# Patient Record
Sex: FEMALE | Race: WHITE | Employment: OTHER | URBAN - METROPOLITAN AREA
[De-identification: names, ages, dates, MRNs, and addresses within clinical notes are randomized per-mention and may not be internally consistent; named-entity substitution may affect disease eponyms.]

---

## 2017-02-10 ENCOUNTER — HOSPITAL ENCOUNTER (OUTPATIENT)
Dept: ONCOLOGY | Age: 67
Discharge: HOME | End: 2017-02-10

## 2017-02-10 ENCOUNTER — OFFICE VISIT (OUTPATIENT)
Dept: ONCOLOGY | Age: 67
End: 2017-02-10

## 2017-02-10 ENCOUNTER — HOSPITAL ENCOUNTER (OUTPATIENT)
Dept: LAB | Age: 67
Discharge: HOME | End: 2017-02-10
Payer: MEDICARE

## 2017-02-10 VITALS
WEIGHT: 108 LBS | DIASTOLIC BLOOD PRESSURE: 83 MMHG | BODY MASS INDEX: 22.67 KG/M2 | TEMPERATURE: 98.3 F | SYSTOLIC BLOOD PRESSURE: 155 MMHG | HEIGHT: 58 IN | HEART RATE: 78 BPM

## 2017-02-10 DIAGNOSIS — T45.1X5A NEUROPATHY DUE TO CHEMOTHERAPEUTIC DRUG (HCC): ICD-10-CM

## 2017-02-10 DIAGNOSIS — G62.0 NEUROPATHY DUE TO CHEMOTHERAPEUTIC DRUG (HCC): ICD-10-CM

## 2017-02-10 DIAGNOSIS — C83.18 MANTLE CELL LYMPHOMA, LYMPH NODES OF MULTIPLE SITES (HCC): Primary | ICD-10-CM

## 2017-02-10 DIAGNOSIS — D72.9 NEUTROPHILIA: ICD-10-CM

## 2017-02-10 DIAGNOSIS — F41.9 ANXIETY: ICD-10-CM

## 2017-02-10 DIAGNOSIS — C83.18 MANTLE CELL LYMPHOMA, LYMPH NODES OF MULTIPLE SITES (HCC): ICD-10-CM

## 2017-02-10 LAB
ALBUMIN SERPL BCP-MCNC: 4.5 G/DL (ref 3.4–5)
ALBUMIN/GLOB SERPL: 1.5 {RATIO} (ref 0.8–1.7)
ALP SERPL-CCNC: 73 U/L (ref 45–117)
ALT SERPL-CCNC: 47 U/L (ref 13–56)
ANION GAP BLD CALC-SCNC: 7 MMOL/L (ref 3–18)
AST SERPL W P-5'-P-CCNC: 24 U/L (ref 15–37)
BASO+EOS+MONOS # BLD AUTO: 0.8 K/UL (ref 0–2.3)
BASO+EOS+MONOS # BLD AUTO: 8 % (ref 0.1–17)
BASOPHILS # BLD AUTO: 0.1 K/UL (ref 0–0.06)
BASOPHILS # BLD: 1 % (ref 0–2)
BILIRUB SERPL-MCNC: 0.5 MG/DL (ref 0.2–1)
BUN SERPL-MCNC: 17 MG/DL (ref 7–18)
BUN/CREAT SERPL: 24 (ref 12–20)
CALCIUM SERPL-MCNC: 9.4 MG/DL (ref 8.5–10.1)
CHLORIDE SERPL-SCNC: 106 MMOL/L (ref 100–108)
CO2 SERPL-SCNC: 27 MMOL/L (ref 21–32)
CREAT SERPL-MCNC: 0.72 MG/DL (ref 0.6–1.3)
DIFFERENTIAL METHOD BLD: ABNORMAL
DIFFERENTIAL METHOD BLD: ABNORMAL
EOSINOPHIL # BLD: 0.2 K/UL (ref 0–0.4)
EOSINOPHIL NFR BLD: 2 % (ref 0–5)
ERYTHROCYTE [DISTWIDTH] IN BLOOD BY AUTOMATED COUNT: 13.2 % (ref 11.5–14.5)
ERYTHROCYTE [DISTWIDTH] IN BLOOD BY AUTOMATED COUNT: 13.8 % (ref 11.6–14.5)
GLOBULIN SER CALC-MCNC: 3.1 G/DL (ref 2–4)
GLUCOSE SERPL-MCNC: 89 MG/DL (ref 74–99)
HCT VFR BLD AUTO: 42 % (ref 36–48)
HCT VFR BLD AUTO: 43.1 % (ref 35–45)
HGB BLD-MCNC: 14.8 G/DL (ref 12–16)
HGB BLD-MCNC: 15.1 G/DL (ref 12–16)
LYMPHOCYTES # BLD AUTO: 10 % (ref 21–52)
LYMPHOCYTES # BLD AUTO: 9 % (ref 14–44)
LYMPHOCYTES # BLD: 0.9 K/UL (ref 1.1–5.9)
LYMPHOCYTES # BLD: 1 K/UL (ref 0.9–3.6)
MCH RBC QN AUTO: 33.2 PG (ref 25–35)
MCH RBC QN AUTO: 34.2 PG (ref 24–34)
MCHC RBC AUTO-ENTMCNC: 35 G/DL (ref 31–37)
MCHC RBC AUTO-ENTMCNC: 35.2 G/DL (ref 31–37)
MCV RBC AUTO: 94.2 FL (ref 78–102)
MCV RBC AUTO: 97.7 FL (ref 74–97)
MONOCYTES # BLD: 0.7 K/UL (ref 0.05–1.2)
MONOCYTES NFR BLD AUTO: 7 % (ref 3–10)
NEUTS SEG # BLD: 8.1 K/UL (ref 1.8–9.5)
NEUTS SEG # BLD: 8.2 K/UL (ref 1.8–8)
NEUTS SEG NFR BLD AUTO: 80 % (ref 40–73)
NEUTS SEG NFR BLD AUTO: 83 % (ref 40–70)
PLATELET # BLD AUTO: 244 K/UL (ref 135–420)
PLATELET # BLD AUTO: 253 K/UL (ref 140–440)
PMV BLD AUTO: 9.3 FL (ref 9.2–11.8)
POTASSIUM SERPL-SCNC: 4.1 MMOL/L (ref 3.5–5.5)
PROT SERPL-MCNC: 7.6 G/DL (ref 6.4–8.2)
RBC # BLD AUTO: 4.41 M/UL (ref 4.2–5.3)
RBC # BLD AUTO: 4.46 M/UL (ref 4.1–5.1)
SODIUM SERPL-SCNC: 140 MMOL/L (ref 136–145)
WBC # BLD AUTO: 10.2 K/UL (ref 4.6–13.2)
WBC # BLD AUTO: 9.8 K/UL (ref 4.5–13)

## 2017-02-10 PROCEDURE — 85025 COMPLETE CBC W/AUTO DIFF WBC: CPT | Performed by: INTERNAL MEDICINE

## 2017-02-10 PROCEDURE — 80053 COMPREHEN METABOLIC PANEL: CPT | Performed by: INTERNAL MEDICINE

## 2017-02-10 PROCEDURE — 36415 COLL VENOUS BLD VENIPUNCTURE: CPT | Performed by: INTERNAL MEDICINE

## 2017-02-10 PROCEDURE — 84155 ASSAY OF PROTEIN SERUM: CPT | Performed by: INTERNAL MEDICINE

## 2017-02-10 NOTE — PROGRESS NOTES
Hematology/Oncology  Progress Note    Name: Akhil Richards  Date: 2/10/2017  : 1950    PCP: Reynaldo Yates MD     Ms. Lyle is a 73year old female who was seen for management of her non-Hodgkin lymphoma, neuropathy,and arthritis    Current therapy: Supportive care and active surveillance. Subjective:     Ms. Lyle is a 71-year-old woman who has a history of mantle cell lymphoma. She has completed a full course of systemic chemotherapy. Today she has no concerns or complaints. She denies any night sweats, fevers or unexplained infections at this time. She denies weight loss. Since her last clinic visit she has not had any further episodes of recurrent diverticulitis. The patient reports that she has continued to maintain a high-fiber diet which was recommended with her last episode of diverticulitis. She is doing well and denies any abdominal pain or discomfort. She also report that her neuropathy associated with her prior chemotherapy is better and has not have to use the Neurontin. Past medical history, family history, and social history: these were reviewed and remains unchanged.     Past Medical History   Diagnosis Date    Anxiety 6/15     IKE-7 was     Calculus of kidney     Clostridium difficile colitis 3/14     Dr. Kevin Montilla adenoma      10/11 Dr. Jenny Trejo Cystic breast     Depression 6/15     PHQ-9 was 15/27    Diverticulitis      recurrent x3 Dr Wilfrido Mckeon liver      10/10 on US, CT ; Fib-4 was 0.74 from 1/15    GI bleed      ischemic colitis on CT, seen by Dr. Erik Wade H/O mammogram 6/3/2016    Hyperlipidemia      calculated 10 year risk score was 3.2% (1/15)    Hypertension      resolved w wt loss    Ischemic colitis ([address] 3/14     Dr Hedrick Matters    Labial abscess      + MRSA    Lymphoma ([address]      Dr. Valentino Maxcy    Migraine     [facility] to [facility]     Prediabetes     Zoster      left T11     Past Surgical History   Procedure Laterality Date    Hx appendectomy  1968    Hx orthopaedic  teenager     knee surgery left    Hx mele and bso  04/02     Dr. Yaneli Jansen Pr cardiac surg procedure unlist  2/14     echo shows mild conc lvh, ef 60-65%. no wma    Hx colonoscopy       colon polyps 2003 Dr. Dianne Hernandez; adenoma 2011; isch colitis 3/14 Dr Johnna Nathan Hx other surgical       bx for lymphoma    Hx heent       tonsillectomy     Social History     Social History    Marital status:      Spouse name: N/A    Number of children: 2    Years of education: N/A     Occupational History    director [employer]      Social History Main Topics    Smoking status: Current Every Day Smoker     Packs/day: 0.50    Smokeless tobacco: Never Used      Comment: smoking cessation advised    Alcohol use 2.5 oz/week     5 Glasses of wine per week      Comment: socially    Drug use: No    Sexual activity: Not Currently     Other Topics Concern    Not on file     Social History Narrative     Family History   Problem Relation Age of Onset    Adopted: Yes     Current Outpatient Prescriptions   Medication Sig Dispense Refill    hyoscyamine (LEVSIN) 0.125 mg tablet Take 1 Tab by mouth every four (4) hours as needed for Cramping. 40 Tab 0       Review of Systems  Constitutional: The patient has no complaints or acute distress  HEENT: The patient denies recent head trauma, eye pain, blurred vision,  hearing deficit, oropharyngeal mucosal pain or lesions, and the patient denies throat pain or discomfort. Lymphatics: The patient denies palpable peripheral lymphadenopathy. Hematologic: The patient denies having bruising, bleeding, or progressive fatigue. Respiratory: Patient denies cough,SOB, or fevers. Cardiovascular: The patient denies having leg pain, leg swelling, heart palpitations, chest permit, chest pain, or lightheadedness. The patient denies having dyspnea on exertion.   Neurologic: The patient is complaining of paresthesias in her hands and feet and legs due to prior chemotherapy use. Gastrointestinal: The patient denies having nausea, emesis, or diarrhea. The patient denies having any hematemesis or blood in the stool. Genitourinary: Patient denies having urinary urgency, frequency, or dysuria. The patient denies having blood in the urine. Psychological: The patient denies having symptoms of nervousness, anxiety, depression, or thoughts of harming himself some of this. Skin: Patient denies having skin rashes, skin, ulcerations, or unexplained itching or pruritus. Musculoskeletal: The patient denies pains in muscles or joints    Objective:     Visit Vitals    /83    Pulse 78    Temp 98.3 °F (36.8 °C)    Ht 4' 10\" (1.473 m)    Wt 49 kg (108 lb)    BMI 22.57 kg/m2     ECOG PS=0, pain score=0/10   Physical Exam:   Gen. Appearance: The patient is in no acute distress. Skin: There is no bruise or rash. HEENT: The exam is unremarkable. Neck: Supple without lymphadenopathy or thyromegaly. Lungs: Clear to auscultation and percussion; there are no wheezes or rhonchi. Heart: Regular rate and rhythm; there are no murmurs, gallops, or rubs. Abdomen: Bowel sounds are present and normal.  There is no guarding, tenderness, or hepatosplenomegaly. Extremities: There is no clubbing, cyanosis, or edema. Neurologic: There are no focal neurologic deficits, except for her complaints of paresthesias in her hands and feet. .  Lymphatics: There is no palpable peripheral lymphadenopathy. Musculoskeletal: The patient has full range of motion at all joints. There is no evidence of joint deformity or effusions. There is no focal joint tenderness. She does complain of pain on standing and with flexion and extension of the leg and hip and knee joints. Psychological/psychiatric: There is no clinical evidence of anxiety, depression, or melancholy.     Lab data:      Results for orders placed or performed during the hospital encounter of 02/10/17   CBC WITH 3 PART DIFF     Status: Abnormal   Result Value Ref Range Status    WBC 9.8 4.5 - 13.0 K/uL Final    RBC 4.46 4.10 - 5.10 M/uL Final    HGB 14.8 12.0 - 16.0 g/dL Final    HCT 42.0 36 - 48 % Final    MCV 94.2 78 - 102 FL Final    MCH 33.2 25.0 - 35.0 PG Final    MCHC 35.2 31 - 37 g/dL Final    RDW 13.2 11.5 - 14.5 % Final    PLATELET 095 226 - 426 K/uL Final    NEUTROPHILS 83 (H) 40 - 70 % Final    MIXED CELLS 8 0.1 - 17 % Final    LYMPHOCYTES 9 (L) 14 - 44 % Final    ABS. NEUTROPHILS 8.1 1.8 - 9.5 K/UL Final    ABS. MIXED CELLS 0.8 0.0 - 2.3 K/uL Final    ABS. LYMPHOCYTES 0.9 (L) 1.1 - 5.9 K/UL Final     Comment: Test performed at [address] Results Reviewed by Medical Director. DF AUTOMATED   Final           Assessment:     1. Mantle cell lymphoma, lymph nodes of multiple sites ([facility]    2. Neutrophilia    3. Neuropathy due to chemotherapeutic drug ([facility]    4. Anxiety      Plan:   Antecedent lymphoma, multiple sites: The patient has completed a full course of systemic chemotherapy. Clinically there is no evidence of disease recurrence. I will recheck her comprehensive metabolic panel, sedimentation rate and the liver function tests. Neutrophilia: I explained to the patient that she is at risk for the development of myelodysplastic syndrome from prior chemotherapy. Her neutrophil count today remain at 76%. A prior flow cytometry did not show any evidence of immunophenotypic abnormalities. There was no evidence of any ongoing leukemia or recurrent lymphoma. .    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 We will continue to monitor this every 4 months at the time of her followup visits.     The patient will return in 4months for a complete reassessment     Orders Placed This Encounter    COMPLETE CBC & AUTO DIFF WBC    InHouse CBC ([employer]))     Standing Status:   Future     Number of Occurrences:   1     Standing Expiration Date:   3/10/0810    METABOLIC PANEL, COMPREHENSIVE     Standing Status:   Future     Standing Expiration Date:   2018    SPEP     Standing Status:   Future     Standing Expiration Date:   2018    CBC WITH AUTOMATED DIFF     Standing Status:   Future     Standing Expiration Date:   2018       Fred Schmitt MD  2/10/2017                                                                                            Hematology/Oncology  Progress Note    Name: Claritza Marie  Date: 2/10/2017  : 1950    PCP: Maude Marcos MD     Ms. Katheryn Barnes is a 60year old female who was seen for management of her non-Hodgkin lymphoma, neuropathy,and arthritis  Current therapy: Supportive care and active surveillance. Subjective:     Ms. Katheryn Barnes is a 41-year-old woman who has a history of mantle cell lymphoma. She has completed a full course of systemic chemotherapy. Today she has no concerns or complaints. She states last week she had three nights of sweats, but this has since resolved. She denies fever or weight loss. She still has complaints of the neuropathy associated with her prior chemotherapy usage, however this is much better since starting Neurontin. Past medical history, family history, and social history: these were reviewed and remains unchanged.     Past Medical History   Diagnosis Date    Anxiety 6/15     IKE-7 was     Calculus of kidney     Clostridium difficile colitis 3/14     Dr. Claudia Christensenble adenoma      10/11 Dr. Stevie Severs Cystic breast     Depression 6/15     PHQ-9 was 15/27    Diverticulitis      recurrent x3 Dr Oc Peck liver      10/10 on US, CT ; Fib-4 was 0.74 from 1/15    GI bleed      ischemic colitis on CT, seen by Dr. Beto Mills H/O mammogram 6/3/2016    Hyperlipidemia      calculated 10 year risk score was 3.2% (1/15)    Hypertension resolved w wt loss    Ischemic colitis ([facility] 3/14     Dr Gary Renee    Labial abscess      + MRSA    Lymphoma ([facility] 12/13     Dr. Preston Keen    Migraine     Neuropathy due to chemotherapeutic drug ([facility] 9/14    Prediabetes     Zoster 8/14     left T11     Past Surgical History   Procedure Laterality Date    Hx appendectomy  1968    Hx orthopaedic  teenager     knee surgery left    Hx mele and bso  04/02     Dr. Steph Gaviria Pr cardiac surg procedure unlist  2/14     echo shows mild conc lvh, ef 60-65%. no wma    Hx colonoscopy       colon polyps 2003 Dr. 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[employer] [place]      Social History Main Topics    Smoking status: Current Every Day Smoker     Packs/day: 0.50    Smokeless tobacco: Never Used      Comment: smoking cessation advised    Alcohol use 2.5 oz/week     5 Glasses of wine per week      Comment: socially    Drug use: No    Sexual activity: Not Currently     Other Topics Concern    Not on file     Social History Narrative     Family History   Problem Relation Age of Onset    Adopted: Yes     Current Outpatient Prescriptions   Medication Sig Dispense Refill    hyoscyamine (LEVSIN) 0.125 mg tablet Take 1 Tab by mouth every four (4) hours as needed for Cramping. 40 Tab 0       Review of Systems  Constitutional: The patient has no complaints or acute distress  HEENT: The patient denies recent head trauma, eye pain, blurred vision,  hearing deficit, oropharyngeal mucosal pain or lesions, and the patient denies throat pain or discomfort. Lymphatics: The patient denies palpable peripheral lymphadenopathy. Hematologic: The patient denies having bruising, bleeding, or progressive fatigue.   Respiratory: Patient denies cough,SOB, or fevers. Cardiovascular: The patient denies having leg pain, leg swelling, heart palpitations, chest permit, chest pain, or lightheadedness. The patient denies having dyspnea on exertion. Neurologic: The patient is complaining of paresthesias in her hands and feet and legs due to prior chemotherapy use. Gastrointestinal: The patient denies having nausea, emesis, or diarrhea. The patient denies having any hematemesis or blood in the stool. Genitourinary: Patient denies having urinary urgency, frequency, or dysuria. The patient denies having blood in the urine. Psychological: The patient denies having symptoms of nervousness, anxiety, depression, or thoughts of harming himself some of this. Skin: Patient denies having skin rashes, skin, ulcerations, or unexplained itching or pruritus. Musculoskeletal: The patient denies pains in muscles or joints    Objective:     Visit Vitals    /83    Pulse 78    Temp 98.3 °F (36.8 °C)    Ht 4' 10\" (1.473 m)    Wt 49 kg (108 lb)    BMI 22.57 kg/m2     ECOG PS=0, pain score=0/10   Physical Exam:   Gen. Appearance: The patient is in no acute distress. Skin: There is no bruise or rash. HEENT: The exam is unremarkable. Neck: Supple without lymphadenopathy or thyromegaly. Lungs: Clear to auscultation and percussion; there are no wheezes or rhonchi. Heart: Regular rate and rhythm; there are no murmurs, gallops, or rubs. Abdomen: Bowel sounds are present and normal.  There is no guarding, tenderness, or hepatosplenomegaly. Extremities: There is no clubbing, cyanosis, or edema. Neurologic: There are no focal neurologic deficits, except for her complaints of paresthesias in her hands and feet. .  Lymphatics: There is no palpable peripheral lymphadenopathy. Musculoskeletal: The patient has full range of motion at all joints. There is no evidence of joint deformity or effusions. There is no focal joint tenderness.  She does complain of pain on standing and with flexion and extension of the leg and hip and knee joints. Psychological/psychiatric: There is no clinical evidence of anxiety, depression, or melancholy. Lab data:      Results for orders placed or performed during the hospital encounter of 02/10/17   CBC WITH 3 PART DIFF     Status: Abnormal   Result Value Ref Range Status    WBC 9.8 4.5 - 13.0 K/uL Final    RBC 4.46 4.10 - 5.10 M/uL Final    HGB 14.8 12.0 - 16.0 g/dL Final    HCT 42.0 36 - 48 % Final    MCV 94.2 78 - 102 FL Final    MCH 33.2 25.0 - 35.0 PG Final    MCHC 35.2 31 - 37 g/dL Final    RDW 13.2 11.5 - 14.5 % Final    PLATELET 421 996 - 196 K/uL Final    NEUTROPHILS 83 (H) 40 - 70 % Final    MIXED CELLS 8 0.1 - 17 % Final    LYMPHOCYTES 9 (L) 14 - 44 % Final    ABS. NEUTROPHILS 8.1 1.8 - 9.5 K/UL Final    ABS. MIXED CELLS 0.8 0.0 - 2.3 K/uL Final    ABS. LYMPHOCYTES 0.9 (L) 1.1 - 5.9 K/UL Final     Comment: Test performed at [address]. Results Reviewed by Medical Director. DF AUTOMATED   Final           Assessment:     1. Mantle cell lymphoma, lymph nodes of multiple sites ([facility]    2. Neutrophilia    3. Neuropathy due to chemotherapeutic drug ([facility]    4. Anxiety      Plan:   Antecedent lymphoma, multiple sites: The patient has completed a full course of systemic chemotherapy. Clinically there is no evidence of disease recurrence. I will recheck her comprehensive metabolic panel, sedimentation rate and the liver function tests. Neutrophilia: I explained to the patient that she is at risk for the development of myelodysplastic syndrome from prior chemotherapy. Her neutrophil count today is 83%. A prior flow cytometry did not show any evidence of immunophenotypic abnormalities. There was no evidence of any ongoing leukemia or recurrent lymphoma. .    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  We will continue to monitor this every 4 months at the time of her followup visits. Anxiety: The patient reports that her anxiety symptoms have been under control over the last 4 months. She has been doing reasonably well. She does not require any Ativan at this time.     The patient will return in 4months for a complete reassessment     Orders Placed This Encounter    COMPLETE CBC & AUTO DIFF WBC    InHouse CBC ([employer])     Standing Status:   Future     Number of Occurrences:   1     Standing Expiration Date:   7/78/6236    METABOLIC PANEL, COMPREHENSIVE     Standing Status:   Future     Standing Expiration Date:   2/11/2018    SPEP     Standing Status:   Future     Standing Expiration Date:   2/11/2018    CBC WITH AUTOMATED DIFF     Standing Status:   Future     Standing Expiration Date:   2/11/2018       Roger Urbina MD  2/10/2017

## 2017-02-10 NOTE — MR AVS SNAPSHOT
Visit Information Date & Time Provider Department Dept. Phone Encounter #  
 2/10/2017 10:15 AM Bandar Gomez [facility] Office [phone] Follow-up Instructions Return in about 4 months (around 6/10/2017). Your Appointments 6/9/2017 11:00 AM  
Office Visit with Bandar Gomez MD  
[facility] [address] [facility]) Appt Note: 4 mo fu  
 [facility] [address] [place] [id number] [phone]  
  
   
 [facility] [address] [facility] Upcoming Health Maintenance Date Due DTaP/Tdap/Td series (1 - Tdap) 8/3/2004 GLAUCOMA SCREENING Q2Y 11/15/2016 OSTEOPOROSIS SCREENING (DEXA) 11/15/2016 MEDICARE YEARLY EXAM 11/15/2016 Pneumococcal 65+ High/Highest Risk (2 of 2 - PPSV23) 10/20/2017 BREAST CANCER SCRN MAMMOGRAM 6/3/2018 COLONOSCOPY 3/20/2024 Allergies as of 2/10/2017  Review Complete On: 10/17/2016 By: Scott Cotter MD  
  
 Severity Noted Reaction Type Reactions Keflex [Cephalexin] Medium 08/12/2013   Side Effect Nausea Only Augmentin [Amoxicillin-pot Clavulanate]    Diarrhea Demerol [Meperidine]    Nausea Only Current Immunizations  Reviewed on 5/31/2016 Name Date Influenza Vaccine (Quad) PF 9/29/2016, 10/22/2015  3:30 PM  
 Influenza Vaccine PF 1/23/2014 PPD 8/15/2000 Pneumococcal Conjugate (PCV-13) 10/22/2015  3:31 PM  
 Pneumococcal Vaccine (Unspecified Type) 10/20/2012 TD Vaccine 8/2/2004 Zoster Vaccine, Live 1/1/2012 Not reviewed this visit You Were Diagnosed With   
  
 Codes Comments Mantle cell lymphoma, lymph nodes of multiple sites [facility])    -  Primary ICD-10-CM: C83.18 
ICD-9-CM: 200.48 Neutrophilia     ICD-10-CM: D72.9 ICD-9-CM: 288.8 Neuropathy due to chemotherapeutic drug ([facility]     ICD-10-CM: G62.0, T45.1X5A 
ICD-9-CM: 357.6, E933.1 Anxiety     ICD-10-CM: F41.9 ICD-9-CM: 300.00 Vitals BP Pulse Temp Height(growth percentile) Weight(growth percentile) BMI  
 155/83 78 98.3 °F (36.8 °C) 4' 10\" (1.473 m) 108 lb (49 kg) 22.57 kg/m2 OB Status Smoking Status Hysterectomy Current Every Day Smoker BMI and BSA Data Body Mass Index Body Surface Area  
 22.57 kg/m 2 1.42 m 2 Preferred Pharmacy Pharmacy Name Phone [facility] [address], [address] [phone] Your Updated Medication List  
  
   
This list is accurate as of: 2/10/17 11:43 AM.  Always use your most recent med list.  
  
  
  
  
 hyoscyamine 0.125 mg tablet Commonly known as:  Traiana Memory Take 1 Tab by mouth every four (4) hours as needed for Cramping. We Performed the Following COMPLETE CBC & AUTO DIFF WBC [20541 CPT(R)] Follow-up Instructions Return in about 4 months (around 6/10/2017). To-Do List   
 02/10/2017 Lab:  CBC WITH 3 PART DIFF   
  
 02/10/2017 Lab:  METABOLIC PANEL, COMPREHENSIVE   
  
 02/10/2017 Lab:  PROTEIN ELECTROPHORESIS   
  
 02/11/2017 Lab:  CBC WITH AUTOMATED DIFF Introducing [facility]! [address] [facility] introduces [employer] patient portal. Now you can access parts of your medical record, email your doctor's office, and request medication refills online. 1. In your internet browser, go to [email or web address] 2. Click on the First Time User? Click Here link in the Sign In box. You will see the New Member Sign Up page. 3. Enter your [employer] Access Code exactly as it appears below. You will not need to use this code after youve completed the sign-up process. If you do not sign up before the expiration date, you must request a new code. · [employer] Access Code: [email or web address] Expires: 5/11/2017 11:43 AM 
 
4. Enter the last four digits of your Social Security Number (xxxx) and Date of Birth (mm/dd/yyyy) as indicated and click Submit.  You will be taken to the next sign-up page. 5. Create a [email or web address][employer]sh ID. This will be your [employer]CH[employer] login ID and cannot be changed, so think of one that is secure and easy to remember. 6. Create a [employer]ORCH[employer]sh password. You can change your password at any time. 7. Enter your Password Reset Question and Answer. This can be used at a later time if you forget your password. 8. Enter your e-mail address. You will receive e-mail notification when new information is available in [address] 9. Click Sign Up. You can now view and download portions of your medical record. 10. Click the Download Summary menu link to download a portable copy of your medical information. If you have questions, please visit the Frequently Asked Questions section of the [employer]CH[employer] website. Remember, [email or web address] is NOT to be used for urgent needs. For medical emergencies, dial 911. Now available from your iPhone and Android! Please provide this summary of care documentation to your next provider. Your primary care clinician is listed as Emilie Ennis. If you have any questions after today's visit, please call [phone].

## 2017-02-13 LAB
ALBUMIN SERPL ELPH-MCNC: 4.4 G/DL (ref 2.9–4.4)
ALBUMIN/GLOB SERPL: 1.5 {RATIO} (ref 0.7–1.7)
ALPHA1 GLOB SERPL ELPH-MCNC: 0.3 G/DL (ref 0–0.4)
ALPHA2 GLOB SERPL ELPH-MCNC: 0.8 G/DL (ref 0.4–1)
B-GLOBULIN SERPL ELPH-MCNC: 1 G/DL (ref 0.7–1.3)
GAMMA GLOB SERPL ELPH-MCNC: 0.9 G/DL (ref 0.4–1.8)
GLOBULIN SER CALC-MCNC: 2.9 G/DL (ref 2.2–3.9)
M PROTEIN SERPL ELPH-MCNC: 0.7 G/DL
PROT SERPL-MCNC: 7.3 G/DL (ref 6–8.5)

## 2017-04-27 ENCOUNTER — HOSPITAL ENCOUNTER (OUTPATIENT)
Dept: VASCULAR SURGERY | Age: 67
Discharge: HOME | End: 2017-04-27
Attending: PODIATRIST
Payer: MEDICARE

## 2017-04-27 DIAGNOSIS — I70.223 ATHEROSCLEROSIS OF NATIVE ARTERY OF BOTH LOWER EXTREMITIES WITH REST PAIN (HCC): ICD-10-CM

## 2017-04-27 PROCEDURE — 93923 UPR/LXTR ART STDY 3+ LVLS: CPT

## 2017-04-27 NOTE — PROCEDURES
[facility]  *** FINAL REPORT ***    Name: Samira Carrion  MRN: [id number]    Outpatient  : 15 Nov 1950  HIS Order #: [id number]  [address] [facility] Visit #: [id number]  Date: 2017    TYPE OF TEST: Peripheral Arterial Testing    REASON FOR TEST  Tobacco use, Peripheral vascular dz NOS    Right Leg  Segmentals: Normal                     mmHg  Brachial         142  High thigh  Low thigh  Calf             158  Posterior tibial 141  Dorsalis pedis   146  Peroneal  Metatarsal  Toe pressure     108  Doppler:    Normal  Ankle/Brachial: 1.01    Left Leg  Segmentals: Normal                     mmHg  Brachial         144  High thigh         0  Low thigh  Calf             164  Posterior tibial 142  Dorsalis pedis   158  Peroneal  Metatarsal  Toe pressure      98  Doppler:    Normal  Ankle/Brachial: 1.10    INTERPRETATION/FINDINGS  Physiologic testing was performed using continuous wave Doppler and  segmental pressures. 1. No evidence of significant peripheral arterial disease at rest in  the right leg. 2. No evidence of significant peripheral arterial disease at rest in  the left leg. 3. The right ankle/brachial index is 1.01 and the left ankle/brachial  index is 1.10.  4. The right digit/brachial index is 0.75 and the left digit/brachial  index is 0.68    ADDITIONAL COMMENTS  No previous study available for comparison. I have personally reviewed the data relevant to the interpretation of  this  study. TECHNOLOGIST: ARMANI Franco, JOE  Signed: 2017 02:43 PM    PHYSICIAN: Flores Guthrie MD  Signed: 2017 09:13 AM

## 2017-06-23 ENCOUNTER — HOSPITAL ENCOUNTER (OUTPATIENT)
Dept: LAB | Age: 67
Discharge: HOME | End: 2017-06-23
Payer: MEDICARE

## 2017-06-23 ENCOUNTER — HOSPITAL ENCOUNTER (OUTPATIENT)
Dept: ONCOLOGY | Age: 67
Discharge: HOME | End: 2017-06-23

## 2017-06-23 ENCOUNTER — OFFICE VISIT (OUTPATIENT)
Dept: ONCOLOGY | Age: 67
End: 2017-06-23

## 2017-06-23 VITALS
BODY MASS INDEX: 22.36 KG/M2 | WEIGHT: 107 LBS | TEMPERATURE: 98.2 F | SYSTOLIC BLOOD PRESSURE: 191 MMHG | HEART RATE: 61 BPM | DIASTOLIC BLOOD PRESSURE: 85 MMHG

## 2017-06-23 DIAGNOSIS — G62.0 NEUROPATHY DUE TO CHEMOTHERAPEUTIC DRUG (HCC): ICD-10-CM

## 2017-06-23 DIAGNOSIS — D72.9 NEUTROPHILIA: ICD-10-CM

## 2017-06-23 DIAGNOSIS — C83.18 MANTLE CELL LYMPHOMA, LYMPH NODES OF MULTIPLE SITES (HCC): Primary | ICD-10-CM

## 2017-06-23 DIAGNOSIS — T45.1X5A NEUROPATHY DUE TO CHEMOTHERAPEUTIC DRUG (HCC): ICD-10-CM

## 2017-06-23 DIAGNOSIS — F32.9 REACTIVE DEPRESSION: ICD-10-CM

## 2017-06-23 DIAGNOSIS — F41.9 ANXIETY: ICD-10-CM

## 2017-06-23 DIAGNOSIS — C83.18 MANTLE CELL LYMPHOMA, LYMPH NODES OF MULTIPLE SITES (HCC): ICD-10-CM

## 2017-06-23 LAB
ALBUMIN SERPL BCP-MCNC: 4.2 G/DL (ref 3.4–5)
ALBUMIN/GLOB SERPL: 1.3 {RATIO} (ref 0.8–1.7)
ALP SERPL-CCNC: 63 U/L (ref 45–117)
ALT SERPL-CCNC: 52 U/L (ref 13–56)
ANION GAP BLD CALC-SCNC: 8 MMOL/L (ref 3–18)
AST SERPL W P-5'-P-CCNC: 35 U/L (ref 15–37)
BASO+EOS+MONOS # BLD AUTO: 0.5 K/UL (ref 0–2.3)
BASO+EOS+MONOS # BLD AUTO: 6 % (ref 0.1–17)
BILIRUB SERPL-MCNC: 0.3 MG/DL (ref 0.2–1)
BUN SERPL-MCNC: 16 MG/DL (ref 7–18)
BUN/CREAT SERPL: 23 (ref 12–20)
CALCIUM SERPL-MCNC: 9.4 MG/DL (ref 8.5–10.1)
CHLORIDE SERPL-SCNC: 107 MMOL/L (ref 100–108)
CO2 SERPL-SCNC: 25 MMOL/L (ref 21–32)
CREAT SERPL-MCNC: 0.71 MG/DL (ref 0.6–1.3)
DIFFERENTIAL METHOD BLD: ABNORMAL
ERYTHROCYTE [DISTWIDTH] IN BLOOD BY AUTOMATED COUNT: 12.6 % (ref 11.5–14.5)
ERYTHROCYTE [SEDIMENTATION RATE] IN BLOOD: 9 MM/HR (ref 0–30)
GLOBULIN SER CALC-MCNC: 3.3 G/DL (ref 2–4)
GLUCOSE SERPL-MCNC: 87 MG/DL (ref 74–99)
HCT VFR BLD AUTO: 40.1 % (ref 36–48)
HGB BLD-MCNC: 14.1 G/DL (ref 12–16)
LYMPHOCYTES # BLD AUTO: 12 % (ref 14–44)
LYMPHOCYTES # BLD: 0.9 K/UL (ref 1.1–5.9)
MCH RBC QN AUTO: 33.3 PG (ref 25–35)
MCHC RBC AUTO-ENTMCNC: 35.2 G/DL (ref 31–37)
MCV RBC AUTO: 94.6 FL (ref 78–102)
NEUTS SEG # BLD: 6.1 K/UL (ref 1.8–9.5)
NEUTS SEG NFR BLD AUTO: 82 % (ref 40–70)
PLATELET # BLD AUTO: 244 K/UL (ref 140–440)
POTASSIUM SERPL-SCNC: 4.9 MMOL/L (ref 3.5–5.5)
PROT SERPL-MCNC: 7.5 G/DL (ref 6.4–8.2)
RBC # BLD AUTO: 4.24 M/UL (ref 4.1–5.1)
SODIUM SERPL-SCNC: 140 MMOL/L (ref 136–145)
WBC # BLD AUTO: 7.5 K/UL (ref 4.5–13)

## 2017-06-23 PROCEDURE — 85652 RBC SED RATE AUTOMATED: CPT | Performed by: INTERNAL MEDICINE

## 2017-06-23 PROCEDURE — 80053 COMPREHEN METABOLIC PANEL: CPT | Performed by: INTERNAL MEDICINE

## 2017-06-23 PROCEDURE — 36415 COLL VENOUS BLD VENIPUNCTURE: CPT | Performed by: INTERNAL MEDICINE

## 2017-06-23 NOTE — PATIENT INSTRUCTIONS
Non-Hodgkin's Lymphoma: Care Instructions  Your Care Instructions  Non-Hodgkin's lymphoma is a type of cancer that affects part of the immune system (lymph system). Cells normally found in the lymph nodes, spleen, and other parts of the lymph system increase in number and collect in areas where they cause problems. Non-Hodgkin's lymphoma is not contagious and is not caused by an injury. Non-Hodgkin's lymphoma may occur in a single lymph node, a group of lymph nodes, or an organ. It can spread to almost any part of the body, including the liver, bone marrow, and spleen. Treatment for non-Hodgkin's lymphoma depends on the stage of the lymphoma. It is usually treated with medicines called chemotherapy. Your doctor may also give you medicines that work on the body's immune system (immunotherapy). You may also need radiation treatments or a procedure called a bone marrow transplant. Your doctor will talk to you about what kind of treatment may be best for you. When you find out that you have cancer, you may feel many emotions and may need some help coping. Seek out family, friends, and counselors for support. You also can do things at home to make yourself feel better while you go through treatment. Call the [employer] (8-331.399.9641) or visit its website at [employer] for more information. Follow-up care is a key part of your treatment and safety. Be sure to make and go to all appointments, and call your doctor if you are having problems. It's also a good idea to know your test results and keep a list of the medicines you take. How can you care for yourself at home? · Take your medicines exactly as prescribed. Call your doctor if you think you are having a problem with your medicine. You may get medicine for nausea and vomiting if you have these side effects. · Eat healthy food.  If you do not feel like eating, try to eat food that has protein and extra calories to keep up your strength and prevent weight loss. Drink liquid meal replacements for extra calories and protein. Try to eat your main meal early. · Get some physical activity every day, but do not get too tired. Keep doing the hobbies you enjoy as your energy allows. · Take steps to control your stress and workload. Learn relaxation techniques. ¨ Share your feelings. Stress and tension affect our emotions. By expressing your feelings to others, you may be able to understand and cope with them. ¨ Consider joining a support group. Talking about a problem with your spouse, a good friend, or other people with similar problems is a good way to reduce tension and stress. ¨ Express yourself through art. Try writing, crafts, dance, or art to relieve stress. Some dance, writing, or art groups may be available just for people who have cancer. ¨ Be kind to your body and mind. Getting enough sleep, eating a healthy diet, and taking time to do things you enjoy can contribute to an overall feeling of balance in your life and can help reduce stress. ¨ Get help if you need it. Discuss your concerns with your doctor or counselor. · If you are vomiting or have diarrhea:  ¨ Drink plenty of fluids (enough so that your urine is light yellow or clear like water) to prevent dehydration. Choose water and other caffeine-free clear liquids. If you have kidney, heart, or liver disease and have to limit fluids, talk with your doctor before you increase the amount of fluids you drink. ¨ When you are able to eat, try clear soups, mild foods, and liquids until all symptoms are gone for 12 to 48 hours. Other good choices include dry toast, crackers, cooked cereal, and gelatin dessert, such as Jell-O.  · If you have not already done so, prepare a list of advance directives. Advance directives are instructions to your doctor and family members about what kind of care you want if you become unable to speak or express yourself. When should you call for help?   Call [phone] anytime you think you may need emergency care. For example, call if:  · You passed out (lost consciousness). · You have trouble breathing. · You cough up blood. · You vomit blood or what looks like coffee grounds. · You pass maroon or very bloody stools. · You have symptoms of a stroke. These may include:  ¨ Sudden numbness, tingling, weakness, or loss of movement in your face, arm, or leg, especially on only one side of your body. ¨ Sudden vision changes. ¨ Sudden trouble speaking. ¨ Sudden confusion or trouble understanding simple statements. ¨ Sudden problems with walking or balance. ¨ A sudden, severe headache that is different from past headaches. Call your doctor now or seek immediate medical care if:  · You have severe pain. · You are dizzy or lightheaded, or you feel like you may faint. · You are sick to your stomach or cannot keep fluids down. · You have any unusual bleeding, such as:  ¨ Blood spots under the skin. ¨ A nosebleed that you cannot stop. ¨ Bleeding gums when you brush your teeth. ¨ Blood in your urine. ¨ Vaginal bleeding when you are not having your period, or heavy period bleeding. · Your stools are black and tarlike or have streaks of blood. · You have signs of an infection, such as:  ¨ Increased pain, swelling, warmth, or redness. ¨ Red streaks leading from a bruise. ¨ Pus draining from a wound. ¨ A fever or chills. ¨ Burning when you urinate. Watch closely for changes in your health, and be sure to contact your doctor if:  · You cough up yellow or green mucus. · You have trouble controlling your pain. Where can you learn more? Go to http://kevin-justen.info/. Enter O194 in the search box to learn more about \"Non-Hodgkin's Lymphoma: Care Instructions. \"  Current as of: July 26, 2016  Content Version: 11.3  © 9507-2684 [employer].  Care instructions adapted under license by [employer] (which disclaims liability or warranty for this information). If you have questions about a medical condition or this instruction, always ask your healthcare professional. [place] [address] any warranty or liability for your use of this information.

## 2017-06-23 NOTE — MR AVS SNAPSHOT
Visit Information Date & Time Provider Department Dept. Phone Encounter #  
 6/23/2017  1:00 PM Mati Juarez[facility] Office [phone] [id number] Follow-up Instructions Return in about 4 months (around 10/23/2017). Your Appointments 10/20/2017  2:00 PM  
Office Visit with Mati Juarez MD  
[address] ([facility]) Appt Note: 4 mo fu  
 [facility] [address] [place] [address] [address] [facility] [address]  
[phone]  
  
   
 [address] Upcoming Health Maintenance Date Due DTaP/Tdap/Td series (1 - Tdap) 8/3/2004 GLAUCOMA SCREENING Q2Y 11/15/2016 OSTEOPOROSIS SCREENING (DEXA) 11/15/2016 MEDICARE YEARLY EXAM 11/15/2016 INFLUENZA AGE 9 TO ADULT 8/1/2017 Pneumococcal 65+ High/Highest Risk (2 of 2 - PPSV23) 10/20/2017 BREAST CANCER SCRN MAMMOGRAM 6/3/2018 COLONOSCOPY 3/20/2024 Allergies as of 6/23/2017  Review Complete On: 6/23/2017 By: Mati Juarez MD  
  
 Severity Noted Reaction Type Reactions Keflex [Cephalexin] Medium 08/12/2013   Side Effect Nausea Only Augmentin [Amoxicillin-pot Clavulanate]    Diarrhea Demerol [Meperidine]    Nausea Only Current Immunizations  Reviewed on 5/31/2016 Name Date Influenza Vaccine (Quad) PF 9/29/2016, 10/22/2015  3:30 PM  
 Influenza Vaccine PF 1/23/2014 PPD 8/15/2000 Pneumococcal Conjugate (PCV-13) 10/22/2015  3:31 PM  
 Pneumococcal Vaccine (Unspecified Type) 10/20/2012 TD Vaccine 8/2/2004 Zoster Vaccine, Live 1/1/2012 Not reviewed this visit You Were Diagnosed With   
  
 Codes Comments Mantle cell lymphoma, lymph nodes of multiple sites [facility])    -  Primary ICD-10-CM: C83.18 
ICD-9-CM: 200.48 Neutrophilia     ICD-10-CM: D72.9 ICD-9-CM: 288.8 Neuropathy due to chemotherapeutic drug ([address]     ICD-10-CM: G62.0, T45.1X5A 
ICD-9-CM: 357.6, E933.1 Reactive depression     ICD-10-CM: F32.9 ICD-9-CM: 300.4 Anxiety     ICD-10-CM: F41.9 ICD-9-CM: 300.00 Vitals BP Pulse Temp Weight(growth percentile) BMI OB Status 191/85 61 98.2 °F (36.8 °C) 107 lb (48.5 kg) 22.36 kg/m2 Hysterectomy Smoking Status Current Every Day Smoker BMI and BSA Data Body Mass Index Body Surface Area  
 22.36 kg/m 2 1.41 m 2 Preferred Pharmacy Pharmacy Name Phone [facility] [address], [address] [phone] Your Updated Medication List  
  
   
This list is accurate as of: 6/23/17  3:01 PM.  Always use your most recent med list.  
  
  
  
  
 hyoscyamine 0.125 mg tablet Commonly known as:  Loreda So Take 1 Tab by mouth every four (4) hours as needed for Cramping. We Performed the Following COMPLETE CBC & AUTO DIFF WBC [44192 CPT(R)] METABOLIC PANEL, COMPREHENSIVE [66345 CPT(R)] SED RATE (ESR) [id number] CPT(R)] Follow-up Instructions Return in about 4 months (around 10/23/2017). To-Do List   
 06/23/2017 Lab:  CBC WITH 3 PART DIFF Patient Instructions Non-Hodgkin's Lymphoma: Care Instructions Your Care Instructions Non-Hodgkin's lymphoma is a type of cancer that affects part of the immune system (lymph system). Cells normally found in the lymph nodes, spleen, and other parts of the lymph system increase in number and collect in areas where they cause problems. Non-Hodgkin's lymphoma is not contagious and is not caused by an injury. Non-Hodgkin's lymphoma may occur in a single lymph node, a group of lymph nodes, or an organ. It can spread to almost any part of the body, including the liver, bone marrow, and spleen. Treatment for non-Hodgkin's lymphoma depends on the stage of the lymphoma. It is usually treated with medicines called chemotherapy.  Your doctor may also give you medicines that work on the body's immune system (immunotherapy). You may also need radiation treatments or a procedure called a bone marrow transplant. Your doctor will talk to you about what kind of treatment may be best for you. When you find out that you have cancer, you may feel many emotions and may need some help coping. Seek out family, friends, and counselors for support. You also can do things at home to make yourself feel better while you go through treatment. Call the [employer] (5-367.282.5423) or visit its website at [employer] [employer] for more information. Follow-up care is a key part of your treatment and safety. Be sure to make and go to all appointments, and call your doctor if you are having problems. It's also a good idea to know your test results and keep a list of the medicines you take. How can you care for yourself at home? · Take your medicines exactly as prescribed. Call your doctor if you think you are having a problem with your medicine. You may get medicine for nausea and vomiting if you have these side effects. · Eat healthy food. If you do not feel like eating, try to eat food that has protein and extra calories to keep up your strength and prevent weight loss. Drink liquid meal replacements for extra calories and protein. Try to eat your main meal early. · Get some physical activity every day, but do not get too tired. Keep doing the hobbies you enjoy as your energy allows. · Take steps to control your stress and workload. Learn relaxation techniques. ¨ Share your feelings. Stress and tension affect our emotions. By expressing your feelings to others, you may be able to understand and cope with them. ¨ Consider joining a support group. Talking about a problem with your spouse, a good friend, or other people with similar problems is a good way to reduce tension and stress. ¨ Express yourself through art. Try writing, crafts, dance, or art to relieve stress.  Some dance, writing, or art groups may be available just for people who have cancer. ¨ Be kind to your body and mind. Getting enough sleep, eating a healthy diet, and taking time to do things you enjoy can contribute to an overall feeling of balance in your life and can help reduce stress. ¨ Get help if you need it. Discuss your concerns with your doctor or counselor. · If you are vomiting or have diarrhea: ¨ Drink plenty of fluids (enough so that your urine is light yellow or clear like water) to prevent dehydration. Choose water and other caffeine-free clear liquids. If you have kidney, heart, or liver disease and have to limit fluids, talk with your doctor before you increase the amount of fluids you drink. ¨ When you are able to eat, try clear soups, mild foods, and liquids until all symptoms are gone for 12 to 48 hours. Other good choices include dry toast, crackers, cooked cereal, and gelatin dessert, such as Jell-O. · If you have not already done so, prepare a list of advance directives. Advance directives are instructions to your doctor and family members about what kind of care you want if you become unable to speak or express yourself. When should you call for help? Call 911 anytime you think you may need emergency care. For example, call if: 
· You passed out (lost consciousness). · You have trouble breathing. · You cough up blood. · You vomit blood or what looks like coffee grounds. · You pass maroon or very bloody stools. · You have symptoms of a stroke. These may include: 
¨ Sudden numbness, tingling, weakness, or loss of movement in your face, arm, or leg, especially on only one side of your body. ¨ Sudden vision changes. ¨ Sudden trouble speaking. ¨ Sudden confusion or trouble understanding simple statements. ¨ Sudden problems with walking or balance. ¨ A sudden, severe headache that is different from past headaches. Call your doctor now or seek immediate medical care if: 
· You have severe pain. · You are dizzy or lightheaded, or you feel like you may faint. · You are sick to your stomach or cannot keep fluids down. · You have any unusual bleeding, such as: ¨ Blood spots under the skin. ¨ A nosebleed that you cannot stop. ¨ Bleeding gums when you brush your teeth. ¨ Blood in your urine. ¨ Vaginal bleeding when you are not having your period, or heavy period bleeding. · Your stools are black and tarlike or have streaks of blood. · You have signs of an infection, such as: 
¨ Increased pain, swelling, warmth, or redness. ¨ Red streaks leading from a bruise. ¨ Pus draining from a wound. ¨ A fever or chills. ¨ Burning when you urinate. Watch closely for changes in your health, and be sure to contact your doctor if: 
· You cough up yellow or green mucus. · You have trouble controlling your pain. Where can you learn more? Go to http://kevin-justen.info/. Enter T313 in the search box to learn more about \"Non-Hodgkin's Lymphoma: Care Instructions. \" Current as of: July 26, 2016 Content Version: 11.3 © 4289-6252 [employer]. Care instructions adapted under license by [employer] (which disclaims liability or warranty for this information). If you have questions about a medical condition or this instruction, always ask your healthcare professional. [address] any warranty or liability for your use of this information. Introducing \[facility]\"" & HEALTH SERVICES! [facility] introduces Tri[employer] patient portal. Now you can access parts of your medical record, email your doctor's office, and request medication refills online. 1. In your internet browser, go to [email or web address] 2. Click on the First Time User? Click Here link in the Sign In box. You will see the New Member Sign Up page. 3. Enter your Tripsidea Access Code exactly as it appears below.  You will not need to use this code after youve completed the sign-up process. If you do not sign up before the expiration date, you must request a new code. · [employer] Access Code: [email or web address] Expires: 9/21/2017  3:01 PM 
 
4. Enter the last four digits of your Social Security Number (xxxx) and Date of Birth (mm/dd/yyyy) as indicated and click Submit. You will be taken to the next sign-up page. 5. Create a [employer] ID. This will be your [employer] login ID and cannot be changed, so think of one that is secure and easy to remember. 6. Create a [employer] password. You can change your password at any time. 7. Enter your Password Reset Question and Answer. This can be used at a later time if you forget your password. 8. Enter your e-mail address. You will receive e-mail notification when new information is available in [address]. 9. Click Sign Up. You can now view and download portions of your medical record. 10. Click the Download Summary menu link to download a portable copy of your medical information. If you have questions, please visit the Frequently Asked Questions section of the [employer] website. Remember, [employer] is NOT to be used for urgent needs. For medical emergencies, dial 911. Now available from your iPhone and Android! Please provide this summary of care documentation to your next provider. Your primary care clinician is listed as Laura Kahn. If you have any questions after today's visit, please call [phone].

## 2017-06-23 NOTE — PROGRESS NOTES
Hematology/Oncology  Progress Note    Name: Davis Ramon  Date: 2017  : 1950    PCP: Radha Donovan MD     Ms. Yvan Sauceda is a 73year old female who was seen for management of her non-Hodgkin lymphoma, neuropathy,and arthritis    Current therapy: Supportive care and active surveillance. Subjective:     Ms. Yvan Sauceda is a 43-year-old woman who has a history of mantle cell lymphoma. She has completed a full course of systemic chemotherapy. Today she has no concerns or complaints. She denies any night sweats, fevers or unexplained infections at this time. She denies weight loss. Since her last clinic visit she has not had any further episodes of recurrent diverticulitis. The patient reports that she has continued to maintain a high-fiber diet which was recommended with her last episode of diverticulitis. She is doing well and denies any abdominal pain or discomfort. She also report that her neuropathy associated with her prior chemotherapy is better and has not have to use the Neurontin. Overall, the patient reports that she has no new complaints to report. Past medical history, family history, and social history: these were reviewed and remains unchanged.     Past Medical History:   Diagnosis Date    Anxiety 6/15    IKE-7 was     Calculus of kidney     Clostridium difficile colitis 3/14    Dr. Eris Orr adenoma     10/11 Dr. Marcio Cunningham Cystic breast     Depression 6/15    PHQ-9 was 15/27    Diverticulitis     recurrent x3 Dr Loki Bains liver     10/10 on US, CT ; Fib-4 was 0.74 from 1/15    GI bleed     ischemic colitis on CT, seen by Dr. Renny Nichols H/O mammogram 6/3/2016    Hyperlipidemia     calculated 10 year risk score was 3.2% (1/15)    Hypertension     resolved w wt loss    Ischemic colitis ([facility] [address] 3/14    Dr Orellana Major    Labial abscess     + MRSA    Lymphoma ([facility] [address]     Dr. Ángel Doshi    Migraine     Neuropathy due to chemotherapeutic drug ([facility] [address]   Prediabetes     Zoster 8/14    left T11     Past Surgical History:   Procedure Laterality Date    CARDIAC SURG PROCEDURE UNLIST  2/14    echo shows mild conc lvh, ef 60-65%. no wma    HX APPENDECTOMY  1968    HX COLONOSCOPY      colon polyps 2003 Dr. Patricia Slade; adenoma 2011; isch colitis 3/14 Dr Fidel Garcia    HX HEENT      tonsillectomy    HX ORTHOPAEDIC  teenager    knee surgery left    HX OTHER SURGICAL      bx for lymphoma    HX SOPHIA AND BSO  04/02    Dr. Karina Aguirre History    Marital status:      Spouse name: N/A    Number of children: 2    Years of education: N/A     Occupational History    director [employer]      Social History Main Topics    Smoking status: Current Every Day Smoker     Packs/day: 0.50    Smokeless tobacco: Never Used      Comment: smoking cessation advised    Alcohol use 2.5 oz/week     5 Glasses of wine per week      Comment: socially    Drug use: No    Sexual activity: Not Currently     Other Topics Concern    Not on file     Social History Narrative     Family History   Problem Relation Age of Onset    Adopted: Yes     Current Outpatient Prescriptions   Medication Sig Dispense Refill    hyoscyamine (LEVSIN) 0.125 mg tablet Take 1 Tab by mouth every four (4) hours as needed for Cramping. 40 Tab 0       Review of Systems  Constitutional: The patient has no complaints or acute distress  HEENT: The patient denies recent head trauma, eye pain, blurred vision,  hearing deficit, oropharyngeal mucosal pain or lesions, and the patient denies throat pain or discomfort. Lymphatics: The patient denies palpable peripheral lymphadenopathy. Hematologic: The patient denies having bruising, bleeding, or progressive fatigue. Respiratory: Patient denies cough,SOB, or fevers. Cardiovascular: The patient denies having leg pain, leg swelling, heart palpitations, chest permit, chest pain, or lightheadedness.   The patient denies having dyspnea on exertion. Neurologic: The patient is complaining of paresthesias in her hands and feet and legs due to prior chemotherapy use. Gastrointestinal: The patient denies having nausea, emesis, or diarrhea. The patient denies having any hematemesis or blood in the stool. Genitourinary: Patient denies having urinary urgency, frequency, or dysuria. The patient denies having blood in the urine. Psychological: The patient denies having symptoms of nervousness, anxiety, depression, or thoughts of harming himself some of this. Skin: Patient denies having skin rashes, skin, ulcerations, or unexplained itching or pruritus. Musculoskeletal: The patient denies pains in muscles or joints    Objective:     Visit Vitals    /85    Pulse 61    Temp 98.2 °F (36.8 °C)    Wt 48.5 kg (107 lb)    BMI 22.36 kg/m2     ECOG PS=0, pain score=0/10   Physical Exam:   Gen. Appearance: The patient is in no acute distress. Skin: There is no bruise or rash. HEENT: The exam is unremarkable. Neck: Supple without lymphadenopathy or thyromegaly. Lungs: Clear to auscultation and percussion; there are no wheezes or rhonchi. Heart: Regular rate and rhythm; there are no murmurs, gallops, or rubs. Abdomen: Bowel sounds are present and normal.  There is no guarding, tenderness, or hepatosplenomegaly. Extremities: There is no clubbing, cyanosis, or edema. Neurologic: There are no focal neurologic deficits, except for her complaints of paresthesias in her hands and feet. .  Lymphatics: There is no palpable peripheral lymphadenopathy. Musculoskeletal: The patient has full range of motion at all joints. There is no evidence of joint deformity or effusions. There is no focal joint tenderness. She does complain of pain on standing and with flexion and extension of the leg and hip and knee joints. Psychological/psychiatric: There is no clinical evidence of anxiety, depression, or melancholy.     Lab data:      Results for orders placed or performed during the hospital encounter of 06/23/17   CBC WITH 3 PART DIFF     Status: Abnormal   Result Value Ref Range Status    WBC 7.5 4.5 - 13.0 K/uL Final    RBC 4.24 4.10 - 5.10 M/uL Final    HGB 14.1 12.0 - 16.0 g/dL Final    HCT 40.1 36 - 48 % Final    MCV 94.6 78 - 102 FL Final    MCH 33.3 25.0 - 35.0 PG Final    MCHC 35.2 31 - 37 g/dL Final    RDW 12.6 11.5 - 14.5 % Final    PLATELET 735 454 - 752 K/uL Final    NEUTROPHILS 82 (H) 40 - 70 % Final    MIXED CELLS 6 0.1 - 17 % Final    LYMPHOCYTES 12 (L) 14 - 44 % Final    ABS. NEUTROPHILS 6.1 1.8 - 9.5 K/UL Final    ABS. MIXED CELLS 0.5 0.0 - 2.3 K/uL Final    ABS. LYMPHOCYTES 0.9 (L) 1.1 - 5.9 K/UL Final     Comment: Test performed at [address]. Results Reviewed by Medical Director. DF AUTOMATED   Final           Assessment:     1. Mantle cell lymphoma, lymph nodes of multiple sites ([facility] [address]    2. Neutrophilia    3. Neuropathy due to chemotherapeutic drug (HCC)    4. Reactive depression    5. Anxiety      Plan:   Antecedent lymphoma, multiple sites: The patient has completed a full course of systemic chemotherapy. Clinically there is no evidence of disease recurrence. I will recheck her comprehensive metabolic panel, sedimentation rate and the liver function tests. Neutrophilia: I explained to the patient that she is at risk for the development of myelodysplastic syndrome from prior chemotherapy. Her neutrophil count today is normal at 51%. A prior flow cytometry did not show any evidence of immunophenotypic abnormalities. There was no evidence of any ongoing leukemia or recurrent lymphoma. .    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 We will continue to monitor this every 4 months at the time of her followup visits.     The patient will return in 4 months for a complete reassessment     Orders Placed This Encounter    COMPLETE CBC & AUTO DIFF WBC    METABOLIC PANEL, COMPREHENSIVE     Standing Status:   Future     Number of Occurrences:   1     Standing Expiration Date:   2018    SED RATE (ESR)     Standing Status:   Future     Number of Occurrences:   1     Standing Expiration Date:   2018    InHouse CBC ([facility])     Standing Status:   Future     Number of Occurrences:   1     Standing Expiration Date:   2017       Evy Stanford MD  2017                                                                                            Hematology/Oncology  Progress Note    Name: Piper Marie  Date: 2017  : 1950    PCP: Ramona Nageotte, MD     Ms. Christ Saab is a 60year old female who was seen for management of her non-Hodgkin lymphoma, neuropathy,and arthritis  Current therapy: Supportive care and active surveillance. Subjective:     Ms. Christ Saab is a 80-year-old woman who has a history of mantle cell lymphoma. She has completed a full course of systemic chemotherapy. Today she has no concerns or complaints. She states last week she had three nights of sweats, but this has since resolved. She denies fever or weight loss. She still has complaints of the neuropathy associated with her prior chemotherapy usage, however this is much better since starting Neurontin. Past medical history, family history, and social history: these were reviewed and remains unchanged.     Past Medical History:   Diagnosis Date    Anxiety 6/15    IKE-7 was     Calculus of kidney     Clostridium difficile colitis 3/14    Dr. Wells Hazard adenoma     10/11 Dr. Goldy Dan Cystic breast     Depression 6/15    PHQ-9 was 15/27    Diverticulitis     recurrent x3 Dr Chichi Nguyễn liver     10/10 on US, CT ; Fib-4 was 0.74 from 1/15    GI bleed     ischemic colitis on CT, seen by Dr. Arlyn Mondragon H/O mammogram 6/3/2016    Hyperlipidemia     calculated 10 year risk score was 3.2% (1/15)    Hypertension     resolved w wt loss    Ischemic colitis ([facility] [address] 3/14    Dr Khoi Wall    Labial abscess     + MRSA    Lymphoma ([facility] [address] 12/13    Dr. Coleman Barrett    Migraine     Neuropathy due to chemotherapeutic drug ([facility] [address] 9/14    Prediabetes     Zoster 8/14    left T11     Past Surgical History:   Procedure Laterality Date    CARDIAC SURG PROCEDURE UNLIST  2/14    echo shows mild conc lvh, ef 60-65%. no wma    HX APPENDECTOMY  1968    HX COLONOSCOPY      colon polyps 2003 Dr. Shelton Ok; adenoma 2011; isch colitis 3/14 Dr Zuly Guillaume    HX HEENT      tonsillectomy    HX ORTHOPAEDIC  teenager    knee surgery left    HX OTHER SURGICAL      bx for lymphoma    HX SOPHIA AND BSO  04/02    Dr. 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[employer]      Social History Main Topics    Smoking status: Current Every Day Smoker     Packs/day: 0.50    Smokeless tobacco: Never Used      Comment: smoking cessation advised    Alcohol use 2.5 oz/week     5 Glasses of wine per week      Comment: socially    Drug use: No    Sexual activity: Not Currently     Other Topics Concern    Not on file     Social History Narrative     Family History   Problem Relation Age of Onset    Adopted: Yes     Current Outpatient Prescriptions   Medication Sig Dispense Refill    hyoscyamine (LEVSIN) 0.125 mg tablet Take 1 Tab by mouth every four (4) hours as needed for Cramping. 40 Tab 0       Review of Systems  Constitutional: The patient has no complaints or acute distress  HEENT: The patient denies recent head trauma, eye pain, blurred vision,  hearing deficit, oropharyngeal mucosal pain or lesions, and the patient denies throat pain or discomfort. Lymphatics: The patient denies palpable peripheral lymphadenopathy.   Hematologic: The patient denies having bruising, bleeding, or progressive fatigue. Respiratory: Patient denies cough,SOB, or fevers. Cardiovascular: The patient denies having leg pain, leg swelling, heart palpitations, chest permit, chest pain, or lightheadedness. The patient denies having dyspnea on exertion. Neurologic: The patient is complaining of paresthesias in her hands and feet and legs due to prior chemotherapy use. Gastrointestinal: The patient denies having nausea, emesis, or diarrhea. The patient denies having any hematemesis or blood in the stool. Genitourinary: Patient denies having urinary urgency, frequency, or dysuria. The patient denies having blood in the urine. Psychological: The patient denies having symptoms of nervousness, anxiety, depression, or thoughts of harming himself some of this. Skin: Patient denies having skin rashes, skin, ulcerations, or unexplained itching or pruritus. Musculoskeletal: The patient denies pains in muscles or joints    Objective:     Visit Vitals    /85    Pulse 61    Temp 98.2 °F (36.8 °C)    Wt 48.5 kg (107 lb)    BMI 22.36 kg/m2     ECOG PS=0, pain score=0/10   Physical Exam:   Gen. Appearance: The patient is in no acute distress. Skin: There is no bruise or rash. HEENT: The exam is unremarkable. Neck: Supple without lymphadenopathy or thyromegaly. Lungs: Clear to auscultation and percussion; there are no wheezes or rhonchi. Heart: Regular rate and rhythm; there are no murmurs, gallops, or rubs. Abdomen: Bowel sounds are present and normal.  There is no guarding, tenderness, or hepatosplenomegaly. Extremities: There is no clubbing, cyanosis, or edema. Neurologic: There are no focal neurologic deficits, except for her complaints of paresthesias in her hands and feet. .  Lymphatics: There is no palpable peripheral lymphadenopathy. Musculoskeletal: The patient has full range of motion at all joints. There is no evidence of joint deformity or effusions. There is no focal joint tenderness.  She does complain of pain on standing and with flexion and extension of the leg and hip and knee joints. Psychological/psychiatric: There is no clinical evidence of anxiety, depression, or melancholy. Lab data:      Results for orders placed or performed during the hospital encounter of 06/23/17   CBC WITH 3 PART DIFF     Status: Abnormal   Result Value Ref Range Status    WBC 7.5 4.5 - 13.0 K/uL Final    RBC 4.24 4.10 - 5.10 M/uL Final    HGB 14.1 12.0 - 16.0 g/dL Final    HCT 40.1 36 - 48 % Final    MCV 94.6 78 - 102 FL Final    MCH 33.3 25.0 - 35.0 PG Final    MCHC 35.2 31 - 37 g/dL Final    RDW 12.6 11.5 - 14.5 % Final    PLATELET 506 762 - 020 K/uL Final    NEUTROPHILS 82 (H) 40 - 70 % Final    MIXED CELLS 6 0.1 - 17 % Final    LYMPHOCYTES 12 (L) 14 - 44 % Final    ABS. NEUTROPHILS 6.1 1.8 - 9.5 K/UL Final    ABS. MIXED CELLS 0.5 0.0 - 2.3 K/uL Final    ABS. LYMPHOCYTES 0.9 (L) 1.1 - 5.9 K/UL Final     Comment: Test performed at [place] [address] Location. Results Reviewed by Medical Director. DF AUTOMATED   Final           Assessment:     1. Mantle cell lymphoma, lymph nodes of multiple sites ([facility] [address]    2. Neutrophilia    3. Neuropathy due to chemotherapeutic drug (HCC)    4. Reactive depression    5. Anxiety      Plan:   Antecedent lymphoma, multiple sites: The patient has completed a full course of systemic chemotherapy. Clinically there is no evidence of disease recurrence. I will recheck her comprehensive metabolic panel, sedimentation rate and the liver function tests. Neutrophilia: I explained to the patient that she is at risk for the development of myelodysplastic syndrome from prior chemotherapy. Her neutrophil count today is 83%. A prior flow cytometry did not show any evidence of immunophenotypic abnormalities. There was no evidence of any ongoing leukemia or recurrent lymphoma.   .    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 We will continue to monitor this every 4 months at the time of her followup visits. Anxiety: The patient reports that her anxiety symptoms have been under control over the last 4 months. She has been doing reasonably well. She does not require any Ativan at this time.     The patient will return in 4months for a complete reassessment     Orders Placed This Encounter    COMPLETE CBC & AUTO DIFF WBC    METABOLIC PANEL, COMPREHENSIVE     Standing Status:   Future     Number of Occurrences:   1     Standing Expiration Date:   6/24/2018    SED RATE (ESR)     Standing Status:   Future     Number of Occurrences:   1     Standing Expiration Date:   6/24/2018    InHouse CBC ([facility])     Standing Status:   Future     Number of Occurrences:   1     Standing Expiration Date:   6/30/2017       Kiersten Traore MD  6/23/2017

## 2017-08-11 ENCOUNTER — OFFICE VISIT (OUTPATIENT)
Dept: INTERNAL MEDICINE CLINIC | Age: 67
End: 2017-08-11

## 2017-08-11 VITALS
HEIGHT: 58 IN | SYSTOLIC BLOOD PRESSURE: 132 MMHG | RESPIRATION RATE: 14 BRPM | WEIGHT: 107 LBS | BODY MASS INDEX: 22.46 KG/M2 | HEART RATE: 76 BPM | DIASTOLIC BLOOD PRESSURE: 76 MMHG | OXYGEN SATURATION: 99 % | TEMPERATURE: 98.3 F

## 2017-08-11 DIAGNOSIS — C83.18 MANTLE CELL LYMPHOMA, LYMPH NODES OF MULTIPLE SITES (HCC): ICD-10-CM

## 2017-08-11 DIAGNOSIS — M54.31 BACK PAIN WITH RIGHT-SIDED SCIATICA: Primary | ICD-10-CM

## 2017-08-11 DIAGNOSIS — R73.01 IFG (IMPAIRED FASTING GLUCOSE): ICD-10-CM

## 2017-08-11 DIAGNOSIS — E78.5 HYPERLIPIDEMIA, UNSPECIFIED HYPERLIPIDEMIA TYPE: ICD-10-CM

## 2017-08-11 RX ORDER — METHYLPREDNISOLONE 4 MG/1
TABLET ORAL
Qty: 1 DOSE PACK | Refills: 0 | Status: SHIPPED | OUTPATIENT
Start: 2017-08-11 | End: 2018-02-05 | Stop reason: ALTCHOICE

## 2017-08-11 RX ORDER — METAXALONE 800 MG/1
800 TABLET ORAL 4 TIMES DAILY
COMMUNITY
End: 2017-08-11 | Stop reason: ALTCHOICE

## 2017-08-11 RX ORDER — OXYCODONE AND ACETAMINOPHEN 5; 325 MG/1; MG/1
1 TABLET ORAL
Qty: 30 TAB | Refills: 0 | Status: SHIPPED | OUTPATIENT
Start: 2017-08-11 | End: 2018-02-05 | Stop reason: ALTCHOICE

## 2017-08-11 RX ORDER — CYCLOBENZAPRINE HCL 10 MG
10 TABLET ORAL
Qty: 30 TAB | Refills: 1 | Status: SHIPPED | OUTPATIENT
Start: 2017-08-11 | End: 2019-02-08

## 2017-08-11 NOTE — PATIENT INSTRUCTIONS
Advance Directives: Care Instructions  Your Care Instructions  An advance directive is a legal way to state your wishes at the end of your life. It tells your family and your doctor what to do if you can no longer say what you want. There are two main types of advance directives. You can change them any time that your wishes change. · A living will tells your family and your doctor your wishes about life support and other treatment. · A durable power of  for health care lets you name a person to make treatment decisions for you when you can't speak for yourself. This person is called a health care agent. If you do not have an advance directive, decisions about your medical care may be made by a doctor or a  who doesn't know you. It may help to think of an advance directive as a gift to the people who care for you. If you have one, they won't have to make tough decisions by themselves. Follow-up care is a key part of your treatment and safety. Be sure to make and go to all appointments, and call your doctor if you are having problems. It's also a good idea to know your test results and keep a list of the medicines you take. How can you care for yourself at home? · Discuss your wishes with your loved ones and your doctor. This way, there are no surprises. · Many states have a unique form. Or you might use a universal form that has been approved by many states. This kind of form can sometimes be completed and stored online. Your electronic copy will then be available wherever you have a connection to the Internet. In most cases, doctors will respect your wishes even if you have a form from a different state. · You don't need a  to do an advance directive. But you may want to get legal advice. · Think about these questions when you prepare an advance directive:  ¨ Who do you want to make decisions about your medical care if you are not able to?  Many people choose a family member or close friend. ¨ Do you know enough about life support methods that might be used? If not, talk to your doctor so you understand. ¨ What are you most afraid of that might happen? You might be afraid of having pain, losing your independence, or being kept alive by machines. ¨ Where would you prefer to die? Choices include your home, a hospital, or a nursing home. ¨ Would you like to have information about hospice care to support you and your family? ¨ Do you want to donate organs when you die? ¨ Do you want certain Restorationism practices performed before you die? If so, put your wishes in the advance directive. · Read your advance directive every year, and make changes as needed. When should you call for help? Be sure to contact your doctor if you have any questions. Where can you learn more? Go to http://kevin-justen.info/. Enter R264 in the search box to learn more about \"Advance Directives: Care Instructions. \"  Current as of: November 17, 2016  Content Version: 11.3  © 0715-4872 [employer]. Care instructions adapted under license by [employer] (which disclaims liability or warranty for this information). If you have questions about a medical condition or this instruction, always ask your healthcare professional. [address] any warranty or liability for your use of this information.

## 2017-08-11 NOTE — PROGRESS NOTES
1. Have you been to the ER, urgent care clinic or hospitalized since your last visit? YES. [facility] Aug 7, 2017    2. Have you seen or consulted any other health care providers outside of the [address] [place] [address] since your last visit (Include any pap smears or colon screening)? NO      Do you have an Advanced Directive? NO    Would you like information on Advanced Directives?  YES

## 2017-08-11 NOTE — PROGRESS NOTES
73 y.o. WHITE OR  female who presents for evaluation. She was helping prepare for her daughter's pre-wedding party and she thinks she pulled her back in all the commotion. The evening of 8/5/17 it started getting bad and by the next day she had pain radiating to the right buttock and leg. She went to urgent care and films showed some arthritis, she was sent home on pred taper and muscle relaxant. She really has not had much improvement. No incontinence, she is able to ambulate, she is known to have prior l spine disease on CTs    Past Medical History:   Diagnosis Date    Anxiety 6/15    IKE-7 was 12/21    Calculus of kidney     Clostridium difficile colitis 3/14    Dr. Dayna Gordon adenoma     10/11 Dr. Jack Esteban Cystic breast     Depression 6/15    PHQ-9 was 15/27    Diverticulitis     recurrent x3 Dr Omkar Douglas liver     10/10 on US, CT 6/12; Fib-4 was 0.74 from 1/15    GI bleed 2/14    ischemic colitis on CT, seen by Dr. Shivam Meza H/O mammogram 6/3/2016    Hyperlipidemia     calculated 10 year risk score was 3.2% (1/15)    Hypertension     resolved w wt loss    Ischemic colitis ([facility] [address] 3/14    Dr Harvey Garcia    Labial abscess     + MRSA    Lymphoma ([facility] [address] 12/13    Dr. Nava Meza    Migraine     Neuropathy due to chemotherapeutic drug ([facility] [address] 9/14    Prediabetes     Zoster 8/14    left T11     Current Outpatient Prescriptions   Medication Sig    methylPREDNISolone (MEDROL DOSEPACK) 4 mg tablet Per dose pack instructions    oxyCODONE-acetaminophen (PERCOCET) 5-325 mg per tablet Take 1 Tab by mouth every six (6) hours as needed for Pain. Max Daily Amount: 4 Tabs.  cyclobenzaprine (FLEXERIL) 10 mg tablet Take 1 Tab by mouth three (3) times daily as needed for Muscle Spasm(s).  hyoscyamine (LEVSIN) 0.125 mg tablet Take 1 Tab by mouth every four (4) hours as needed for Cramping. No current facility-administered medications for this visit.       Allergies   Allergen Reactions    Keflex [Cephalexin] Nausea Only    Augmentin [Amoxicillin-Pot Clavulanate] Diarrhea    Demerol [Meperidine] Nausea Only     Visit Vitals    /76 (BP 1 Location: Right arm, BP Patient Position: Sitting)    Pulse 76    Temp 98.3 °F (36.8 °C) (Oral)    Resp 14    Ht 4' 10\" (1.473 m)    Wt 107 lb (48.5 kg)    SpO2 99%    BMI 22.36 kg/m2   back showed no cvat, marked tenderness and spasm in the right lower lumbar musculature, midline tenderness also in the lower lumbar spine. Positive straight leg on right. No tenderness in hips, pulses and soft touch  And strength intact bilat le    Assessment and plan:  1. Back pain, spasm, right sciatica. Will sched mri and give medrol, perc, flexeril. Further recs pending outcomes      Above conditions discussed at length and patient vocalized understanding.   All questions answered to patient satisfaction

## 2017-08-11 NOTE — MR AVS SNAPSHOT
Visit Information Date & Time Provider Department Dept. Phone Encounter #  
 8/11/2017  2:30 PM Jennifer Thakur MD Internist of [address] [place] [address] Road [phone] [id number] Your Appointments 10/20/2017  2:00 PM  
Office Visit with MD Sofia Vargas 78 ([facility]) Appt Note: 4 mo fu  
 [facility] [address] [facility] [address] [facility] [address]  
[phone]  
  
   
 [facility] [address] [facility] [address] [place] Upcoming Health Maintenance Date Due DTaP/Tdap/Td series (1 - Tdap) 8/3/2004 GLAUCOMA SCREENING Q2Y 11/15/2016 OSTEOPOROSIS SCREENING (DEXA) 11/15/2016 MEDICARE YEARLY EXAM 11/15/2016 INFLUENZA AGE 9 TO ADULT 8/1/2017 Pneumococcal 65+ High/Highest Risk (2 of 2 - PPSV23) 10/20/2017 BREAST CANCER SCRN MAMMOGRAM 6/3/2018 COLONOSCOPY 3/20/2024 Allergies as of 8/11/2017  Review Complete On: 8/11/2017 By: Brennan Colon Severity Noted Reaction Type Reactions Keflex [Cephalexin] Medium 08/12/2013   Side Effect Nausea Only Augmentin [Amoxicillin-pot Clavulanate]    Diarrhea Demerol [Meperidine]    Nausea Only Current Immunizations  Reviewed on 5/31/2016 Name Date Influenza Vaccine (Quad) PF 9/29/2016, 10/22/2015  3:30 PM  
 Influenza Vaccine PF 1/23/2014 PPD 8/15/2000 Pneumococcal Conjugate (PCV-13) 10/22/2015  3:31 PM  
 Pneumococcal Vaccine (Unspecified Type) 10/20/2012 TD Vaccine 8/2/2004 Zoster Vaccine, Live 1/1/2012 Not reviewed this visit You Were Diagnosed With   
  
 Codes Comments Back pain with right-sided sciatica    -  Primary ICD-10-CM: M54.31 
ICD-9-CM: 724.3 Vitals BP Pulse Temp Resp Height(growth percentile) Weight(growth percentile) 132/76 (BP 1 Location: Right arm, BP Patient Position: Sitting) 76 98.3 °F (36.8 °C) (Oral) 14 4' 10\" (1.473 m) 107 lb (48.5 kg) SpO2 BMI OB Status Smoking Status 99% 22.36 kg/m2 Hysterectomy Current Every Day Smoker Vitals History BMI and BSA Data Body Mass Index Body Surface Area  
 22.36 kg/m 2 1.41 m 2 Preferred Pharmacy Pharmacy Name [facility] [phone] Your Updated Medication List  
  
   
This list is accurate as of: 8/11/17  3:21 PM.  Always use your most recent med list.  
  
  
  
  
 cyclobenzaprine 10 mg tablet Commonly known as:  FLEXERIL Take 1 Tab by mouth three (3) times daily as needed for Muscle Spasm(s). hyoscyamine 0.125 mg tablet Commonly known as:  Carolin Milder Take 1 Tab by mouth every four (4) hours as needed for Cramping. methylPREDNISolone 4 mg tablet Commonly known as:  Harry Midget Per dose pack instructions  
  
 oxyCODONE-acetaminophen 5-325 mg per tablet Commonly known as:  PERCOCET Take 1 Tab by mouth every six (6) hours as needed for Pain. Max Daily Amount: 4 Tabs. Prescriptions Printed Refills  
 methylPREDNISolone (MEDROL DOSEPACK) 4 mg tablet 0 Sig: Per dose pack instructions Class: Print  
 oxyCODONE-acetaminophen (PERCOCET) 5-325 mg per tablet 0 Sig: Take 1 Tab by mouth every six (6) hours as needed for Pain. Max Daily Amount: 4 Tabs. Class: Print Route: Oral  
 cyclobenzaprine (FLEXERIL) 10 mg tablet 1 Sig: Take 1 Tab by mouth three (3) times daily as needed for Muscle Spasm(s). Class: Print Route: Oral  
  
We Performed the Following REFERRAL TO ORTHOPEDICS [PLT033 Custom] Comments:  
 Please evaluate for back pain ad right sciaitca 
pls sched any provider To-Do List   
 08/11/2017 Imaging:  MRI LUMB SPINE WO CONT Referral Information Referral ID Referred By Referred To  
  
 [id number] [facility], [address] [facility]   
   [address] Phone: [phone] Fax: [phone] Visits Status Start Date End Date 1 New Request 8/11/17 8/11/18 If your referral has a status of pending review or denied, additional information will be sent to support the outcome of this decision. Patient Instructions Advance Directives: Care Instructions Your Care Instructions An advance directive is a legal way to state your wishes at the end of your life. It tells your family and your doctor what to do if you can no longer say what you want. There are two main types of advance directives. You can change them any time that your wishes change. · A living will tells your family and your doctor your wishes about life support and other treatment. · A durable power of  for health care lets you name a person to make treatment decisions for you when you can't speak for yourself. This person is called a health care agent. If you do not have an advance directive, decisions about your medical care may be made by a doctor or a  who doesn't know you. It may help to think of an advance directive as a gift to the people who care for you. If you have one, they won't have to make tough decisions by themselves. Follow-up care is a key part of your treatment and safety. Be sure to make and go to all appointments, and call your doctor if you are having problems. It's also a good idea to know your test results and keep a list of the medicines you take. How can you care for yourself at home? · Discuss your wishes with your loved ones and your doctor. This way, there are no surprises. · Many states have a unique form. Or you might use a universal form that has been approved by many states. This kind of form can sometimes be completed and stored online. Your electronic copy will then be available wherever you have a connection to the Internet.  In most cases, doctors will respect your wishes even if you have a form from a different state. · You don't need a  to do an advance directive. But you may want to get legal advice. · Think about these questions when you prepare an advance directive: ¨ Who do you want to make decisions about your medical care if you are not able to? Many people choose a family member or close friend. ¨ Do you know enough about life support methods that might be used? If not, talk to your doctor so you understand. ¨ What are you most afraid of that might happen? You might be afraid of having pain, losing your independence, or being kept alive by machines. ¨ Where would you prefer to die? Choices include your home, a hospital, or a nursing home. ¨ Would you like to have information about hospice care to support you and your family? ¨ Do you want to donate organs when you die? ¨ Do you want certain Mandaeism practices performed before you die? If so, put your wishes in the advance directive. · Read your advance directive every year, and make changes as needed. When should you call for help? Be sure to contact your doctor if you have any questions. Where can you learn more? Go to http://kevin-justen.info/. Enter R264 in the search box to learn more about \"Advance Directives: Care Instructions. \" Current as of: November 17, 2016 Content Version: 11.3 © 3887-9838 [employer]. Care instructions adapted under license by [employer] (which disclaims liability or warranty for this information). If you have questions about a medical condition or this instruction, always ask your healthcare professional. [place] [address] any warranty or liability for your use of this information. Introducing [facility]! Christiane Nj introduces [employer]fire patient portal. Now you can access parts of your medical record, email your doctor's office, and request medication refills online. 1. In your internet browser, go to [email or web address] 2. Click on the First Time User? Click Here link in the Sign In box. You will see the New Member Sign Up page. 3. Enter your Exavio Access Code exactly as it appears below. You will not need to use this code after youve completed the sign-up process. If you do not sign up before the expiration date, you must request a new code. · Exavio Access Code: [email or web address] Expires: 9/21/2017  3:01 PM 
 
4. Enter the last four digits of your Social Security Number (xxxx) and Date of Birth (mm/dd/yyyy) as indicated and click Submit. You will be taken to the next sign-up page. 5. Create a House Partyt ID. This will be your Exavio login ID and cannot be changed, so think of one that is secure and easy to remember. 6. Create a Exavio password. You can change your password at any time. 7. Enter your Password Reset Question and Answer. This can be used at a later time if you forget your password. 8. Enter your e-mail address. You will receive e-mail notification when new information is available in [address] 9. Click Sign Up. You can now view and download portions of your medical record. 10. Click the Download Summary menu link to download a portable copy of your medical information. If you have questions, please visit the Frequently Asked Questions section of the [employer] website. Remember, Ex[employer]io is NOT to be used for urgent needs. For medical emergencies, dial 911. Now available from your iPhone and Android! Please provide this summary of care documentation to your next provider. Your primary care clinician is listed as Madelin Carrera. If you have any questions after today's visit, please call [phone].

## 2017-10-20 ENCOUNTER — HOSPITAL ENCOUNTER (OUTPATIENT)
Dept: ONCOLOGY | Age: 67
Discharge: HOME | End: 2017-10-20

## 2017-10-20 ENCOUNTER — HOSPITAL ENCOUNTER (OUTPATIENT)
Dept: LAB | Age: 67
Discharge: HOME | End: 2017-10-20
Payer: MEDICARE

## 2017-10-20 ENCOUNTER — OFFICE VISIT (OUTPATIENT)
Dept: ONCOLOGY | Age: 67
End: 2017-10-20

## 2017-10-20 VITALS
BODY MASS INDEX: 21.32 KG/M2 | WEIGHT: 102 LBS | TEMPERATURE: 98.1 F | DIASTOLIC BLOOD PRESSURE: 78 MMHG | SYSTOLIC BLOOD PRESSURE: 126 MMHG | HEART RATE: 70 BPM

## 2017-10-20 DIAGNOSIS — F32.9 REACTIVE DEPRESSION: ICD-10-CM

## 2017-10-20 DIAGNOSIS — F41.9 ANXIETY: ICD-10-CM

## 2017-10-20 DIAGNOSIS — M15.9 PRIMARY OSTEOARTHRITIS INVOLVING MULTIPLE JOINTS: ICD-10-CM

## 2017-10-20 DIAGNOSIS — D72.9 NEUTROPHILIA: ICD-10-CM

## 2017-10-20 DIAGNOSIS — C83.18 MANTLE CELL LYMPHOMA OF LYMPH NODES OF MULTIPLE SITES (HCC): ICD-10-CM

## 2017-10-20 DIAGNOSIS — G62.0 NEUROPATHY DUE TO CHEMOTHERAPEUTIC DRUG (HCC): ICD-10-CM

## 2017-10-20 DIAGNOSIS — T45.1X5A NEUROPATHY DUE TO CHEMOTHERAPEUTIC DRUG (HCC): ICD-10-CM

## 2017-10-20 DIAGNOSIS — C83.18 MANTLE CELL LYMPHOMA OF LYMPH NODES OF MULTIPLE SITES (HCC): Primary | ICD-10-CM

## 2017-10-20 LAB
ALBUMIN SERPL-MCNC: 3.9 G/DL (ref 3.4–5)
ALBUMIN/GLOB SERPL: 1.3 {RATIO} (ref 0.8–1.7)
ALP SERPL-CCNC: 58 U/L (ref 45–117)
ALT SERPL-CCNC: 27 U/L (ref 13–56)
ANION GAP SERPL CALC-SCNC: 6 MMOL/L (ref 3–18)
AST SERPL-CCNC: 15 U/L (ref 15–37)
BASO+EOS+MONOS # BLD AUTO: 0.3 K/UL (ref 0–2.3)
BASO+EOS+MONOS # BLD AUTO: 4 % (ref 0.1–17)
BILIRUB SERPL-MCNC: 0.3 MG/DL (ref 0.2–1)
BUN SERPL-MCNC: 11 MG/DL (ref 7–18)
BUN/CREAT SERPL: 16 (ref 12–20)
CALCIUM SERPL-MCNC: 9.3 MG/DL (ref 8.5–10.1)
CHLORIDE SERPL-SCNC: 107 MMOL/L (ref 100–108)
CO2 SERPL-SCNC: 28 MMOL/L (ref 21–32)
CREAT SERPL-MCNC: 0.67 MG/DL (ref 0.6–1.3)
DIFFERENTIAL METHOD BLD: ABNORMAL
ERYTHROCYTE [DISTWIDTH] IN BLOOD BY AUTOMATED COUNT: 12.6 % (ref 11.5–14.5)
ERYTHROCYTE [SEDIMENTATION RATE] IN BLOOD: 13 MM/HR (ref 0–30)
GLOBULIN SER CALC-MCNC: 3.1 G/DL (ref 2–4)
GLUCOSE SERPL-MCNC: 100 MG/DL (ref 74–99)
HCT VFR BLD AUTO: 33.6 % (ref 36–48)
HGB BLD-MCNC: 11.7 G/DL (ref 12–16)
LYMPHOCYTES # BLD: 1.3 K/UL (ref 1.1–5.9)
LYMPHOCYTES NFR BLD: 19 % (ref 14–44)
MCH RBC QN AUTO: 32.5 PG (ref 25–35)
MCHC RBC AUTO-ENTMCNC: 34.8 G/DL (ref 31–37)
MCV RBC AUTO: 93.3 FL (ref 78–102)
NEUTS SEG # BLD: 5.2 K/UL (ref 1.8–9.5)
NEUTS SEG NFR BLD: 77 % (ref 40–70)
PLATELET # BLD AUTO: 262 K/UL (ref 140–440)
POTASSIUM SERPL-SCNC: 3.5 MMOL/L (ref 3.5–5.5)
PROT SERPL-MCNC: 7 G/DL (ref 6.4–8.2)
RBC # BLD AUTO: 3.6 M/UL (ref 4.1–5.1)
SODIUM SERPL-SCNC: 141 MMOL/L (ref 136–145)
WBC # BLD AUTO: 6.8 K/UL (ref 4.5–13)

## 2017-10-20 PROCEDURE — 85652 RBC SED RATE AUTOMATED: CPT | Performed by: INTERNAL MEDICINE

## 2017-10-20 PROCEDURE — 36415 COLL VENOUS BLD VENIPUNCTURE: CPT | Performed by: INTERNAL MEDICINE

## 2017-10-20 PROCEDURE — 80053 COMPREHEN METABOLIC PANEL: CPT | Performed by: INTERNAL MEDICINE

## 2017-10-20 NOTE — PROGRESS NOTES
Hematology/Oncology  Progress Note    Name: Flaquito Brandon  Date: 10/20/2017  : 1950    PCP: Yung Flores MD     Ms. Ken Saez is a 73year old female who was seen for management of her non-Hodgkin lymphoma, neuropathy,and arthritis    Current therapy: Supportive care and active surveillance. Subjective:     Ms. Ken Saez is a 79-year-old woman who has a history of mantle cell lymphoma. She has completed a full course of systemic chemotherapy. Today she has no concerns or complaints. She denies any night sweats, fevers or unexplained infections at this time. She denies weight loss. Since her last clinic visit she has not had any further episodes of recurrent diverticulitis. The patient reports that she has continued to maintain a high-fiber diet which was recommended with her last episode of diverticulitis. She is doing well and denies any abdominal pain or discomfort. She also report that her neuropathy associated with her prior chemotherapy is better and has not have to use the Neurontin. Overall, the patient reports that she has no new complaints to report. Past medical history, family history, and social history: these were reviewed and remains unchanged.     Past Medical History:   Diagnosis Date    Anxiety 6/15    IKE-7 was     Calculus of kidney     Clostridium difficile colitis 3/14    Dr. Wendy Brantley adenoma     10/11 Dr. Mike Noel Cystic breast     Depression 6/15    PHQ-9 was 15/27    Diverticulitis     recurrent x3 Dr Mike Christy liver     10/10 on US, CT ; Fib-4 was 0.74 from 1/15    GI bleed     ischemic colitis on CT, seen by Dr. Jay Jay Renteria H/O mammogram 6/3/2016    Hyperlipidemia     calculated 10 year risk score was 3.2% (1/15)    Hypertension     resolved w wt loss    Ischemic colitis ([address] 3/14    Dr Sincere Hicks    Labial abscess     + MRSA    Lymphoma ([address]     Dr. Nnamdi Burris    Migraine     Neuropathy due to chemotherapeutic drug ([address] 9/14    Prediabetes     Zoster 8/14    left T11     Past Surgical History:   Procedure Laterality Date    CARDIAC SURG PROCEDURE UNLIST  2/14    echo shows mild conc lvh, ef 60-65%. no wma    HX APPENDECTOMY  1968    HX COLONOSCOPY      colon polyps 2003 Dr. Airam Broussard; adenoma 2011; isch colitis 3/14 Dr Kleber Adair    HX HEENT      tonsillectomy    HX ORTHOPAEDIC  teenager    knee surgery left    HX OTHER SURGICAL      bx for lymphoma    HX SOPHIA AND BSO  04/02    Dr. Nelsy De La Vega History    Marital status:      Spouse name: N/A    Number of children: 2    Years of education: N/A     Occupational History    director [employer]      Social History Main Topics    Smoking status: Current Every Day Smoker     Packs/day: 0.50    Smokeless tobacco: Never Used      Comment: smoking cessation advised    Alcohol use 2.5 oz/week     5 Glasses of wine per week      Comment: socially    Drug use: No    Sexual activity: Not Currently     Other Topics Concern    Not on file     Social History Narrative     Family History   Problem Relation Age of Onset    Adopted: Yes     Current Outpatient Prescriptions   Medication Sig Dispense Refill    methylPREDNISolone (MEDROL DOSEPACK) 4 mg tablet Per dose pack instructions 1 Dose Pack 0    oxyCODONE-acetaminophen (PERCOCET) 5-325 mg per tablet Take 1 Tab by mouth every six (6) hours as needed for Pain. Max Daily Amount: 4 Tabs. 30 Tab 0    cyclobenzaprine (FLEXERIL) 10 mg tablet Take 1 Tab by mouth three (3) times daily as needed for Muscle Spasm(s). 30 Tab 1    hyoscyamine (LEVSIN) 0.125 mg tablet Take 1 Tab by mouth every four (4) hours as needed for Cramping. 40 Tab 0       Review of Systems  Constitutional: The patient has no complaints or acute distress  HEENT: The patient denies recent head trauma, eye pain, blurred vision,  hearing deficit, oropharyngeal mucosal pain or lesions, and the patient denies throat pain or discomfort.   Lymphatics: The patient denies palpable peripheral lymphadenopathy. Hematologic: The patient denies having bruising, bleeding, or progressive fatigue. Respiratory: Patient denies cough,SOB, or fevers. Cardiovascular: The patient denies having leg pain, leg swelling, heart palpitations, chest permit, chest pain, or lightheadedness. The patient denies having dyspnea on exertion. Neurologic: The patient is complaining of paresthesias in her hands and feet and legs due to prior chemotherapy use. Gastrointestinal: The patient denies having nausea, emesis, or diarrhea. The patient denies having any hematemesis or blood in the stool. Genitourinary: Patient denies having urinary urgency, frequency, or dysuria. The patient denies having blood in the urine. Psychological: The patient denies having symptoms of nervousness, anxiety, depression, or thoughts of harming himself some of this. Skin: Patient denies having skin rashes, skin, ulcerations, or unexplained itching or pruritus. Musculoskeletal: The patient denies pains in muscles or joints    Objective:     Visit Vitals    /78 (BP 1 Location: Left arm, BP Patient Position: Sitting)    Pulse 70    Temp 98.1 °F (36.7 °C) (Oral)    Wt 46.3 kg (102 lb)    BMI 21.32 kg/m2     ECOG PS=0, pain score=0/10   Physical Exam:   Gen. Appearance: The patient is in no acute distress. Skin: There is no bruise or rash. HEENT: The exam is unremarkable. Neck: Supple without lymphadenopathy or thyromegaly. Lungs: Clear to auscultation and percussion; there are no wheezes or rhonchi. Heart: Regular rate and rhythm; there are no murmurs, gallops, or rubs. Abdomen: Bowel sounds are present and normal.  There is no guarding, tenderness, or hepatosplenomegaly. Extremities: There is no clubbing, cyanosis, or edema. Neurologic: There are no focal neurologic deficits, except for her complaints of paresthesias in her hands and feet. .  Lymphatics:  There is no palpable peripheral lymphadenopathy. Musculoskeletal: The patient has full range of motion at all joints. There is no evidence of joint deformity or effusions. There is no focal joint tenderness. She does complain of pain on standing and with flexion and extension of the leg and hip and knee joints. Psychological/psychiatric: There is no clinical evidence of anxiety, depression, or melancholy. Lab data:      Results for orders placed or performed during the hospital encounter of 10/20/17   CBC WITH 3 PART DIFF     Status: Abnormal   Result Value Ref Range Status    WBC 6.8 4.5 - 13.0 K/uL Final    RBC 3.60 (L) 4.10 - 5.10 M/uL Final    HGB 11.7 (L) 12.0 - 16.0 g/dL Final    HCT 33.6 (L) 36 - 48 % Final    MCV 93.3 78 - 102 FL Final    MCH 32.5 25.0 - 35.0 PG Final    MCHC 34.8 31 - 37 g/dL Final    RDW 12.6 11.5 - 14.5 % Final    PLATELET 618 715 - 798 K/uL Final    NEUTROPHILS 77 (H) 40 - 70 % Final    MIXED CELLS 4 0.1 - 17 % Final    LYMPHOCYTES 19 14 - 44 % Final    ABS. NEUTROPHILS 5.2 1.8 - 9.5 K/UL Final    ABS. MIXED CELLS 0.3 0.0 - 2.3 K/uL Final    ABS. LYMPHOCYTES 1.3 1.1 - 5.9 K/UL Final     Comment: Test performed at [address] Results Reviewed by Medical Director. DF AUTOMATED   Final           Assessment:     1. Mantle cell lymphoma of lymph nodes of multiple sites ([facility]    2. Neuropathy due to chemotherapeutic drug ([facility]    3. Anxiety    4. Reactive depression    5. Primary osteoarthritis involving multiple joints    6. Neutrophilia      Plan:   Antecedent lymphoma, multiple sites: The patient has completed a full course of systemic chemotherapy. Clinically there is no evidence of disease recurrence. I will recheck her comprehensive metabolic panel, sedimentation rate and the liver function tests. Neutrophilia: I explained to the patient that she is at risk for the development of myelodysplastic syndrome from prior chemotherapy. Her neutrophil count today is normal at 51%.   A prior flow cytometry did not show any evidence of immunophenotypic abnormalities. There was no evidence of any ongoing leukemia or recurrent lymphoma. .    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 We will continue to monitor this every 4 months at the time of her followup visits. The patient will return in 4 months for a complete reassessment     Orders Placed This Encounter    COMPLETE CBC & AUTO DIFF WBC    InHouse CBC ([employer])     Standing Status:   Future     Number of Occurrences:   1     Standing Expiration Date:   10/27/2017       Yuliet Orourke MD  10/20/2017                                                                                            Hematology/Oncology  Progress Note    Name: Eder Marie  Date: 10/20/2017  : 1950    PCP: Tamara Valverde MD     Ms. Merlinda Faes is a 60year old female who was seen for management of her non-Hodgkin lymphoma, neuropathy,and arthritis  Current therapy: Supportive care and active surveillance. Subjective:     Ms. Merlinda Faes is a 45-year-old woman who has a history of mantle cell lymphoma. She has completed a full course of systemic chemotherapy. Today she has no concerns or complaints. She states last week she had three nights of sweats, but this has since resolved. She denies fever or weight loss. She still has complaints of the neuropathy associated with her prior chemotherapy usage, however this is much better since starting Neurontin. Past medical history, family history, and social history: these were reviewed and remains unchanged.     Past Medical History:   Diagnosis Date    Anxiety 6/15    IKE-7 was     Calculus of kidney     Clostridium difficile colitis 3/14    Dr. Yudi Nation adenoma     10/11 Dr. Baca [facility] breast     Depression 6/15    PHQ-9 was 15/27    Diverticulitis     recurrent x3  [facility]    Fatty liver     10/10 on US, CT 6/12; Fib-4 was 0.74 from 1/15    GI bleed 2/14    ischemic colitis on CT, seen by Dr. Richie Draper H/O mammogram 6/3/2016    Hyperlipidemia     calculated 10 year risk score was 3.2% (1/15)    Hypertension     resolved w wt loss    Ischemic colitis ([address] 3/14    Dr Janiya Holcomb    Labial abscess     + MRSA    Lymphoma ([facility] 12/13    Dr. Pito Phelan    Migraine     Neuropathy due to chemotherapeutic drug ([facility] 9/14    Prediabetes     Zoster 8/14    left T11     Past Surgical History:   Procedure Laterality Date    CARDIAC SURG PROCEDURE UNLIST  2/14    echo shows mild conc lvh, ef 60-65%. no wma    HX APPENDECTOMY  1968    HX COLONOSCOPY      colon polyps 2003 Dr. Fatoumata Espino; adenoma 2011; isch colitis 3/14 Dr Josr Cha    HX HEENT      tonsillectomy    HX ORTHOPAEDIC  teenager    knee surgery left    HX OTHER SURGICAL      bx for lymphoma    HX SOPHIA AND BSO  04/02    Dr. 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[employer]      Social History Main Topics    Smoking status: Current Every Day Smoker     Packs/day: 0.50    Smokeless tobacco: Never Used      Comment: smoking cessation advised    Alcohol use 2.5 oz/week     5 Glasses of wine per week      Comment: socially    Drug use: No    Sexual activity: Not Currently     Other Topics Concern    Not on file     Social History Narrative     Family History   Problem Relation Age of Onset    Adopted: Yes     Current Outpatient Prescriptions   Medication Sig Dispense Refill    methylPREDNISolone (MEDROL DOSEPACK) 4 mg tablet Per dose pack instructions 1 Dose Pack 0    oxyCODONE-acetaminophen (PERCOCET) 5-325 mg per tablet Take 1 Tab by mouth every six (6) hours as needed for Pain. Max Daily Amount: 4 Tabs.  30 Tab 0    cyclobenzaprine (FLEXERIL) 10 mg tablet Take 1 Tab by mouth three (3) times daily as needed for Muscle Spasm(s). 30 Tab 1    hyoscyamine (LEVSIN) 0.125 mg tablet Take 1 Tab by mouth every four (4) hours as needed for Cramping. 40 Tab 0       Review of Systems  Constitutional: The patient has no complaints or acute distress  HEENT: The patient denies recent head trauma, eye pain, blurred vision,  hearing deficit, oropharyngeal mucosal pain or lesions, and the patient denies throat pain or discomfort. Lymphatics: The patient denies palpable peripheral lymphadenopathy. Hematologic: The patient denies having bruising, bleeding, or progressive fatigue. Respiratory: Patient denies cough,SOB, or fevers. Cardiovascular: The patient denies having leg pain, leg swelling, heart palpitations, chest permit, chest pain, or lightheadedness. The patient denies having dyspnea on exertion. Neurologic: The patient is complaining of paresthesias in her hands and feet and legs due to prior chemotherapy use. Gastrointestinal: The patient denies having nausea, emesis, or diarrhea. The patient denies having any hematemesis or blood in the stool. Genitourinary: Patient denies having urinary urgency, frequency, or dysuria. The patient denies having blood in the urine. Psychological: The patient denies having symptoms of nervousness, anxiety, depression, or thoughts of harming himself some of this. Skin: Patient denies having skin rashes, skin, ulcerations, or unexplained itching or pruritus. Musculoskeletal: The patient denies pains in muscles or joints    Objective:     Visit Vitals    /78 (BP 1 Location: Left arm, BP Patient Position: Sitting)    Pulse 70    Temp 98.1 °F (36.7 °C) (Oral)    Wt 46.3 kg (102 lb)    BMI 21.32 kg/m2     ECOG PS=0, pain score=0/10   Physical Exam:   Gen. Appearance: The patient is in no acute distress. Skin: There is no bruise or rash. HEENT: The exam is unremarkable. Neck: Supple without lymphadenopathy or thyromegaly.   Lungs: Clear to auscultation and percussion; there are no wheezes or rhonchi. Heart: Regular rate and rhythm; there are no murmurs, gallops, or rubs. Abdomen: Bowel sounds are present and normal.  There is no guarding, tenderness, or hepatosplenomegaly. Extremities: There is no clubbing, cyanosis, or edema. Neurologic: There are no focal neurologic deficits, except for her complaints of paresthesias in her hands and feet. .  Lymphatics: There is no palpable peripheral lymphadenopathy. Musculoskeletal: The patient has full range of motion at all joints. There is no evidence of joint deformity or effusions. There is no focal joint tenderness. She does complain of pain on standing and with flexion and extension of the leg and hip and knee joints. Psychological/psychiatric: There is no clinical evidence of anxiety, depression, or melancholy. Lab data:      Results for orders placed or performed during the hospital encounter of 10/20/17   CBC WITH 3 PART DIFF     Status: Abnormal   Result Value Ref Range Status    WBC 6.8 4.5 - 13.0 K/uL Final    RBC 3.60 (L) 4.10 - 5.10 M/uL Final    HGB 11.7 (L) 12.0 - 16.0 g/dL Final    HCT 33.6 (L) 36 - 48 % Final    MCV 93.3 78 - 102 FL Final    MCH 32.5 25.0 - 35.0 PG Final    MCHC 34.8 31 - 37 g/dL Final    RDW 12.6 11.5 - 14.5 % Final    PLATELET 462 524 - 530 K/uL Final    NEUTROPHILS 77 (H) 40 - 70 % Final    MIXED CELLS 4 0.1 - 17 % Final    LYMPHOCYTES 19 14 - 44 % Final    ABS. NEUTROPHILS 5.2 1.8 - 9.5 K/UL Final    ABS. MIXED CELLS 0.3 0.0 - 2.3 K/uL Final    ABS. LYMPHOCYTES 1.3 1.1 - 5.9 K/UL Final     Comment: Test performed at [place] [address] Location. Results Reviewed by Medical Director. DF AUTOMATED   Final           Assessment:     1. Mantle cell lymphoma of lymph nodes of multiple sites ([facility]    2. Neuropathy due to chemotherapeutic drug ([facility]    3. Anxiety    4. Reactive depression    5. Primary osteoarthritis involving multiple joints    6.  Neutrophilia      Plan: Mantle cell lymphoma, multiple sites: The patient has completed a full course of systemic chemotherapy. Clinically there is no evidence of disease recurrence. I will recheck her comprehensive metabolic panel, sedimentation rate and the liver function tests. The most recent sedimentation rate from 6/23/2017 was normal at 9 mm. Neutrophilia: I explained to the patient that she is at risk for the development of myelodysplastic syndrome from prior chemotherapy. Her neutrophil count today is 77%. A prior flow cytometry did not show any evidence of immunophenotypic abnormalities. There was no evidence of any ongoing leukemia or recurrent lymphoma. .    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 We will continue to monitor this every 4 months at the time of her followup visits. Anxiety/depression: The patient reports that her anxiety symptoms have been under control over the last 4 months. She has been doing reasonably well. She does not require any Ativan at this time.     The patient will return in 4months for a complete reassessment     Orders Placed This Encounter    COMPLETE CBC & AUTO DIFF WBC    InHouse CBC ([employer])     Standing Status:   Future     Number of Occurrences:   1     Standing Expiration Date:   10/27/2017       Carrillo Troncoso MD  10/20/2017

## 2017-10-20 NOTE — MR AVS SNAPSHOT
Visit Information Date & Time Provider Department Dept. Phone Encounter #  
 10/20/2017  2:00 PM Carrillo Client, [facility] [address] Office [phone] [id number] Your Appointments 2/20/2018 11:15 AM  
Office Visit with Carrillo Client, MD [facility]) Appt Note: 4 mo fu  
 [facility] [address] [place] [address] [address] [address]  
[phone]  
  
   
 [facility] [address] [place] [address] Upcoming Health Maintenance Date Due DTaP/Tdap/Td series (1 - Tdap) 8/3/2004 GLAUCOMA SCREENING Q2Y 11/15/2016 OSTEOPOROSIS SCREENING (DEXA) 11/15/2016 MEDICARE YEARLY EXAM 11/15/2016 INFLUENZA AGE 9 TO ADULT 8/1/2017 Pneumococcal 65+ High/Highest Risk (2 of 2 - PPSV23) 10/20/2017 BREAST CANCER SCRN MAMMOGRAM 7/14/2019 COLONOSCOPY 3/20/2024 Allergies as of 10/20/2017  Review Complete On: 8/11/2017 By: Jaime Woods MD  
  
 Severity Noted Reaction Type Reactions Keflex [Cephalexin] Medium 08/12/2013   Side Effect Nausea Only Augmentin [Amoxicillin-pot Clavulanate]    Diarrhea Demerol [Meperidine]    Nausea Only Current Immunizations  Reviewed on 5/31/2016 Name Date Influenza Vaccine (Quad) PF 9/29/2016, 10/22/2015  3:30 PM  
 Influenza Vaccine PF 1/23/2014 PPD 8/15/2000 Pneumococcal Conjugate (PCV-13) 10/22/2015  3:31 PM  
 Pneumococcal Vaccine (Unspecified Type) 10/20/2012 TD Vaccine 8/2/2004 Zoster Vaccine, Live 1/1/2012 Not reviewed this visit You Were Diagnosed With   
  
 Codes Comments Mantle cell lymphoma of lymph nodes of multiple sites [facility])    -  Primary ICD-10-CM: C83.18 
ICD-9-CM: 200.48 Neuropathy due to chemotherapeutic drug ([facility]     ICD-10-CM: G62.0, T45.1X5A 
ICD-9-CM: 357.6, E933.1 Anxiety     ICD-10-CM: F41.9 ICD-9-CM: 300.00 Reactive depression     ICD-10-CM: F32.9 ICD-9-CM: 300.4 Primary osteoarthritis involving multiple joints     ICD-10-CM: M15.0 ICD-9-CM: 715.09 Neutrophilia     ICD-10-CM: D72.9 ICD-9-CM: 150. 8 Vitals BP Pulse Temp Weight(growth percentile) BMI OB Status 126/78 (BP 1 Location: Left arm, BP Patient Position: Sitting) 70 98.1 °F (36.7 °C) (Oral) 102 lb (46.3 kg) 21.32 kg/m2 Hysterectomy Smoking Status Current Every Day Smoker BMI and BSA Data Body Mass Index Body Surface Area  
 21.32 kg/m 2 1.38 m 2 Preferred Pharmacy Pharmacy Name Phone [facility] Tika Barrios, [address] [phone] Your Updated Medication List  
  
   
This list is accurate as of: 10/20/17  2:51 PM.  Always use your most recent med list.  
  
  
  
  
 cyclobenzaprine 10 mg tablet Commonly known as:  FLEXERIL Take 1 Tab by mouth three (3) times daily as needed for Muscle Spasm(s). hyoscyamine 0.125 mg tablet Commonly known as:  [place] Francisco Take 1 Tab by mouth every four (4) hours as needed for Cramping. methylPREDNISolone 4 mg tablet Commonly known as:  [place] Innis Per dose pack instructions  
  
 oxyCODONE-acetaminophen 5-325 mg per tablet Commonly known as:  PERCOCET Take 1 Tab by mouth every six (6) hours as needed for Pain. Max Daily Amount: 4 Tabs. We Performed the Following COMPLETE CBC & AUTO DIFF WBC [39296 CPT(R)] To-Do List   
 10/20/2017 Lab:  CBC WITH 3 PART DIFF Introducing [facility]! Desiree Blake introduces [facility] patient portal. Now you can access parts of your medical record, email your doctor's office, and request medication refills online. 1. In your internet browser, go to [email or web address] 2. Click on the First Time User? Click Here link in the Sign In box. You will see the New Member Sign Up page. 3. Enter your [facility] Access Code exactly as it appears below.  You will not need to use this code after youve completed the sign-up process. If you do not sign up before the expiration date, you must request a new code. · [employer] Access Code: [email or web address] Expires: 1/18/2018  2:51 PM 
 
4. Enter the last four digits of your Social Security Number (xxxx) and Date of Birth (mm/dd/yyyy) as indicated and click Submit. You will be taken to the next sign-up page. 5. Create a [employer] ID. This will be your [employer] login ID and cannot be changed, so think of one that is secure and easy to remember. 6. Create a [employer] password. You can change your password at any time. 7. Enter your Password Reset Question and Answer. This can be used at a later time if you forget your password. 8. Enter your e-mail address. You will receive e-mail notification when new information is available in [address] 9. Click Sign Up. You can now view and download portions of your medical record. 10. Click the Download Summary menu link to download a portable copy of your medical information. If you have questions, please visit the Frequently Asked Questions section of the [employer] website. Remember, [employer] is NOT to be used for urgent needs. For medical emergencies, dial 911. Now available from your iPhone and Android! Please provide this summary of care documentation to your next provider. Your primary care clinician is listed as Tara Murphy. If you have any questions after today's visit, please call [phone].

## 2018-02-05 ENCOUNTER — OFFICE VISIT (OUTPATIENT)
Dept: INTERNAL MEDICINE CLINIC | Age: 68
End: 2018-02-05

## 2018-02-05 VITALS
DIASTOLIC BLOOD PRESSURE: 72 MMHG | SYSTOLIC BLOOD PRESSURE: 124 MMHG | TEMPERATURE: 100.2 F | HEART RATE: 78 BPM | RESPIRATION RATE: 14 BRPM | WEIGHT: 102 LBS | BODY MASS INDEX: 21.41 KG/M2 | HEIGHT: 58 IN | OXYGEN SATURATION: 97 %

## 2018-02-05 DIAGNOSIS — J11.1 INFLUENZA: Primary | ICD-10-CM

## 2018-02-05 DIAGNOSIS — R09.89 CHEST CONGESTION: ICD-10-CM

## 2018-02-05 DIAGNOSIS — R06.2 WHEEZING: ICD-10-CM

## 2018-02-05 DIAGNOSIS — J01.40 ACUTE NON-RECURRENT PANSINUSITIS: ICD-10-CM

## 2018-02-05 RX ORDER — IBUPROFEN 200 MG
200 TABLET ORAL
COMMUNITY
End: 2020-10-14

## 2018-02-05 RX ORDER — DOXYCYCLINE 100 MG/1
100 TABLET ORAL 2 TIMES DAILY
Qty: 20 TAB | Refills: 0 | Status: SHIPPED | OUTPATIENT
Start: 2018-02-05 | End: 2018-02-15

## 2018-02-05 RX ORDER — GUAIFENESIN 600 MG/1
600 TABLET, EXTENDED RELEASE ORAL 2 TIMES DAILY
Qty: 30 TAB | Refills: 0 | Status: SHIPPED | OUTPATIENT
Start: 2018-02-05 | End: 2019-02-08 | Stop reason: ALTCHOICE

## 2018-02-05 RX ORDER — PREDNISONE 20 MG/1
TABLET ORAL
Qty: 6 TAB | Refills: 0 | Status: SHIPPED | OUTPATIENT
Start: 2018-02-05 | End: 2019-02-08 | Stop reason: ALTCHOICE

## 2018-02-05 RX ORDER — BENZONATATE 100 MG/1
100 CAPSULE ORAL
Qty: 21 CAP | Refills: 0 | Status: SHIPPED | OUTPATIENT
Start: 2018-02-05 | End: 2018-02-12

## 2018-02-05 RX ORDER — PREDNISONE 20 MG/1
TABLET ORAL
Qty: 10 TAB | Refills: 0 | Status: SHIPPED | OUTPATIENT
Start: 2018-02-05 | End: 2018-02-05 | Stop reason: DRUGHIGH

## 2018-02-05 RX ORDER — ALBUTEROL SULFATE 90 UG/1
2 AEROSOL, METERED RESPIRATORY (INHALATION)
Qty: 1 INHALER | Refills: 0 | Status: SHIPPED | OUTPATIENT
Start: 2018-02-05 | End: 2019-02-08

## 2018-02-05 NOTE — PROGRESS NOTES
1. Have you been to the ER, urgent care clinic or hospitalized since your last visit? NO.     2. Have you seen or consulted any other health care providers outside of the [address] [place] [address] since your last visit (Include any pap smears or colon screening)? NO      Do you have an Advanced Directive? NO    Would you like information on Advanced Directives?  NO

## 2018-02-05 NOTE — PATIENT INSTRUCTIONS
Symptomatic Treatment:     Make sure you are staying hydrated, 6-8 glasses of water daily, warm teas with honey to soothe a sore throat, throat lozenges, cool mist humidifier    Over the counter medications:     Nasal saline rinses followed by Flonase or Nasacort 1 puff to each side of the nose to be done daily prior to bedtime to help decreased nasal congestion and post nasal drip. Mucinex daily to help loosen chest and nasal congestion and needs to be taken daily with good hydration to provide relief. Antihistamines (Claritin, Zyrtec, Allegra) help dry up congestion and decrease watery or itchy eyes, ear fullness if related to noninfectious fluid behind the ear drum, and itchy ears. Sudafed- helps with congestion and cough. This medication does contain phenylephrine so be cautious while taking this medication if you have a diagnosis of elevated blood pressure. Tylenol, Naproxsyn, Aleve can be used to help relieve pain/tenderness/headaches/fever    Prescription medications:    Tessalon tablets take to help reduce cough. Bromfed is a liquid used to help reduce cough and congestion- this medication contains an antihistamine and phenylephrine(decongestant part), the phenylephrine should be used with caution in those with elevated blood pressure as it can increase blood pressure. Tussionex is used to provide cough relief. It does contain an antihistamine and a narcotic called Hydrocodone. Please be advised that narcotics can cause sedation so should only be used when in a safe environment, no driving while taking this medication, and please keep out of reach of children.

## 2018-02-05 NOTE — PROGRESS NOTES
Melanie Kelly is a 78 y.o.  female and presents with    Chief Complaint   Patient presents with    Cold Symptoms     Patient here for cough with clear mucus, head, nasal & chest congestion, sore throat, shortness of breathe. Patient reports temp this morning at 100. 1. x 5 days        Subjective:  HPI   Mrs. Marie presents today with complaints of productive cough with clear mucus, sinus congestion, nasal and chest congestion, sore throat, shortness of breath. She reports symptoms began on Thursday, fevers at home averaging 101, she is taking ibuprofen with relief. She did not take ibuprofen this morning. She was using a TheraFlu and Afrin with some relief. Complaints today of increased shortness of breath. History of mantle cell lymphoma in remission. Additional Concerns: none     ROS   Review of Systems   Constitutional: Positive for chills, fever and malaise/fatigue. Negative for diaphoresis and weight loss. HENT: Positive for congestion and sore throat. Eyes: Negative. Respiratory: Positive for cough, sputum production and shortness of breath. Negative for hemoptysis and wheezing. Cardiovascular: Negative. Gastrointestinal: Negative. Genitourinary: Negative. Musculoskeletal: Positive for myalgias. Mild body aches   Skin: Negative. Neurological: Positive for headaches. Negative for dizziness and weakness. Psychiatric/Behavioral: Negative. Allergies   Allergen Reactions    Keflex [Cephalexin] Nausea Only    Augmentin [Amoxicillin-Pot Clavulanate] Diarrhea    Demerol [Meperidine] Nausea Only       Current Outpatient Prescriptions   Medication Sig Dispense Refill    OXYMETAZOLINE HCL (AFRIN NASAL SPRAY NA) 2 Puffs by Nasal route daily as needed.  ibuprofen (MOTRIN) 200 mg tablet Take 200 mg by mouth every six (6) hours as needed for Pain.       cyclobenzaprine (FLEXERIL) 10 mg tablet Take 1 Tab by mouth three (3) times daily as needed for Muscle Spasm(s). 30 Tab 1    methylPREDNISolone (MEDROL DOSEPACK) 4 mg tablet Per dose pack instructions 1 Dose Pack 0       Social History     Social History    Marital status:      Spouse name: N/A    Number of children: 2    Years of education: N/A     Occupational History    director [employer]      Social History Main Topics    Smoking status: Current Every Day Smoker     Packs/day: 0.50    Smokeless tobacco: Never Used      Comment: smoking cessation advised    Alcohol use 2.5 oz/week     5 Glasses of wine per week      Comment: socially    Drug use: No    Sexual activity: Not Currently     Other Topics Concern    Not on file     Social History Narrative       Past Medical History:   Diagnosis Date    Anxiety 6/15    IKE-7 was 12/21    Calculus of kidney     Clostridium difficile colitis 3/14    Dr. Yulissa Hodges adenoma     10/11 Dr. Yudith Laguerre Cystic breast     Depression 6/15    PHQ-9 was 15/27    Diverticulitis     recurrent x3 Dr Leonides Bear liver     10/10 on US, CT 6/12; Fib-4 was 0.74 from 1/15    GI bleed 2/14    ischemic colitis on CT, seen by Dr. Leila Yang H/O mammogram 6/3/2016    Hyperlipidemia     calculated 10 year risk score was 3.2% (1/15)    Hypertension     resolved w wt loss    Ischemic colitis ([facility] [address] 3/14    Dr Tonya Crain    Labial abscess     + MRSA    Lymphoma ([facility] [address] 12/13    Dr. Gary Dee    Migraine     Neuropathy due to chemotherapeutic drug ([facility] [address] 9/14    Prediabetes     Zoster 8/14    left T11       Past Surgical History:   Procedure Laterality Date    CARDIAC SURG PROCEDURE UNLIST  2/14    echo shows mild conc lvh, ef 60-65%.  no wma    HX APPENDECTOMY  1968    HX COLONOSCOPY      colon polyps 2003 Dr. Walt Truong; adenoma 2011; isch colitis 3/14 Dr Biju Mahoney    HX HEENT      tonsillectomy    HX ORTHOPAEDIC  teenager    knee surgery left    HX OTHER SURGICAL      bx for lymphoma    HX SOPHIA AND BSO  04/02    Dr. Nayana Linton       Family History   Problem Relation Age of Onset    Adopted: Yes       Objective:  Vitals:    02/05/18 0934   BP: 124/72   Pulse: 78   Resp: 14   Temp: 100.2 °F (37.9 °C)   TempSrc: Oral   SpO2: 97%   Weight: 102 lb (46.3 kg)   Height: 4' 10\" (1.473 m)   PainSc:   0 - No pain       LABS   Results for orders placed or performed during the hospital encounter of 86/89/33   METABOLIC PANEL, COMPREHENSIVE   Result Value Ref Range    Sodium 141 136 - 145 mmol/L    Potassium 3.5 3.5 - 5.5 mmol/L    Chloride 107 100 - 108 mmol/L    CO2 28 21 - 32 mmol/L    Anion gap 6 3.0 - 18 mmol/L    Glucose 100 (H) 74 - 99 mg/dL    BUN 11 7.0 - 18 MG/DL    Creatinine 0.67 0.6 - 1.3 MG/DL    BUN/Creatinine ratio 16 12 - 20      GFR est AA >60 >60 ml/min/1.73m2    GFR est non-AA >60 >60 ml/min/1.73m2    Calcium 9.3 8.5 - 10.1 MG/DL    Bilirubin, total 0.3 0.2 - 1.0 MG/DL    ALT (SGPT) 27 13 - 56 U/L    AST (SGOT) 15 15 - 37 U/L    Alk. phosphatase 58 45 - 117 U/L    Protein, total 7.0 6.4 - 8.2 g/dL    Albumin 3.9 3.4 - 5.0 g/dL    Globulin 3.1 2.0 - 4.0 g/dL    A-G Ratio 1.3 0.8 - 1.7     SED RATE (ESR)   Result Value Ref Range    Sed rate, automated 13 0 - 30 mm/hr       TESTS  none    PE  Physical Exam   Constitutional: She is oriented to person, place, and time. She appears well-developed and well-nourished. No distress. Tired appearing, properly dressed, alert and oriented   HENT:   Head: Normocephalic and atraumatic. Nose: Nose normal.   Mouth/Throat: Oropharynx is clear and moist. No oropharyngeal exudate. Edematous nasal mucosa, erythematous oropharynx  Tenderness on palpation to frontal, maxillary sinus, bilateral eyes with clear drainage   Eyes: Conjunctivae and EOM are normal. Pupils are equal, round, and reactive to light. Neck: Normal range of motion. Neck supple. Cardiovascular: Normal rate, regular rhythm, normal heart sounds and intact distal pulses. No murmur heard. Pulmonary/Chest: Effort normal. No respiratory distress. She has wheezes.  She has no rales. She exhibits no tenderness. Able to complete full sentences    Abdominal: Soft. Bowel sounds are normal. She exhibits no distension. There is no tenderness. Musculoskeletal: Normal range of motion. Lymphadenopathy:     She has no cervical adenopathy. Neurological: She is alert and oriented to person, place, and time. Skin: Skin is warm and dry. She is not diaphoretic. No erythema. Psychiatric: She has a normal mood and affect. Her behavior is normal. Judgment and thought content normal.       Assessment/Plan:    1. Possible influenza, possible bacterial pansinusitis-patient ×5 days with symptoms, out of guideline window for treatment of influenza, patient would rather not have nasal swab since treatment will not be changed due to result. Symptomatic treatment discussed and education provided to include rest, hydration, no return to work until no fever x 24 h. Prednisone 40 mg x 3 days, albuterol PRN cough/wheeze/bronchospasm. Possible bacterial sinusitis-doxycycline ×10 days prescribed, recommended nasal saline rinses followed by Flonase prior to bedtime, Zyrtec daily. Alternate Tylenol and Ibuprofen for fever, body aches. Handout provided. Patient to call/follow up for reevaluation with worsening symptoms. Lab review: no lab studies available for review at time of visit    Today's Visit:   Diagnoses and all orders for this visit:    1. Influenza    Other orders  -     predniSONE (DELTASONE) 20 mg tablet; Take 2 tablets by mouth x 5 days  -     albuterol (PROVENTIL HFA, VENTOLIN HFA, PROAIR HFA) 90 mcg/actuation inhaler; Take 2 Puffs by inhalation every four (4) hours as needed for Wheezing. Health Maintenance: Defer to PCP. I have discussed the diagnosis with the patient and the intended plan as seen in the above orders. The patient has received an after-visit summary and questions were answered concerning future plans.   I have discussed medication side effects and warnings with the patient as well. I have reviewed the plan of care with the patient, accepted their input and they are in agreement with the treatment goals. Follow-up Disposition: Not on File   More than 1/2 of this 20 minute visit was spent in counseling and coordination of care, as described above.     MELISSA Kruger  Internist of [address], [address] [place] [address] Str.  Phone: [phone]  Fax: [phone]

## 2018-02-07 ENCOUNTER — TELEPHONE (OUTPATIENT)
Dept: INTERNAL MEDICINE CLINIC | Age: 68
End: 2018-02-07

## 2018-02-07 NOTE — TELEPHONE ENCOUNTER
Still has temp 100-101, still sweating at night, restless sleeping, chest is still tight- does she need an xray?   She needs to get back to work

## 2018-02-07 NOTE — TELEPHONE ENCOUNTER
Spoke with Mrs. Rajni Chacon regarding fevers and chest symptoms. She reports feeling better as far as congestion however still feeling \"drained\" and with 100 to 101 temperatures that are relieved with tylenol and aleve however do return, waking at night drenched in sweat. Discussed course of infuenza as I do suspect she has and asked her to monitor until Friday and report symptoms then, if continues with symptoms will order a chest xray and address plan of care, she has a follow up scheduled with Oncology Feb 16th. She is anxious to go back to work however recommended with fevers, this was not recommended and her need to hydrate and rest. She will call on Friday.

## 2018-02-16 ENCOUNTER — OFFICE VISIT (OUTPATIENT)
Dept: ONCOLOGY | Age: 68
End: 2018-02-16

## 2018-02-16 ENCOUNTER — HOSPITAL ENCOUNTER (OUTPATIENT)
Dept: LAB | Age: 68
Discharge: HOME | End: 2018-02-16
Payer: MEDICARE

## 2018-02-16 ENCOUNTER — HOSPITAL ENCOUNTER (OUTPATIENT)
Dept: ONCOLOGY | Age: 68
Discharge: HOME | End: 2018-02-16

## 2018-02-16 VITALS
TEMPERATURE: 98.5 F | SYSTOLIC BLOOD PRESSURE: 115 MMHG | HEART RATE: 57 BPM | DIASTOLIC BLOOD PRESSURE: 65 MMHG | BODY MASS INDEX: 20.61 KG/M2 | WEIGHT: 98.6 LBS

## 2018-02-16 DIAGNOSIS — C83.18 MANTLE CELL LYMPHOMA OF LYMPH NODES OF MULTIPLE SITES (HCC): ICD-10-CM

## 2018-02-16 DIAGNOSIS — D72.9 NEUTROPHILIA: ICD-10-CM

## 2018-02-16 DIAGNOSIS — G62.0 NEUROPATHY DUE TO CHEMOTHERAPEUTIC DRUG (HCC): ICD-10-CM

## 2018-02-16 DIAGNOSIS — T45.1X5A NEUROPATHY DUE TO CHEMOTHERAPEUTIC DRUG (HCC): ICD-10-CM

## 2018-02-16 DIAGNOSIS — F41.9 ANXIETY: ICD-10-CM

## 2018-02-16 DIAGNOSIS — M15.9 PRIMARY OSTEOARTHRITIS INVOLVING MULTIPLE JOINTS: ICD-10-CM

## 2018-02-16 DIAGNOSIS — C83.18 MANTLE CELL LYMPHOMA OF LYMPH NODES OF MULTIPLE SITES (HCC): Primary | ICD-10-CM

## 2018-02-16 LAB
ALBUMIN SERPL-MCNC: 4 G/DL (ref 3.4–5)
ALBUMIN/GLOB SERPL: 1.2 {RATIO} (ref 0.8–1.7)
ALP SERPL-CCNC: 58 U/L (ref 45–117)
ALT SERPL-CCNC: 22 U/L (ref 13–56)
ANION GAP SERPL CALC-SCNC: 9 MMOL/L (ref 3–18)
AST SERPL-CCNC: 11 U/L (ref 15–37)
BASO+EOS+MONOS # BLD AUTO: 0.7 K/UL (ref 0–2.3)
BASO+EOS+MONOS # BLD AUTO: 8 % (ref 0.1–17)
BILIRUB SERPL-MCNC: 0.3 MG/DL (ref 0.2–1)
BUN SERPL-MCNC: 18 MG/DL (ref 7–18)
BUN/CREAT SERPL: 28 (ref 12–20)
CALCIUM SERPL-MCNC: 9.2 MG/DL (ref 8.5–10.1)
CHLORIDE SERPL-SCNC: 109 MMOL/L (ref 100–108)
CO2 SERPL-SCNC: 23 MMOL/L (ref 21–32)
CREAT SERPL-MCNC: 0.65 MG/DL (ref 0.6–1.3)
DIFFERENTIAL METHOD BLD: ABNORMAL
ERYTHROCYTE [DISTWIDTH] IN BLOOD BY AUTOMATED COUNT: 12.9 % (ref 11.5–14.5)
ERYTHROCYTE [SEDIMENTATION RATE] IN BLOOD: 5 MM/HR (ref 0–30)
GLOBULIN SER CALC-MCNC: 3.3 G/DL (ref 2–4)
GLUCOSE SERPL-MCNC: 103 MG/DL (ref 74–99)
HCT VFR BLD AUTO: 36 % (ref 36–48)
HGB BLD-MCNC: 13.1 G/DL (ref 12–16)
LYMPHOCYTES # BLD: 1.6 K/UL (ref 1.1–5.9)
LYMPHOCYTES NFR BLD: 18 % (ref 14–44)
MCH RBC QN AUTO: 33.2 PG (ref 25–35)
MCHC RBC AUTO-ENTMCNC: 36.4 G/DL (ref 31–37)
MCV RBC AUTO: 91.4 FL (ref 78–102)
NEUTS SEG # BLD: 6.8 K/UL (ref 1.8–9.5)
NEUTS SEG NFR BLD: 74 % (ref 40–70)
PLATELET # BLD AUTO: 328 K/UL (ref 140–440)
POTASSIUM SERPL-SCNC: 4 MMOL/L (ref 3.5–5.5)
PROT SERPL-MCNC: 7.3 G/DL (ref 6.4–8.2)
RBC # BLD AUTO: 3.94 M/UL (ref 4.1–5.1)
SODIUM SERPL-SCNC: 141 MMOL/L (ref 136–145)
WBC # BLD AUTO: 9.1 K/UL (ref 4.5–13)

## 2018-02-16 PROCEDURE — 85652 RBC SED RATE AUTOMATED: CPT | Performed by: INTERNAL MEDICINE

## 2018-02-16 PROCEDURE — 36415 COLL VENOUS BLD VENIPUNCTURE: CPT | Performed by: INTERNAL MEDICINE

## 2018-02-16 PROCEDURE — 80053 COMPREHEN METABOLIC PANEL: CPT | Performed by: INTERNAL MEDICINE

## 2018-02-16 NOTE — PROGRESS NOTES
Hematology/Oncology  Progress Note    Name: Fidencio Macias  Date: 2018  : 1950    PCP: Gilma Suarez MD     Ms. Hari Alvarenga is a 78year old female who was seen for management of her non-Hodgkin lymphoma, neuropathy,and arthritis    Current therapy: Supportive care and active surveillance. Subjective:     Ms. Hari Alvarenga is a 50-year-old woman who has a history of mantle cell lymphoma. She has completed a full course of systemic chemotherapy. Today she has no concerns or complaints. She denies any night sweats, fevers or unexplained infections at this time. She denies weight loss. Since her last clinic visit she has not had any further episodes of recurrent diverticulitis. The patient reports that she has continued to maintain a high-fiber diet which was recommended with her last episode of diverticulitis. She is doing well and denies any abdominal pain or discomfort. She also report that her neuropathy associated with her prior chemotherapy is better and has not have to use the Neurontin. Overall, the patient reports that she has no new complaints to report. Past medical history, family history, and social history: these were reviewed and remains unchanged.     Past Medical History:   Diagnosis Date    Anxiety 6/15    IKE-7 was     Calculus of kidney     Clostridium difficile colitis 3/14    Dr. Colonel Oliva adenoma     10/11 Dr. Edu Soto Cystic breast     Depression 6/15    PHQ-9 was 15/27    Diverticulitis     recurrent x3 Dr Keyla Walker liver     10/10 on US, CT ; Fib-4 was 0.74 from 1/15    GI bleed     ischemic colitis on CT, seen by Dr. Amilcar Pereira H/O mammogram 6/3/2016    Hyperlipidemia     calculated 10 year risk score was 3.2% (1/15)    Hypertension     resolved w wt loss    Ischemic colitis ([address] 3/14    Dr Jessica Aj    Labial abscess     + MRSA    Lymphoma ([address]     Dr. Kody Wong    Migraine     Neurop[facility] due to [facility]otherapeutic [address] [facility]   Prediabetes     Zoster 8/14    left T11     Past Surgical History:   Procedure Laterality Date    CARDIAC SURG PROCEDURE UNLIST  2/14    echo shows mild conc lvh, ef 60-65%. no wma    HX APPENDECTOMY  1968    HX COLONOSCOPY      colon polyps 2003 Dr. Benny Toledo; adenoma 2011; isch colitis 3/14 Dr Brock Gunter    HX HEENT      tonsillectomy    HX ORTHOPAEDIC  teenager    knee surgery left    HX OTHER SURGICAL      bx for lymphoma    HX SOPHIA AND BSO  04/02    Dr. Arian Mccormack History    Marital status:      Spouse name: N/A    Number of children: 2    Years of education: N/A     Occupational History    director [employer]      Social History Main Topics    Smoking status: Current Every Day Smoker     Packs/day: 0.50    Smokeless tobacco: Never Used      Comment: smoking cessation advised    Alcohol use 2.5 oz/week     5 Glasses of wine per week      Comment: socially    Drug use: No    Sexual activity: Not Currently     Other Topics Concern    Not on file     Social History Narrative     Family History   Problem Relation Age of Onset    Adopted: Yes     Current Outpatient Prescriptions   Medication Sig Dispense Refill    OXYMETAZOLINE HCL (AFRIN NASAL SPRAY NA) 2 Puffs by Nasal route daily as needed.  ibuprofen (MOTRIN) 200 mg tablet Take 200 mg by mouth every six (6) hours as needed for Pain.  albuterol (PROVENTIL HFA, VENTOLIN HFA, PROAIR HFA) 90 mcg/actuation inhaler Take 2 Puffs by inhalation every four (4) hours as needed for Wheezing. 1 Inhaler 0    guaiFENesin ER (MUCINEX) 600 mg ER tablet Take 1 Tab by mouth two (2) times a day. Indications: Cough 30 Tab 0    inhalational spacing device 1 Each by Does Not Apply route as needed. 1 Device 0    predniSONE (DELTASONE) 20 mg tablet Take 2 tabs by mouth x 3 days 6 Tab 0    cyclobenzaprine (FLEXERIL) 10 mg tablet Take 1 Tab by mouth three (3) times daily as needed for Muscle Spasm(s).  30 Tab 1       Review of Systems  Constitutional: The patient has no complaints or acute distress  HEENT: The patient denies recent head trauma, eye pain, blurred vision,  hearing deficit, oropharyngeal mucosal pain or lesions, and the patient denies throat pain or discomfort. Lymphatics: The patient denies palpable peripheral lymphadenopathy. Hematologic: The patient denies having bruising, bleeding, or progressive fatigue. Respiratory: Patient denies cough,SOB, or fevers. Cardiovascular: The patient denies having leg pain, leg swelling, heart palpitations, chest permit, chest pain, or lightheadedness. The patient denies having dyspnea on exertion. Neurologic: The patient is complaining of paresthesias in her hands and feet and legs due to prior chemotherapy use. Gastrointestinal: The patient denies having nausea, emesis, or diarrhea. The patient denies having any hematemesis or blood in the stool. Genitourinary: Patient denies having urinary urgency, frequency, or dysuria. The patient denies having blood in the urine. Psychological: The patient denies having symptoms of nervousness, anxiety, depression, or thoughts of harming himself some of this. Skin: Patient denies having skin rashes, skin, ulcerations, or unexplained itching or pruritus. Musculoskeletal: The patient denies pains in muscles or joints    Objective:     Visit Vitals    /65    Pulse (!) 57    Temp 98.5 °F (36.9 °C) (Oral)    Wt 44.7 kg (98 lb 9.6 oz)    BMI 20.61 kg/m2     ECOG PS=0, pain score=0/10   Physical Exam:   Gen. Appearance: The patient is in no acute distress. Skin: There is no bruise or rash. HEENT: The exam is unremarkable. Neck: Supple without lymphadenopathy or thyromegaly. Lungs: Clear to auscultation and percussion; there are no wheezes or rhonchi. Heart: Regular rate and rhythm; there are no murmurs, gallops, or rubs. Abdomen: Bowel sounds are present and normal.  There is no guarding, tenderness, or hepatosplenomegaly. Extremities: There is no clubbing, cyanosis, or edema. Neurologic: There are no focal neurologic deficits, except for her complaints of paresthesias in her hands and feet. .  Lymphatics: There is no palpable peripheral lymphadenopathy. Musculoskeletal: The patient has full range of motion at all joints. There is no evidence of joint deformity or effusions. There is no focal joint tenderness. She does complain of pain on standing and with flexion and extension of the leg and hip and knee joints. Psychological/psychiatric: There is no clinical evidence of anxiety, depression, or melancholy. Lab data:      Results for orders placed or performed during the hospital encounter of 02/16/18   CBC WITH 3 PART DIFF     Status: Abnormal   Result Value Ref Range Status    WBC 9.1 4.5 - 13.0 K/uL Final    RBC 3.94 (L) 4.10 - 5.10 M/uL Final    HGB 13.1 12.0 - 16.0 g/dL Final    HCT 36.0 36 - 48 % Final    MCV 91.4 78 - 102 FL Final    MCH 33.2 25.0 - 35.0 PG Final    MCHC 36.4 31 - 37 g/dL Final    RDW 12.9 11.5 - 14.5 % Final    PLATELET 086 564 - 546 K/uL Final    NEUTROPHILS 74 (H) 40 - 70 % Final    MIXED CELLS 8 0.1 - 17 % Final    LYMPHOCYTES 18 14 - 44 % Final    ABS. NEUTROPHILS 6.8 1.8 - 9.5 K/UL Final    ABS. MIXED CELLS 0.7 0.0 - 2.3 K/uL Final    ABS. LYMPHOCYTES 1.6 1.1 - 5.9 K/UL Final     Comment: Test performed at [place] [id number] Location. Results Reviewed by Medical Director. DF AUTOMATED   Final           Assessment:     1. Mantle cell lymphoma of lymph nodes of multiple sites ([address]    2. Neutrophilia    3. Neuropathy due to chemotherapeutic drug ([address]    4. Anxiety    5. Primary osteoarthritis involving multiple joints      Plan:   Mantle cell lymphoma, multiple sites: The patient has completed a full course of systemic chemotherapy. Clinically there is no evidence of disease recurrence.   I will recheck her comprehensive metabolic panel, sedimentation rate and the liver function tests.    Neutrophilia: I explained to the patient that she is at risk for the development of myelodysplastic syndrome from prior chemotherapy. Her neutrophil count today is currently slightly elevated at 74%. A prior flow cytometry did not show any evidence of immunophenotypic abnormalities. There was no evidence of any ongoing leukemia or recurrent lymphoma. .    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 We will continue to monitor this every 4 months at the time of her followup visits. The patient will return in 4 months for a complete reassessment     Orders Placed This Encounter    COMPLETE CBC & AUTO DIFF WBC    InHouse CBC ([facility])     Standing Status:   Future     Number of Occurrences:   1     Standing Expiration Date:       METABOLIC PANEL, COMPREHENSIVE     Standing Status:   Future     Standing Expiration Date:   2019    SED RATE (ESR)     Standing Status:   Future     Standing Expiration Date:   2019       Diana Hamlin MD  2018                                                                                            Hematology/Oncology  Progress Note    Name: Latrell Eusebio Marie  Date: 2018  : 1950    PCP: Remedios Godwin MD     Ms. Karel Rubio is a 60year old female who was seen for management of her non-Hodgkin lymphoma, neuropathy,and arthritis  Current therapy: Supportive care and active surveillance. Subjective:     Ms. Karel Rubio is a 80-year-old woman who has a history of mantle cell lymphoma. She has completed a full course of systemic chemotherapy. Today she has no concerns or complaints. She states last week she had three nights of sweats, but this has since resolved. She denies fever or weight loss.  She still has complaints of the neuropathy associated with her prior chemotherapy usage, however this is much better since starting Neurontin. Past medical history, family history, and social history: these were reviewed and remains unchanged. Past Medical History:   Diagnosis Date    Anxiety 6/15    IKE-7 was 12/21    Calculus of kidney     Clostridium difficile colitis 3/14    Dr. Harrison Prader adenoma     10/11 Dr. Jeison Zamudio Cystic breast     Depression 6/15    PHQ-9 was 15/27    Diverticulitis     recurrent x3 Dr Paul Truong liver     10/10 on US, CT 6/12; Fib-4 was 0.74 from 1/15    GI bleed 2/14    ischemic colitis on CT, seen by Dr. Everette Cota H/O mammogram 6/3/2016    Hyperlipidemia     calculated 10 year risk score was 3.2% (1/15)    Hypertension     resolved w wt loss    Ischemic colitis ([address] 3/14    Dr Rosalee Felix    Labial abscess     + MRSA    Lymphoma [address] 12/13    Dr. Jyothi Chao    Migraine     Neuropathy due to chemotherapeutic drug ([facility] [facility] 9/14    Prediabetes     Zoster 8/14    left T11     Past Surgical History:   Procedure Laterality Date    CARDIAC SURG PROCEDURE UNLIST  2/14    echo shows mild conc lvh, ef 60-65%.  no wma    HX APPENDECTOMY  1968    HX COLONOSCOPY      colon polyps 2003 Dr. Boris Barrett; adenoma 2011; isch colitis 3/14 Dr Larissa Kellogg    HX HEENT      tonsillectomy    HX ORTHOPAEDIC  teenager    knee surgery left    HX OTHER SURGICAL      bx for lymphoma    HX SOPHIA AND BSO  04/02    Dr. 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[employer]      Social History Main Topics    Smoking status: Current Every Day Smoker     Packs/day: 0.50    Smokeless tobacco: Never Used      Comment: smoking cessation advised    Alcohol use 2.5 oz/week     5 Glasses of wine per week      Comment: socially    Drug use: No    Sexual activity: Not Currently     Other Topics Concern    Not on file     Social History Narrative     Family History   Problem Relation Age of Onset  Adopted: Yes     Current Outpatient Prescriptions   Medication Sig Dispense Refill    OXYMETAZOLINE HCL (AFRIN NASAL SPRAY NA) 2 Puffs by Nasal route daily as needed.  ibuprofen (MOTRIN) 200 mg tablet Take 200 mg by mouth every six (6) hours as needed for Pain.  albuterol (PROVENTIL HFA, VENTOLIN HFA, PROAIR HFA) 90 mcg/actuation inhaler Take 2 Puffs by inhalation every four (4) hours as needed for Wheezing. 1 Inhaler 0    guaiFENesin ER (MUCINEX) 600 mg ER tablet Take 1 Tab by mouth two (2) times a day. Indications: Cough 30 Tab 0    inhalational spacing device 1 Each by Does Not Apply route as needed. 1 Device 0    predniSONE (DELTASONE) 20 mg tablet Take 2 tabs by mouth x 3 days 6 Tab 0    cyclobenzaprine (FLEXERIL) 10 mg tablet Take 1 Tab by mouth three (3) times daily as needed for Muscle Spasm(s). 30 Tab 1       Review of Systems  Constitutional: The patient has no complaints or acute distress  HEENT: The patient denies recent head trauma, eye pain, blurred vision,  hearing deficit, oropharyngeal mucosal pain or lesions, and the patient denies throat pain or discomfort. Lymphatics: The patient denies palpable peripheral lymphadenopathy. Hematologic: The patient denies having bruising, bleeding, or progressive fatigue. Respiratory: Patient denies cough,SOB, or fevers. Cardiovascular: The patient denies having leg pain, leg swelling, heart palpitations, chest permit, chest pain, or lightheadedness. The patient denies having dyspnea on exertion. Neurologic: The patient is complaining of paresthesias in her hands and feet and legs due to prior chemotherapy use. Gastrointestinal: The patient denies having nausea, emesis, or diarrhea. The patient denies having any hematemesis or blood in the stool. Genitourinary: Patient denies having urinary urgency, frequency, or dysuria. The patient denies having blood in the urine.   Psychological: The patient denies having symptoms of nervousness, anxiety, depression, or thoughts of harming himself some of this. Skin: Patient denies having skin rashes, skin, ulcerations, or unexplained itching or pruritus. Musculoskeletal: The patient denies pains in muscles or joints    Objective:     Visit Vitals    /65    Pulse (!) 57    Temp 98.5 °F (36.9 °C) (Oral)    Wt 44.7 kg (98 lb 9.6 oz)    BMI 20.61 kg/m2     ECOG PS=0, pain score=0/10   Physical Exam:   Gen. Appearance: The patient is in no acute distress. Skin: There is no bruise or rash. HEENT: The exam is unremarkable. Neck: Supple without lymphadenopathy or thyromegaly. Lungs: Clear to auscultation and percussion; there are no wheezes or rhonchi. Heart: Regular rate and rhythm; there are no murmurs, gallops, or rubs. Abdomen: Bowel sounds are present and normal.  There is no guarding, tenderness, or hepatosplenomegaly. Extremities: There is no clubbing, cyanosis, or edema. Neurologic: There are no focal neurologic deficits, except for her complaints of paresthesias in her hands and feet. .  Lymphatics: There is no palpable peripheral lymphadenopathy. Musculoskeletal: The patient has full range of motion at all joints. There is no evidence of joint deformity or effusions. There is no focal joint tenderness. She does complain of pain on standing and with flexion and extension of the leg and hip and knee joints. Psychological/psychiatric: There is no clinical evidence of anxiety, depression, or melancholy.     Lab data:      Results for orders placed or performed during the hospital encounter of 02/16/18   CBC WITH 3 PART DIFF     Status: Abnormal   Result Value Ref Range Status    WBC 9.1 4.5 - 13.0 K/uL Final    RBC 3.94 (L) 4.10 - 5.10 M/uL Final    HGB 13.1 12.0 - 16.0 g/dL Final    HCT 36.0 36 - 48 % Final    MCV 91.4 78 - 102 FL Final    MCH 33.2 25.0 - 35.0 PG Final    MCHC 36.4 31 - 37 g/dL Final    RDW 12.9 11.5 - 14.5 % Final    PLATELET 132 852 - 150 K/uL Final    NEUTROPHILS 74 (H) 40 - 70 % Final    MIXED CELLS 8 0.1 - 17 % Final    LYMPHOCYTES 18 14 - 44 % Final    ABS. NEUTROPHILS 6.8 1.8 - 9.5 K/UL Final    ABS. MIXED CELLS 0.7 0.0 - 2.3 K/uL Final    ABS. LYMPHOCYTES 1.6 1.1 - 5.9 K/UL Final     Comment: Test performed at [place] [address] Location. Results Reviewed by Medical Director. DF AUTOMATED   Final           Assessment:     1. Mantle cell lymphoma of lymph nodes of multiple sites ([facility]    2. Neutrophilia    3. Neuropathy due to chemotherapeutic drug ([facility]    4. Anxiety    5. Primary osteoarthritis involving multiple joints      Plan:   Mantle cell lymphoma, multiple sites: The patient has completed a full course of systemic chemotherapy. Clinically there is no evidence of disease recurrence. I will recheck her comprehensive metabolic panel, sedimentation rate and the liver function tests. The most recent sedimentation rate from 6/23/2017 was normal at 9 mm. Neutrophilia: I explained to the patient that she is at risk for the development of myelodysplastic syndrome from prior chemotherapy. Her neutrophil count today is 77%. A prior flow cytometry did not show any evidence of immunophenotypic abnormalities. There was no evidence of any ongoing leukemia or recurrent lymphoma. .    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 We will continue to monitor this every 4 months at the time of her followup visits. Anxiety/depression: The patient reports that her anxiety symptoms have been under control over the last 4 months. She has been doing reasonably well. She does not require any Ativan at this time.     The patient will return in 4months for a complete reassessment     Orders Placed This Encounter    COMPLETE CBC & AUTO DIFF WBC    InHouse CBC ([facility])     Standing Status:   Future     Number of Occurrences:   1     Standing Expiration Date:   2/23/2018   [address] [place] [address] METABOLIC PANEL, COMPREHENSIVE     Standing Status:   Future     Standing Expiration Date:   2/17/2019    SED RATE (ESR)     Standing Status:   Future     Standing Expiration Date:   2/17/2019       Damari Steele MD  2/16/2018

## 2018-02-16 NOTE — MR AVS SNAPSHOT
[address] [facility] [address] [facility] [address] 
[phone] Patient: Inocente Marin MRN: [id number] YIZ:[id number] Visit Information Date & Time Provider Department Dept. Phone Encounter #  
 2/16/2018 11:15 AM Roseann Crane 72 Office [phone] [id number] Follow-up Instructions Return in about 4 months (around 6/16/2018). Your Appointments 6/15/2018 11:15 AM  
Office Visit with MD Sofia Crane [address] [place] [address] [facility]: 4 mo fu  
 [facility] [address] [place] [address]  
[phone]  
  
   
 [address] Upcoming Health Maintenance Date Due DTaP/Tdap/Td series (1 - Tdap) 8/3/2004 GLAUCOMA SCREENING Q2Y 11/15/2016 OSTEOPOROSIS SCREENING (DEXA) 11/15/2016 MEDICARE YEARLY EXAM 11/15/2016 Influenza Age 5 to Adult 8/1/2017 Pneumococcal 65+ High/Highest Risk (2 of 2 - PPSV23) 10/20/2017 BREAST CANCER SCRN MAMMOGRAM 7/14/2019 COLONOSCOPY 3/20/2024 Allergies as of 2/16/2018  Review Complete On: 2/16/2018 By: Liseth Cordova MD  
  
 Severity Noted Reaction Type Reactions Keflex [Cephalexin] Medium 08/12/2013   Side Effect Nausea Only Augmentin [Amoxicillin-pot Clavulanate]    Diarrhea Demerol [Meperidine]    Nausea Only Current Immunizations  Reviewed on 5/31/2016 Name Date Influenza Vaccine (Quad) PF 9/29/2016, 10/22/2015  3:30 PM  
 Influenza Vaccine PF 1/23/2014 PPD 8/15/2000 Pneumococcal Conjugate (PCV-13) 10/22/2015  3:31 PM  
 Pneumococcal Vaccine (Unspecified Type) 10/20/2012 TD Vaccine 8/2/2004 Zoster Vaccine, Live 1/1/2012 Not reviewed this visit You Were Diagnosed With   
  
 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 
ICD-9-CM: 200.48   
 Neutrophilia     ICD-10-CM: D72.9 ICD-9-CM: 288.8 Neuropathy due to chemotherapeutic drug ([facility] [address]     ICD-10-CM: G62.0, T45.1X5A 
ICD-9-CM: 357.6, E933.1 Anxiety     ICD-10-CM: F41.9 ICD-9-CM: 300.00 Primary osteoarthritis involving multiple joints     ICD-10-CM: M15.0 ICD-9-CM: 715.09 Vitals BP Pulse Temp Weight(growth percentile) BMI OB Status 115/65 (!) 57 98.5 °F (36.9 °C) (Oral) 98 lb 9.6 oz (44.7 kg) 20.61 kg/m2 Hysterectomy Smoking Status Current Every Day Smoker BMI and BSA Data Body Mass Index Body Surface Area  
 20.61 kg/m 2 1.35 m 2 Preferred Pharmacy Pharmacy Name [facility] Myranda Grimm, [address] [phone] Your Updated Medication List  
  
   
This list is accurate as of: 2/16/18 12:18 PM.  Always use your most recent med list.  
  
  
  
  
 AFRIN NASAL SPRAY NA  
2 Puffs by Nasal route daily as needed. albuterol 90 mcg/actuation inhaler Commonly known as:  PROVENTIL HFA, VENTOLIN HFA, PROAIR HFA Take 2 Puffs by inhalation every four (4) hours as needed for Wheezing. cyclobenzaprine 10 mg tablet Commonly known as:  FLEXERIL Take 1 Tab by mouth three (3) times daily as needed for Muscle Spasm(s). guaiFENesin  mg ER tablet Commonly known as:  Neo & Neo Take 1 Tab by mouth two (2) times a day. Indications: Cough  
  
 ibuprofen 200 mg tablet Commonly known as:  MOTRIN Take 200 mg by mouth every six (6) hours as needed for Pain.  
  
 inhalational spacing device 1 Each by Does Not Apply route as needed. predniSONE 20 mg tablet Commonly known as:  Prashant Lemus Take 2 tabs by mouth x 3 days We Performed the Following COMPLETE CBC & AUTO DIFF WBC [24600 CPT(R)] Follow-up Instructions Return in about 4 months (around 6/16/2018). To-Do List   
 02/16/2018   Lab:  CBC WITH 3 PART DIFF   
  
  
 Patient Instructions Non-Hodgkin's Lymphoma: Care Instructions Your Care Instructions Non-Hodgkin's lymphoma is a type of cancer that affects part of the immune system (lymph system). Cells normally found in the lymph nodes, spleen, and other parts of the lymph system increase in number and collect in areas where they cause problems. Non-Hodgkin's lymphoma is not contagious and is not caused by an injury. Non-Hodgkin's lymphoma may occur in a single lymph node, a group of lymph nodes, or an organ. It can spread to almost any part of the body, including the liver, bone marrow, and spleen. Treatment for non-Hodgkin's lymphoma depends on the stage of the lymphoma. It is usually treated with medicines called chemotherapy. Your doctor may also give you medicines that work on the body's immune system (immunotherapy). You may also need radiation treatments or a procedure called a bone marrow transplant. Your doctor will talk to you about what kind of treatment may be best for you. When you find out that you have cancer, you may feel many emotions and may need some help coping. Seek out family, friends, and counselors for support. You also can do things at home to make yourself feel better while you go through treatment. Call the [employer] (0-709.334.9372) or visit its website at [address]. Medialive for more information. Follow-up care is a key part of your treatment and safety. Be sure to make and go to all appointments, and call your doctor if you are having problems. It's also a good idea to know your test results and keep a list of the medicines you take. How can you care for yourself at home? · Take your medicines exactly as prescribed. Call your doctor if you think you are having a problem with your medicine. You may get medicine for nausea and vomiting if you have these side effects. · Eat healthy food.  If you do not feel like eating, try to eat food that has protein and extra calories to keep up your strength and prevent weight loss. Drink liquid meal replacements for extra calories and protein. Try to eat your main meal early. · Get some physical activity every day, but do not get too tired. Keep doing the hobbies you enjoy as your energy allows. · Take steps to control your stress and workload. Learn relaxation techniques. ¨ Share your feelings. Stress and tension affect our emotions. By expressing your feelings to others, you may be able to understand and cope with them. ¨ Consider joining a support group. Talking about a problem with your spouse, a good friend, or other people with similar problems is a good way to reduce tension and stress. ¨ Express yourself through art. Try writing, crafts, dance, or art to relieve stress. Some dance, writing, or art groups may be available just for people who have cancer. ¨ Be kind to your body and mind. Getting enough sleep, eating a healthy diet, and taking time to do things you enjoy can contribute to an overall feeling of balance in your life and can help reduce stress. ¨ Get help if you need it. Discuss your concerns with your doctor or counselor. · If you are vomiting or have diarrhea: ¨ Drink plenty of fluids (enough so that your urine is light yellow or clear like water) to prevent dehydration. Choose water and other caffeine-free clear liquids. If you have kidney, heart, or liver disease and have to limit fluids, talk with your doctor before you increase the amount of fluids you drink. ¨ When you are able to eat, try clear soups, mild foods, and liquids until all symptoms are gone for 12 to 48 hours. Other good choices include dry toast, crackers, cooked cereal, and gelatin dessert, such as Jell-O. · If you have not already done so, prepare a list of advance directives.  Advance directives are instructions to your doctor and family members about what kind of care you want if you become unable to speak or express yourself. When should you call for help? Call 911 anytime you think you may need emergency care. For example, call if: 
? · You passed out (lost consciousness). ?Call your doctor now or seek immediate medical care if: 
? · You have a fever. ? · You have abnormal bleeding. ? · You have new or worse pain. ? · You think you have an infection. ? · You have new symptoms, such as a cough, belly pain, vomiting, diarrhea, or a rash. ? Watch closely for changes in your health, and be sure to contact your doctor if: 
? · You are much more tired than usual.  
? · You have swollen glands in your armpits, groin, or neck. ? · You do not get better as expected. Where can you learn more? Go to http://kevin-justen.info/. Enter S335 in the search box to learn more about \"Non-Hodgkin's Lymphoma: Care Instructions. \" Current as of: May 12, 2017 Content Version: 11.4 © 8669-6001 [employer]. Care instructions adapted under license by [employer] (which disclaims liability or warranty for this information). If you have questions about a medical condition or this instruction, always ask your healthcare professional. [address] any warranty or liability for your use of this information. Introducing [facility]! [employer] introduces [employer]PNR patient portal. Now you can access parts of your medical record, email your doctor's office, and request medication refills online. 1. In your internet browser, go to [email or web address] 2. Click on the First Time User? Click Here link in the Sign In box. You will see the New Member Sign Up page. 3. Enter your [employer]PNR Access Code exactly as it appears below. You will not need to use this code after youve completed the sign-up process.  If you do not sign up before the expiration date, you must request a new code. · [employer] Access Code: [email or web address] Expires: 5/6/2018  9:31 AM 
 
4. Enter the last four digits of your Social Security Number (xxxx) and Date of Birth (mm/dd/yyyy) as indicated and click Submit. You will be taken to the next sign-up page. 5. Create a [employer] ID. This will be your [employer] login ID and cannot be changed, so think of one that is secure and easy to remember. 6. Create a [employer] password. You can change your password at any time. 7. Enter your Password Reset Question and Answer. This can be used at a later time if you forget your password. 8. Enter your e-mail address. You will receive e-mail notification when new information is available in [address] 9. Click Sign Up. You can now view and download portions of your medical record. 10. Click the Download Summary menu link to download a portable copy of your medical information. If you have questions, please visit the Frequently Asked Questions section of the [employer] website. Remember, [employer] is NOT to be used for urgent needs. For medical emergencies, dial 911. Now available from your iPhone and Android! Please provide this summary of care documentation to your next provider. Your primary care clinician is listed as Vanessa Reed. If you have any questions after today's visit, please call [phone].

## 2018-02-16 NOTE — PATIENT INSTRUCTIONS
Non-Hodgkin's Lymphoma: Care Instructions  Your Care Instructions  Non-Hodgkin's lymphoma is a type of cancer that affects part of the immune system (lymph system). Cells normally found in the lymph nodes, spleen, and other parts of the lymph system increase in number and collect in areas where they cause problems. Non-Hodgkin's lymphoma is not contagious and is not caused by an injury. Non-Hodgkin's lymphoma may occur in a single lymph node, a group of lymph nodes, or an organ. It can spread to almost any part of the body, including the liver, bone marrow, and spleen. Treatment for non-Hodgkin's lymphoma depends on the stage of the lymphoma. It is usually treated with medicines called chemotherapy. Your doctor may also give you medicines that work on the body's immune system (immunotherapy). You may also need radiation treatments or a procedure called a bone marrow transplant. Your doctor will talk to you about what kind of treatment may be best for you. When you find out that you have cancer, you may feel many emotions and may need some help coping. Seek out family, friends, and counselors for support. You also can do things at home to make yourself feel better while you go through treatment. Call the [employer] (7-335.659.3310) or visit its website at [employer] PeptiVir for more information. Follow-up care is a key part of your treatment and safety. Be sure to make and go to all appointments, and call your doctor if you are having problems. It's also a good idea to know your test results and keep a list of the medicines you take. How can you care for yourself at home? · Take your medicines exactly as prescribed. Call your doctor if you think you are having a problem with your medicine. You may get medicine for nausea and vomiting if you have these side effects. · Eat healthy food.  If you do not feel like eating, try to eat food that has protein and extra calories to keep up your strength and prevent weight loss. Drink liquid meal replacements for extra calories and protein. Try to eat your main meal early. · Get some physical activity every day, but do not get too tired. Keep doing the hobbies you enjoy as your energy allows. · Take steps to control your stress and workload. Learn relaxation techniques. ¨ Share your feelings. Stress and tension affect our emotions. By expressing your feelings to others, you may be able to understand and cope with them. ¨ Consider joining a support group. Talking about a problem with your spouse, a good friend, or other people with similar problems is a good way to reduce tension and stress. ¨ Express yourself through art. Try writing, crafts, dance, or art to relieve stress. Some dance, writing, or art groups may be available just for people who have cancer. ¨ Be kind to your body and mind. Getting enough sleep, eating a healthy diet, and taking time to do things you enjoy can contribute to an overall feeling of balance in your life and can help reduce stress. ¨ Get help if you need it. Discuss your concerns with your doctor or counselor. · If you are vomiting or have diarrhea:  ¨ Drink plenty of fluids (enough so that your urine is light yellow or clear like water) to prevent dehydration. Choose water and other caffeine-free clear liquids. If you have kidney, heart, or liver disease and have to limit fluids, talk with your doctor before you increase the amount of fluids you drink. ¨ When you are able to eat, try clear soups, mild foods, and liquids until all symptoms are gone for 12 to 48 hours. Other good choices include dry toast, crackers, cooked cereal, and gelatin dessert, such as Jell-O.  · If you have not already done so, prepare a list of advance directives. Advance directives are instructions to your doctor and family members about what kind of care you want if you become unable to speak or express yourself. When should you call for help?   Call [phone] anytime you think you may need emergency care. For example, call if:  ? · You passed out (lost consciousness). ?Call your doctor now or seek immediate medical care if:  ? · You have a fever. ? · You have abnormal bleeding. ? · You have new or worse pain. ? · You think you have an infection. ? · You have new symptoms, such as a cough, belly pain, vomiting, diarrhea, or a rash. ? Watch closely for changes in your health, and be sure to contact your doctor if:  ? · You are much more tired than usual.   ? · You have swollen glands in your armpits, groin, or neck. ? · You do not get better as expected. Where can you learn more? Go to http://kevin-justen.info/. Enter P398 in the search box to learn more about \"Non-Hodgkin's Lymphoma: Care Instructions. \"  Current as of: May 12, 2017  Content Version: 11.4  © 2989-6011 [employer]. Care instructions adapted under license by [employer] (which disclaims liability or warranty for this information). If you have questions about a medical condition or this instruction, always ask your healthcare professional. [address] any warranty or liability for your use of this information.

## 2018-06-15 ENCOUNTER — HOSPITAL ENCOUNTER (OUTPATIENT)
Dept: ONCOLOGY | Age: 68
Discharge: HOME | End: 2018-06-15

## 2018-06-15 DIAGNOSIS — C83.18 MANTLE CELL LYMPHOMA OF LYMPH NODES OF MULTIPLE SITES (HCC): ICD-10-CM

## 2018-08-06 ENCOUNTER — HOSPITAL ENCOUNTER (OUTPATIENT)
Dept: MAMMOGRAPHY | Age: 68
Discharge: HOME | End: 2018-08-06
Attending: INTERNAL MEDICINE

## 2018-08-06 ENCOUNTER — TELEPHONE (OUTPATIENT)
Dept: INTERNAL MEDICINE CLINIC | Age: 68
End: 2018-08-06

## 2018-08-06 DIAGNOSIS — Z12.31 VISIT FOR SCREENING MAMMOGRAM: ICD-10-CM

## 2018-08-06 DIAGNOSIS — N63.0 LUMP IN FEMALE BREAST: Primary | ICD-10-CM

## 2018-08-06 DIAGNOSIS — N63.0 LUMP OR MASS IN BREAST: ICD-10-CM

## 2018-08-06 DIAGNOSIS — Z12.39 BREAST CANCER SCREENING: ICD-10-CM

## 2018-08-06 DIAGNOSIS — R92.8 ABNORMAL MAMMOGRAM: ICD-10-CM

## 2018-08-06 NOTE — TELEPHONE ENCOUNTER
They need an order for a diagnostic mammogram, b/c the patient complains about a lump behind her right nipple.      Also needs a breast ultrasound order of both breast.    PATIENT IS THERE NOW

## 2018-08-07 ENCOUNTER — OFFICE VISIT (OUTPATIENT)
Dept: ONCOLOGY | Age: 68
End: 2018-08-07

## 2018-08-07 ENCOUNTER — HOSPITAL ENCOUNTER (OUTPATIENT)
Dept: ONCOLOGY | Age: 68
Discharge: HOME | End: 2018-08-07

## 2018-08-07 ENCOUNTER — HOSPITAL ENCOUNTER (OUTPATIENT)
Dept: LAB | Age: 68
Discharge: HOME | End: 2018-08-07
Payer: MEDICARE

## 2018-08-07 VITALS
TEMPERATURE: 98.4 F | DIASTOLIC BLOOD PRESSURE: 76 MMHG | SYSTOLIC BLOOD PRESSURE: 157 MMHG | WEIGHT: 100.6 LBS | HEIGHT: 58 IN | HEART RATE: 57 BPM | BODY MASS INDEX: 21.12 KG/M2

## 2018-08-07 DIAGNOSIS — C83.18 MANTLE CELL LYMPHOMA OF LYMPH NODES OF MULTIPLE SITES (HCC): ICD-10-CM

## 2018-08-07 DIAGNOSIS — G62.0 NEUROPATHY DUE TO CHEMOTHERAPEUTIC DRUG (HCC): ICD-10-CM

## 2018-08-07 DIAGNOSIS — F32.9 REACTIVE DEPRESSION: ICD-10-CM

## 2018-08-07 DIAGNOSIS — T45.1X5A NEUROPATHY DUE TO CHEMOTHERAPEUTIC DRUG (HCC): ICD-10-CM

## 2018-08-07 DIAGNOSIS — C83.18 MANTLE CELL LYMPHOMA OF LYMPH NODES OF MULTIPLE SITES (HCC): Primary | ICD-10-CM

## 2018-08-07 DIAGNOSIS — D72.9 NEUTROPHILIA: ICD-10-CM

## 2018-08-07 DIAGNOSIS — F41.9 ANXIETY: ICD-10-CM

## 2018-08-07 LAB
BASO+EOS+MONOS # BLD AUTO: 0.7 K/UL (ref 0–2.3)
BASO+EOS+MONOS # BLD AUTO: 12 % (ref 0.1–17)
DIFFERENTIAL METHOD BLD: ABNORMAL
ERYTHROCYTE [DISTWIDTH] IN BLOOD BY AUTOMATED COUNT: 14 % (ref 11.5–14.5)
ERYTHROCYTE [SEDIMENTATION RATE] IN BLOOD: 13 MM/HR (ref 0–30)
HCT VFR BLD AUTO: 36.6 % (ref 36–48)
HGB BLD-MCNC: 12.6 G/DL (ref 12–16)
LYMPHOCYTES # BLD: 0.9 K/UL (ref 1.1–5.9)
LYMPHOCYTES NFR BLD: 16 % (ref 14–44)
MCH RBC QN AUTO: 33.2 PG (ref 25–35)
MCHC RBC AUTO-ENTMCNC: 34.4 G/DL (ref 31–37)
MCV RBC AUTO: 96.6 FL (ref 78–102)
NEUTS SEG # BLD: 4 K/UL (ref 1.8–9.5)
NEUTS SEG NFR BLD: 72 % (ref 40–70)
PLATELET # BLD AUTO: 221 K/UL (ref 140–440)
RBC # BLD AUTO: 3.79 M/UL (ref 4.1–5.1)
WBC # BLD AUTO: 5.6 K/UL (ref 4.5–13)

## 2018-08-07 PROCEDURE — 80053 COMPREHEN METABOLIC PANEL: CPT | Performed by: INTERNAL MEDICINE

## 2018-08-07 PROCEDURE — 85652 RBC SED RATE AUTOMATED: CPT | Performed by: INTERNAL MEDICINE

## 2018-08-07 NOTE — MR AVS SNAPSHOT
[address] [facility] [address] 
 
 
 [facility] [address] [facility] [address] 
[phone] Patient: Reynaldo Urena MRN: [id number] Visit Information Date & Time Provider Department Dept. Phone Encounter #  
 8/7/2018  1:30 PM Noe Stout MD [address] [facility]  [phone] [id number] Follow-up Instructions Return in about 4 months (around 12/7/2018). Your Appointments 12/11/2018  1:30 PM  
Office Visit with MD Reinier Sarabia [facility] Annie Juarez) Appt Note: OV  
 [facility] [address] [facility] [address]  
[phone]  
  
   
 [facility] [address] Upcoming Health Maintenance Date Due DTaP/Tdap/Td series (1 - Tdap) 8/3/2004 GLAUCOMA SCREENING Q2Y 11/15/2016 Bone Densitometry (Dexa) Screening 11/15/2016 Pneumococcal 65+ High/Highest Risk (2 of 2 - PPSV23) 10/20/2017 MEDICARE YEARLY EXAM 3/14/2018 Influenza Age 5 to Adult 8/1/2018 BREAST CANCER SCRN MAMMOGRAM 7/14/2019 COLONOSCOPY 3/20/2024 Allergies as of 8/7/2018  Review Complete On: 8/7/2018 By: Noe Stout MD  
  
 Severity Noted Reaction Type Reactions Keflex [Cephalexin] Medium 08/12/2013   Side Effect Nausea Only Augmentin [Amoxicillin-pot Clavulanate]    Diarrhea Demerol [Meperidine]    Nausea Only Current Immunizations  Reviewed on 5/31/2016 Name Date Influenza Vaccine (Quad) PF 9/29/2016, 10/22/2015  3:30 PM  
 Influenza Vaccine PF 1/23/2014 PPD 8/15/2000 Pneumococcal Conjugate (PCV-13) 10/22/2015  3:31 PM  
 Pneumococcal Vaccine (Unspecified Type) 10/20/2012 TD Vaccine 8/2/2004 Zoster Vaccine, Live 1/1/2012 Not reviewed this visit You Were Diagnosed With   
  
 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 
ICD-9-CM: 200.48 Neuropathy due to chemotherapeutic drug ([address]     ICD-10-CM: G62.0, T45.1X5A 
ICD-9-CM: 357.6, E933.1 Neutrophilia     ICD-10-CM: D72.9 ICD-9-CM: 288.8 Anxiety     ICD-10-CM: F41.9 ICD-9-CM: 300.00 Reactive depression     ICD-10-CM: F32.9 ICD-9-CM: 300.4 Vitals BP Pulse Temp Height(growth percentile) Weight(growth percentile) BMI  
 157/76 (BP 1 Location: Left arm, BP Patient Position: Sitting) (!) 57 98.4 °F (36.9 °C) 4' 10\" (1.473 m) 100 lb 9.6 oz (45.6 kg) 21.03 kg/m2 OB Status Smoking Status Hysterectomy Current Every Day Smoker BMI and BSA Data Body Mass Index Body Surface Area 21.03 kg/m 2 1.37 m 2 Preferred Pharmacy Pharmacy Name [facility] [place], [address] [phone] Your Updated Medication List  
  
   
This list is accurate as of 8/7/18  2:17 PM.  Always use your most recent med list.  
  
  
  
  
 AFRIN NASAL SPRAY NA  
2 Puffs by Nasal route daily as needed. albuterol 90 mcg/actuation inhaler Commonly known as:  PROVENTIL HFA, VENTOLIN HFA, PROAIR HFA Take 2 Puffs by inhalation every four (4) hours as needed for Wheezing. cyclobenzaprine 10 mg tablet Commonly known as:  FLEXERIL Take 1 Tab by mouth three (3) times daily as needed for Muscle Spasm(s). guaiFENesin  mg ER tablet Commonly known as:  Neo & Neo Take 1 Tab by mouth two (2) times a day. Indications: Cough  
  
 ibuprofen 200 mg tablet Commonly known as:  MOTRIN Take 200 mg by mouth every six (6) hours as needed for Pain.  
  
 inhalational spacing device 1 Each by Does Not Apply route as needed. predniSONE 20 mg tablet Commonly known as:  Orlean Aas Take 2 tabs by mouth x 3 days We Performed the Following COMPLETE CBC & AUTO DIFF WBC [94254 CPT(R)] Follow-up Instructions Return in about 4 months (around 12/7/2018). To-Do List   
 08/07/2018 Lab:  CBC WITH 3 PART DIFF   
  
 08/13/2018 1:00 PM  
  Appointment with TOMAS [facility] RM [address] at [address] [place] [address] ([phone]) OUTSIDE FILMS  - Any outside films related to the study being scheduled should be brought with you on the day of the exam.  If this cannot be done there may be a delay in the reading of the study. MEDICATIONS  - Patient must bring a complete list of all medications currently taking to include prescriptions, over-the-counter meds, herbals, vitamins & any dietary supplements  GENERAL INSTRUCTIONS  - On the day of your exam do not use any bath powder, deodorant or lotions on the armpit area. 08/13/2018 1:30 PM  
  Appointment with TOMAS PATEL  RM [address] at [address] [place] [address] ([phone]) OUTSIDE FILMS  - Any outside films related to the study being scheduled should be brought with you on the day of the exam.  If this cannot be done there may be a delay in the reading of the study. MEDICATIONS  - Patient must bring a complete list of all medications currently taking to include prescriptions, over-the-counter meds, herbals, vitamins & any dietary supplements Patient Instructions Non-Hodgkin's Lymphoma: Care Instructions Your Care Instructions Non-Hodgkin's lymphoma is a type of cancer that affects part of the immune system (lymph system). Cells normally found in the lymph nodes, spleen, and other parts of the lymph system increase in number and collect in areas where they cause problems. Non-Hodgkin's lymphoma is not contagious and is not caused by an injury. Non-Hodgkin's lymphoma may occur in a single lymph node, a group of lymph nodes, or an organ. It can spread to almost any part of the body, including the liver, bone marrow, and spleen. Treatment for non-Hodgkin's lymphoma depends on the stage of the lymphoma. It is usually treated with medicines called chemotherapy. Your doctor may also give you medicines that work on the body's immune system (immunotherapy). You may also need radiation treatments or a procedure called a bone marrow transplant. Your doctor will talk to you about what kind of treatment may be best for you. When you find out that you have cancer, you may feel many emotions and may need some help coping. Seek out family, friends, and counselors for support. You also can do things at home to make yourself feel better while you go through treatment. Call the [employer] (1-191.890.9282) or visit its website at [employer] for more information. Follow-up care is a key part of your treatment and safety. Be sure to make and go to all appointments, and call your doctor if you are having problems. It's also a good idea to know your test results and keep a list of the medicines you take. How can you care for yourself at home? · Take your medicines exactly as prescribed. Call your doctor if you think you are having a problem with your medicine. You may get medicine for nausea and vomiting if you have these side effects. · Eat healthy food. If you do not feel like eating, try to eat food that has protein and extra calories to keep up your strength and prevent weight loss. Drink liquid meal replacements for extra calories and protein. Try to eat your main meal early. · Get some physical activity every day, but do not get too tired. Keep doing the hobbies you enjoy as your energy allows. · Take steps to control your stress and workload. Learn relaxation techniques. ¨ Share your feelings. Stress and tension affect our emotions. By expressing your feelings to others, you may be able to understand and cope with them. ¨ Consider joining a support group.  Talking about a problem with your spouse, a good friend, or other people with similar problems is a good way to reduce tension and stress. ¨ Express yourself through art. Try writing, crafts, dance, or art to relieve stress. Some dance, writing, or art groups may be available just for people who have cancer. ¨ Be kind to your body and mind. Getting enough sleep, eating a healthy diet, and taking time to do things you enjoy can contribute to an overall feeling of balance in your life and can help reduce stress. ¨ Get help if you need it. Discuss your concerns with your doctor or counselor. · If you are vomiting or have diarrhea: ¨ Drink plenty of fluids (enough so that your urine is light yellow or clear like water) to prevent dehydration. Choose water and other caffeine-free clear liquids. If you have kidney, heart, or liver disease and have to limit fluids, talk with your doctor before you increase the amount of fluids you drink. ¨ When you are able to eat, try clear soups, mild foods, and liquids until all symptoms are gone for 12 to 48 hours. Other good choices include dry toast, crackers, cooked cereal, and gelatin dessert, such as Jell-O. · If you have not already done so, prepare a list of advance directives. Advance directives are instructions to your doctor and family members about what kind of care you want if you become unable to speak or express yourself. When should you call for help? Call 911 anytime you think you may need emergency care.  For example, call if: 
  · You passed out (lost consciousness).  
 Call your doctor now or seek immediate medical care if: 
  · You have a fever.  
  · You have abnormal bleeding.  
  · You have new or worse pain.  
  · You think you have an infection.  
  · You have new symptoms, such as a cough, belly pain, vomiting, diarrhea, or a rash.  
 Watch closely for changes in your health, and be sure to contact your doctor if: 
  · You are much more tired than usual.  
   · You have swollen glands in your armpits, groin, or neck.  
  · You do not get better as expected. Where can you learn more? Go to http://kevin-justen.info/. Enter D945 in the search box to learn more about \"Non-Hodgkin's Lymphoma: Care Instructions. \" Current as of: May 12, 2017 Content Version: 11.7 © 0224-3737 [employer]. Care instructions adapted under license by [employer] (which disclaims liability or warranty for this information). If you have questions about a medical condition or this instruction, always ask your healthcare professional. [place] [address] any warranty or liability for your use of this information. Introducing [facility]! Silvia Aguilar introduces [employer] patient portal. Now you can access parts of your medical record, email your doctor's office, and request medication refills online. 1. In your internet browser, go to [email or web address] 2. Click on the First Time User? Click Here link in the Sign In box. You will see the New Member Sign Up page. 3. Enter your [employer] Access Code exactly as it appears below. You will not need to use this code after youve completed the sign-up process. If you do not sign up before the expiration date, you must request a new code. · [employer] Access Code: [email or web address] Expires: 10/28/2018 12:50 PM 
 
4. Enter the last four digits of your Social Security Number (xxxx) and Date of Birth (mm/dd/yyyy) as indicated and click Submit. You will be taken to the next sign-up page. 5. Create a Mobile[employer] Network[employer] ID. This will be your [employer] login ID and cannot be changed, so think of one that is secure and easy to remember. 6. Create a [employer] password. You can change your password at any time. 7. Enter your Password Reset Question and Answer. This can be used at a later time if you forget your password. 8. Enter your e-mail address. You will receive e-mail notification when new information is available in [address] 9. Click Sign Up. You can now view and download portions of your medical record. 10. Click the Download Summary menu link to download a portable copy of your medical information. If you have questions, please visit the Frequently Asked Questions section of the [employer] website. Remember, [employer] is NOT to be used for urgent needs. For medical emergencies, dial 911. Now available from your iPhone and Android! Please provide this summary of care documentation to your next provider. Your primary care clinician is listed as Rich Ortega. If you have any questions after today's visit, please call [phone].

## 2018-08-07 NOTE — PROGRESS NOTES
Hematology/Oncology  Progress Note    Name: Wanda Love  Date: 2018  : 1950    PCP: Jackie Hassan MD     Ms. Annamarie Bernstein is a 78year old female who was seen for management of her non-Hodgkin lymphoma, neuropathy,and arthritis    Current therapy: Supportive care and active surveillance. Subjective:     Ms. Annamarie Bernstein is a 79-year-old woman who has a history of mantle cell lymphoma. She has completed a full course of systemic chemotherapy. Today she has no concerns or complaints. She denies any night sweats, fevers or unexplained infections at this time. She denies weight loss. Since her last clinic visit she has not had any further episodes of recurrent diverticulitis. The patient reports that she has continued to maintain a high-fiber diet which was recommended with her last episode of diverticulitis. She is doing well and denies any abdominal pain or discomfort. She also report that her neuropathy associated with her prior chemotherapy is better and has not have to use the Neurontin. Overall, the patient reports that she has no new complaints to report. She had been planning to retire at the end of this year but has changed her mind and will continue to work. Past medical history, family history, and social history: these were reviewed and remains unchanged.     Past Medical History:   Diagnosis Date    Anxiety 6/15    IKE-7 was     Calculus of kidney     Clostridium difficile colitis 3/14    Dr. Shaila Chao adenoma     10/11 Dr. Marisela Apgar Cystic breast     Depression 6/15    PHQ-9 was 15/27    Diverticulitis     recurrent x3 Dr Luz Spearing liver     10/10 on US, CT ; Fib-4 was 0.74 from 1/15    GI bleed     ischemic colitis on CT, seen by Dr. Pankaj Morrison H/O mammogram 6/3/2016    Hyperlipidemia     calculated 10 year risk score was 3.2% (1/15)    Hypertension     resolved w wt loss    Ischemic colitis ([address] 3/14    Dr Abhilash Hamlin    Labial abscess     + MRSA  Lymphoma ([facility] [address] 12/13    Dr. Javier Marie    Migraine     Neuropathy due to chemotherapeutic drug ([facility] [address] 9/14    Prediabetes     Zoster 8/14    left T11     Past Surgical History:   Procedure Laterality Date    CARDIAC SURG PROCEDURE UNLIST  2/14    echo shows mild conc lvh, ef 60-65%. no wma    HX APPENDECTOMY  1968    HX COLONOSCOPY      colon polyps 2003 Dr. Tanner Zuniga; adenoma 2011; isch colitis 3/14 Dr Jaime Snyder    HX HEENT      tonsillectomy    HX ORTHOPAEDIC  teenager    knee surgery left    HX OTHER SURGICAL      bx for lymphoma    HX SOPHIA AND BSO  04/02    Dr. Jacqueline Thakur History    Marital status:      Spouse name: N/A    Number of children: 2    Years of education: N/A     Occupational History    director [employer]      Social History Main Topics    Smoking status: Current Every Day Smoker     Packs/day: 0.50    Smokeless tobacco: Never Used      Comment: smoking cessation advised    Alcohol use 2.5 oz/week     5 Glasses of wine per week      Comment: socially    Drug use: No    Sexual activity: Not Currently     Other Topics Concern    Not on file     Social History Narrative     Family History   Problem Relation Age of Onset    Adopted: Yes     Current Outpatient Prescriptions   Medication Sig Dispense Refill    OXYMETAZOLINE HCL (AFRIN NASAL SPRAY NA) 2 Puffs by Nasal route daily as needed.  ibuprofen (MOTRIN) 200 mg tablet Take 200 mg by mouth every six (6) hours as needed for Pain.  albuterol (PROVENTIL HFA, VENTOLIN HFA, PROAIR HFA) 90 mcg/actuation inhaler Take 2 Puffs by inhalation every four (4) hours as needed for Wheezing. 1 Inhaler 0    guaiFENesin ER (MUCINEX) 600 mg ER tablet Take 1 Tab by mouth two (2) times a day. Indications: Cough 30 Tab 0    inhalational spacing device 1 Each by Does Not Apply route as needed.  1 Device 0    predniSONE (DELTASONE) 20 mg tablet Take 2 tabs by mouth x 3 days 6 Tab 0    cyclobenzaprine (FLEXERIL) 10 mg tablet Take 1 Tab by mouth three (3) times daily as needed for Muscle Spasm(s). 30 Tab 1       Review of Systems  Constitutional: The patient has no complaints or acute distress  HEENT: The patient denies recent head trauma, eye pain, blurred vision,  hearing deficit, oropharyngeal mucosal pain or lesions, and the patient denies throat pain or discomfort. Lymphatics: The patient denies palpable peripheral lymphadenopathy. Hematologic: The patient denies having bruising, bleeding, or progressive fatigue. Respiratory: Patient denies cough,SOB, or fevers. Cardiovascular: The patient denies having leg pain, leg swelling, heart palpitations, chest permit, chest pain, or lightheadedness. The patient denies having dyspnea on exertion. Neurologic: The patient is complaining of paresthesias in her hands and feet and legs due to prior chemotherapy use. Gastrointestinal: The patient denies having nausea, emesis, or diarrhea. The patient denies having any hematemesis or blood in the stool. Genitourinary: Patient denies having urinary urgency, frequency, or dysuria. The patient denies having blood in the urine. Psychological: The patient denies having symptoms of nervousness, anxiety, depression, or thoughts of harming himself some of this. Skin: Patient denies having skin rashes, skin, ulcerations, or unexplained itching or pruritus. Musculoskeletal: The patient denies pains in muscles or joints    Objective:     Visit Vitals    /76 (BP 1 Location: Left arm, BP Patient Position: Sitting)    Pulse (!) 57    Temp 98.4 °F (36.9 °C)    Ht 4' 10\" (1.473 m)    Wt 45.6 kg (100 lb 9.6 oz)    BMI 21.03 kg/m2     ECOG PS=0, pain score=0/10   Physical Exam:   Gen. Appearance: The patient is in no acute distress. Skin: There is no bruise or rash. HEENT: The exam is unremarkable. Neck: Supple without lymphadenopathy or thyromegaly. Lungs: Clear to auscultation and percussion; there are no wheezes or rhonchi. Heart: Regular rate and rhythm; there are no murmurs, gallops, or rubs. Abdomen: Bowel sounds are present and normal.  There is no guarding, tenderness, or hepatosplenomegaly. Extremities: There is no clubbing, cyanosis, or edema. Neurologic: There are no focal neurologic deficits, except for her complaints of paresthesias in her hands and feet. .  Lymphatics: There is no palpable peripheral lymphadenopathy. Musculoskeletal: The patient has full range of motion at all joints. There is no evidence of joint deformity or effusions. There is no focal joint tenderness. She does complain of pain on standing and with flexion and extension of the leg and hip and knee joints. Psychological/psychiatric: There is no clinical evidence of anxiety, depression, or melancholy. Lab data:      Results for orders placed or performed during the hospital encounter of 02/16/18   CBC WITH 3 PART DIFF     Status: Abnormal   Result Value Ref Range Status    WBC 9.1 4.5 - 13.0 K/uL Final    RBC 3.94 (L) 4.10 - 5.10 M/uL Final    HGB 13.1 12.0 - 16.0 g/dL Final    HCT 36.0 36 - 48 % Final    MCV 91.4 78 - 102 FL Final    MCH 33.2 25.0 - 35.0 PG Final    MCHC 36.4 31 - 37 g/dL Final    RDW 12.9 11.5 - 14.5 % Final    PLATELET 662 538 - 812 K/uL Final    NEUTROPHILS 74 (H) 40 - 70 % Final    MIXED CELLS 8 0.1 - 17 % Final    LYMPHOCYTES 18 14 - 44 % Final    ABS. NEUTROPHILS 6.8 1.8 - 9.5 K/UL Final    ABS. MIXED CELLS 0.7 0.0 - 2.3 K/uL Final    ABS. LYMPHOCYTES 1.6 1.1 - 5.9 K/UL Final     Comment: Test performed at [place] [address] Location. Results Reviewed by Medical Director. DF AUTOMATED   Final           Assessment:     1. Mantle cell lymphoma of lymph nodes of multiple sites ([facility] [address]    2. Neuropathy due to chemotherapeutic drug ([facility] [address]    3. Neutrophilia    4. Anxiety    5. Reactive depression      Plan:   Mantle cell lymphoma, multiple sites: The patient has completed a full course of systemic chemotherapy. Clinically there is no evidence of disease recurrence. I will recheck her comprehensive metabolic panel, sedimentation rate and the liver function tests. The most recent sedimentation rate from 2018 was normal at 5 mm. Neutrophilia: I explained to the patient that she is at risk for the development of myelodysplastic syndrome from prior chemotherapy. Her neutrophil count today is currently slightly elevated at 72 %. A prior flow cytometry did not show any evidence of immunophenotypic abnormalities. There was no evidence of any ongoing leukemia or recurrent lymphoma. .    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 We will continue to monitor this every 4 months at the time of her followup visits. The patient will return in 4 months for a complete reassessment     Orders Placed This Encounter    COMPLETE CBC & AUTO DIFF WBC    InHouse CBC ([email or web address])     Standing Status:   Future     Number of Occurrences:   1     Standing Expiration Date:   2018       Carlotta Cheatham MD  2018                                                                                            Hematology/Oncology  Progress Note    Name: Flora Marie  Date: 2018  : 1950    PCP: Eliud Norman MD     Ms. Kimberlyn Graf is a 60year old female who was seen for management of her non-Hodgkin lymphoma, neuropathy,and arthritis  Current therapy: Supportive care and active surveillance. Subjective:     Ms. Kimberlyn Graf is a 80-year-old woman who has a history of mantle cell lymphoma. She has completed a full course of systemic chemotherapy. Today she has no concerns or complaints. She states last week she had three nights of sweats, but this has since resolved. She denies fever or weight loss.  She still has complaints of the neuropathy associated with her prior chemotherapy usage, however this is much better since starting Neurontin. Past medical history, family history, and social history: these were reviewed and remains unchanged. Past Medical History:   Diagnosis Date    Anxiety 6/15    IKE-7 was 12/21    Calculus of kidney     Clostridium difficile colitis 3/14    Dr. Gomes Brought adenoma     10/11 Dr. Khushbu Jimenez Cystic breast     Depression 6/15    PHQ-9 was 15/27    Diverticulitis     recurrent x3 Dr Tirso Camarena liver     10/10 on US, CT 6/12; Fib-4 was 0.74 from 1/15    GI bleed 2/14    ischemic colitis on CT, seen by Dr. Robert Plata H/O mammogram 6/3/2016    Hyperlipidemia     calculated 10 year risk score was 3.2% (1/15)    Hypertension     resolved w wt loss    Ischemic colitis ([address] 3/14    Dr Janelle Glover    Labial abscess     + MRSA    Lymphoma ([facility] [address] 12/13    Dr. Venessa Carpenter    Migraine     Neuropathy due to chemotherapeutic drug ([facility] [address] 9/14    Prediabetes     Zoster 8/14    left T11     Past Surgical History:   Procedure Laterality Date    CARDIAC SURG PROCEDURE UNLIST  2/14    echo shows mild conc lvh, ef 60-65%.  no wma    HX APPENDECTOMY  1968    HX COLONOSCOPY      colon polyps 2003 Dr. Drake Sharp; adenoma 2011; isch colitis 3/14 Dr Gutierrez Padilla    HX HEENT      tonsillectomy    HX ORTHOPAEDIC  teenager    knee surgery left    HX OTHER SURGICAL      bx for lymphoma    HX SOPHIA AND BSO  04/02    Dr. 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[employer]      Social History Main Topics    Smoking status: Current Every Day Smoker     Packs/day: 0.50    Smokeless tobacco: Never Used      Comment: smoking cessation advised    Alcohol use 2.5 oz/week     5 Glasses of wine per week      Comment: socially    Drug use: No    Sexual activity: Not Currently     Other Topics Concern    Not on file     Social History Narrative     Family History   Problem Relation Age of Onset    Adopted: Yes     Current Outpatient Prescriptions   Medication Sig Dispense Refill    OXYMETAZOLINE HCL (AFRIN NASAL SPRAY NA) 2 Puffs by Nasal route daily as needed.  ibuprofen (MOTRIN) 200 mg tablet Take 200 mg by mouth every six (6) hours as needed for Pain.  albuterol (PROVENTIL HFA, VENTOLIN HFA, PROAIR HFA) 90 mcg/actuation inhaler Take 2 Puffs by inhalation every four (4) hours as needed for Wheezing. 1 Inhaler 0    guaiFENesin ER (MUCINEX) 600 mg ER tablet Take 1 Tab by mouth two (2) times a day. Indications: Cough 30 Tab 0    inhalational spacing device 1 Each by Does Not Apply route as needed. 1 Device 0    predniSONE (DELTASONE) 20 mg tablet Take 2 tabs by mouth x 3 days 6 Tab 0    cyclobenzaprine (FLEXERIL) 10 mg tablet Take 1 Tab by mouth three (3) times daily as needed for Muscle Spasm(s). 30 Tab 1       Review of Systems  Constitutional: The patient has no complaints or acute distress  HEENT: The patient denies recent head trauma, eye pain, blurred vision,  hearing deficit, oropharyngeal mucosal pain or lesions, and the patient denies throat pain or discomfort. Lymphatics: The patient denies palpable peripheral lymphadenopathy. Hematologic: The patient denies having bruising, bleeding, or progressive fatigue. Respiratory: Patient denies cough,SOB, or fevers. Cardiovascular: The patient denies having leg pain, leg swelling, heart palpitations, chest permit, chest pain, or lightheadedness. The patient denies having dyspnea on exertion. Neurologic: The patient is complaining of paresthesias in her hands and feet and legs due to prior chemotherapy use. Gastrointestinal: The patient denies having nausea, emesis, or diarrhea. The patient denies having any hematemesis or blood in the stool. Genitourinary: Patient denies having urinary urgency, frequency, or dysuria. The patient denies having blood in the urine.   Psychological: The patient denies having symptoms of nervousness, anxiety, depression, or thoughts of harming himself some of this. Skin: Patient denies having skin rashes, skin, ulcerations, or unexplained itching or pruritus. Musculoskeletal: The patient denies pains in muscles or joints    Objective:     Visit Vitals    /76 (BP 1 Location: Left arm, BP Patient Position: Sitting)    Pulse (!) 57    Temp 98.4 °F (36.9 °C)    Ht 4' 10\" (1.473 m)    Wt 45.6 kg (100 lb 9.6 oz)    BMI 21.03 kg/m2     ECOG PS=0, pain score=0/10   Physical Exam:   Gen. Appearance: The patient is in no acute distress. Skin: There is no bruise or rash. HEENT: The exam is unremarkable. Neck: Supple without lymphadenopathy or thyromegaly. Lungs: Clear to auscultation and percussion; there are no wheezes or rhonchi. Heart: Regular rate and rhythm; there are no murmurs, gallops, or rubs. Abdomen: Bowel sounds are present and normal.  There is no guarding, tenderness, or hepatosplenomegaly. Extremities: There is no clubbing, cyanosis, or edema. Neurologic: There are no focal neurologic deficits, except for her complaints of paresthesias in her hands and feet. .  Lymphatics: There is no palpable peripheral lymphadenopathy. Musculoskeletal: The patient has full range of motion at all joints. There is no evidence of joint deformity or effusions. There is no focal joint tenderness. She does complain of pain on standing and with flexion and extension of the leg and hip and knee joints. Psychological/psychiatric: There is no clinical evidence of anxiety, depression, or melancholy.     Lab data:      Results for orders placed or performed during the hospital encounter of 02/16/18   CBC WITH 3 PART DIFF     Status: Abnormal   Result Value Ref Range Status    WBC 9.1 4.5 - 13.0 K/uL Final    RBC 3.94 (L) 4.10 - 5.10 M/uL Final    HGB 13.1 12.0 - 16.0 g/dL Final    HCT 36.0 36 - 48 % Final    MCV 91.4 78 - 102 FL Final    MCH 33.2 25.0 - 35.0 PG Final    MCHC 36.4 31 - 37 g/dL Final    RDW 12.9 11.5 - 14.5 % Final    PLATELET 377 749 - 052 K/uL Final    NEUTROPHILS 74 (H) 40 - 70 % Final    MIXED CELLS 8 0.1 - 17 % Final    LYMPHOCYTES 18 14 - 44 % Final    ABS. NEUTROPHILS 6.8 1.8 - 9.5 K/UL Final    ABS. MIXED CELLS 0.7 0.0 - 2.3 K/uL Final    ABS. LYMPHOCYTES 1.6 1.1 - 5.9 K/UL Final     Comment: Test performed at [place] [address] Location. Results Reviewed by Medical Director. DF AUTOMATED   Final           Assessment:     1. Mantle cell lymphoma of lymph nodes of multiple sites ([facility] [address]    2. Neuropathy due to chemotherapeutic drug ([facility] [address]    3. Neutrophilia    4. Anxiety    5. Reactive depression      Plan:   Mantle cell lymphoma, multiple sites: The patient has completed a full course of systemic chemotherapy. Clinically there is no evidence of disease recurrence. I will recheck her comprehensive metabolic panel, sedimentation rate and the liver function tests. The most recent sedimentation rate from 6/23/2017 was normal at 9 mm. Neutrophilia: I explained to the patient that she is at risk for the development of myelodysplastic syndrome from prior chemotherapy. Her neutrophil count today is 77%. A prior flow cytometry did not show any evidence of immunophenotypic abnormalities. There was no evidence of any ongoing leukemia or recurrent lymphoma. .    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 We will continue to monitor this every 4 months at the time of her followup visits. Anxiety/depression: The patient reports that her anxiety symptoms have been under control over the last 4 months. She has been doing reasonably well. She does not require any Ativan at this time.     The patient will return in 4months for a complete reassessment     Orders Placed This Encounter    COMPLETE CBC & AUTO DIFF WBC    InHouse CBC ([place])     Standing Status:   Future     Number of Occurrences:   1     Standing Expiration Date:   8/14/2018       Noe Stout MD  8/7/2018

## 2018-08-07 NOTE — PATIENT INSTRUCTIONS
Non-Hodgkin's Lymphoma: Care Instructions  Your Care Instructions  Non-Hodgkin's lymphoma is a type of cancer that affects part of the immune system (lymph system). Cells normally found in the lymph nodes, spleen, and other parts of the lymph system increase in number and collect in areas where they cause problems. Non-Hodgkin's lymphoma is not contagious and is not caused by an injury. Non-Hodgkin's lymphoma may occur in a single lymph node, a group of lymph nodes, or an organ. It can spread to almost any part of the body, including the liver, bone marrow, and spleen. Treatment for non-Hodgkin's lymphoma depends on the stage of the lymphoma. It is usually treated with medicines called chemotherapy. Your doctor may also give you medicines that work on the body's immune system (immunotherapy). You may also need radiation treatments or a procedure called a bone marrow transplant. Your doctor will talk to you about what kind of treatment may be best for you. When you find out that you have cancer, you may feel many emotions and may need some help coping. Seek out family, friends, and counselors for support. You also can do things at home to make yourself feel better while you go through treatment. Call the "[employer] (2-465.840.8549) or visit its website at [employer] for more information. Follow-up care is a key part of your treatment and safety. Be sure to make and go to all appointments, and call your doctor if you are having problems. It's also a good idea to know your test results and keep a list of the medicines you take. How can you care for yourself at home? · Take your medicines exactly as prescribed. Call your doctor if you think you are having a problem with your medicine. You may get medicine for nausea and vomiting if you have these side effects. · Eat healthy food.  If you do not feel like eating, try to eat food that has protein and extra calories to keep up your strength and prevent weight loss. Drink liquid meal replacements for extra calories and protein. Try to eat your main meal early. · Get some physical activity every day, but do not get too tired. Keep doing the hobbies you enjoy as your energy allows. · Take steps to control your stress and workload. Learn relaxation techniques. ¨ Share your feelings. Stress and tension affect our emotions. By expressing your feelings to others, you may be able to understand and cope with them. ¨ Consider joining a support group. Talking about a problem with your spouse, a good friend, or other people with similar problems is a good way to reduce tension and stress. ¨ Express yourself through art. Try writing, crafts, dance, or art to relieve stress. Some dance, writing, or art groups may be available just for people who have cancer. ¨ Be kind to your body and mind. Getting enough sleep, eating a healthy diet, and taking time to do things you enjoy can contribute to an overall feeling of balance in your life and can help reduce stress. ¨ Get help if you need it. Discuss your concerns with your doctor or counselor. · If you are vomiting or have diarrhea:  ¨ Drink plenty of fluids (enough so that your urine is light yellow or clear like water) to prevent dehydration. Choose water and other caffeine-free clear liquids. If you have kidney, heart, or liver disease and have to limit fluids, talk with your doctor before you increase the amount of fluids you drink. ¨ When you are able to eat, try clear soups, mild foods, and liquids until all symptoms are gone for 12 to 48 hours. Other good choices include dry toast, crackers, cooked cereal, and gelatin dessert, such as Jell-O.  · If you have not already done so, prepare a list of advance directives. Advance directives are instructions to your doctor and family members about what kind of care you want if you become unable to speak or express yourself. When should you call for help?   Call 46 anytime you think you may need emergency care. For example, call if:    · You passed out (lost consciousness).    Call your doctor now or seek immediate medical care if:    · You have a fever.     · You have abnormal bleeding.     · You have new or worse pain.     · You think you have an infection.     · You have new symptoms, such as a cough, belly pain, vomiting, diarrhea, or a rash.    Watch closely for changes in your health, and be sure to contact your doctor if:    · You are much more tired than usual.     · You have swollen glands in your armpits, groin, or neck.     · You do not get better as expected. Where can you learn more? Go to http://kevin-justen.info/. Enter V978 in the search box to learn more about \"Non-Hodgkin's Lymphoma: Care Instructions. \"  Current as of: May 12, 2017  Content Version: 11.7  © 0642-8793 [employer]. Care instructions adapted under license by [employer] (which disclaims liability or warranty for this information). If you have questions about a medical condition or this instruction, always ask your healthcare professional. [address] any warranty or liability for your use of this information.

## 2018-08-08 LAB
ALBUMIN SERPL-MCNC: 4.1 G/DL (ref 3.4–5)
ALBUMIN/GLOB SERPL: 1.2 {RATIO} (ref 0.8–1.7)
ALP SERPL-CCNC: 63 U/L (ref 45–117)
ALT SERPL-CCNC: 26 U/L (ref 13–56)
ANION GAP SERPL CALC-SCNC: 9 MMOL/L (ref 3–18)
AST SERPL-CCNC: 17 U/L (ref 15–37)
BILIRUB SERPL-MCNC: 0.3 MG/DL (ref 0.2–1)
BUN SERPL-MCNC: 20 MG/DL (ref 7–18)
BUN/CREAT SERPL: 30 (ref 12–20)
CALCIUM SERPL-MCNC: 9.3 MG/DL (ref 8.5–10.1)
CHLORIDE SERPL-SCNC: 107 MMOL/L (ref 100–108)
CO2 SERPL-SCNC: 25 MMOL/L (ref 21–32)
CREAT SERPL-MCNC: 0.67 MG/DL (ref 0.6–1.3)
GLOBULIN SER CALC-MCNC: 3.3 G/DL (ref 2–4)
GLUCOSE SERPL-MCNC: 99 MG/DL (ref 74–99)
POTASSIUM SERPL-SCNC: 4.3 MMOL/L (ref 3.5–5.5)
PROT SERPL-MCNC: 7.4 G/DL (ref 6.4–8.2)
SODIUM SERPL-SCNC: 141 MMOL/L (ref 136–145)

## 2018-08-20 ENCOUNTER — HOSPITAL ENCOUNTER (OUTPATIENT)
Dept: MAMMOGRAPHY | Age: 68
Discharge: HOME | End: 2018-08-20
Attending: INTERNAL MEDICINE
Payer: MEDICARE

## 2018-08-20 ENCOUNTER — HOSPITAL ENCOUNTER (OUTPATIENT)
Dept: ULTRASOUND IMAGING | Age: 68
Discharge: HOME | End: 2018-08-20
Attending: INTERNAL MEDICINE
Payer: MEDICARE

## 2018-08-20 DIAGNOSIS — Z12.39 BREAST CANCER SCREENING: ICD-10-CM

## 2018-08-20 DIAGNOSIS — N63.0 LUMP IN FEMALE BREAST: ICD-10-CM

## 2018-08-20 DIAGNOSIS — N63.0 LUMP OR MASS IN BREAST: ICD-10-CM

## 2018-08-20 DIAGNOSIS — N63.0 BREAST LUMP OR MASS: ICD-10-CM

## 2018-08-20 PROCEDURE — 76642 ULTRASOUND BREAST LIMITED: CPT

## 2018-08-20 PROCEDURE — 77066 DX MAMMO INCL CAD BI: CPT

## 2018-12-11 ENCOUNTER — OFFICE VISIT (OUTPATIENT)
Dept: ONCOLOGY | Age: 68
End: 2018-12-11

## 2018-12-11 ENCOUNTER — HOSPITAL ENCOUNTER (OUTPATIENT)
Dept: ONCOLOGY | Age: 68
Discharge: HOME | End: 2018-12-11

## 2018-12-11 VITALS
HEART RATE: 68 BPM | SYSTOLIC BLOOD PRESSURE: 166 MMHG | HEIGHT: 58 IN | WEIGHT: 98.6 LBS | OXYGEN SATURATION: 98 % | TEMPERATURE: 98.2 F | DIASTOLIC BLOOD PRESSURE: 79 MMHG | BODY MASS INDEX: 20.7 KG/M2

## 2018-12-11 DIAGNOSIS — M15.9 PRIMARY OSTEOARTHRITIS INVOLVING MULTIPLE JOINTS: ICD-10-CM

## 2018-12-11 DIAGNOSIS — D72.9 NEUTROPHILIA: ICD-10-CM

## 2018-12-11 DIAGNOSIS — F32.9 REACTIVE DEPRESSION: ICD-10-CM

## 2018-12-11 DIAGNOSIS — T45.1X5A NEUROPATHY DUE TO CHEMOTHERAPEUTIC DRUG (HCC): ICD-10-CM

## 2018-12-11 DIAGNOSIS — C83.18 MANTLE CELL LYMPHOMA OF LYMPH NODES OF MULTIPLE SITES (HCC): ICD-10-CM

## 2018-12-11 DIAGNOSIS — C83.18 MANTLE CELL LYMPHOMA OF LYMPH NODES OF MULTIPLE SITES (HCC): Primary | ICD-10-CM

## 2018-12-11 DIAGNOSIS — F41.9 ANXIETY: ICD-10-CM

## 2018-12-11 DIAGNOSIS — G62.0 NEUROPATHY DUE TO CHEMOTHERAPEUTIC DRUG (HCC): ICD-10-CM

## 2018-12-11 LAB
BASO+EOS+MONOS # BLD AUTO: 0.6 K/UL (ref 0–2.3)
BASO+EOS+MONOS # BLD AUTO: 9 % (ref 0.1–17)
DIFFERENTIAL METHOD BLD: ABNORMAL
ERYTHROCYTE [DISTWIDTH] IN BLOOD BY AUTOMATED COUNT: 13.2 % (ref 11.5–14.5)
HCT VFR BLD AUTO: 38.2 % (ref 36–48)
HGB BLD-MCNC: 13 G/DL (ref 12–16)
LYMPHOCYTES # BLD: 1.2 K/UL (ref 1.1–5.9)
LYMPHOCYTES NFR BLD: 17 % (ref 14–44)
MCH RBC QN AUTO: 32.8 PG (ref 25–35)
MCHC RBC AUTO-ENTMCNC: 34 G/DL (ref 31–37)
MCV RBC AUTO: 96.5 FL (ref 78–102)
NEUTS SEG # BLD: 5.1 K/UL (ref 1.8–9.5)
NEUTS SEG NFR BLD: 74 % (ref 40–70)
PLATELET # BLD AUTO: 237 K/UL (ref 140–440)
RBC # BLD AUTO: 3.96 M/UL (ref 4.1–5.1)
WBC # BLD AUTO: 6.9 K/UL (ref 4.5–13)

## 2018-12-11 NOTE — PROGRESS NOTES
Hematology/Oncology  Progress Note    Name: Loki Garner  Date: 2018  : 1950    PCP: Lluvia Mack MD     Ms. Vlad Vogt is a 80year old female who was seen for management of her non-Hodgkin lymphoma, neuropathy,and arthritis    Current therapy: Supportive care and active surveillance. Subjective:     Ms. Vlad Vogt is a 16-year-old woman who has a history of mantle cell lymphoma. She has completed a full course of systemic chemotherapy. Today she has no concerns or complaints. She denies any night sweats, fevers or unexplained infections at this time. She denies weight loss. Since her last clinic visit she has not had any further episodes of recurrent diverticulitis. The patient reports that she has continued to maintain a high-fiber diet which was recommended with her last episode of diverticulitis. She is doing well and denies any abdominal pain or discomfort. She also report that her neuropathy associated with her prior chemotherapy is better and has not have to use the Neurontin. Overall, the patient reports that she has no new complaints to report. She had been planning to retire at the end of this year but has changed her mind and will continue to work. Past medical history, family history, and social history: these were reviewed and remains unchanged.     Past Medical History:   Diagnosis Date    Anxiety 6/15    IKE-7 was     Calculus of kidney     Clostridium difficile colitis 3/14    Dr. Thomas Teran adenoma     10/11 Dr. Flora Ramey breast     Depression 6/15    PHQ-9 was 15/27    Diverticulitis     recurrent x3 Dr Larissa Olivo liver     10/10 on US, CT ; Fib-4 was 0.74 from 1/15    GI bleed     ischemic colitis on CT, seen by Dr. Sriram Read H/O mammogram 6/3/2016    Hyperlipidemia     calculated 10 year risk score was 3.2% (1/15)    Hypertension     resolved w wt loss    Ischemic colitis ([address] 3/14    Dr Araseli Galan    Labial abscess     + MRSA    [facility] 12/13    Dr. Read Fall    Migraine     Neuropathy due to chemotherapeutic drug ([facility] 9/14    Prediabetes     Zoster 8/14    left T11     Past Surgical History:   Procedure Laterality Date    CARDIAC SURG PROCEDURE UNLIST  2/14    echo shows mild conc lvh, ef 60-65%. no wma    HX APPENDECTOMY  1968    HX COLONOSCOPY      colon polyps 2003 Dr. Mendel Christiansen; adenoma 2011; isch colitis 3/14 Dr Beckie Desai    HX HEENT      tonsillectomy    HX ORTHOPAEDIC  teenager    knee surgery left    HX OTHER SURGICAL      bx for lymphoma    HX SOPHIA AND BSO  04/02    Dr. Aristeo Schaffer History     Socioeconomic History    Marital status:      Spouse name: Not on file    Number of children: 2    Years of education: Not on file    Highest education level: Not on file   Social Needs    Financial resource strain: Not on file    Food insecurity - worry: Not on file    Food insecurity - inability: Not on file   [employer] needs - medical: Not on file   [employer] needs - non-medical: Not on file   Occupational History    Occupation: director [employer]   Tobacco Use    Smoking status: Current Every Day Smoker     Packs/day: 0.50    Smokeless tobacco: Never Used    Tobacco comment: smoking cessation advised   Substance and Sexual Activity    Alcohol use: Yes     Alcohol/week: 2.5 oz     Types: 5 Glasses of wine per week     Comment: socially    Drug use: No    Sexual activity: Not Currently   Other Topics Concern    Not on file   Social History Narrative    Not on file     Family History   Adopted: Yes     Current Outpatient Medications   Medication Sig Dispense Refill    OXYMETAZOLINE HCL (AFRIN NASAL SPRAY NA) 2 Puffs by Nasal route daily as needed.  ibuprofen (MOTRIN) 200 mg tablet Take 200 mg by mouth every six (6) hours as needed for Pain.       albuterol (PROVENTIL HFA, VENTOLIN HFA, PROAIR HFA) 90 mcg/actuation inhaler Take 2 Puffs by inhalation every four (4) hours as needed for Wheezing. 1 Inhaler 0    guaiFENesin ER (MUCINEX) 600 mg ER tablet Take 1 Tab by mouth two (2) times a day. Indications: Cough 30 Tab 0    inhalational spacing device 1 Each by Does Not Apply route as needed. 1 Device 0    predniSONE (DELTASONE) 20 mg tablet Take 2 tabs by mouth x 3 days 6 Tab 0    cyclobenzaprine (FLEXERIL) 10 mg tablet Take 1 Tab by mouth three (3) times daily as needed for Muscle Spasm(s). 30 Tab 1       Review of Systems  Constitutional: The patient has no complaints or acute distress  HEENT: The patient denies recent head trauma, eye pain, blurred vision,  hearing deficit, oropharyngeal mucosal pain or lesions, and the patient denies throat pain or discomfort. Lymphatics: The patient denies palpable peripheral lymphadenopathy. Hematologic: The patient denies having bruising, bleeding, or progressive fatigue. Respiratory: Patient denies cough,SOB, or fevers. Cardiovascular: The patient denies having leg pain, leg swelling, heart palpitations, chest permit, chest pain, or lightheadedness. The patient denies having dyspnea on exertion. Neurologic: The patient is complaining of paresthesias in her hands and feet and legs due to prior chemotherapy use. Gastrointestinal: The patient denies having nausea, emesis, or diarrhea. The patient denies having any hematemesis or blood in the stool. Genitourinary: Patient denies having urinary urgency, frequency, or dysuria. The patient denies having blood in the urine. Psychological: The patient denies having symptoms of nervousness, anxiety, depression, or thoughts of harming himself some of this. Skin: Patient denies having skin rashes, skin, ulcerations, or unexplained itching or pruritus.   Musculoskeletal: The patient denies pains in muscles or joints    Objective:     Visit Vitals  /79   Pulse 68   Temp 98.2 °F (36.8 °C)   Ht 4' 10\" (1.473 m)   Wt 44.7 kg (98 lb 9.6 oz)   SpO2 98%   BMI 20.61 kg/m²     ECOG PS=0, pain score=0/10   Physical Exam:   Gen. Appearance: The patient is in no acute distress. Skin: There is no bruise or rash. HEENT: The exam is unremarkable. Neck: Supple without lymphadenopathy or thyromegaly. Lungs: Clear to auscultation and percussion; there are no wheezes or rhonchi. Heart: Regular rate and rhythm; there are no murmurs, gallops, or rubs. Abdomen: Bowel sounds are present and normal.  There is no guarding, tenderness, or hepatosplenomegaly. Extremities: There is no clubbing, cyanosis, or edema. Neurologic: There are no focal neurologic deficits, except for her complaints of paresthesias in her hands and feet. .  Lymphatics: There is no palpable peripheral lymphadenopathy. Musculoskeletal: The patient has full range of motion at all joints. There is no evidence of joint deformity or effusions. There is no focal joint tenderness. She does complain of pain on standing and with flexion and extension of the leg and hip and knee joints. Psychological/psychiatric: There is no clinical evidence of anxiety, depression, or melancholy. Lab data:      Results for orders placed or performed during the hospital encounter of 12/11/18   CBC WITH 3 PART DIFF     Status: Abnormal   Result Value Ref Range Status    WBC 6.9 4.5 - 13.0 K/uL Final    RBC 3.96 (L) 4.10 - 5.10 M/uL Final    HGB 13.0 12.0 - 16.0 g/dL Final    HCT 38.2 36 - 48 % Final    MCV 96.5 78 - 102 FL Final    MCH 32.8 25.0 - 35.0 PG Final    MCHC 34.0 31 - 37 g/dL Final    RDW 13.2 11.5 - 14.5 % Final    PLATELET 854 747 - 005 K/uL Final    NEUTROPHILS 74 (H) 40 - 70 % Final    MIXED CELLS 9 0.1 - 17 % Final    LYMPHOCYTES 17 14 - 44 % Final    ABS. NEUTROPHILS 5.1 1.8 - 9.5 K/UL Final    ABS. MIXED CELLS 0.6 0.0 - 2.3 K/uL Final    ABS. LYMPHOCYTES 1.2 1.1 - 5.9 K/UL Final     Comment: Test performed at [address]. Results Reviewed by Medical Director. DF AUTOMATED   Final           Assessment:     1.  Mantle cell lymphoma of lymph nodes of multiple sites ([facility]    2. Neuropathy due to chemotherapeutic drug ([facility]    3. Neutrophilia    4. Anxiety    5. Reactive depression    6. Primary osteoarthritis involving multiple joints      Plan:   Mantle cell lymphoma, multiple sites: The patient has completed a full course of systemic chemotherapy. Clinically there is no evidence of disease recurrence. I will recheck her comprehensive metabolic panel, sedimentation rate and the liver function tests. The most recent sedimentation rate from 2018 was normal at 5 mm. I will check the sedimentation rate at this time. Neutrophilia: I explained to the patient that she is at risk for the development of myelodysplastic syndrome from prior chemotherapy. Her neutrophil count today is currently slightly elevated at 74 %. A prior flow cytometry did not show any evidence of immunophenotypic abnormalities. There was no evidence of any ongoing leukemia or recurrent lymphoma. .    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 We will continue to monitor this every 4 months at the time of her followup visits.     The patient will return in 4 months for a complete reassessment     Orders Placed This Encounter    COMPLETE CBC & AUTO DIFF WBC    METABOLIC PANEL, COMPREHENSIVE     Standing Status:   Future     Standing Expiration Date:   2019    SED RATE (ESR)     Standing Status:   Future     Standing Expiration Date:   2019    InHouse CBC ([employer])     Standing Status:   Future     Number of Occurrences:   1     Standing Expiration Date:   2018       Marian Mak MD  2018                                                                                            Hematology/Oncology  Progress Note    Name: Burnetta Cheadle Roisen  Date: 2018  : 1950    PCP: MD Ms. Maxime Sheridan is a 60year old female who was seen for management of her non-Hodgkin lymphoma, neuropathy,and arthritis  Current therapy: Supportive care and active surveillance. Subjective:     Ms. Maxime Cho is a 64-year-old woman who has a history of mantle cell lymphoma. She has completed a full course of systemic chemotherapy. Today she has no concerns or complaints. She states last week she had three nights of sweats, but this has since resolved. She denies fever or weight loss. She still has complaints of the neuropathy associated with her prior chemotherapy usage, however this is much better since starting Neurontin. Past medical history, family history, and social history: these were reviewed and remains unchanged. Past Medical History:   Diagnosis Date    Anxiety 6/15    IKE-7 was 12/21    Calculus of kidney     Clostridium difficile colitis 3/14    Dr. Wili Scruggs adenoma     10/11 Dr. Wesley Rivas Cystic breast     Depression 6/15    PHQ-9 was 15/27    Diverticulitis     recurrent x3 Dr Amrita Rothman liver     10/10 on US, CT 6/12; Fib-4 was 0.74 from 1/15    GI bleed 2/14    ischemic colitis on CT, seen by Dr. Ramsey Rojas H/O mammogram 6/3/2016    Hyperlipidemia     calculated 10 year risk score was 3.2% (1/15)    Hypertension     resolved w wt loss    Ischemic colitis ([address] 3/14    Dr Angélica Jones    Labial abscess     + MRSA    Lymphoma ([facility] 12/13    Dr. Santiago Curran    Migraine     Neuropathy due to chemotherapeutic drug ([facility] 9/14    Prediabetes     Zoster 8/14    left T11     Past Surgical History:   Procedure Laterality Date    CARDIAC SURG PROCEDURE UNLIST  2/14    echo shows mild conc lvh, ef 60-65%.  no wma    HX APPENDECTOMY  1968    HX COLONOSCOPY      colon polyps 2003 Dr. Clary Nuñez; adenoma 2011; isch colitis 3/14 Dr Deedee Gonzalez    HX HEENT      tonsillectomy    HX ORTHOPAEDIC  teenager    knee surgery left    HX OTHER SURGICAL      bx for lymphoma    HX SOPHIA AND BSO  04/02    Dr. 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[employer]   Tobacco Use    Smoking status: Current Every Day Smoker     Packs/day: 0.50    Smokeless tobacco: Never Used    Tobacco comment: smoking cessation advised   Substance and Sexual Activity    Alcohol use: Yes     Alcohol/week: 2.5 oz     Types: 5 Glasses of wine per week     Comment: socially    Drug use: No    Sexual activity: Not Currently   Other Topics Concern    Not on file   Social History Narrative    Not on file     Family History   Adopted: Yes     Current Outpatient Medications   Medication Sig Dispense Refill    OXYMETAZOLINE HCL (AFRIN NASAL SPRAY NA) 2 Puffs by Nasal route daily as needed.  ibuprofen (MOTRIN) 200 mg tablet Take 200 mg by mouth every six (6) hours as needed for Pain.  albuterol (PROVENTIL HFA, VENTOLIN HFA, PROAIR HFA) 90 mcg/actuation inhaler Take 2 Puffs by inhalation every four (4) hours as needed for Wheezing. 1 Inhaler 0    guaiFENesin ER (MUCINEX) 600 mg ER tablet Take 1 Tab by mouth two (2) times a day. Indications: Cough 30 Tab 0    inhalational spacing device 1 Each by Does Not Apply route as needed. 1 Device 0    predniSONE (DELTASONE) 20 mg tablet Take 2 tabs by mouth x 3 days 6 Tab 0    cyclobenzaprine (FLEXERIL) 10 mg tablet Take 1 Tab by mouth three (3) times daily as needed for Muscle Spasm(s).  30 Tab 1       Review of Systems  Constitutional: The patient has no complaints or acute distress  HEENT: The patient denies recent head trauma, eye pain, blurred vision,  hearing deficit, oropharyngeal mucosal pain or lesions, and the patient denies throat pain or discomfort. Lymphatics: The patient denies palpable peripheral lymphadenopathy. Hematologic: The patient denies having bruising, bleeding, or progressive fatigue. Respiratory: Patient denies cough,SOB, or fevers. Cardiovascular: The patient denies having leg pain, leg swelling, heart palpitations, chest permit, chest pain, or lightheadedness. The patient denies having dyspnea on exertion. Neurologic: The patient is complaining of paresthesias in her hands and feet and legs due to prior chemotherapy use. Gastrointestinal: The patient denies having nausea, emesis, or diarrhea. The patient denies having any hematemesis or blood in the stool. Genitourinary: Patient denies having urinary urgency, frequency, or dysuria. The patient denies having blood in the urine. Psychological: The patient denies having symptoms of nervousness, anxiety, depression, or thoughts of harming himself some of this. Skin: Patient denies having skin rashes, skin, ulcerations, or unexplained itching or pruritus. Musculoskeletal: The patient denies pains in muscles or joints    Objective:     Visit Vitals  /79   Pulse 68   Temp 98.2 °F (36.8 °C)   Ht 4' 10\" (1.473 m)   Wt 44.7 kg (98 lb 9.6 oz)   SpO2 98%   BMI 20.61 kg/m²     ECOG PS=0, pain score=0/10   Physical Exam:   Gen. Appearance: The patient is in no acute distress. Skin: There is no bruise or rash. HEENT: The exam is unremarkable. Neck: Supple without lymphadenopathy or thyromegaly. Lungs: Clear to auscultation and percussion; there are no wheezes or rhonchi. Heart: Regular rate and rhythm; there are no murmurs, gallops, or rubs. Abdomen: Bowel sounds are present and normal.  There is no guarding, tenderness, or hepatosplenomegaly. Extremities: There is no clubbing, cyanosis, or edema.   Neurologic: There are no focal neurologic deficits, except for her complaints of paresthesias in her hands and feet. .  Lymphatics: There is no palpable peripheral lymphadenopathy. Musculoskeletal: The patient has full range of motion at all joints. There is no evidence of joint deformity or effusions. There is no focal joint tenderness. She does complain of pain on standing and with flexion and extension of the leg and hip and knee joints. Psychological/psychiatric: There is no clinical evidence of anxiety, depression, or melancholy. Lab data:      Results for orders placed or performed during the hospital encounter of 12/11/18   CBC WITH 3 PART DIFF     Status: Abnormal   Result Value Ref Range Status    WBC 6.9 4.5 - 13.0 K/uL Final    RBC 3.96 (L) 4.10 - 5.10 M/uL Final    HGB 13.0 12.0 - 16.0 g/dL Final    HCT 38.2 36 - 48 % Final    MCV 96.5 78 - 102 FL Final    MCH 32.8 25.0 - 35.0 PG Final    MCHC 34.0 31 - 37 g/dL Final    RDW 13.2 11.5 - 14.5 % Final    PLATELET 667 023 - 450 K/uL Final    NEUTROPHILS 74 (H) 40 - 70 % Final    MIXED CELLS 9 0.1 - 17 % Final    LYMPHOCYTES 17 14 - 44 % Final    ABS. NEUTROPHILS 5.1 1.8 - 9.5 K/UL Final    ABS. MIXED CELLS 0.6 0.0 - 2.3 K/uL Final    ABS. LYMPHOCYTES 1.2 1.1 - 5.9 K/UL Final     Comment: Test performed at [place] [address] Location. Results Reviewed by Medical Director. DF AUTOMATED   Final           Assessment:     1. Mantle cell lymphoma of lymph nodes of multiple sites ([facility]    2. Neuropathy due to chemotherapeutic drug ([facility]    3. Neutrophilia    4. Anxiety    5. Reactive depression    6. Primary osteoarthritis involving multiple joints      Plan:   Mantle cell lymphoma, multiple sites: The patient has completed a full course of systemic chemotherapy. Clinically there is no evidence of disease recurrence. I will recheck her comprehensive metabolic panel, sedimentation rate and the liver function tests. The most recent sedimentation rate from 6/23/2017 was normal at 9 mm.     Neutrophilia: I explained to the patient that she is at risk for the development of myelodysplastic syndrome from prior chemotherapy. Her neutrophil count today is 77%. A prior flow cytometry did not show any evidence of immunophenotypic abnormalities. There was no evidence of any ongoing leukemia or recurrent lymphoma. .    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 We will continue to monitor this every 4 months at the time of her followup visits. Anxiety/depression: The patient reports that her anxiety symptoms have been under control over the last 4 months. She has been doing reasonably well. She does not require any Ativan at this time.     The patient will return in 4months for a complete reassessment     Orders Placed This Encounter    COMPLETE CBC & AUTO DIFF WBC    METABOLIC PANEL, COMPREHENSIVE     Standing Status:   Future     Standing Expiration Date:   12/12/2019    SED RATE (ESR)     Standing Status:   Future     Standing Expiration Date:   12/12/2019    InHouse CBC ([employer])     Standing Status:   Future     Number of Occurrences:   1     Standing Expiration Date:   12/18/2018       Naomie Ojeda MD  12/11/2018

## 2018-12-11 NOTE — PATIENT INSTRUCTIONS
Non-Hodgkin's Lymphoma: Care Instructions  Your Care Instructions  Non-Hodgkin's lymphoma is a type of cancer that affects part of the immune system (lymph system). Cells normally found in the lymph nodes, spleen, and other parts of the lymph system increase in number and collect in areas where they cause problems. Non-Hodgkin's lymphoma is not contagious and is not caused by an injury. Non-Hodgkin's lymphoma may occur in a single lymph node, a group of lymph nodes, or an organ. It can spread to almost any part of the body, including the liver, bone marrow, and spleen. Treatment for non-Hodgkin's lymphoma depends on the stage of the lymphoma. It is usually treated with medicines called chemotherapy. Your doctor may also give you medicines that work on the body's immune system (immunotherapy). You may also need radiation treatments or a procedure called a bone marrow transplant. Your doctor will talk to you about what kind of treatment may be best for you. When you find out that you have cancer, you may feel many emotions and may need some help coping. Seek out family, friends, and counselors for support. You also can do things at home to make yourself feel better while you go through treatment. Call the [employer] (3-729.396.6534) or visit its website at [employer] Sample6 for more information. Follow-up care is a key part of your treatment and safety. Be sure to make and go to all appointments, and call your doctor if you are having problems. It's also a good idea to know your test results and keep a list of the medicines you take. How can you care for yourself at home? · Take your medicines exactly as prescribed. Call your doctor if you think you are having a problem with your medicine. You may get medicine for nausea and vomiting if you have these side effects. · Eat healthy food.  If you do not feel like eating, try to eat food that has protein and extra calories to keep up your strength and prevent weight loss. Drink liquid meal replacements for extra calories and protein. Try to eat your main meal early. · Get some physical activity every day, but do not get too tired. Keep doing the hobbies you enjoy as your energy allows. · Take steps to control your stress and workload. Learn relaxation techniques. ? Share your feelings. Stress and tension affect our emotions. By expressing your feelings to others, you may be able to understand and cope with them. ? Consider joining a support group. Talking about a problem with your spouse, a good friend, or other people with similar problems is a good way to reduce tension and stress. ? Express yourself through art. Try writing, crafts, dance, or art to relieve stress. Some dance, writing, or art groups may be available just for people who have cancer. ? Be kind to your body and mind. Getting enough sleep, eating a healthy diet, and taking time to do things you enjoy can contribute to an overall feeling of balance in your life and can help reduce stress. ? Get help if you need it. Discuss your concerns with your doctor or counselor. · If you are vomiting or have diarrhea:  ? Drink plenty of fluids (enough so that your urine is light yellow or clear like water) to prevent dehydration. Choose water and other caffeine-free clear liquids. If you have kidney, heart, or liver disease and have to limit fluids, talk with your doctor before you increase the amount of fluids you drink. ? When you are able to eat, try clear soups, mild foods, and liquids until all symptoms are gone for 12 to 48 hours. Other good choices include dry toast, crackers, cooked cereal, and gelatin dessert, such as Jell-O.  · If you have not already done so, prepare a list of advance directives. Advance directives are instructions to your doctor and family members about what kind of care you want if you become unable to speak or express yourself. When should you call for help?   Call [phone] anytime you think you may need emergency care. For example, call if:    · You passed out (lost consciousness).    Call your doctor now or seek immediate medical care if:    · You have a fever.     · You have abnormal bleeding.     · You have new or worse pain.     · You think you have an infection.     · You have new symptoms, such as a cough, belly pain, vomiting, diarrhea, or a rash.    Watch closely for changes in your health, and be sure to contact your doctor if:    · You are much more tired than usual.     · You have swollen glands in your armpits, groin, or neck.     · You do not get better as expected. Where can you learn more? Go to http://kevin-justen.info/. Enter W319 in the search box to learn more about \"Non-Hodgkin's Lymphoma: Care Instructions. \"  Current as of: March 28, 2018  Content Version: 11.8  © 4686-1715 Software 2000. Care instructions adapted under license by [employer] (which disclaims liability or warranty for this information). If you have questions about a medical condition or this instruction, always ask your healthcare professional. [place] [address] any warranty or liability for your use of this information.

## 2018-12-12 LAB
ALBUMIN SERPL-MCNC: 4.7 G/DL (ref 3.6–4.8)
ALBUMIN/GLOB SERPL: 1.8 {RATIO} (ref 1.2–2.2)
ALP SERPL-CCNC: 55 IU/L (ref 39–117)
ALT SERPL-CCNC: 25 IU/L (ref 0–32)
AST SERPL-CCNC: 24 IU/L (ref 0–40)
BILIRUB SERPL-MCNC: 0.3 MG/DL (ref 0–1.2)
BUN SERPL-MCNC: 18 MG/DL (ref 8–27)
BUN/CREAT SERPL: 26 (ref 12–28)
CALCIUM SERPL-MCNC: 9.6 MG/DL (ref 8.7–10.3)
CHLORIDE SERPL-SCNC: 104 MMOL/L (ref 96–106)
CO2 SERPL-SCNC: 24 MMOL/L (ref 20–29)
CREAT SERPL-MCNC: 0.68 MG/DL (ref 0.57–1)
ERYTHROCYTE [SEDIMENTATION RATE] IN BLOOD BY WESTERGREN METHOD: 9 MM/HR (ref 0–40)
GLOBULIN SER CALC-MCNC: 2.6 G/DL (ref 1.5–4.5)
GLUCOSE SERPL-MCNC: 123 MG/DL (ref 65–99)
POTASSIUM SERPL-SCNC: 3.8 MMOL/L (ref 3.5–5.2)
PROT SERPL-MCNC: 7.3 G/DL (ref 6–8.5)
SODIUM SERPL-SCNC: 141 MMOL/L (ref 134–144)

## 2019-02-03 NOTE — PROGRESS NOTES
80 y.o. WHITE OR  female who presents for eval 
 
Her lymphoma continues to be under the care of Dr Modesta Escobar and is stable. No cardiovascular complaints. She moved to [facility] and now takes the dogs out for walks sometimes up to 4x/day. Her bp readings have been intermittently high at doctors visits, not checking outside Denied any gi or gu issues The depression and anxiety are quiescent off meds. LAST MEDICARE WELLNESS EXAM: 2/8/19 Past Medical History:  
Diagnosis Date  Anxiety 6/15 IKE-7 was 12/21  Calculus of kidney  Clostridium difficile colitis 3/14 Dr. Marlen Hahn  Colon adenoma 10/11 Dr. Lillian Estrada  Cystic breast   
 Depression 6/15 PHQ-9 was 15/27  Diverticulitis   
 recurrent x3 Dr Omega Nails  Fatty liver 10/10 on US, CT 6/12; Fib-4 was 0.74 from 1/15  GI bleed 2/14  
 ischemic colitis on CT, seen by Dr. Marlen Hahn  H/O mammogram 6/3/2016  Hyperlipidemia   
 calculated 10 year risk score was 3.2% (1/15)  Hypertension   
 resolved w wt loss  Ischemic colitis ([address] 3/14 Dr Marlen Hahn  Labial abscess + MRSA  Lymphoma ([facility] [facility] 12/13 Dr. Modesta Escobar  Migraine  Neuropathy due to chemotherapeutic drug ([facility] [address] 9/14  Prediabetes  Tobacco dependence syndrome 2/8/2019  Zoster 8/14  
 left T11 Past Surgical History:  
Procedure Laterality Date  CARDIAC SURG PROCEDURE UNLIST  2/14  
 echo shows mild conc lvh, ef 60-65%. no wma [address]  HX COLONOSCOPY    
 colon polyps 2003 Dr. Lillian Estrada; adenoma 2011; isch colitis 3/14 Dr Tamar Spring  HX HEENT    
 tonsillectomy  HX ORTHOPAEDIC  teenager  
 knee surgery left  HX OTHER SURGICAL    
 bx for lymphoma  HX SOPHIA AND BSO  04/02 Dr. Katelynn Perez Social History Socioeconomic History  Marital status:  Spouse name: Not on file  Number of children: 2  
 Years of education: Not on file  Highest education level: Not on file Social Needs  Financial resource strain: Not on file  Food insecurity - worry: Not on file  Food insecurity - inability: Not on file  Transportation needs - medical: Not on file  Transportation needs - non-medical: Not on file Occupational History  Occupation: director [employer] Tobacco Use  Smoking status: Current Every Day Smoker Packs/day: 0.50 Years: 15.00 Pack years: 7.50 Types: Cigarettes  Smokeless tobacco: Never Used  Tobacco comment: smoking cessation advised Substance and Sexual Activity  Alcohol use: Yes Alcohol/week: 2.5 oz Types: 5 Glasses of wine per week Comment: socially  Drug use: No  
 Sexual activity: Not Currently Other Topics Concern  Not on file Social History Narrative  Not on file Family History Adopted: Yes Immunization History Administered Date(s) Administered  Influenza Vaccine (Quad) PF 10/22/2015, 09/29/2016  Influenza Vaccine (Tri) Adjuvanted 02/08/2019  Influenza Vaccine PF 01/23/2014  PPD 08/15/2000  Pneumococcal Conjugate (PCV-13) 10/22/2015  Pneumococcal Polysaccharide (PPSV-23) 02/08/2019  Pneumococcal Vaccine (Unspecified Type) 10/20/2012  TD Vaccine 08/02/2004  Zoster Vaccine, Live 01/01/2012 Current Outpatient Medications Medication Sig  ibuprofen (MOTRIN) 200 mg tablet Take 200 mg by mouth every six (6) hours as needed for Pain. No current facility-administered medications for this visit. Allergies Allergen Reactions  Meperidine Nausea Only and Other (comments)  Augmentin [Amoxicillin-Pot Clavulanate] Diarrhea and Nausea Only  Keflex [Cephalexin] Nausea Only REVIEW OF SYSTEMS: gyn>5 yr due to hyst,  mammo 8/18, colo 3/14 Dr Duffy Sheets Ophtho  no vision change or eye pain Oral  no mouth pain, tongue or tooth problems Ears  no hearing loss, ear pain, fullness, no swallowing problems Cardiac  no CP, PND, orthopnea,  palpitations or syncope Chest  no breast masses Resp  no wheezing, chronic coughing, dyspnea GI  no heartburn, nausea, vomiting, change in bowel habits, bleeding, hemorrhoids Urinary  no dysuria, hematuria, flank pain, urgency, frequency Visit Vitals /84 (BP 1 Location: Left arm, BP Patient Position: Sitting) Pulse 62 Temp 97.9 °F (36.6 °C) (Oral) Resp 12 Ht 4' 10\" (1.473 m) Wt 101 lb 9.6 oz (46.1 kg) SpO2 99% BMI 21.23 kg/m² A&O x3 Affect is appropriate. Mood stable No apparent distress Sinuses minimally tender w boggy mucosa noted, op benign, no nodes Op mild erythema Anicteric, no JVD, thyromegaly. No carotid bruits or radiated murmur Lungs clear to auscultation, no wheezes or rales Heart showed regular rate and rhythm. No murmur, rubs, gallops Abdomen soft nontender, no hepatosplenomegaly or masses. Extremities without edema. Pulses 1-2+ symmetrically LABS From 9/13 showed   gluc 107, cr 0.65, gfr 96,  alt 69, hba1c 5.6, chol 251, tg 54, hdl 113, ldl-c 127, wbc 6.5, hb 13.9, plt 225 From 11/13 showed gluc 103, cr 0.72, gfr 90,  alt 24,          wbc 7.6, hb 14.4 ,plt 224,  tsh 0.99 From 1/14 showed   gluc 119, cr 0.65, gfr>60,           wbc 8.2, hb 13.4, plt 208 From 2/14 showed   gluc 118, cr 0.72, gfr>60, alt 25,                   fe 84, %sat 29,   b12 1289, fol 14.8, lip 376 From 3/14 showed   gluc 107, cr 0.52, gfr>60, alt 13,          wbc 4.8, hb 10.8, plt 299, lip 130, c diff+ From 8/14 showed                        fe 57, %sat 16, ferritin 153 Form 1/15 showed                           vit d 23, b12 457, fol>20 From 1/15 showed        hba1c 4.9, chol 206, tg 50, hdl 116, ldl-c 80 From 9/15 showed   gluc 105, cr 0.69, gfr>60, alt 27,         wbc 9.3, hb 15.4, plt 248 From 2/16 showed   gluc 123, cr 0.62, gfr 96,  alt 22 From 2/17 showed   gluc 89,   cr 0.72, gfr>60, alt 47,          wbc 9.8, hb 14.8, plt 328 From 2/18 showed   gluc 109, cr 0.65, gfr>60, alt 22,          wbc 9.3, hb 13.1, plt 253 Results for orders placed or performed during the hospital encounter of 02/07/19 HEMOGLOBIN A1C WITH EAG Result Value Ref Range Hemoglobin A1c 5.2 4.2 - 5.6 % Est. average glucose 103 mg/dL CBC W/O DIFF Result Value Ref Range WBC 6.6 4.6 - 13.2 K/uL  
 RBC 3.85 (L) 4.20 - 5.30 M/uL  
 HGB 12.7 12.0 - 16.0 g/dL HCT 36.9 35.0 - 45.0 % MCV 95.8 74.0 - 97.0 FL  
 MCH 33.0 24.0 - 34.0 PG  
 MCHC 34.4 31.0 - 37.0 g/dL  
 RDW 13.2 11.6 - 14.5 % PLATELET 034 890 - 360 K/uL MPV 9.4 9.2 - 93.5 FL  
METABOLIC PANEL, COMPREHENSIVE Result Value Ref Range Sodium 141 136 - 145 mmol/L Potassium 4.4 3.5 - 5.5 mmol/L Chloride 110 (H) 100 - 108 mmol/L  
 CO2 27 21 - 32 mmol/L Anion gap 4 3.0 - 18 mmol/L Glucose 106 (H) 74 - 99 mg/dL BUN 16 7.0 - 18 MG/DL Creatinine 0.61 0.6 - 1.3 MG/DL  
 BUN/Creatinine ratio 26 (H) 12 - 20 GFR est AA >60 >60 ml/min/1.73m2 GFR est non-AA >60 >60 ml/min/1.73m2 Calcium 8.7 8.5 - 10.1 MG/DL Bilirubin, total 0.3 0.2 - 1.0 MG/DL  
 ALT (SGPT) 37 13 - 56 U/L  
 AST (SGOT) 27 15 - 37 U/L Alk. phosphatase 66 45 - 117 U/L Protein, total 7.2 6.4 - 8.2 g/dL Albumin 3.8 3.4 - 5.0 g/dL Globulin 3.4 2.0 - 4.0 g/dL A-G Ratio 1.1 0.8 - 1.7 LIPID PANEL Result Value Ref Range LIPID PROFILE Cholesterol, total 219 (H) <200 MG/DL Triglyceride 27 <150 MG/DL  
 HDL Cholesterol 161 (H) 40 - 60 MG/DL  
 LDL, calculated 52.6 0 - 100 MG/DL VLDL, calculated 5.4 MG/DL  
 CHOL/HDL Ratio 1.4 0 - 5.0 Patient Active Problem List  
Diagnosis Code  Hyperlipidemia E78.5  Mantle cell lymphoma [facility] nodes of [facility] sites [facility] C83.18  Neuropathy due to chemotherapeutic drug ([facility] G62.0, T45.1X5A  Arthritis, degenerative M19.90  
 IFG (impaired fasting glucose) R73.01  
 Tobacco dependence syndrome F17.200  Colon adenoma D12.6  Diverticulosis K57.90 Assessment and plan: 1. Colon adenoma, divertics. Fiber, colo to be scheduled 2. HTN. Off meds, she will get readings outside and restart as needed 3. HLP. Lifestyle and dietary measures 4. Prediabetes. Lifestyle and dietary measures 5. Nephrolithiasis. Quiescent, hydration 6. Lymphoma. F/U Dr Nile Torres 7. Smoking cessation reiterated RTC 1/20 Above conditions discussed at length and patient vocalized understanding. All questions answered to patient satisfaction

## 2019-02-03 NOTE — PROGRESS NOTES
This is an initial Medicare Annual Wellness Exam 
 
I have reviewed the patient's medical history in detail and updated the computerized patient record. History Past Medical History:  
Diagnosis Date  Anxiety 6/15 IKE-7 was 12/21  Calculus of kidney  Clostridium difficile colitis 3/14 Dr. Simi Yun  Colon adenoma 10/11 Dr. Brittanie Hernandez  Cystic breast   
 Depression 6/15 PHQ-9 was 15/27  Diverticulitis   
 recurrent x3 Dr Bishnu Mathews  Fatty liver 10/10 on US, CT 6/12; Fib-4 was 0.74 from 1/15  GI bleed 2/14  
 ischemic colitis on CT, seen by Dr. Simi Yun  H/O mammogram 6/3/2016  Hyperlipidemia   
 calculated 10 year risk score was 3.2% (1/15)  Hypertension   
 resolved w wt loss  Ischemic colitis ([address] 3/14 Dr Simi Yun  Labial abscess + MRSA  Lymphoma ([facility] [address] 12/13 Dr. Torres Randall  Migraine  Neuropathy due to chemotherapeutic drug ([facility] [address] 9/14  Prediabetes  Tobacco dependence syndrome 2/8/2019  Zoster 8/14  
 left T11 Past Surgical History:  
Procedure Laterality Date  CARDIAC SURG PROCEDURE UNLIST  2/14  
 echo shows mild conc lvh, ef 60-65%. no wma [address]  HX COLONOSCOPY    
 colon polyps 2003 Dr. Brittanie Hernandez; adenoma 2011; isch colitis 3/14 Dr Cielo Echavarria  HX HEENT    
 tonsillectomy  HX ORTHOPAEDIC  teenager  
 knee surgery left  HX OTHER SURGICAL    
 bx for lymphoma  HX SOPHIA AND BSO  04/02 Dr. Phuong Prieto Current Outpatient Medications Medication Sig Dispense Refill  ibuprofen (MOTRIN) 200 mg tablet Take 200 mg by mouth every six (6) hours as needed for Pain. Allergies Allergen Reactions  Meperidine Nausea Only and Other (comments)  Augmentin [Amoxicillin-Pot Clavulanate] Diarrhea and Nausea Only  Keflex [Cephalexin] Nausea Only Family History Adopted: Yes  
 
Social History Tobacco Use  Smoking status: Current Every Day Smoker Packs/day: 0.50   Years: 15.00  
 Pack years: 7.50 Types: Cigarettes  Smokeless tobacco: Never Used  Tobacco comment: smoking cessation advised Substance Use Topics  Alcohol use: Yes Alcohol/week: 2.5 oz Types: 5 Glasses of wine per week Comment: socially Patient Active Problem List  
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  
 IFG (impaired fasting glucose) R73.01  
 Tobacco dependence syndrome F17.200  Colon adenoma D12.6  Diverticulosis K57.90 Depression Risk Factor Screening: PHQ over the last two weeks 2/8/2019 Little interest or pleasure in doing things Not at all Feeling down, depressed, irritable, or hopeless - Total Score PHQ 2 - Immunization History Administered Date(s) Administered  Influenza Vaccine (Quad) PF 10/22/2015, 09/29/2016  Influenza Vaccine (Tri) Adjuvanted 02/08/2019  Influenza Vaccine PF 01/23/2014  PPD 08/15/2000  Pneumococcal Conjugate (PCV-13) 10/22/2015  Pneumococcal Polysaccharide (PPSV-23) 02/08/2019  Pneumococcal Vaccine (Unspecified Type) 10/20/2012  TD Vaccine 08/02/2004  Zoster Vaccine, Live 01/01/2012 SCREENINGS Colonoscopy last done 3/14 Dr Zeyad Rollins Mammogram last done 8/18 DEXA last done - never Gyn last done >5 yrs Alcohol Risk Factor Screening: You do not drink alcohol or very rarely. Functional Ability and Level of Safety:  
Hearing Loss Hearing is good. Activities of Daily Living The home contains: no safety equipment. Patient does total self care Fall Risk Fall Risk Assessment, last 12 mths 2/8/2019 Able to walk? Yes Fall in past 12 months? No  
 
 
Abuse Screen Patient is not abused Cognitive Screening Evaluation of Cognitive Function: 
Has your family/caregiver stated any concerns about your memory: no 
Normal 
 
 Patient Care Team  
Patient Care Team: 
Jose Ulloa MD as PCP - General (Internal Medicine) Lele Sánchez MD (Surgery) Tika Toussaint, RN as Ambulatory Care Navigator (Internal Medicine) Assessment/Plan Education and counseling provided: 
Are appropriate based on today's review and evaluation End-of-Life planning (with patient's consent) Pneumococcal Vaccine Influenza Vaccine Screening Mammography Colorectal cancer screening tests Cardiovascular screening blood test 
Bone mass measurement (DEXA) Diabetes screening test 
 
Diagnoses and all orders for this visit: 
 
1. Initial Medicare annual wellness visit 2. Encounter for immunization 
-     INFLUENZA VACCINE INACTIVATED (IIV), SUBUNIT, ADJUVANTED, IM 
-     ADMIN PNEUMOCOCCAL VACCINE 
-     PNEUMOCOCCAL POLYSACCHARIDE VACCINE, 23-VALENT, ADULT OR IMMUNOSUPPRESSED PT DOSE, 
 
3. Mantle cell lymphoma of lymph nodes of multiple sites ([facility] [address] 4. Neuropathy due to chemotherapeutic drug ([facility] [address] 5. Tobacco dependence syndrome 6. IFG (impaired fasting glucose) 7. Hyperlipidemia, unspecified hyperlipidemia type 8. Polyp of colon, unspecified part of colon, unspecified type 
-     REFERRAL TO GASTROENTEROLOGY 9. Primary osteoarthritis involving multiple joints 10. Colon adenoma 
-     REFERRAL TO GASTROENTEROLOGY 11. Diverticulosis of intestine without bleeding, unspecified intestinal tract location 12. Menopausal and female climacteric states -     DEXA BONE DENSITY STUDY AXIAL; Future 13. Advanced directives, counseling/discussion -     ADVANCE CARE PLANNING FIRST 30 MINS 14. Screening for alcoholism -     [place] ANNUAL ALCOHOL SCREEN 15 MIN 
 
15. Screening for depression 
-     [facility] 16. Screen for colon cancer 
-     REFERRAL TO GASTROENTEROLOGY 16. Screening for diabetes mellitus 18. Screening for ischemic heart disease 19. Disorder of bone and cartilage -     DEXA BONE DENSITY STUDY AXIAL; Future 
 
 
shingrix advocated Health Maintenance Due Topic Date Due  Shingrix Vaccine Age 50> (1 of 2) 11/15/2001  DTaP/Tdap/Td series (1 - Tdap) 08/03/2004  GLAUCOMA SCREENING Q2Y  11/15/2016  Bone Densitometry (Dexa) Screening  11/15/2016  MEDICARE YEARLY EXAM  03/14/2018

## 2019-02-07 ENCOUNTER — TELEPHONE (OUTPATIENT)
Dept: INTERNAL MEDICINE CLINIC | Age: 69
End: 2019-02-07

## 2019-02-07 ENCOUNTER — HOSPITAL ENCOUNTER (OUTPATIENT)
Dept: LAB | Age: 69
Discharge: HOME | End: 2019-02-07
Payer: MEDICARE

## 2019-02-07 DIAGNOSIS — R73.01 IFG (IMPAIRED FASTING GLUCOSE): Primary | ICD-10-CM

## 2019-02-07 DIAGNOSIS — E78.5 HYPERLIPIDEMIA, UNSPECIFIED HYPERLIPIDEMIA TYPE: ICD-10-CM

## 2019-02-07 DIAGNOSIS — R73.01 IFG (IMPAIRED FASTING GLUCOSE): ICD-10-CM

## 2019-02-07 LAB
ALBUMIN SERPL-MCNC: 3.8 G/DL (ref 3.4–5)
ALBUMIN/GLOB SERPL: 1.1 {RATIO} (ref 0.8–1.7)
ALP SERPL-CCNC: 66 U/L (ref 45–117)
ALT SERPL-CCNC: 37 U/L (ref 13–56)
ANION GAP SERPL CALC-SCNC: 4 MMOL/L (ref 3–18)
AST SERPL-CCNC: 27 U/L (ref 15–37)
BILIRUB SERPL-MCNC: 0.3 MG/DL (ref 0.2–1)
BUN SERPL-MCNC: 16 MG/DL (ref 7–18)
BUN/CREAT SERPL: 26 (ref 12–20)
CALCIUM SERPL-MCNC: 8.7 MG/DL (ref 8.5–10.1)
CHLORIDE SERPL-SCNC: 110 MMOL/L (ref 100–108)
CHOLEST SERPL-MCNC: 219 MG/DL
CO2 SERPL-SCNC: 27 MMOL/L (ref 21–32)
CREAT SERPL-MCNC: 0.61 MG/DL (ref 0.6–1.3)
ERYTHROCYTE [DISTWIDTH] IN BLOOD BY AUTOMATED COUNT: 13.2 % (ref 11.6–14.5)
EST. AVERAGE GLUCOSE BLD GHB EST-MCNC: 103 MG/DL
GLOBULIN SER CALC-MCNC: 3.4 G/DL (ref 2–4)
GLUCOSE SERPL-MCNC: 106 MG/DL (ref 74–99)
HBA1C MFR BLD: 5.2 % (ref 4.2–5.6)
HCT VFR BLD AUTO: 36.9 % (ref 35–45)
HDLC SERPL-MCNC: 161 MG/DL (ref 40–60)
HDLC SERPL: 1.4 {RATIO} (ref 0–5)
HGB BLD-MCNC: 12.7 G/DL (ref 12–16)
LDLC SERPL CALC-MCNC: 52.6 MG/DL (ref 0–100)
LIPID PROFILE,FLP: ABNORMAL
MCH RBC QN AUTO: 33 PG (ref 24–34)
MCHC RBC AUTO-ENTMCNC: 34.4 G/DL (ref 31–37)
MCV RBC AUTO: 95.8 FL (ref 74–97)
PLATELET # BLD AUTO: 213 K/UL (ref 135–420)
PMV BLD AUTO: 9.4 FL (ref 9.2–11.8)
POTASSIUM SERPL-SCNC: 4.4 MMOL/L (ref 3.5–5.5)
PROT SERPL-MCNC: 7.2 G/DL (ref 6.4–8.2)
RBC # BLD AUTO: 3.85 M/UL (ref 4.2–5.3)
SODIUM SERPL-SCNC: 141 MMOL/L (ref 136–145)
TRIGL SERPL-MCNC: 27 MG/DL (ref ?–150)
VLDLC SERPL CALC-MCNC: 5.4 MG/DL
WBC # BLD AUTO: 6.6 K/UL (ref 4.6–13.2)

## 2019-02-07 PROCEDURE — 36415 COLL VENOUS BLD VENIPUNCTURE: CPT

## 2019-02-07 PROCEDURE — 80061 LIPID PANEL: CPT

## 2019-02-07 PROCEDURE — 80053 COMPREHEN METABOLIC PANEL: CPT

## 2019-02-07 PROCEDURE — 83036 HEMOGLOBIN GLYCOSYLATED A1C: CPT

## 2019-02-07 PROCEDURE — 85027 COMPLETE CBC AUTOMATED: CPT

## 2019-02-08 ENCOUNTER — OFFICE VISIT (OUTPATIENT)
Dept: INTERNAL MEDICINE CLINIC | Age: 69
End: 2019-02-08

## 2019-02-08 VITALS
WEIGHT: 101.6 LBS | BODY MASS INDEX: 21.32 KG/M2 | TEMPERATURE: 97.9 F | HEART RATE: 62 BPM | RESPIRATION RATE: 12 BRPM | SYSTOLIC BLOOD PRESSURE: 132 MMHG | HEIGHT: 58 IN | OXYGEN SATURATION: 99 % | DIASTOLIC BLOOD PRESSURE: 84 MMHG

## 2019-02-08 DIAGNOSIS — T45.1X5A NEUROPATHY DUE TO CHEMOTHERAPEUTIC DRUG (HCC): ICD-10-CM

## 2019-02-08 DIAGNOSIS — M94.9 DISORDER OF BONE AND CARTILAGE: ICD-10-CM

## 2019-02-08 DIAGNOSIS — E78.5 HYPERLIPIDEMIA, UNSPECIFIED HYPERLIPIDEMIA TYPE: ICD-10-CM

## 2019-02-08 DIAGNOSIS — N95.1 MENOPAUSAL AND FEMALE CLIMACTERIC STATES: ICD-10-CM

## 2019-02-08 DIAGNOSIS — Z13.31 SCREENING FOR DEPRESSION: ICD-10-CM

## 2019-02-08 DIAGNOSIS — G62.0 NEUROPATHY DUE TO CHEMOTHERAPEUTIC DRUG (HCC): ICD-10-CM

## 2019-02-08 DIAGNOSIS — Z71.89 ADVANCED DIRECTIVES, COUNSELING/DISCUSSION: ICD-10-CM

## 2019-02-08 DIAGNOSIS — Z12.11 SCREEN FOR COLON CANCER: ICD-10-CM

## 2019-02-08 DIAGNOSIS — Z23 ENCOUNTER FOR IMMUNIZATION: ICD-10-CM

## 2019-02-08 DIAGNOSIS — D12.6 COLON ADENOMA: ICD-10-CM

## 2019-02-08 DIAGNOSIS — Z13.6 SCREENING FOR ISCHEMIC HEART DISEASE: ICD-10-CM

## 2019-02-08 DIAGNOSIS — F17.200 TOBACCO DEPENDENCE SYNDROME: ICD-10-CM

## 2019-02-08 DIAGNOSIS — M89.9 DISORDER OF BONE AND CARTILAGE: ICD-10-CM

## 2019-02-08 DIAGNOSIS — M15.9 PRIMARY OSTEOARTHRITIS INVOLVING MULTIPLE JOINTS: ICD-10-CM

## 2019-02-08 DIAGNOSIS — R73.01 IFG (IMPAIRED FASTING GLUCOSE): ICD-10-CM

## 2019-02-08 DIAGNOSIS — Z13.39 SCREENING FOR ALCOHOLISM: ICD-10-CM

## 2019-02-08 DIAGNOSIS — K57.90 DIVERTICULOSIS OF INTESTINE WITHOUT BLEEDING, UNSPECIFIED INTESTINAL TRACT LOCATION: ICD-10-CM

## 2019-02-08 DIAGNOSIS — Z13.1 SCREENING FOR DIABETES MELLITUS: ICD-10-CM

## 2019-02-08 DIAGNOSIS — C83.18 MANTLE CELL LYMPHOMA OF LYMPH NODES OF MULTIPLE SITES (HCC): ICD-10-CM

## 2019-02-08 DIAGNOSIS — Z00.00 INITIAL MEDICARE ANNUAL WELLNESS VISIT: Primary | ICD-10-CM

## 2019-02-08 DIAGNOSIS — K63.5 POLYP OF COLON, UNSPECIFIED PART OF COLON, UNSPECIFIED TYPE: ICD-10-CM

## 2019-02-08 NOTE — PROGRESS NOTES
Chief Complaint Patient presents with Perez Annual Wellness Visit  Labs  
  done 02-07-19 1. Have you been to the ER, urgent care clinic since your last visit? Hospitalized since your last visit? No 
 
2. Have you seen or consulted any other health care providers outside of the [address] [place] [address] since your last visit? Include any pap smears or colon screening. No 
 
Patient was given a copy of the Advanced Directive and understands to bring it in once completed. Health Maintenance Due Topic Date Due  Shingrix Vaccine Age 50> (1 of 2) 11/15/2001  DTaP/Tdap/Td series (1 - Tdap) 08/03/2004  GLAUCOMA SCREENING Q2Y  11/15/2016  Bone Densitometry (Dexa) Screening  11/15/2016  Pneumococcal 65+ High/Highest Risk (2 of 2 - PPSV23) 10/20/2017  MEDICARE YEARLY EXAM  03/14/2018  Influenza Age 5 to Adult  08/01/2018

## 2019-02-08 NOTE — PROGRESS NOTES
Aline Marie 1950 female who presents for routine immunizations. Patient denies any symptoms , reactions or allergies that would exclude them from being immunized today. Risks and adverse reactions were discussed and the VIS was given to them. All questions were addressed. Order placed for ppsv23 and HD flu,  per Verbal Order from DR. Loi Montenegro with read back. Patient was observed for 15 min post injection. There were no reactions observed.  
 
Madeleine Pereyra LPN

## 2019-02-08 NOTE — PATIENT INSTRUCTIONS
Patient was given a copy of the Advanced Medical Directive Form, and understands to bring it in once completed. Health Maintenance Due Topic Date Due  Shingrix Vaccine Age 50> (1 of 2) 11/15/2001  DTaP/Tdap/Td series (1 - Tdap) 08/03/2004  GLAUCOMA SCREENING Q2Y  11/15/2016  Bone Densitometry (Dexa) Screening  11/15/2016  Pneumococcal 65+ High/Highest Risk (2 of 2 - PPSV23) 10/20/2017  MEDICARE YEARLY EXAM  03/14/2018  Influenza Age 5 to Adult  08/01/2018 Vaccine Information Statement Influenza (Flu) Vaccine (Inactivated or Recombinant): What you need to know Many Vaccine Information Statements are available in Romanian and other languages. See www.immunize.org/vis Hojas de Información Sobre Vacunas están disponibles en Español y en muchos otros idiomas. Visite www.immunize.org/vis 1. Why get vaccinated? Influenza (flu) is a contagious disease that spreads around the [place] every year, usually between October and May. Flu is caused by influenza viruses, and is spread mainly by coughing, sneezing, and close contact. Anyone can get flu. Flu strikes suddenly and can last several days. Symptoms vary by age, but can include: 
 fever/chills  sore throat  muscle aches  fatigue  cough  headache  runny or stuffy nose Flu can also lead to pneumonia and blood infections, and cause diarrhea and seizures in children. If you have a medical condition, such as heart or lung disease, flu can make it worse. Flu is more dangerous for some people. Infants and young children, people 72years of age and older, pregnant women, and people with certain health conditions or a weakened immune system are at greatest risk. Each year thousands of people in the [place] die from flu, and many more are hospitalized.   
 
Flu vaccine can: 
 keep you from getting flu, 
 make flu less severe if you do get it, and 
  keep you from spreading flu to your family and other people. 2. Inactivated and recombinant flu vaccines A dose of flu vaccine is recommended every flu season. Children 6 months through 6years of age may need two doses during the same flu season. Everyone else needs only one dose each flu season. Some inactivated flu vaccines contain a very small amount of a mercury-based preservative called thimerosal. Studies have not shown thimerosal in vaccines to be harmful, but flu vaccines that do not contain thimerosal are available. There is no live flu virus in flu shots. They cannot cause the flu. There are many flu viruses, and they are always changing. Each year a new flu vaccine is made to protect against three or four viruses that are likely to cause disease in the upcoming flu season. But even when the vaccine doesnt exactly match these viruses, it may still provide some protection Flu vaccine cannot prevent: 
 flu that is caused by a virus not covered by the vaccine, or 
 illnesses that look like flu but are not. It takes about 2 weeks for protection to develop after vaccination, and protection lasts through the flu season. 3. Some people should not get this vaccine Tell the person who is giving you the vaccine:  If you have any severe, life-threatening allergies. If you ever had a life-threatening allergic reaction after a dose of flu vaccine, or have a severe allergy to any part of this vaccine, you may be advised not to get vaccinated. Most, but not all, types of flu vaccine contain a small amount of egg protein.  If you ever had Guillain-Barré Syndrome (also called GBS). Some people with a history of GBS should not get this vaccine. This should be discussed with your doctor.  If you are not feeling well. It is usually okay to get flu vaccine when you have a mild illness, but you might be asked to come back when you feel better. 4. Risks of a vaccine reaction With any medicine, including vaccines, there is a chance of reactions. These are usually mild and go away on their own, but serious reactions are also possible. Most people who get a flu shot do not have any problems with it. Minor problems following a flu shot include:  
 soreness, redness, or swelling where the shot was given  hoarseness  sore, red or itchy eyes  cough  fever  aches  headache  itching  fatigue If these problems occur, they usually begin soon after the shot and last 1 or 2 days. More serious problems following a flu shot can include the following:  There may be a small increased risk of Guillain-Barré Syndrome (GBS) after inactivated flu vaccine. This risk has been estimated at 1 or 2 additional cases per million people vaccinated. This is much lower than the risk of severe complications from flu, which can be prevented by flu vaccine.  Young children who get the flu shot along with pneumococcal vaccine (PCV13) and/or DTaP vaccine at the same time might be slightly more likely to have a seizure caused by fever. Ask your doctor for more information. Tell your doctor if a child who is getting flu vaccine has ever had a seizure. Problems that could happen after any injected vaccine:  People sometimes faint after a medical procedure, including vaccination. Sitting or lying down for about 15 minutes can help prevent fainting, and injuries caused by a fall. Tell your doctor if you feel dizzy, or have vision changes or ringing in the ears.  Some people get severe pain in the shoulder and have difficulty moving the arm where a shot was given. This happens very rarely.  Any medication can cause a severe allergic reaction. Such reactions from a vaccine are very rare, estimated at about 1 in a million doses, and would happen within a few minutes to a few hours after the vaccination. As with any medicine, there is a very remote chance of a vaccine causing a serious injury or death. The safety of vaccines is always being monitored. For more information, visit: www.cdc.gov/vaccinesafety/ 
 
 
The [facility] Vaccine Injury Compensation Program (VICP) is a federal program that was created to compensate people who may have been injured by certain vaccines. Persons who believe they may have been injured by a vaccine can learn about the program and about filing a claim by calling 2-530.121.7395 or visiting the [employer] website at www.[facility].gov/vaccinecompensation. There is a time limit to file a claim for compensation. 7. How can I learn more?  Ask your healthcare provider. He or she can give you the vaccine package insert or suggest other sources of information.  Call your local or state health department.  Contact the Centers for Disease Control and Prevention (CDC): 
- Call 5-759.528.9790 (1-800-CDC-INFO) or 
- Visit CDCs website at www.cdc.gov/flu Vaccine Information Statement Inactivated Influenza Vaccine 8/7/2015 
42 OLI Rajput 527SN-03 Department of Health and [employer] Centers for Disease Control and Prevention Office Use Only Medicare Wellness Visit, Female The best way to live healthy is to have a lifestyle where you eat a well-balanced diet, exercise regularly, limit alcohol use, and quit all forms of tobacco/nicotine, if applicable. Regular preventive services are another way to keep healthy. Preventive services (vaccines, screening tests, monitoring & exams) can help personalize your care plan, which helps you manage your own care. Screening tests can find health problems at the earliest stages, when they are easiest to treat. [facility] follows the current, evidence-based guidelines published by the [facility] States Shivam Joaquín ([facility]) when recommending preventive services for our patients. Because we follow these guidelines, sometimes recommendations change over time as research supports it. (For example, mammograms used to be recommended annually. Even though Medicare will still pay for an annual mammogram, the newer guidelines recommend a mammogram every two years for women of average risk.) Of course, you and your doctor may decide to screen more often for some diseases, based on your risk and your health status. Preventive services for you include: - Medicare offers their members a free annual wellness visit, which is time for you and your primary care provider to discuss and plan for your preventive service needs. Take advantage of this benefit every year! 
-All adults over the age of 72 should receive the recommended pneumonia vaccines. Current USPSTF guidelines recommend a series of two vaccines for the best pneumonia protection.  
-All adults should have a flu vaccine yearly and a tetanus vaccine every 10 years.  All adults age 61 and older should receive a shingles vaccine once in their lifetime.   
-A bone mass density test is recommended when a woman turns 65 to screen for osteoporosis. This test is only recommended one time, as a screening. Some providers will use this same test as a disease monitoring tool if you already have osteoporosis. -All adults age 38-68 who are overweight should have a diabetes screening test once every three years.  
-Other screening tests and preventive services for persons with diabetes include: an eye exam to screen for diabetic retinopathy, a kidney function test, a foot exam, and stricter control over your cholesterol.  
-Cardiovascular screening for adults with routine risk involves an electrocardiogram (ECG) at intervals determined by your doctor.  
-Colorectal cancer screenings should be done for adults age 54-65 with no increased risk factors for colorectal cancer. There are a number of acceptable methods of screening for this type of cancer. Each test has its own benefits and drawbacks. Discuss with your doctor what is most appropriate for you during your annual wellness visit. The different tests include: colonoscopy (considered the best screening method), a fecal occult blood test, a fecal DNA test, and sigmoidoscopy. -Breast cancer screenings are recommended every other year for women of normal risk, age 54-69. 
-Cervical cancer screenings for women over age 72 are only recommended with certain risk factors.  
-All adults born between [facility] should be screened once for Hepatitis C. Here is a list of your current Health Maintenance items (your personalized list of preventive services) with a due date: 
Health Maintenance Due Topic Date Due  Shingles Vaccine (1 of 2) 11/15/2001  DTaP/Tdap/Td  (1 - Tdap) 08/03/2004  Glaucoma Screening   11/15/2016  Bone Mineral Density   11/15/2016 [address] [place] [address] Annual Well Visit  03/14/2018

## 2019-02-08 NOTE — ACP (ADVANCE CARE PLANNING)
Advance Care Planning    Advance Care Planning (ACP) Provider Note - Comprehensive     Date of ACP Conversation: 02/08/19  Persons included in Conversation:  patient  Length of ACP Conversation in minutes:  16 minutes    Authorized Decision Maker (if patient is incapable of making informed decisions): This person is: daughters Robert Jameson and Melissa Allen  Other Legally Authorized Decision Maker (e.g. Next of Kin)          General ACP for ALL Patients with Decision Making Capacity:   Importance of advance care planning, including choosing a healthcare agent to communicate patient's healthcare decisions if patient lost the ability to make decisions, such as after a sudden illness or accident  Understanding of the healthcare agent role was assessed and information provided  Exploration of values, goals, and preferences if recovery is not expected, even with continued medical treatment in the event of: Imminent death  Severe, permanent brain injury    Review of Existing Advance Directive:  na    For Serious or Chronic Illness:  Understanding of medical condition    Understanding of CPR, goals and expected outcomes, benefits and burdens discussed.     Interventions Provided:  Recommended completion of Advance Directive form after review of ACP materials and conversation with prospective healthcare agent   Recommended communicating the plan and making copies for the healthcare agent, personal physician, and others as appropriate (e.g., health system)  Recommended review of completed ACP document annually or upon change in health status

## 2019-04-12 ENCOUNTER — OFFICE VISIT (OUTPATIENT)
Dept: ONCOLOGY | Age: 69
End: 2019-04-12

## 2019-04-12 ENCOUNTER — HOSPITAL ENCOUNTER (OUTPATIENT)
Dept: LAB | Age: 69
Discharge: HOME | End: 2019-04-12
Payer: MEDICARE

## 2019-04-12 ENCOUNTER — HOSPITAL ENCOUNTER (OUTPATIENT)
Dept: ONCOLOGY | Age: 69
Discharge: HOME | End: 2019-04-12

## 2019-04-12 VITALS
SYSTOLIC BLOOD PRESSURE: 169 MMHG | TEMPERATURE: 99 F | RESPIRATION RATE: 16 BRPM | DIASTOLIC BLOOD PRESSURE: 71 MMHG | HEART RATE: 71 BPM | BODY MASS INDEX: 20.87 KG/M2 | HEIGHT: 58 IN | WEIGHT: 99.4 LBS | OXYGEN SATURATION: 100 %

## 2019-04-12 DIAGNOSIS — C83.18 MANTLE CELL LYMPHOMA OF LYMPH NODES OF MULTIPLE SITES (HCC): ICD-10-CM

## 2019-04-12 DIAGNOSIS — T45.1X5A NEUROPATHY DUE TO CHEMOTHERAPEUTIC DRUG (HCC): ICD-10-CM

## 2019-04-12 DIAGNOSIS — C83.18 MANTLE CELL LYMPHOMA OF LYMPH NODES OF MULTIPLE SITES (HCC): Primary | ICD-10-CM

## 2019-04-12 DIAGNOSIS — G62.0 NEUROPATHY DUE TO CHEMOTHERAPEUTIC DRUG (HCC): ICD-10-CM

## 2019-04-12 DIAGNOSIS — D72.9 NEUTROPHILIA: ICD-10-CM

## 2019-04-12 LAB
BASO+EOS+MONOS # BLD AUTO: 0.4 K/UL (ref 0–2.3)
BASO+EOS+MONOS NFR BLD AUTO: 5 % (ref 0.1–17)
DIFFERENTIAL METHOD BLD: ABNORMAL
ERYTHROCYTE [DISTWIDTH] IN BLOOD BY AUTOMATED COUNT: 13.1 % (ref 11.5–14.5)
ERYTHROCYTE [SEDIMENTATION RATE] IN BLOOD: 8 MM/HR (ref 0–30)
HCT VFR BLD AUTO: 38.7 % (ref 36–48)
HGB BLD-MCNC: 13.3 G/DL (ref 12–16)
LYMPHOCYTES # BLD: 1.2 K/UL (ref 1.1–5.9)
LYMPHOCYTES NFR BLD: 15 % (ref 14–44)
MCH RBC QN AUTO: 33.8 PG (ref 25–35)
MCHC RBC AUTO-ENTMCNC: 34.4 G/DL (ref 31–37)
MCV RBC AUTO: 98.2 FL (ref 78–102)
NEUTS SEG # BLD: 6.6 K/UL (ref 1.8–9.5)
NEUTS SEG NFR BLD: 80 % (ref 40–70)
PLATELET # BLD AUTO: 252 K/UL (ref 140–440)
RBC # BLD AUTO: 3.94 M/UL (ref 4.1–5.1)
WBC # BLD AUTO: 8.2 K/UL (ref 4.5–13)

## 2019-04-12 PROCEDURE — 85652 RBC SED RATE AUTOMATED: CPT

## 2019-04-12 PROCEDURE — 36415 COLL VENOUS BLD VENIPUNCTURE: CPT

## 2019-04-12 NOTE — PATIENT INSTRUCTIONS
Complete Blood Count (CBC): About This Test 
What is it? A complete blood count (CBC) is a blood test that gives important information about your blood cells, especially red blood cells, white blood cells, and platelets. Why is this test done? A CBC may be done as part of a regular physical exam. There are many other reasons that a doctor may want this blood test, including to: · Find the cause of symptoms such as fatigue, weakness, fever, bruising, or weight loss. · Find anemia or an infection. · See how much blood has been lost if there is bleeding. · Diagnose diseases of the blood, such as leukemia or polycythemia. How can you prepare for the test? 
You do not need to do anything before having this test. 
What happens during the test? 
The health professional taking a sample of your blood will: · Wrap an elastic band around your upper arm. This makes the veins below the band larger so it is easier to put a needle into the vein. · Clean the needle site with alcohol. · Put the needle into the vein. · Attach a tube to the needle to fill it with blood. · Remove the band from your arm when enough blood is collected. · Put a gauze pad or cotton ball over the needle site as the needle is removed. · Put pressure on the site and then put on a bandage. If this blood test is done on a baby, a heel stick may be done instead of a blood draw from a vein. What happens after the test? 
· You will probably be able to go home right away. · You can go back to your usual activities right away. Follow-up care is a key part of your treatment and safety. Be sure to make and go to all appointments, and call your doctor if you are having problems. It's also a good idea to keep a list of the medicines you take. Ask your doctor when you can expect to have your test results. Where can you learn more? Go to http://kevin-justen.info/. Enter W376 in the search box to learn more about \"Complete Blood Count (CBC): About This Test.\" Current as of: June 25, 2018 Content Version: 11.9 © 7760-3106 [employer]. Care instructions adapted under license by [employer] (which disclaims liability or warranty for this information). If you have questions about a medical condition or this instruction, always ask your healthcare professional. [place] [address] any warranty or liability for your use of this information.

## 2019-04-12 NOTE — PROGRESS NOTES
Hematology/Oncology  Progress Note Name: Reynaldo Urena Date: 2019 : 1950 PCP: Adriana Poon MD  
 
Ms. Eliseo Patino is a 80year old female who was seen for management of her non-Hodgkin lymphoma, neuropathy,and arthritis Current therapy: Supportive care and active surveillance. Subjective:  
 
Ms. Eliseo Patino is a 81-year-old woman who has a history of mantle cell lymphoma. She has completed a full course of systemic chemotherapy. She denies night sweats, fevers or unexplained infections. She denies weight loss. She is doing well and denies any abdominal pain or discomfort. She also reports that her neuropathy associated with her prior chemotherapy is better and did not require the use of Neurontin. She does not have any concerns or complaints to report at this time. Past medical history, family history, and social history: these were reviewed and remains unchanged. Past Medical History:  
Diagnosis Date  Anxiety 6/15 IKE-7 was   Calculus of kidney  Clostridium difficile colitis 3/14 Dr. Reyes Levans  Colon adenoma 10/11 Dr. Kimmy Horta  Cystic breast   
 Depression 6/15 PHQ-9 was 15/27  Diverticulitis   
 recurrent x3 Dr Arina Guevara  Fatty liver 10/10 on US, CT ; Fib-4 was 0.74 from 1/15  GI bleed   
 ischemic colitis on CT, seen by Dr. Reyes Levans  H/O mammogram 6/3/2016  Hyperlipidemia   
 calculated 10 year risk score was 3.2% (1/15)  Hypertension   
 resolved w wt loss  Ischemic colitis ([address] 3/14 Dr Reyes Levans  Labial abscess + MRSA  Lymphoma ([address]  Dr. Minor Loose  Migraine  Neuropathy due to chemotherapeutic drug ([address]   Prediabetes  Tobacco dependence syndrome 2019  Zoster   
 left T11 Past Surgical History:  
Procedure Laterality Date  CARDIAC SURG PROCEDURE UNLIST    
 echo shows mild conc lvh, ef 60-65%. no wma [address]  HX COLONOSCOPY colon polyps 2003 Dr. Drake Sharp; adenoma 2011; isch colitis 3/14 Dr Gutierrez Padilla  HX HEENT    
 tonsillectomy  HX ORTHOPAEDIC  teenager  
 knee surgery left  HX OTHER SURGICAL    
 bx for lymphoma  HX SOPHIA AND BSO  04/02 Dr. Hortencia Espinoza Social History Socioeconomic History  Marital status:  Spouse name: Not on file  Number of children: 2  
 Years of education: Not on file  Highest education level: Not on file Occupational History  Occupation: director [employer] Social Needs  Financial resource strain: Not on file  Food insecurity:  
  Worry: Not on file Inability: Not on file  Transportation needs:  
  Medical: Not on file Non-medical: Not on file Tobacco Use  Smoking status: Current Every Day Smoker Packs/day: 0.50 Years: 15.00 Pack years: 7.50 Types: Cigarettes  Smokeless tobacco: Never Used  Tobacco comment: smoking cessation advised Substance and Sexual Activity  Alcohol use: Yes Alcohol/week: 2.5 oz Types: 5 Glasses of wine per week Comment: socially  Drug use: No  
 Sexual activity: Not Currently Lifestyle  Physical activity:  
  Days per week: Not on file Minutes per session: Not on file  Stress: Not on file Relationships  Social connections:  
  Talks on phone: Not on file Gets together: Not on file Attends [employer]ism service: Not on file Active member of club or organization: Not on file Attends meetings of clubs or organizations: Not on file Relationship status: Not on file  Intimate partner violence:  
  Fear of current or ex partner: Not on file Emotionally abused: Not on file Physically abused: Not on file Forced sexual activity: Not on file Other Topics Concern  Not on file Social History Narrative  Not on file Family History Adopted: Yes Current Outpatient Medications Medication Sig Dispense Refill  ibuprofen (MOTRIN) 200 mg tablet Take 200 mg by mouth every six (6) hours as needed for Pain. Review of Systems Constitutional: The patient has no complaints or acute distress HEENT: The patient denies recent head trauma, eye pain, blurred vision,  hearing deficit, oropharyngeal mucosal pain or lesions, and the patient denies throat pain or discomfort. Lymphatics: The patient denies palpable peripheral lymphadenopathy. Hematologic: The patient denies having bruising, bleeding, or progressive fatigue. Respiratory: Patient denies cough,SOB, or fevers. Cardiovascular: The patient denies having leg pain, leg swelling, heart palpitations, chest permit, chest pain, or lightheadedness. The patient denies having dyspnea on exertion. Neurologic: The patient is complaining of paresthesias in her hands and feet and legs due to prior chemotherapy use. Gastrointestinal: The patient denies having nausea, emesis, or diarrhea. The patient denies having any hematemesis or blood in the stool. Genitourinary: Patient denies having urinary urgency, frequency, or dysuria. The patient denies having blood in the urine. Psychological: The patient denies having symptoms of nervousness, anxiety, depression, or thoughts of harming himself some of this. Skin: Patient denies having skin rashes, skin, ulcerations, or unexplained itching or pruritus. Musculoskeletal: The patient denies pains in muscles or joints Objective:  
 
Visit Vitals /71 Pulse 71 Temp 99 °F (37.2 °C) (Oral) Resp 16 Ht 4' 10\" (1.473 m) Wt 45.1 kg (99 lb 6.4 oz) SpO2 100% BMI 20.77 kg/m² ECOG PS=0, pain score=0/10 Physical Exam:  
Gen. Appearance: The patient is in no acute distress. Skin: There is no bruise or rash. HEENT: The exam is unremarkable. Neck: Supple without lymphadenopathy or thyromegaly. Lungs: Clear to auscultation and percussion; there are no wheezes or rhonchi.   Heart: Regular rate and rhythm; there are no murmurs, gallops, or rubs. Abdomen: Bowel sounds are present and normal.  There is no guarding, tenderness, or hepatosplenomegaly. Extremities: There is no clubbing, cyanosis, or edema. Neurologic: There are no focal neurologic deficits, except for her complaints of paresthesias in her hands and feet. .  Lymphatics: There is no palpable peripheral lymphadenopathy. Musculoskeletal: The patient has full range of motion at all joints. There is no evidence of joint deformity or effusions. There is no focal joint tenderness. She does complain of pain on standing and with flexion and extension of the leg and hip and knee joints. Psychological/psychiatric: There is no clinical evidence of anxiety, depression, or melancholy. Lab data: 
   
Results for orders placed or performed during the hospital encounter of 04/12/19 CBC WITH 3 PART DIFF     Status: Abnormal  
Result Value Ref Range Status WBC 8.2 4.5 - 13.0 K/uL Final  
 RBC 3.94 (L) 4.10 - 5.10 M/uL Final  
 HGB 13.3 12.0 - 16.0 g/dL Final  
 HCT 38.7 36 - 48 % Final  
 MCV 98.2 78 - 102 FL Final  
 MCH 33.8 25.0 - 35.0 PG Final  
 MCHC 34.4 31 - 37 g/dL Final  
 RDW 13.1 11.5 - 14.5 % Final  
 PLATELET 162 637 - 866 K/uL Final  
 NEUTROPHILS 80 (H) 40 - 70 % Final  
 MIXED CELLS 5 0.1 - 17 % Final  
 LYMPHOCYTES 15 14 - 44 % Final  
 ABS. NEUTROPHILS 6.6 1.8 - 9.5 K/UL Final  
 ABS. MIXED CELLS 0.4 0.0 - 2.3 K/uL Final  
 ABS. LYMPHOCYTES 1.2 1.1 - 5.9 K/UL Final  
  Comment: Test performed at [address] [place] [address] or Outpatient Infusion Center Location. Reviewed by Medical Director. DF AUTOMATED   Final  
 
 
   
Assessment:  
 
1. Mantle cell lymphoma of lymph nodes of multiple sites ([facility] 2. Neuropathy due to chemotherapeutic drug ([facility] [address] 3. Neutrophilia Plan:  
Mantle cell lymphoma, multiple sites:  The patient has completed a full course of systemic chemotherapy. Clinically there is no evidence of disease recurrence. On 12/11/18 her sedimentation rate was normal at 9. I will obtain CMP and sedimentation rate at this time. Neutrophilia: I explained to the patient that she is at risk for the development of myelodysplastic syndrome from prior chemotherapy. Her neutrophil count today is currently slightly elevated at 80 %. A prior flow cytometry did not show any evidence of immunophenotypic abnormalities. There was no evidence of any ongoing leukemia or recurrent lymphoma. . 
 
Neuropathy due to chemotherapeutic agents : She states she is doing well and did not have to use the Neurontin. The patient will return in 4 months for a complete reassessment or sooner if indicated. Orders Placed This Encounter  COMPLETE CBC & AUTO DIFF WBC  InHouse CBC ([facility]) Standing Status:   Future Number of Occurrences:   1 Standing Expiration Date:   4/19/2019  SED RATE (ESR) Standing Status:   Future Standing Expiration Date:   4/12/2020  METABOLIC PANEL, COMPREHENSIVE Standing Status:   Future Standing Expiration Date:   4/12/2020 Dat Chávez NP 
4/12/2019

## 2019-04-29 ENCOUNTER — HOSPITAL ENCOUNTER (OUTPATIENT)
Dept: BONE DENSITY | Age: 69
Discharge: HOME | End: 2019-04-29
Attending: INTERNAL MEDICINE
Payer: MEDICARE

## 2019-04-29 DIAGNOSIS — M89.9 DISORDER OF BONE AND CARTILAGE: ICD-10-CM

## 2019-04-29 DIAGNOSIS — M94.9 DISORDER OF BONE AND CARTILAGE: ICD-10-CM

## 2019-04-29 DIAGNOSIS — N95.1 MENOPAUSAL AND FEMALE CLIMACTERIC STATES: ICD-10-CM

## 2019-04-29 PROCEDURE — 77080 DXA BONE DENSITY AXIAL: CPT

## 2019-05-13 ENCOUNTER — TELEPHONE (OUTPATIENT)
Dept: INTERNAL MEDICINE CLINIC | Age: 69
End: 2019-05-13

## 2019-07-02 ENCOUNTER — OFFICE VISIT (OUTPATIENT)
Dept: INTERNAL MEDICINE CLINIC | Age: 69
End: 2019-07-02

## 2019-07-02 VITALS
HEIGHT: 58 IN | OXYGEN SATURATION: 100 % | RESPIRATION RATE: 14 BRPM | DIASTOLIC BLOOD PRESSURE: 78 MMHG | HEART RATE: 72 BPM | WEIGHT: 103 LBS | BODY MASS INDEX: 21.62 KG/M2 | TEMPERATURE: 98.4 F | SYSTOLIC BLOOD PRESSURE: 138 MMHG

## 2019-07-02 DIAGNOSIS — Z23 ENCOUNTER FOR IMMUNIZATION: ICD-10-CM

## 2019-07-02 DIAGNOSIS — L03.113 CELLULITIS OF RIGHT UPPER EXTREMITY: Primary | ICD-10-CM

## 2019-07-02 DIAGNOSIS — T30.0 BURN INJURY: ICD-10-CM

## 2019-07-02 DIAGNOSIS — R10.9 FLANK PAIN: ICD-10-CM

## 2019-07-02 LAB
BILIRUB UR QL STRIP: NEGATIVE
GLUCOSE UR-MCNC: NEGATIVE MG/DL
KETONES P FAST UR STRIP-MCNC: NEGATIVE MG/DL
PH UR STRIP: 5.5 [PH] (ref 4.6–8)
PROT UR QL STRIP: NEGATIVE
SP GR UR STRIP: 1.02 (ref 1–1.03)
UA UROBILINOGEN AMB POC: NORMAL (ref 0.2–1)
URINALYSIS CLARITY POC: CLEAR
URINALYSIS COLOR POC: YELLOW
URINE BLOOD POC: NEGATIVE
URINE LEUKOCYTES POC: NEGATIVE
URINE NITRITES POC: NEGATIVE

## 2019-07-02 RX ORDER — SULFAMETHOXAZOLE AND TRIMETHOPRIM 800; 160 MG/1; MG/1
1 TABLET ORAL 2 TIMES DAILY
Qty: 20 TAB | Refills: 0 | Status: SHIPPED | OUTPATIENT
Start: 2019-07-02 | End: 2019-07-12

## 2019-07-02 NOTE — PROGRESS NOTES
Mireille Monsivais presents today for   Chief Complaint   Patient presents with    Burn     burn right arm above elbow. redness around wound. warm and painful to touch. onset friday night              Depression Screening:  3 most recent PHQ Screens 7/2/2019   Little interest or pleasure in doing things Not at all   Feeling down, depressed, irritable, or hopeless Not at all   Total Score PHQ 2 0       Learning Assessment:  Learning Assessment 2/8/2019   PRIMARY LEARNER Patient   HIGHEST LEVEL OF EDUCATION - PRIMARY LEARNER  SOME COLLEGE   BARRIERS PRIMARY LEARNER NONE   CO-LEARNER CAREGIVER No   PRIMARY LANGUAGE ENGLISH   LEARNER PREFERENCE PRIMARY DEMONSTRATION     -     -     -     -   ANSWERED BY patient   RELATIONSHIP SELF       Abuse Screening:  Abuse Screening Questionnaire 7/2/2019   Do you ever feel afraid of your partner? N   Are you in a relationship with someone who physically or mentally threatens you? N   Is it safe for you to go home? Y       Fall Risk  Fall Risk Assessment, last 12 mths 7/2/2019   Able to walk? Yes   Fall in past 12 months? No           Coordination of Care:  1. Have you been to the ER, urgent care clinic since your last visit? Hospitalized since your last visit? no    2. Have you seen or consulted any other health care providers outside of the [address] [place] [address] since your last visit? Include any pap smears or colon screening.  no

## 2019-07-02 NOTE — PROGRESS NOTES
Dorene Bright is a 80 y.o. female who presents for routine immunizations. Per verbal order from Arsenio Ervin, TDAP given in left deltoid. She denies any symptoms , reactions or allergies that would exclude them from being immunized today. Risks and adverse reactions were discussed and the VIS was given to them. All questions were addressed. She was observed for 15 min post injection. There were no reactions observed. Pt given waiver form for TDAP.      Neil Smith LPN

## 2019-07-02 NOTE — PROGRESS NOTES
80 y.o. WHITE OR  female who presents for evaluation. She sustained a burn injury in her right proximal dorsal forearm while cooking some chicken 4 nights ago. She was doing local care with bacitracin and eventually the boullous lesion erupted. There is increasing redness in the surrounding tissue so she is here for further evaluation. She has not had any fevers or systemic symptoms    On a separate note, she has had onset of right flank pain radiating to the groin. She reports a history of kidney stones x1 episdoe some years ago, she does not think it is a kidney stone because the pain is not as severe. Reports no dysuria, hematuria, GI or gyn complaints otherwise    Past Medical History:   Diagnosis Date    Anxiety 6/15    IKE-7 was 12/21    Calculus of kidney     Clostridium difficile colitis 3/14    Dr. Ar Cordova adenoma     10/11 Dr. Danica Fisher Cystic breast     Depression 6/15    PHQ-9 was 15/27    Diverticulitis     recurrent x3 Dr Negrete Bleacher liver     10/10 on US, CT 6/12; Fib-4 was 0.74 from 1/15    GI bleed 2/14    ischemic colitis on CT, seen by Dr. Hien Ponce H/O mammogram 6/3/2016    Hyperlipidemia     calculated 10 year risk score was 3.2% (1/15)    Hypertension     resolved w wt loss    Ischemic colitis ([address] 3/14    Dr Padmini Adams    Labial abscess     + MRSA    Lymphoma ([facility] 12/13    Dr. Ganga Manrique    Migraine     Neuropathy due to chemotherapeutic drug ([facility] 9/14    Prediabetes     Tobacco dependence syndrome 2/8/2019    Zoster 8/14    left T11     Past Surgical History:   Procedure Laterality Date    CARDIAC SURG PROCEDURE UNLIST  2/14    echo shows mild conc lvh, ef 60-65%.  no wma    HX APPENDECTOMY  1968    HX COLONOSCOPY      colon polyps 2003 Dr. Julee Mckinley; adenoma 2011; isch colitis 3/14 Dr Rajiv Winter    HX HEENT      tonsillectomy    HX ORTHOPAEDIC  teenager    knee surgery left    HX ORTHOPAEDIC      DEXA t score 2.1 spine, -0.3 hip (4/19)    HX OTHER SURGICAL      bx for lymphoma    HX SOPHIA AND BSO  04/02    Dr. Squires Stain History     Socioeconomic History    Marital status:      Spouse name: Not on file    Number of children: 2    Years of education: Not on file    Highest education level: Not on file   Occupational History    Occupation: director [employer]   Social Needs    Financial resource strain: Not on file    Food insecurity:     Worry: Not on file     Inability: Not on file   [employer] needs:     Medical: Not on file     Non-medical: Not on file   Tobacco Use    Smoking status: Current Every Day Smoker     Packs/day: 0.50     Years: 15.00     Pack years: 7.50     Types: Cigarettes    Smokeless tobacco: Never Used    Tobacco comment: smoking cessation advised   Substance and Sexual Activity    Alcohol use: Yes     Alcohol/week: 2.5 oz     Types: 5 Glasses of wine per week     Comment: socially    Drug use: No    Sexual activity: Not Currently   Lifestyle    Physical activity:     Days per week: Not on file     Minutes per session: Not on file    Stress: Not on file   Relationships    Social connections:     Talks on phone: Not on file     Gets together: Not on file     Attends [employer] service: Not on file     Active member of club or organization: Not on file     Attends meetings of clubs or organizations: Not on file     Relationship status: Not on file    Intimate partner violence:     Fear of current or ex partner: Not on file     Emotionally abused: Not on file     Physically abused: Not on file     Forced sexual activity: Not on file   Other Topics Concern    Not on file   Social History Narrative    Not on file     Current Outpatient Medications   Medication Sig    trimethoprim-sulfamethoxazole (BACTRIM DS, SEPTRA DS) 160-800 mg per tablet Take 1 Tab by mouth two (2) times a day for 10 days.  ibuprofen (MOTRIN) 200 mg tablet Take 200 mg by mouth every six (6) hours as needed for Pain.      No current facility-administered medications for this visit. Allergies   Allergen Reactions    Meperidine Nausea Only and Other (comments)    Augmentin [Amoxicillin-Pot Clavulanate] Diarrhea and Nausea Only    Keflex [Cephalexin] Nausea Only     REVIEW OF SYSTEMS:   Ophtho  no vision change or eye pain  Oral  no mouth pain, tongue or tooth problems  Ears  no hearing loss, ear pain, fullness, no swallowing problems  Cardiac  no CP, PND, orthopnea, edema, palpitations or syncope  Chest  no breast masses  Resp  no wheezing, chronic coughing, dyspnea  GI  no heartburn, nausea, vomiting, change in bowel habits, bleeding, hemorrhoids    Visit Vitals  /78   Pulse 72   Temp 98.4 °F (36.9 °C) (Oral)   Resp 14   Ht 4' 10\" (1.473 m)   Wt 103 lb (46.7 kg)   SpO2 100%   BMI 21.53 kg/m²   There is an area with good granulation tissue just distal to the right posterior elbow roughly half an inch greatest diameter. Some mild redness extending beyond the wound and I marked the edge of the redness. No red streaking, mild tenderness but no fluctuance on palpation. Back showed no CVA tenderness. Negative straight leg, no bruising or rash. Abdomen soft, nontender, no hepatosplenomegaly or masses. Assessment and plan:  1. No flank pain. UA pending. Further recommendations pending results  2. Burn injury. Empiric Bactrim for presumed cellulitis, she will observe the redness for progression and go to the ER if there is rapid progression or signs of systemic infection. Tdap given also. Further recommendations pending outcomes        Above conditions discussed at length and patient vocalized understanding.   All questions answered to patient satisfaction

## 2019-07-15 ENCOUNTER — TELEPHONE (OUTPATIENT)
Dept: INTERNAL MEDICINE CLINIC | Age: 69
End: 2019-07-15

## 2019-07-18 ENCOUNTER — OFFICE VISIT (OUTPATIENT)
Dept: INTERNAL MEDICINE CLINIC | Age: 69
End: 2019-07-18

## 2019-07-18 ENCOUNTER — HOSPITAL ENCOUNTER (OUTPATIENT)
Dept: LAB | Age: 69
Discharge: HOME | End: 2019-07-18
Payer: MEDICARE

## 2019-07-18 VITALS
SYSTOLIC BLOOD PRESSURE: 152 MMHG | OXYGEN SATURATION: 98 % | TEMPERATURE: 98.6 F | BODY MASS INDEX: 21.45 KG/M2 | RESPIRATION RATE: 14 BRPM | DIASTOLIC BLOOD PRESSURE: 84 MMHG | WEIGHT: 102.2 LBS | HEIGHT: 58 IN | HEART RATE: 60 BPM

## 2019-07-18 DIAGNOSIS — R21 RASH: Primary | ICD-10-CM

## 2019-07-18 DIAGNOSIS — R21 RASH: ICD-10-CM

## 2019-07-18 DIAGNOSIS — T50.905A ADVERSE EFFECT OF DRUG, INITIAL ENCOUNTER: ICD-10-CM

## 2019-07-18 DIAGNOSIS — I10 PRIMARY HYPERTENSION: ICD-10-CM

## 2019-07-18 LAB
ALBUMIN SERPL-MCNC: 4.1 G/DL (ref 3.4–5)
ALBUMIN/GLOB SERPL: 1.1 {RATIO} (ref 0.8–1.7)
ALP SERPL-CCNC: 64 U/L (ref 45–117)
ALT SERPL-CCNC: 46 U/L (ref 13–56)
ANION GAP SERPL CALC-SCNC: 7 MMOL/L (ref 3–18)
AST SERPL-CCNC: 12 U/L (ref 10–38)
BILIRUB SERPL-MCNC: 0.3 MG/DL (ref 0.2–1)
BUN SERPL-MCNC: 20 MG/DL (ref 7–18)
BUN/CREAT SERPL: 24 (ref 12–20)
CALCIUM SERPL-MCNC: 8.9 MG/DL (ref 8.5–10.1)
CHLORIDE SERPL-SCNC: 105 MMOL/L (ref 100–111)
CO2 SERPL-SCNC: 25 MMOL/L (ref 21–32)
CREAT SERPL-MCNC: 0.84 MG/DL (ref 0.6–1.3)
ERYTHROCYTE [DISTWIDTH] IN BLOOD BY AUTOMATED COUNT: 13.5 % (ref 11.6–14.5)
GLOBULIN SER CALC-MCNC: 3.7 G/DL (ref 2–4)
GLUCOSE SERPL-MCNC: 159 MG/DL (ref 74–99)
HCT VFR BLD AUTO: 37.3 % (ref 35–45)
HGB BLD-MCNC: 12.6 G/DL (ref 12–16)
MCH RBC QN AUTO: 32.6 PG (ref 24–34)
MCHC RBC AUTO-ENTMCNC: 33.8 G/DL (ref 31–37)
MCV RBC AUTO: 96.6 FL (ref 74–97)
PLATELET # BLD AUTO: 265 K/UL (ref 135–420)
PMV BLD AUTO: 9.1 FL (ref 9.2–11.8)
POTASSIUM SERPL-SCNC: 4.1 MMOL/L (ref 3.5–5.5)
PROT SERPL-MCNC: 7.8 G/DL (ref 6.4–8.2)
RBC # BLD AUTO: 3.86 M/UL (ref 4.2–5.3)
SODIUM SERPL-SCNC: 137 MMOL/L (ref 136–145)
WBC # BLD AUTO: 7.6 K/UL (ref 4.6–13.2)

## 2019-07-18 PROCEDURE — 80053 COMPREHEN METABOLIC PANEL: CPT

## 2019-07-18 PROCEDURE — 85027 COMPLETE CBC AUTOMATED: CPT

## 2019-07-18 PROCEDURE — 36415 COLL VENOUS BLD VENIPUNCTURE: CPT

## 2019-07-18 RX ORDER — AMLODIPINE BESYLATE 5 MG/1
5 TABLET ORAL DAILY
Qty: 30 TAB | Refills: 5 | Status: SHIPPED | OUTPATIENT
Start: 2019-07-18 | End: 2020-01-16

## 2019-07-18 RX ORDER — HYDROXYZINE PAMOATE 25 MG/1
25 CAPSULE ORAL
Qty: 30 CAP | Refills: 1 | Status: SHIPPED | OUTPATIENT
Start: 2019-07-18 | End: 2020-02-25

## 2019-07-18 NOTE — PROGRESS NOTES
80 y.o. WHITE OR  female who presents for evaluation. She is here to follow-up after her ER visit. We had seen her on 7/2/19 at which time we felt she had a cellulitis in the elbow where she had sustained a burn a few nights before. We sent her home with Bactrim because of penicillin/cephalosporin allergy. She was doing great, was going on to the son and on day #10, she started having a rash. She ended up in the ER, they gave her prednisone and Atarax. Symptoms are resolving. They did not draw blood. Of note, we have noted her blood pressure to be rising over the last few visits. She is been trying to do better with exercise, diet, salt avoidance. She was hypertensive years ago, lost some weight but the hypertension seems to be coming back. Past Medical History:   Diagnosis Date    Anxiety 6/15    IKE-7 was 12/21    Calculus of kidney     Clostridium difficile colitis 3/14    Dr. Julio Kolb adenoma     10/11 Dr. Paris Heck Cystic breast     Depression 6/15    PHQ-9 was 15/27    Diverticulitis     recurrent x3 Dr Hari Hernadez liver     10/10 on US, CT 6/12; Fib-4 was 0.74 from 1/15    GI bleed 2/14    ischemic colitis on CT, seen by Dr. Stacie Grossman Hyperlipidemia     calculated 10 year risk score was 3.2% (1/15)    Hypertension     Ischemic colitis ([address] 3/14    Dr Sheridan Browning    Labial abscess     + MRSA    Lymphoma ([address] 12/13    Dr. Cliff Davey    Migraine     Neuropathy due to chemotherapeutic drug ([facility] 9/14    Prediabetes     Tobacco dependence syndrome 2/8/2019    Zoster 8/14    left T11     Past Surgical History:   Procedure Laterality Date    CARDIAC SURG PROCEDURE UNLIST  2/14    echo shows mild conc lvh, ef 60-65%.  no wma    HX APPENDECTOMY  1968    HX COLONOSCOPY      colon polyps 2003 Dr. Stanley Harris; adenoma 2011; isch colitis 3/14 Dr Theodora Verde    HX HEENT      tonsillectomy    HX ORTHOPAEDIC  teenager    knee surgery left    HX ORTHOPAEDIC      DEXA t score 2.1 spine, -0.3 hip (4/19)    HX OTHER SURGICAL      bx for lymphoma    HX SOPHIA AND BSO  04/02    Dr. Aristeo Obregon History     Socioeconomic History    Marital status:      Spouse name: Not on file    Number of children: 2    Years of education: Not on file    Highest education level: Not on file   Occupational History    Occupation: director [employer]   Social Needs    Financial resource strain: Not on file    Food insecurity:     Worry: Not on file     Inability: Not on file   [employer] needs:     Medical: Not on file     Non-medical: Not on file   Tobacco Use    Smoking status: Current Every Day Smoker     Packs/day: 0.50     Years: 15.00     Pack years: 7.50     Types: Cigarettes    Smokeless tobacco: Never Used    Tobacco comment: smoking cessation advised   Substance and Sexual Activity    Alcohol use: Yes     Alcohol/week: 4.2 standard drinks     Types: 5 Glasses of wine per week     Comment: socially    Drug use: No    Sexual activity: Not Currently   Lifestyle    Physical activity:     Days per week: Not on file     Minutes per session: Not on file    Stress: Not on file   Relationships    Social connections:     Talks on phone: Not on file     Gets together: Not on file     Attends [employer] service: Not on file     Active member of club or organization: Not on file     Attends meetings of clubs or organizations: Not on file     Relationship status: Not on file    Intimate partner violence:     Fear of current or ex partner: Not on file     Emotionally abused: Not on file     Physically abused: Not on file     Forced sexual activity: Not on file   Other Topics Concern    Not on file   Social History Narrative    Not on file     Current Outpatient Medications   Medication Sig    amLODIPine (NORVASC) 5 mg tablet Take 1 Tab by mouth daily.  hydrOXYzine pamoate (VISTARIL) 25 mg capsule Take 1 Cap by mouth three (3) times daily as needed for Itching.     ibuprofen (MOTRIN) 200 mg tablet Take 200 mg by mouth every six (6) hours as needed for Pain. No current facility-administered medications for this visit. Allergies   Allergen Reactions    Meperidine Nausea Only and Other (comments)    Augmentin [Amoxicillin-Pot Clavulanate] Diarrhea and Nausea Only    Keflex [Cephalexin] Nausea Only    Sulfa (Sulfonamide Antibiotics) Rash     REVIEW OF SYSTEMS:   Ophtho - no vision change or eye pain  Oral - no mouth pain, tongue or tooth problems  Ears - no hearing loss, ear pain, fullness, no swallowing problems  Cardiac - no CP, PND, orthopnea, edema, palpitations or syncope  Chest - no breast masses  Resp - no wheezing, chronic coughing, dyspnea  GI - no heartburn, nausea, vomiting, change in bowel habits, bleeding, hemorrhoids  Urinary - no dysuria, hematuria, flank pain, urgency, frequency    Visit Vitals  /84   Pulse 60   Temp 98.6 °F (37 °C) (Oral)   Resp 14   Ht 4' 10\" (1.473 m)   Wt 102 lb 3.2 oz (46.4 kg)   SpO2 98%   BMI 21.36 kg/m²   A&O x3  Affect is appropriate. Mood stable  No apparent distress  Anicteric, no JVD, adenopathy or thyromegaly. No carotid bruits or radiated murmur  Lungs clear to auscultation, no wheezes or rales  Heart showed regular rate and rhythm. No murmur, rubs, gallops  Abdomen soft nontender, no hepatosplenomegaly or masses. Extremities without edema. Pulses 1-2+ symmetrically    Assessment and plan:  1. Presumed sulfa reaction. Finish out the prednisone, she has for Atarax refill  2. Probable return of hypertension. Start amlodipine after discussing possible side effects. Return to clinic 3 months        Above conditions discussed at length and patient vocalized understanding.   All questions answered to patient satisfaction

## 2019-07-18 NOTE — PROGRESS NOTES
Sara Rivers presents today for   Chief Complaint   Patient presents with   [facility] Follow Up     Patient here for hospital follow up at [facility]. for allergic reaction to medication. Depression Screening:  3 most recent PHQ Screens 7/2/2019   Little interest or pleasure in doing things Not at all   Feeling down, depressed, irritable, or hopeless Not at all   Total Score PHQ 2 0       Learning Assessment:  Learning Assessment 2/8/2019   PRIMARY LEARNER Patient   HIGHEST LEVEL OF EDUCATION - PRIMARY LEARNER  SOME COLLEGE   BARRIERS PRIMARY LEARNER NONE   CO-LEARNER CAREGIVER No   PRIMARY LANGUAGE ENGLISH   LEARNER PREFERENCE PRIMARY DEMONSTRATION     -     -     -     -   ANSWERED BY patient   RELATIONSHIP SELF       Abuse Screening:  Abuse Screening Questionnaire 7/2/2019   Do you ever feel afraid of your partner? N   Are you in a relationship with someone who physically or mentally threatens you? N   Is it safe for you to go home? Y       Fall Risk  Fall Risk Assessment, last 12 mths 7/2/2019   Able to walk? Yes   Fall in past 12 months? No           Coordination of Care:  1. Have you been to the ER, urgent care clinic since your last visit? Hospitalized since your last visit? [address]    2. Have you seen or consulted any other health care providers outside of the [address] [place] [address] [place] since your last visit? Include any pap smears or colon screening. NO      Advance Directive:  1. Do you have an advance directive in place? Patient Reply:No    2. If not, would you like material regarding how to put one in place? Patient Reply: NO    2.   Per patient no changes to their ACP contact NO.

## 2019-08-30 ENCOUNTER — HOSPITAL ENCOUNTER (OUTPATIENT)
Dept: ONCOLOGY | Age: 69
Discharge: HOME | End: 2019-08-30

## 2019-08-30 ENCOUNTER — HOSPITAL ENCOUNTER (OUTPATIENT)
Dept: LAB | Age: 69
Discharge: HOME | End: 2019-08-30
Payer: MEDICARE

## 2019-08-30 ENCOUNTER — OFFICE VISIT (OUTPATIENT)
Dept: ONCOLOGY | Age: 69
End: 2019-08-30

## 2019-08-30 VITALS
BODY MASS INDEX: 21.24 KG/M2 | DIASTOLIC BLOOD PRESSURE: 73 MMHG | RESPIRATION RATE: 12 BRPM | HEIGHT: 58 IN | HEART RATE: 70 BPM | OXYGEN SATURATION: 98 % | TEMPERATURE: 98.1 F | SYSTOLIC BLOOD PRESSURE: 135 MMHG | WEIGHT: 101.2 LBS

## 2019-08-30 DIAGNOSIS — C83.18 MANTLE CELL LYMPHOMA OF LYMPH NODES OF MULTIPLE SITES (HCC): Primary | ICD-10-CM

## 2019-08-30 DIAGNOSIS — D72.9 NEUTROPHILIA: ICD-10-CM

## 2019-08-30 DIAGNOSIS — C83.18 MANTLE CELL LYMPHOMA OF LYMPH NODES OF MULTIPLE SITES (HCC): ICD-10-CM

## 2019-08-30 DIAGNOSIS — D50.9 IRON DEFICIENCY ANEMIA, UNSPECIFIED IRON DEFICIENCY ANEMIA TYPE: ICD-10-CM

## 2019-08-30 LAB
ALBUMIN SERPL-MCNC: 4.1 G/DL (ref 3.4–5)
ALBUMIN/GLOB SERPL: 1.3 {RATIO} (ref 0.8–1.7)
ALP SERPL-CCNC: 52 U/L (ref 45–117)
ALT SERPL-CCNC: 26 U/L (ref 13–56)
ANION GAP SERPL CALC-SCNC: 5 MMOL/L (ref 3–18)
AST SERPL-CCNC: 20 U/L (ref 10–38)
BASO+EOS+MONOS # BLD AUTO: 0.5 K/UL (ref 0–2.3)
BASO+EOS+MONOS NFR BLD AUTO: 8 % (ref 0.1–17)
BILIRUB SERPL-MCNC: 0.3 MG/DL (ref 0.2–1)
BUN SERPL-MCNC: 15 MG/DL (ref 7–18)
BUN/CREAT SERPL: 19 (ref 12–20)
CALCIUM SERPL-MCNC: 9.2 MG/DL (ref 8.5–10.1)
CHLORIDE SERPL-SCNC: 107 MMOL/L (ref 100–111)
CO2 SERPL-SCNC: 26 MMOL/L (ref 21–32)
CREAT SERPL-MCNC: 0.81 MG/DL (ref 0.6–1.3)
DIFFERENTIAL METHOD BLD: ABNORMAL
ERYTHROCYTE [DISTWIDTH] IN BLOOD BY AUTOMATED COUNT: 12.3 % (ref 11.5–14.5)
ERYTHROCYTE [SEDIMENTATION RATE] IN BLOOD: 18 MM/HR (ref 0–30)
GLOBULIN SER CALC-MCNC: 3.2 G/DL (ref 2–4)
GLUCOSE SERPL-MCNC: 94 MG/DL (ref 74–99)
HCT VFR BLD AUTO: 32 % (ref 36–48)
HGB BLD-MCNC: 11 G/DL (ref 12–16)
LYMPHOCYTES # BLD: 1.3 K/UL (ref 1.1–5.9)
LYMPHOCYTES NFR BLD: 20 % (ref 14–44)
MCH RBC QN AUTO: 32.6 PG (ref 25–35)
MCHC RBC AUTO-ENTMCNC: 34.4 G/DL (ref 31–37)
MCV RBC AUTO: 95 FL (ref 78–102)
NEUTS SEG # BLD: 4.7 K/UL (ref 1.8–9.5)
NEUTS SEG NFR BLD: 73 % (ref 40–70)
PLATELET # BLD AUTO: 252 K/UL (ref 140–440)
POTASSIUM SERPL-SCNC: 4.1 MMOL/L (ref 3.5–5.5)
PROT SERPL-MCNC: 7.3 G/DL (ref 6.4–8.2)
RBC # BLD AUTO: 3.37 M/UL (ref 4.1–5.1)
SODIUM SERPL-SCNC: 138 MMOL/L (ref 136–145)
WBC # BLD AUTO: 6.5 K/UL (ref 4.5–13)

## 2019-08-30 PROCEDURE — 36415 COLL VENOUS BLD VENIPUNCTURE: CPT

## 2019-08-30 PROCEDURE — 80053 COMPREHEN METABOLIC PANEL: CPT

## 2019-08-30 PROCEDURE — 82728 ASSAY OF FERRITIN: CPT

## 2019-08-30 PROCEDURE — 83540 ASSAY OF IRON: CPT

## 2019-08-30 PROCEDURE — 85652 RBC SED RATE AUTOMATED: CPT

## 2019-08-30 NOTE — PATIENT INSTRUCTIONS
Iron Deficiency Anemia: Care Instructions  Your Care Instructions    Anemia means that you do not have enough red blood cells. Red blood cells carry oxygen around your body. When you have anemia, it can make you pale, weak, and tired. Many things can cause anemia. The most common cause is loss of blood. This can happen if you have heavy menstrual periods. It can also happen if you have bleeding in your stomach or bowel. You can also get anemia if you don't have enough iron in your diet or if it's hard for your body to absorb iron. In some cases, pregnancy causes anemia. That's because a pregnant woman needs more iron. Your doctor may do more tests to find the cause of your anemia. If a disease or other health problem is causing it, your doctor will treat that problem. It's important to follow up with your doctor to make sure that your iron level returns to normal.  Follow-up care is a key part of your treatment and safety. Be sure to make and go to all appointments, and call your doctor if you are having problems. It's also a good idea to know your test results and keep a list of the medicines you take. How can you care for yourself at home? · If your doctor recommended iron pills, take them as directed. ? Try to take the pills on an empty stomach. You can do this about 1 hour before or 2 hours after meals. But you may need to take iron with food to avoid an upset stomach. ? Do not take antacids or drink milk or anything with caffeine within 2 hours of when you take your iron. They can keep your body from absorbing the iron well. ? Vitamin C helps your body absorb iron. You may want to take iron pills with a glass of orange juice or some other food high in vitamin C.  ? Iron pills may cause stomach problems. These include heartburn, nausea, diarrhea, constipation, and cramps. It can help to drink plenty of fluids and include fruits, vegetables, and fiber in your diet.   ? It's normal for iron pills to make your stool a greenish or grayish black. But internal bleeding can also cause dark stool. So it's important to tell your doctor about any color changes. ? Call your doctor if you think you are having a problem with your iron pills. Even after you start to feel better, it will take several months for your body to build up its supply of iron. ? If you miss a pill, don't take a double dose. ? Keep iron pills out of the reach of small children. Too much iron can be very dangerous. · Eat foods with a lot of iron. These include red meat, shellfish, poultry, and eggs. They also include beans, raisins, whole-grain bread, and leafy green vegetables. · Steam your vegetables. This is the best way to prepare them if you want to get as much iron as possible. · Be safe with medicines. Do not take nonsteroidal anti-inflammatory pain relievers unless your doctor tells you to. These include aspirin, naproxen (Aleve), and ibuprofen (Advil, Motrin). · Liquid iron can stain your teeth. But you can mix it with water or juice and drink it with a straw. Then it won't get on your teeth. When should you call for help? Call 911 anytime you think you may need emergency care. For example, call if:    · You passed out (lost consciousness).    Call your doctor now or seek immediate medical care if:    · You are short of breath.     · You are dizzy or light-headed, or you feel like you may faint.     · You have new or worse bleeding.    Watch closely for changes in your health, and be sure to contact your doctor if:    · You feel weaker or more tired than usual.     · You do not get better as expected. Where can you learn more? Go to http://kevin-justen.info/. Enter J012 in the search box to learn more about \"Iron Deficiency Anemia: Care Instructions. \"  Current as of: March 28, 2019  Content Version: 12.1  © 9981-3896 [employer]wise, Incorporated.  Care instructions adapted under license by [employer] (which disclaims liability or warranty for this information). If you have questions about a medical condition or this instruction, always ask your healthcare professional. [address] any warranty or liability for your use of this information. Complete Blood Count (CBC): About This Test  What is it? A complete blood count (CBC) is a blood test that gives important information about your blood cells, especially red blood cells, white blood cells, and platelets. Why is this test done? A CBC may be done as part of a regular physical exam. There are many other reasons that a doctor may want this blood test, including to:  · Find the cause of symptoms such as fatigue, weakness, fever, bruising, or weight loss. · Find anemia or an infection. · See how much blood has been lost if there is bleeding. · Diagnose diseases of the blood, such as leukemia or polycythemia. How can you prepare for the test?  You do not need to do anything before having this test.  What happens during the test?  The health professional taking a sample of your blood will:  · Wrap an elastic band around your upper arm. This makes the veins below the band larger so it is easier to put a needle into the vein. · Clean the needle site with alcohol. · Put the needle into the vein. · Attach a tube to the needle to fill it with blood. · Remove the band from your arm when enough blood is collected. · Put a gauze pad or cotton ball over the needle site as the needle is removed. · Put pressure on the site and then put on a bandage. If this blood test is done on a baby, a heel stick may be done instead of a blood draw from a vein. What happens after the test?  · You will probably be able to go home right away. · You can go back to your usual activities right away. Follow-up care is a key part of your treatment and safety. Be sure to make and go to all appointments, and call your doctor if you are having problems. It's also a good idea to keep a list of the medicines you take. Ask your doctor when you can expect to have your test results. Where can you learn more? Go to http://kevin-justen.info/. Enter H166 in the search box to learn more about \"Complete Blood Count (CBC): About This Test.\"  Current as of: March 28, 2019  Content Version: 12.1  © 5088-7585 [employer], Incorporated. Care instructions adapted under license by [employer] (which disclaims liability or warranty for this information). If you have questions about a medical condition or this instruction, always ask your healthcare professional. [address] any warranty or liability for your use of this information.

## 2019-08-30 NOTE — PROGRESS NOTES
ROOM # 7    Nino Patella presents today for   Chief Complaint   Patient presents with    Lymphoma       Nino Patella preferred language for health care discussion is english/other. Is someone accompanying this pt? no    Is the patient using any DME equipment during OV? no    Depression Screening:  3 most recent [facility] Screens 7/2/2019 4/12/2019 2/8/2019 1/27/2015 1/23/2014   Little interest or pleasure in doing things Not at all Not at all Not at all Not at all Not at all   Feeling down, depressed, irritable, or hopeless Not at all Not at all - Not at all Not at all   Total Score PHQ 2 0 0 - 0 0       Learning Assessment:  Learning Assessment 2/8/2019 8/11/2017 10/3/2016 1/23/2014 1/7/2014   PRIMARY LEARNER Patient Patient Patient Patient Patient   HIGHEST LEVEL OF EDUCATION - PRIMARY LEARNER  [address] CAREGIVER No - - No -   PRIMARY [place]   LEARNER PREFERENCE PRIMARY DEMONSTRATION OTHER (COMMENT) READING DEMONSTRATION LISTENING     - - - LISTENING READING     - - - PICTURES -     - - - READING -     - - - VIDEOS -   ANSWERED BY patient patient self patient -   RELATIONSHIP SELF SELF SELF SELF SELF       Abuse Screening:  Abuse Screening Questionnaire 7/2/2019 4/12/2019 2/8/2019 8/11/2017 9/29/2016 7/6/2015 1/27/2015   Do you ever feel afraid of your partner? N N N N N N N   Are you in a relationship with someone who physically or mentally threatens you? N N N N N N N   Is it safe for you to go home? Micky Cuenca Y       Fall Risk  Fall Risk Assessment, last 12 mths 7/2/2019 4/12/2019 2/8/2019 12/11/2018 2/16/2018 10/20/2017 8/11/2017   Able to walk? Yes Yes Yes Yes Yes Yes Yes   Fall in past 12 months? No No No No No No No       Health Maintenance reviewed and discussed per provider.  Yes    Nino Schofield is due for   Health Maintenance Due   Topic Date Due    Shingrix Vaccine Age 50> (1 of 2) 11/15/2001    GLAUCOMA SCREENING Q2Y  11/15/2016    Influenza Age 9 to Adult  08/01/2019         Please order/place referral if appropriate. Advance Directive:  1. Do you have an advance directive in place? Patient Reply: no    2. If not, would you like material regarding how to put one in place? Patient Reply: no    Coordination of Care:  1. Have you been to the ER, urgent care clinic since your last visit? Hospitalized since your last visit? no    2. Have you seen or consulted any other health care providers outside of the [address] [place] [address] since your last visit? Include any pap smears or colon screening.  no

## 2019-08-30 NOTE — PROGRESS NOTES
Hematology/Oncology  Progress Note    Name: Desi Ennis  Date: 2019  : 1950    PCP: Randy Sood MD     Ms. Onofre Ventura is a 80year old female who was seen for management of her non-Hodgkin lymphoma, neuropathy,and arthritis    Current therapy: Supportive care and active surveillance. Subjective:     Ms. Onofre Ventura is a 50-year-old woman who has a history of mantle cell lymphoma. She was diagnosed in 2014. She has completed a full course of systemic chemotherapy. She denies night sweats, fevers or unexplained infections. She denies weight loss. She is doing well and denies any abdominal pain or discomfort. She also reports that her neuropathy associated with her prior chemotherapy is better and did not require the use of Neurontin. She does not have any concerns or complaints to report at this time. Past medical history, family history, and social history: these were reviewed and remains unchanged. Past Medical History:   Diagnosis Date    Anxiety 6/15    IKE-7 was     Calculus of kidney     Clostridium difficile colitis 3/14    Dr. Chen Reveal adenoma     10/11 Dr. Trinity Caldwell Cystic breast     Depression 6/15    PHQ-9 was 15/27    Diverticulitis     recurrent x3 Dr Chrissy Perez liver     10/10 on US, CT ; Fib-4 was 0.74 from 1/15    GI bleed     ischemic colitis on CT, seen by Dr. Louis Marin Hyperlipidemia     calculated 10 year risk score was 3.2% (1/15)    Hypertension     Ischemic colitis ([facility] 3/14    Dr Mitra Bain    Labial abscess     + MRSA    Lymphoma ([facility]     Dr. Delfina Rajput    Migraine     Neuropathy due to chemotherapeutic drug ([facility]     Prediabetes     Tobacco dependence syndrome 2019    Zoster     left T11     Past Surgical History:   Procedure Laterality Date    CARDIAC SURG PROCEDURE UNLIST      echo shows mild conc lvh, ef 60-65%.  no wma    HX APPENDECTOMY      HX COLONOSCOPY      colon polyps   Ky Laming; adenoma 2011; isch colitis 3/14 Dr Lilly Dill    HX HEENT      tonsillectomy    HX ORTHOPAEDIC  teenager    knee surgery left    HX ORTHOPAEDIC      DEXA t score 2.1 spine, -0.3 hip (4/19)    HX OTHER SURGICAL      bx for lymphoma    HX SOPHIA AND BSO  04/02    Dr. Varner Horse History     Socioeconomic History    Marital status:      Spouse name: Not on file    Number of children: 2    Years of education: Not on file    Highest education level: Not on file   Occupational History    Occupation: director [employer]   Social Needs    Financial resource strain: Not on file    Food insecurity:     Worry: Not on file     Inability: Not on file   [employer] needs:     Medical: Not on file     Non-medical: Not on file   Tobacco Use    Smoking status: Current Every Day Smoker     Packs/day: 0.50     Years: 15.00     Pack years: 7.50     Types: Cigarettes    Smokeless tobacco: Never Used    Tobacco comment: smoking cessation advised   Substance and Sexual Activity    Alcohol use: Not Currently     Alcohol/week: 4.2 standard drinks     Types: 5 Glasses of wine per week    Drug use: No    Sexual activity: Not Currently   Lifestyle    Physical activity:     Days per week: Not on file     Minutes per session: Not on file    Stress: Not on file   Relationships    Social connections:     Talks on phone: Not on file     Gets together: Not on file     Attends Confucianism service: Not on file     Active member of club or organization: Not on file     Attends meetings of clubs or organizations: Not on file     Relationship status: Not on file    Intimate partner violence:     Fear of current or ex partner: Not on file     Emotionally abused: Not on file     Physically abused: Not on file     Forced sexual activity: Not on file   Other Topics Concern    Not on file   Social History Narrative    Not on file     Family History   Adopted: Yes     Current Outpatient Medications   Medication Sig Dispense Refill    amLODIPine (NORVASC) 5 mg tablet Take 1 Tab by mouth daily. 30 Tab 5    ibuprofen (MOTRIN) 200 mg tablet Take 200 mg by mouth every six (6) hours as needed for Pain.  hydrOXYzine pamoate (VISTARIL) 25 mg capsule Take 1 Cap by mouth three (3) times daily as needed for Itching. 30 Cap 1       Review of Systems  Constitutional: The patient has no complaints or acute distress  HEENT: The patient denies recent head trauma, eye pain, blurred vision,  hearing deficit, oropharyngeal mucosal pain or lesions, and the patient denies throat pain or discomfort. Lymphatics: The patient denies palpable peripheral lymphadenopathy. Hematologic: The patient denies having bruising, bleeding, or progressive fatigue. Respiratory: Patient denies cough,SOB, or fevers. Cardiovascular: The patient denies having leg pain, leg swelling, heart palpitations, chest permit, chest pain, or lightheadedness. The patient denies having dyspnea on exertion. Neurologic: The patient is complaining of paresthesias in her hands and feet and legs due to prior chemotherapy use. Gastrointestinal: The patient denies having nausea, emesis, or diarrhea. The patient denies having any hematemesis or blood in the stool. Genitourinary: Patient denies having urinary urgency, frequency, or dysuria. The patient denies having blood in the urine. Psychological: The patient denies having symptoms of nervousness, anxiety, depression, or thoughts of harming himself some of this. Skin: Patient denies having skin rashes, skin, ulcerations, or unexplained itching or pruritus. Musculoskeletal: The patient denies pains in muscles or joints    Objective:     Visit Vitals  /73   Pulse 70   Temp 98.1 °F (36.7 °C) (Oral)   Resp 12   Ht 4' 10\" (1.473 m)   Wt 45.9 kg (101 lb 3.2 oz)   SpO2 98%   BMI 21.15 kg/m²     ECOG PS=0, pain score=0/10   Physical Exam:   Gen. Appearance: The patient is in no acute distress. Skin: There is no bruise or rash. HEENT: The exam is unremarkable. Neck: Supple without lymphadenopathy or thyromegaly. Lungs: Clear to auscultation and percussion; there are no wheezes or rhonchi. Heart: Regular rate and rhythm; there are no murmurs, gallops, or rubs. Abdomen: Bowel sounds are present and normal.  There is no guarding, tenderness, or hepatosplenomegaly. Extremities: There is no clubbing, cyanosis, or edema. Neurologic: There are no focal neurologic deficits, except for her complaints of paresthesias in her hands and feet. .  Lymphatics: There is no palpable peripheral lymphadenopathy. Musculoskeletal: The patient has full range of motion at all joints. There is no evidence of joint deformity or effusions. There is no focal joint tenderness. She does complain of pain on standing and with flexion and extension of the leg and hip and knee joints. Psychological/psychiatric: There is no clinical evidence of anxiety, depression, or melancholy. Lab data:      Results for orders placed or performed during the hospital encounter of 08/30/19   CBC WITH 3 PART DIFF     Status: Abnormal   Result Value Ref Range Status    WBC 6.5 4.5 - 13.0 K/uL Final    RBC 3.37 (L) 4.10 - 5.10 M/uL Final    HGB 11.0 (L) 12.0 - 16.0 g/dL Final    HCT 32.0 (L) 36 - 48 % Final    MCV 95.0 78 - 102 FL Final    MCH 32.6 25.0 - 35.0 PG Final    MCHC 34.4 31 - 37 g/dL Final    RDW 12.3 11.5 - 14.5 % Final    PLATELET 885 250 - 185 K/uL Final    NEUTROPHILS 73 (H) 40 - 70 % Final    MIXED CELLS 8 0.1 - 17 % Final    LYMPHOCYTES 20 14 - 44 % Final    ABS. NEUTROPHILS 4.7 1.8 - 9.5 K/UL Final    ABS. MIXED CELLS 0.5 0.0 - 2.3 K/uL Final    ABS. LYMPHOCYTES 1.3 1.1 - 5.9 K/UL Final     Comment: Test performed at [address] [place] [address] or Outpatient Infusion Center Location. Reviewed by Medical Director. DF AUTOMATED   Final           Assessment:     1. Mantle cell lymphoma of lymph nodes of multiple sites ([facility]    2. Neutrophilia    3. Iron deficiency anemia, unspecified iron deficiency anemia type      Plan:   Mantle cell lymphoma, multiple sites: The patient has completed a full course of systemic chemotherapy. Clinically there is no evidence of disease recurrence. On 04/12/19 her sedimentation rate was normal at 8. I will obtain CMP and sedimentation rate at this time. Neutrophilia: I explained to the patient that she is at risk for the development of myelodysplastic syndrome from prior chemotherapy. Her neutrophil count today is 73%. A prior flow cytometry did not show any evidence of immunophenotypic abnormalities. There was no evidence of any ongoing leukemia or recurrent lymphoma. Iron Deficiency Anemia: Today her hemoglobin shows 11.0g/dL with hematocrit of 32%. I will obtain Iron Profile, Ferritin, and CMP at this time. I advised the patient to take Iron OTC 1 tab PO daily. The patient will return in 4 months for a complete reassessment or sooner if indicated. Orders Placed This Encounter    COMPLETE CBC & AUTO DIFF WBC    InHouse CBC ([email or web address])     Standing Status:   Future     Number of Occurrences:   1     Standing Expiration Date:   9/6/2019    IRON PROFILE     Standing Status:   Future     Standing Expiration Date:   2/56/5613    METABOLIC PANEL, COMPREHENSIVE     Standing Status:   Future     Standing Expiration Date:   8/30/2020    SED RATE (ESR)     Standing Status:   Future     Standing Expiration Date:   8/30/2020    FERRITIN     Standing Status:   Future     Standing Expiration Date:   8/30/2020         Anshul Xie NP  8/30/2019       I have assessed the patient independently and  agree with the full assessment as outlined.   Ngozi Ibrahim MD, [place]

## 2019-08-31 LAB
FERRITIN SERPL-MCNC: 147 NG/ML (ref 8–388)
IRON SATN MFR SERPL: 19 %
IRON SERPL-MCNC: 73 UG/DL (ref 50–175)
TIBC SERPL-MCNC: 378 UG/DL (ref 250–450)

## 2019-09-21 NOTE — PROGRESS NOTES
80 y.o. WHITE OR  female who presents for eval    Her lymphoma continues to be under the care of Dr Poppy Velazquez and is stable. No cardiovascular complaints. We started her on amlodipine at the last visit in July, pressure seems to be controlled since then. She continues to walk the dog several times a day. Denied any gi or gu issues    The depression and anxiety are quiescent off meds. She has been having a lot of aches in her joints, and soft tissues. She think she has arthritis in the hip and knees worse on the left side. She has been taking OTC Motrin. LAST MEDICARE WELLNESS EXAM: 2/8/19    Past Medical History:   Diagnosis Date    Anxiety 6/15    IKE-7 was 12/21    Calculus of kidney     Clostridium difficile colitis 3/14    Dr. Sharon Rudolph adenoma     10/11 Dr. Komal Rubio Cystic breast     Depression 6/15    PHQ-9 was 15/27    Diverticulitis     recurrent x3 Dr Shannon Paredes liver     10/10 on US, CT 6/12; Fib-4 was 0.74 from 1/15    GI bleed 2/14    ischemic colitis on CT, seen by Dr. Gardenia Vázquez Hyperlipidemia     calculated 10 year risk score was 3.2% (1/15)    Hypertension     Ischemic colitis ([facility] 3/14    Dr Woody Scott    Labial abscess     + MRSA    Lymphoma ([facility] 12/13    Dr. Poppy Velazquez    Migraine     Neuropathy due to chemotherapeutic drug ([facility] 9/14    Prediabetes     Tobacco dependence syndrome 2/8/2019    Zoster 8/14    left T11     Past Surgical History:   Procedure Laterality Date    CARDIAC SURG PROCEDURE UNLIST  2/14    echo shows mild conc lvh, ef 60-65%.  no wma    HX APPENDECTOMY  1968    HX COLONOSCOPY      colon polyps 2003 Dr. Ashwini Shepherd; adenoma 2011; isch colitis 3/14  [facility]    HX HEENT      tonsillectomy    HX ORTHOPAEDIC  teenager    knee surgery left    HX ORTHOPAEDIC      DEXA t score 2.1 spine, -0.3 hip (4/19)    HX OTHER SURGICAL      bx for lymphoma    HX SOPHIA AND BSO  04/02    Dr. 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[employer]   Social Needs    Financial resource strain: Not on file    Food insecurity:     Worry: Not on file     Inability: Not on file   [employer] needs:     Medical: Not on file     Non-medical: Not on file   Tobacco Use    Smoking status: Current Every Day Smoker     Packs/day: 0.50     Years: 15.00     Pack years: 7.50     Types: Cigarettes    Smokeless tobacco: Never Used    Tobacco comment: smoking cessation advised   Substance and Sexual Activity    Alcohol use: Not Currently     Alcohol/week: 4.2 standard drinks     Types: 5 Glasses of wine per week    Drug use: No    Sexual activity: Not Currently   Lifestyle    Physical activity:     Days per week: Not on file     Minutes per session: Not on file    Stress: Not on file   Relationships    Social connections:     Talks on phone: Not on file     Gets together: Not on file     Attends Mormonism service: Not on file     Active member of club or organization: Not on file     Attends meetings of clubs or organizations: Not on file     Relationship status: Not on file    Intimate partner violence:     Fear of current or ex partner: Not on file     Emotionally abused: Not on file     Physically abused: Not on file     Forced sexual activity: Not on file   Other Topics Concern    Not on file   Social History Narrative    Not on file     Current Outpatient Medications   Medication Sig    ferrous sulfate (IRON) 325 mg (65 mg iron) tablet Take  by mouth Daily (before breakfast).  amLODIPine (NORVASC) 5 mg tablet Take 1 Tab by mouth daily.  hydrOXYzine pamoate (VISTARIL) 25 mg capsule Take 1 Cap by mouth three (3) times daily as needed for Itching.  ibuprofen (MOTRIN) 200 mg tablet Take 200 mg by mouth every six (6) hours as needed for Pain. No current facility-administered medications for this visit. Allergies   Allergen Reactions    Meperidine Nausea Only and Other (comments)    Augmentin [Amoxicillin-Pot Clavulanate] Diarrhea and Nausea Only    Keflex [Cephalexin] Nausea Only    Sulfa (Sulfonamide Antibiotics) Rash     REVIEW OF SYSTEMS: gyn>5 yr due to hyst,  mammo 8/18, colo 3/14 Dr Eleonora Frazier  Ophtho - no vision change or eye pain  Oral - no mouth pain, tongue or tooth problems  Ears - no hearing loss, ear pain, fullness, no swallowing problems  Cardiac - no CP, PND, orthopnea,  palpitations or syncope  Chest - no breast masses  Resp - no wheezing, chronic coughing, dyspnea  GI - no heartburn, nausea, vomiting, change in bowel habits, bleeding, hemorrhoids  Urinary - no dysuria, hematuria, flank pain, urgency, frequency    Visit Vitals  /72   Pulse 87   Temp 98.2 °F (36.8 °C) (Oral)   Resp 14   Ht 4' 10\" (1.473 m)   Wt 100 lb (45.4 kg)   SpO2 98%   BMI 20.90 kg/m²     A&O x3  Affect is appropriate. Mood stable  No apparent distress  Sinuses minimally tender w boggy mucosa noted, op benign, no nodes  Op mild erythema  Anicteric, no JVD, thyromegaly. No carotid bruits or radiated murmur  Lungs clear to auscultation, no wheezes or rales  Heart showed regular rate and rhythm. No murmur, rubs, gallops  Abdomen soft nontender, no hepatosplenomegaly or masses. Extremities without edema. Pulses 1-2+ symmetrically there was pain on rotation of the left hip joint but not the right. Mild tenderness on palpation of the left trochanter. Negative straight leg.         LABS  From 9/13 showed   gluc 107, cr 0.65, gfr 96,  alt 69, hba1c 5.6, chol 251, tg 54, hdl 113, ldl-c 127, wbc 6.5, hb 13.9, plt 225  From 11/13 showed gluc 103, cr 0.72, gfr 90,  alt 24,          wbc 7.6, hb 14.4 ,plt 224,  tsh 0.99  From 1/14 showed   gluc 119, cr 0.65, gfr>60,           wbc 8.2, hb 13.4, plt 208  From 2/14 showed   gluc 118, cr 0.72, gfr>60, alt 25, fe 84, %sat 29,   b12 1289, fol 14.8, lip 376  From 3/14 showed   gluc 107, cr 0.52, gfr>60, alt 13,          wbc 4.8, hb 10.8, plt 299, lip 130, c diff+  From 8/14 showed                        fe 57, %sat 16, ferritin 153  Form 1/15 showed                           vit d 23, b12 457, fol>20  From 1/15 showed        hba1c 4.9, chol 206, tg 50, hdl 116, ldl-c 80  From 9/15 showed   gluc 105, cr 0.69, gfr>60, alt 27,         wbc 9.3, hb 15.4, plt 248  From 2/16 showed   gluc 123, cr 0.62, gfr 96,  alt 22  From 2/17 showed   gluc 89,   cr 0.72, gfr>60, alt 47,          wbc 9.8, hb 14.8, plt 328  From 2/18 showed   gluc 109, cr 0.65, gfr>60, alt 22,          wbc 9.3, hb 13.1, plt 253  From 2/19 showed   gluc 106, cr 0.61, gfr>60, alt 37, hba1c 5.2, chol 219, tg 27, hdl 161, ldl-c 53,   wbc 6.6, hb 12.7, plt 213  From 7/19 showed   gluc 159, cr 0.84, gfr>60, alt 46,          wbc 7.7, hb 12.6, plt 265    Patient Active Problem List   Diagnosis Code    Hyperlipidemia E78.5    Mantle cell lymphoma of lymph nodes of multiple sites ([facility] C83.18    Neuropathy due to chemotherapeutic drug ([facility] G62.0, T45.1X5A    Arthritis, degenerative M19.90    IFG (impaired fasting glucose) R73.01    Tobacco dependence syndrome F17.200    Colon adenoma D12.6    Diverticulosis K57.90    Primary hypertension I10     Assessment and plan:  1. Colon adenoma, divertics. Fiber, colo to be scheduled  2. HTN. Continue current regimen. 3. HLP. Lifestyle and dietary measures  4. Prediabetes. Lifestyle and dietary measures  5. Nephrolithiasis. Quiescent, hydration  6. Lymphoma. F/U Dr Lizbeth Desai  7. Smoking cessation reiterated  8. Hip pain. Knee pain. Will get opinion from Dr. Robi Alegria  9. Generalized aches. Check labs and go from there        RTC 1/20      Above conditions discussed at length and patient vocalized understanding.   All questions answered to patient satisfaction

## 2019-09-23 ENCOUNTER — OFFICE VISIT (OUTPATIENT)
Dept: INTERNAL MEDICINE CLINIC | Age: 69
End: 2019-09-23

## 2019-09-23 VITALS
TEMPERATURE: 98.2 F | DIASTOLIC BLOOD PRESSURE: 72 MMHG | OXYGEN SATURATION: 98 % | SYSTOLIC BLOOD PRESSURE: 104 MMHG | RESPIRATION RATE: 14 BRPM | HEIGHT: 58 IN | HEART RATE: 87 BPM | BODY MASS INDEX: 20.99 KG/M2 | WEIGHT: 100 LBS

## 2019-09-23 DIAGNOSIS — M25.50 ARTHRALGIA, UNSPECIFIED JOINT: ICD-10-CM

## 2019-09-23 DIAGNOSIS — G89.29 CHRONIC PAIN OF LEFT KNEE: ICD-10-CM

## 2019-09-23 DIAGNOSIS — E78.5 HYPERLIPIDEMIA, UNSPECIFIED HYPERLIPIDEMIA TYPE: ICD-10-CM

## 2019-09-23 DIAGNOSIS — M15.9 PRIMARY OSTEOARTHRITIS INVOLVING MULTIPLE JOINTS: ICD-10-CM

## 2019-09-23 DIAGNOSIS — M25.552 LEFT HIP PAIN: ICD-10-CM

## 2019-09-23 DIAGNOSIS — G62.0 NEUROPATHY DUE TO CHEMOTHERAPEUTIC DRUG (HCC): ICD-10-CM

## 2019-09-23 DIAGNOSIS — T45.1X5A NEUROPATHY DUE TO CHEMOTHERAPEUTIC DRUG (HCC): ICD-10-CM

## 2019-09-23 DIAGNOSIS — M79.10 MYALGIA: Primary | ICD-10-CM

## 2019-09-23 DIAGNOSIS — I10 PRIMARY HYPERTENSION: ICD-10-CM

## 2019-09-23 DIAGNOSIS — M25.562 CHRONIC PAIN OF LEFT KNEE: ICD-10-CM

## 2019-09-23 DIAGNOSIS — F17.200 TOBACCO DEPENDENCE SYNDROME: ICD-10-CM

## 2019-09-23 DIAGNOSIS — R73.01 IFG (IMPAIRED FASTING GLUCOSE): ICD-10-CM

## 2019-09-23 DIAGNOSIS — E55.9 HYPOVITAMINOSIS D: ICD-10-CM

## 2019-09-23 RX ORDER — LANOLIN ALCOHOL/MO/W.PET/CERES
1 CREAM (GRAM) TOPICAL
COMMUNITY
End: 2021-08-26

## 2019-09-23 NOTE — PROGRESS NOTES
Khadar Varner presents today for   Chief Complaint   Patient presents with    Hypertension     3 month follow up              Depression Screening:  3 most recent PHQ Screens 7/2/2019   Little interest or pleasure in doing things Not at all   Feeling down, depressed, irritable, or hopeless Not at all   Total Score PHQ 2 0       Learning Assessment:  Learning Assessment 2/8/2019   PRIMARY LEARNER Patient   HIGHEST LEVEL OF EDUCATION - PRIMARY LEARNER  [address] PRIMARY LEARNER NONE   CO-LEARNER CAREGIVER No   PRIMARY LANGUAGE ENGLISH   LEARNER PREFERENCE PRIMARY DEMONSTRATION     -     -     -     -   ANSWERED BY patient   RELATIONSHIP SELF       Abuse Screening:  Abuse Screening Questionnaire 7/2/2019   Do you ever feel afraid of your partner? N   Are you in a relationship with someone who physically or mentally threatens you? N   Is it safe for you to go home? Y       Fall Risk  Fall Risk Assessment, last 12 mths 7/2/2019   Able to walk? Yes   Fall in past 12 months? No           Coordination of Care:  1. Have you been to the ER, urgent care clinic since your last visit? Hospitalized since your last visit? no    2. Have you seen or consulted any other health care providers outside of the [address] [place] [address] since your last visit? Include any pap smears or colon screening.  no

## 2019-10-03 ENCOUNTER — OFFICE VISIT (OUTPATIENT)
Dept: ORTHOPEDIC SURGERY | Facility: CLINIC | Age: 69
End: 2019-10-03

## 2019-10-03 VITALS
DIASTOLIC BLOOD PRESSURE: 58 MMHG | TEMPERATURE: 97.6 F | HEART RATE: 60 BPM | HEIGHT: 58 IN | WEIGHT: 102 LBS | RESPIRATION RATE: 16 BRPM | BODY MASS INDEX: 21.41 KG/M2 | SYSTOLIC BLOOD PRESSURE: 120 MMHG | OXYGEN SATURATION: 100 %

## 2019-10-03 DIAGNOSIS — M25.562 LEFT KNEE PAIN, UNSPECIFIED CHRONICITY: Primary | ICD-10-CM

## 2019-10-03 DIAGNOSIS — M25.552 LEFT HIP PAIN: ICD-10-CM

## 2019-10-03 DIAGNOSIS — Z85.79 HISTORY OF LYMPHOMA: ICD-10-CM

## 2019-10-03 DIAGNOSIS — M70.62 TROCHANTERIC BURSITIS, LEFT HIP: ICD-10-CM

## 2019-10-03 RX ORDER — BETAMETHASONE SODIUM PHOSPHATE AND BETAMETHASONE ACETATE 3; 3 MG/ML; MG/ML
6 INJECTION, SUSPENSION INTRA-ARTICULAR; INTRALESIONAL; INTRAMUSCULAR; SOFT TISSUE ONCE
Qty: 1 ML | Refills: 0
Start: 2019-10-03 | End: 2019-10-03

## 2019-10-03 NOTE — PROGRESS NOTES
Patient: Hector Bhat                MRN: [id number]       SSN: [id number]  YOB: 1950        AGE: 80 y.o. SEX: female  Body mass index is 21.32 kg/m². PCP: Declan Hernandez MD  10/03/19    HISTORY OF PRESENT ILLNESS:  Thank you so much for having me see Ms. Marie for opinion and advice. I do appreciate the opportunity. She is quite a character, originally from [facility]). She has had previous left knee surgery, what sounds like a realignment procedure and even maybe a tibial tubercle surgery. She does have some evidence of previous surgery on the x-ray anteriorly. Her main complaint is actually with left lateral hip pain. It hurts to roll over on it at night, some groin discomfort as well. She has known arthritis involving the right hip and right knee as well. 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 She denies fevers, chills, night sweats or weight loss. She denies diabetes. She denies any shortness of breath. She is a smoker. PHYSICAL EXAMINATION:  On examination today, a very nice lady, short statured at 4'10''. BMI is 21. She is very pleasant. Alert and oriented. Affect normal.  She moves her head and neck adequately. A well healed incision for the left knee. She has some patellar tracking issues on the left side with reverse J sign. The right knee tracks better. A well healed incision. There is no evidence for infection or DVT. Calf is nontender. Zeeshan sign is negative. The hips rotate nicely. Low back is minimally tender. She is tender over the greater trochanter. Good motion of both hips. Neurologically intact distally. RADIOGRAPHS:  Review of the x-rays AP of the hips show mild to moderate arthritis, nothing severe. With regards to the tibia, she does have some convexity involving the proximal tibia in the location of the tubercle, looks chronic and old, but I do want to check this with her history of lymphoma.   We will get an MRI and a bone scan of this area. She has a fair bit of patellofemoral arthritis. PROCEDURE NOTE:  Under aseptic conditions and after informed written consent with time out, left trochanteric bursa injected with 1 mL Celestone preparation, i.e. 6 mg, well tolerated. IMPRESSION:  We are going to do some investigating for her. When she returns, we are going to review the 3-phase bone scan and MRI involving the proximal tibia and knee and likely proceed with an injection as long as everything looks benign, which I expect. It has been a pleasure to share in her care and provide opinion and advice in regards to her management. CC:  Graciela Saini M.D. REVIEW OF SYSTEMS:      CON: negative for weight loss, fever  EYE: negative for double vision  ENT: negative for hoarseness  RS:   negative for Tb  GI:    negative for blood in stool  :  negative for blood in urine  Other systems reviewed and noted below. Past Medical History:   Diagnosis Date    Anxiety 6/15    IKE-7 was 12/21    Calculus of kidney     Clostridium difficile colitis 3/14    Dr. Baltazar Hernandez adenoma     10/11 Dr. Cinthya Hanks Cystic breast     Depression 6/15    PHQ-9 was 15/27    Diverticulitis     recurrent x3 Dr Aleksey Rene liver     10/10 on US, CT 6/12; Fib-4 was 0.74 from 1/15    GI bleed 2/14    ischemic colitis on CT, seen by Dr. Mia Milton Hyperlipidemia     calculated 10 year risk score was 3.2% (1/15)    Hypertension     Ischemic colitis ([facility] [address] 3/14    Dr Meli Araya    Labial abscess     + MRSA    Lymphoma ([facility] [address] 12/13    Dr. Suzi Grant    Migraine     Neuropathy due to chemotherapeutic drug ([facility] [address] 9/14    Prediabetes     Tobacco dependence syndrome 2/8/2019    Zoster 8/14    left T11       Family History   Adopted: Yes       Current Outpatient Medications   Medication Sig Dispense Refill    betamethasone (CELESTONE SOLUSPAN) 6 mg/mL injection 1 mL by Intra artICUlar route once for 1 dose.  1 mL 0  ferrous sulfate (IRON) 325 mg (65 mg iron) tablet Take  by mouth Daily (before breakfast).  amLODIPine (NORVASC) 5 mg tablet Take 1 Tab by mouth daily. 30 Tab 5    ibuprofen (MOTRIN) 200 mg tablet Take 200 mg by mouth every six (6) hours as needed for Pain.  hydrOXYzine pamoate (VISTARIL) 25 mg capsule Take 1 Cap by mouth three (3) times daily as needed for Itching. 30 Cap 1       Allergies   Allergen Reactions    Meperidine Nausea Only and Other (comments)    Augmentin [Amoxicillin-Pot Clavulanate] Diarrhea and Nausea Only    Keflex [Cephalexin] Nausea Only    Sulfa (Sulfonamide Antibiotics) Rash       Past Surgical History:   Procedure Laterality Date    CARDIAC SURG PROCEDURE UNLIST  2/14    echo shows mild conc lvh, ef 60-65%.  no wma    HX APPENDECTOMY  1968    HX COLONOSCOPY      colon polyps 2003 Dr. Cindy Gee; adenoma 2011; isch colitis 3/14 Dr Lennox Knack    HX HEENT      tonsillectomy    HX ORTHOPAEDIC  teenager    knee surgery left    HX ORTHOPAEDIC      DEXA t score 2.1 spine, -0.3 hip (4/19)    HX OTHER SURGICAL      bx for lymphoma    HX SOPHIA AND BSO  04/02    Dr. Wilber Coyle History     Socioeconomic History    Marital status:      Spouse name: Not on file    Number of children: 2    Years of education: Not on file    Highest education level: Not on file   Occupational History    Occupation: director [employer]   Social Needs    Financial resource strain: Not on file    Food insecurity:     Worry: Not on file     Inability: Not on file   [employer] needs:     Medical: Not on file     Non-medical: Not on file   Tobacco Use    Smoking status: Current Every Day Smoker     Packs/day: 0.50     Years: 15.00     Pack years: 7.50     Types: Cigarettes    Smokeless tobacco: Never Used    Tobacco comment: smoking cessation advised   Substance and Sexual Activity    Alcohol use: Not Currently     Alcohol/week: 4.2 standard drinks     Types: 5 Glasses of wine per week  Drug use: No    Sexual activity: Not Currently   Lifestyle    Physical activity:     Days per week: Not on file     Minutes per session: Not on file    Stress: Not on file   Relationships    Social connections:     Talks on phone: Not on file     Gets together: Not on file     Attends Holiness service: Not on file     Active member of club or organization: Not on file     Attends meetings of clubs or organizations: Not on file     Relationship status: Not on file    Intimate partner violence:     Fear of current or ex partner: Not on file     Emotionally abused: Not on file     Physically abused: Not on file     Forced sexual activity: Not on file   Other Topics Concern    Not on file   Social History Narrative    Not on file       Visit Vitals  /58 (BP 1 Location: Left arm, BP Patient Position: Sitting)   Pulse 60   Temp 97.6 °F (36.4 °C) (Oral)   Resp 16   Ht 4' 10\" (1.473 m)   Wt 102 lb (46.3 kg)   SpO2 100%   BMI 21.32 kg/m²         PHYSICAL EXAMINATION:  GENERAL: Alert and oriented x3, in no acute distress, well-developed, well-nourished, afebrile. HEART: No JVD. EYES: No scleral icterus   NECK: No significant lymphadenopathy   LUNGS: No respiratory compromise or indrawing  ABDOMEN: Soft, non-tender, non-distended. Electronically signed by:  Duane Dean MD

## 2019-10-03 NOTE — PROGRESS NOTES
1. Have you been to the ER, urgent care clinic since your last visit? Hospitalized since your last visit? No    2. Have you seen or consulted any other health care providers outside of the [address] [place] [address] since your last visit? Include any pap smears or colon screening.  NO

## 2019-10-03 NOTE — PROGRESS NOTES
Verbal order with read back given by Jannet Valdivia. MD Lukasz on 10/3/2019 to draw up 1mL (6mg) of Celestone and 3mL of 1% Lidocaine.

## 2019-10-21 ENCOUNTER — HOSPITAL ENCOUNTER (OUTPATIENT)
Dept: MRI IMAGING | Age: 69
Discharge: HOME | End: 2019-10-21
Attending: ORTHOPAEDIC SURGERY
Payer: MEDICARE

## 2019-10-21 ENCOUNTER — HOSPITAL ENCOUNTER (OUTPATIENT)
Dept: NUCLEAR MEDICINE | Age: 69
Discharge: HOME | End: 2019-10-21
Attending: ORTHOPAEDIC SURGERY
Payer: MEDICARE

## 2019-10-21 DIAGNOSIS — M25.562 LEFT KNEE PAIN, UNSPECIFIED CHRONICITY: ICD-10-CM

## 2019-10-21 DIAGNOSIS — Z85.79 HISTORY OF LYMPHOMA: ICD-10-CM

## 2019-10-21 DIAGNOSIS — M25.552 LEFT HIP PAIN: ICD-10-CM

## 2019-10-21 PROCEDURE — 73721 MRI JNT OF LWR EXTRE W/O DYE: CPT

## 2019-10-21 PROCEDURE — 78315 BONE IMAGING 3 PHASE: CPT

## 2019-11-19 ENCOUNTER — HOSPITAL ENCOUNTER (OUTPATIENT)
Dept: LAB | Age: 69
Discharge: HOME | End: 2019-11-19
Payer: MEDICARE

## 2019-11-19 ENCOUNTER — OFFICE VISIT (OUTPATIENT)
Dept: ONCOLOGY | Age: 69
End: 2019-11-19

## 2019-11-19 ENCOUNTER — HOSPITAL ENCOUNTER (OUTPATIENT)
Dept: ONCOLOGY | Age: 69
Discharge: HOME | End: 2019-11-19

## 2019-11-19 VITALS
HEART RATE: 64 BPM | SYSTOLIC BLOOD PRESSURE: 110 MMHG | OXYGEN SATURATION: 99 % | HEIGHT: 58 IN | BODY MASS INDEX: 21.83 KG/M2 | WEIGHT: 104 LBS | TEMPERATURE: 98.1 F | DIASTOLIC BLOOD PRESSURE: 67 MMHG | RESPIRATION RATE: 16 BRPM

## 2019-11-19 DIAGNOSIS — D50.9 IRON DEFICIENCY ANEMIA, UNSPECIFIED IRON DEFICIENCY ANEMIA TYPE: ICD-10-CM

## 2019-11-19 DIAGNOSIS — D50.9 IRON DEFICIENCY ANEMIA, UNSPECIFIED IRON DEFICIENCY ANEMIA TYPE: Primary | ICD-10-CM

## 2019-11-19 DIAGNOSIS — D72.9 NEUTROPHILIA: ICD-10-CM

## 2019-11-19 DIAGNOSIS — C83.18 MANTLE CELL LYMPHOMA OF LYMPH NODES OF MULTIPLE SITES (HCC): ICD-10-CM

## 2019-11-19 LAB
ALBUMIN SERPL-MCNC: 3.9 G/DL (ref 3.4–5)
ALBUMIN/GLOB SERPL: 1.1 {RATIO} (ref 0.8–1.7)
ALP SERPL-CCNC: 52 U/L (ref 45–117)
ALT SERPL-CCNC: 21 U/L (ref 13–56)
ANION GAP SERPL CALC-SCNC: 3 MMOL/L (ref 3–18)
AST SERPL-CCNC: 10 U/L (ref 10–38)
BASO+EOS+MONOS # BLD AUTO: 0.3 K/UL (ref 0–2.3)
BASO+EOS+MONOS NFR BLD AUTO: 5 % (ref 0.1–17)
BILIRUB SERPL-MCNC: 0.3 MG/DL (ref 0.2–1)
BUN SERPL-MCNC: 14 MG/DL (ref 7–18)
BUN/CREAT SERPL: 19 (ref 12–20)
CALCIUM SERPL-MCNC: 9.1 MG/DL (ref 8.5–10.1)
CHLORIDE SERPL-SCNC: 112 MMOL/L (ref 100–111)
CO2 SERPL-SCNC: 26 MMOL/L (ref 21–32)
CREAT SERPL-MCNC: 0.73 MG/DL (ref 0.6–1.3)
DIFFERENTIAL METHOD BLD: ABNORMAL
ERYTHROCYTE [DISTWIDTH] IN BLOOD BY AUTOMATED COUNT: 13.5 % (ref 11.5–14.5)
ERYTHROCYTE [SEDIMENTATION RATE] IN BLOOD: 8 MM/HR (ref 0–30)
GLOBULIN SER CALC-MCNC: 3.5 G/DL (ref 2–4)
GLUCOSE SERPL-MCNC: 89 MG/DL (ref 74–99)
HCT VFR BLD AUTO: 32.9 % (ref 36–48)
HGB BLD-MCNC: 11.4 G/DL (ref 12–16)
LYMPHOCYTES # BLD: 1.1 K/UL (ref 1.1–5.9)
LYMPHOCYTES NFR BLD: 17 % (ref 14–44)
MCH RBC QN AUTO: 32.4 PG (ref 25–35)
MCHC RBC AUTO-ENTMCNC: 34.7 G/DL (ref 31–37)
MCV RBC AUTO: 93.5 FL (ref 78–102)
NEUTS SEG # BLD: 5.2 K/UL (ref 1.8–9.5)
NEUTS SEG NFR BLD: 78 % (ref 40–70)
PLATELET # BLD AUTO: 267 K/UL (ref 140–440)
POTASSIUM SERPL-SCNC: 4 MMOL/L (ref 3.5–5.5)
PROT SERPL-MCNC: 7.4 G/DL (ref 6.4–8.2)
RBC # BLD AUTO: 3.52 M/UL (ref 4.1–5.1)
SODIUM SERPL-SCNC: 141 MMOL/L (ref 136–145)
WBC # BLD AUTO: 6.6 K/UL (ref 4.5–13)

## 2019-11-19 PROCEDURE — 82728 ASSAY OF FERRITIN: CPT

## 2019-11-19 PROCEDURE — 80053 COMPREHEN METABOLIC PANEL: CPT

## 2019-11-19 PROCEDURE — 85652 RBC SED RATE AUTOMATED: CPT

## 2019-11-19 PROCEDURE — 83540 ASSAY OF IRON: CPT

## 2019-11-19 PROCEDURE — 36415 COLL VENOUS BLD VENIPUNCTURE: CPT

## 2019-11-19 NOTE — PATIENT INSTRUCTIONS
Iron Deficiency Anemia: Care Instructions Your Care Instructions Anemia means that you do not have enough red blood cells. Red blood cells carry oxygen around your body. When you have anemia, it can make you pale, weak, and tired. Many things can cause anemia. The most common cause is loss of blood. This can happen if you have heavy menstrual periods. It can also happen if you have bleeding in your stomach or bowel. You can also get anemia if you don't have enough iron in your diet or if it's hard for your body to absorb iron. In some cases, pregnancy causes anemia. That's because a pregnant woman needs more iron. Your doctor may do more tests to find the cause of your anemia. If a disease or other health problem is causing it, your doctor will treat that problem. It's important to follow up with your doctor to make sure that your iron level returns to normal. 
Follow-up care is a key part of your treatment and safety. Be sure to make and go to all appointments, and call your doctor if you are having problems. It's also a good idea to know your test results and keep a list of the medicines you take. How can you care for yourself at home? · If your doctor recommended iron pills, take them as directed. ? Try to take the pills on an empty stomach. You can do this about 1 hour before or 2 hours after meals. But you may need to take iron with food to avoid an upset stomach. ? Do not take antacids or drink milk or anything with caffeine within 2 hours of when you take your iron. They can keep your body from absorbing the iron well. ? Vitamin C helps your body absorb iron. You may want to take iron pills with a glass of orange juice or some other food high in vitamin C. 
? Iron pills may cause stomach problems. These include heartburn, nausea, diarrhea, constipation, and cramps. It can help to drink plenty of fluids and include fruits, vegetables, and fiber in your diet. ? It's normal for iron pills to make your stool a greenish or grayish black. But internal bleeding can also cause dark stool. So it's important to tell your doctor about any color changes. ? Call your doctor if you think you are having a problem with your iron pills. Even after you start to feel better, it will take several months for your body to build up its supply of iron. ? If you miss a pill, don't take a double dose. ? Keep iron pills out of the reach of small children. Too much iron can be very dangerous. · Eat foods with a lot of iron. These include red meat, shellfish, poultry, and eggs. They also include beans, raisins, whole-grain bread, and leafy green vegetables. · Steam your vegetables. This is the best way to prepare them if you want to get as much iron as possible. · Be safe with medicines. Do not take nonsteroidal anti-inflammatory pain relievers unless your doctor tells you to. These include aspirin, naproxen (Aleve), and ibuprofen (Advil, Motrin). · Liquid iron can stain your teeth. But you can mix it with water or juice and drink it with a straw. Then it won't get on your teeth. When should you call for help? Call 911 anytime you think you may need emergency care. For example, call if: 
  · You passed out (lost consciousness).  
 Call your doctor now or seek immediate medical care if: 
  · You are short of breath.  
  · You are dizzy or light-headed, or you feel like you may faint.  
  · You have new or worse bleeding.  
 Watch closely for changes in your health, and be sure to contact your doctor if: 
  · You feel weaker or more tired than usual.  
  · You do not get better as expected. Where can you learn more? Go to http://kevin-justen.info/. Enter S712 in the search box to learn more about \"Iron Deficiency Anemia: Care Instructions. \" Current as of: March 28, 2019 Content Version: 12.2 © 4204-8090 [employer].  Care instructions adapted under license by [address] (which disclaims liability or warranty for this information). If you have questions about a medical condition or this instruction, always ask your healthcare professional. [address] any warranty or liability for your use of this information.

## 2019-11-19 NOTE — PROGRESS NOTES
Hematology/Oncology  Progress Note    Name: Philippe Verde  Date: 2019  : 1950    PCP: Parker Soliz MD     Ms. Graham Spencer is a 80year old female who was seen for management of her non-Hodgkin lymphoma, neuropathy,and arthritis    Current therapy: Supportive care and active surveillance. Subjective:     Ms. Graham Spencer is a 72-year-old woman who has a history of mantle cell lymphoma. She was diagnosed in 2014. She has completed a full course of systemic chemotherapy. She denies night sweats, fevers or unexplained infections. She denies weight loss. She states she is doing well and denies any abdominal pain or discomfort. She also reports that her neuropathy associated with her prior chemotherapy is better and did not require the use of Neurontin. She denies fatigue, shortness of breath, and weakness. She denies chest pain or dizziness. She denies pain or any discomfort. She does not have any concerns or complaints to report at this time. Past medical history, family history, and social history: these were reviewed and remains unchanged.     Past Medical History:   Diagnosis Date    Anxiety 6/15    IKE-7 was     Calculus of kidney     Clostridium difficile colitis 3/14    Dr. Colón Cho adenoma     10/11 Dr. Karen Sullivan Cystic breast     Depression 6/15    PHQ-9 was 15/27    Diverticulitis     recurrent x3 Dr Octavia Lunsford liver     10/10 on US, CT ; Fib-4 was 0.74 from 1/15    GI bleed     ischemic colitis on CT, seen by Dr. Kendell Kenny Hyperlipidemia     calculated 10 year risk score was 3.2% (1/15)    Hypertension     Ischemic colitis ([address] 3/14    Dr Lalo Love    Labial abscess     + MRSA    Lymphoma ([address]     Dr. Zen Frances    Migraine     Neuropathy due to chemotherapeutic drug ([address]     Prediabetes     Tobacco dependence syndrome 2019    Zoster     left T11     Past Surgical History:   Procedure Laterality Date    CARDIAC SURG PROCEDURE UNLIST  2/14    echo shows mild conc lvh, ef 60-65%.  no wma    HX APPENDECTOMY  1968    HX COLONOSCOPY      colon polyps 2003 Dr. Alberto Mora; adenoma 2011; isch colitis 3/14 Dr Gustavo Eden    HX HEENT      tonsillectomy    HX ORTHOPAEDIC  teenager    knee surgery left    HX ORTHOPAEDIC      DEXA t score 2.1 spine, -0.3 hip (4/19)    HX OTHER SURGICAL      bx for lymphoma    HX SOPHIA AND BSO  04/02    Dr. Oswaldo Yip History     Socioeconomic History    Marital status:      Spouse name: Not on file    Number of children: 2    Years of education: Not on file    Highest education level: Not on file   Occupational History    Occupation: director [employer]   Social Needs    Financial resource strain: Not on file    Food insecurity:     Worry: Not on file     Inability: Not on file   [employer] needs:     Medical: Not on file     Non-medical: Not on file   Tobacco Use    Smoking status: Current Every Day Smoker     Packs/day: 0.50     Years: 15.00     Pack years: 7.50     Types: Cigarettes    Smokeless tobacco: Never Used    Tobacco comment: smoking cessation advised   Substance and Sexual Activity    Alcohol use: Not Currently     Alcohol/week: 4.2 standard drinks     Types: 5 Glasses of wine per week    Drug use: No    Sexual activity: Not Currently   Lifestyle    Physical activity:     Days per week: Not on file     Minutes per session: Not on file    Stress: Not on file   Relationships    Social connections:     Talks on phone: Not on file     Gets together: Not on file     Attends Sikhism service: Not on file     Active member of club or organization: Not on file     Attends meetings of clubs or organizations: Not on file     Relationship status: Not on file    Intimate partner violence:     Fear of current or ex partner: Not on file     Emotionally abused: Not on file     Physically abused: Not on file     Forced sexual activity: Not on file   Other Topics Concern    Not on file   Social History Narrative    Not on file     Family History   Adopted: Yes     Current Outpatient Medications   Medication Sig Dispense Refill    ferrous sulfate (IRON) 325 mg (65 mg iron) tablet Take  by mouth Daily (before breakfast).  amLODIPine (NORVASC) 5 mg tablet Take 1 Tab by mouth daily. 30 Tab 5    hydrOXYzine pamoate (VISTARIL) 25 mg capsule Take 1 Cap by mouth three (3) times daily as needed for Itching. 30 Cap 1    ibuprofen (MOTRIN) 200 mg tablet Take 200 mg by mouth every six (6) hours as needed for Pain. Review of Systems  Constitutional: The patient has no complaints or acute distress  HEENT: The patient denies recent head trauma, eye pain, blurred vision,  hearing deficit, oropharyngeal mucosal pain or lesions, and the patient denies throat pain or discomfort. Lymphatics: The patient denies palpable peripheral lymphadenopathy. Hematologic: The patient denies having bruising, bleeding, or progressive fatigue. Respiratory: Patient denies cough,SOB, or fevers. Cardiovascular: The patient denies having leg pain, leg swelling, heart palpitations, chest permit, chest pain, or lightheadedness. The patient denies having dyspnea on exertion. Neurologic: The patient is complaining of paresthesias in her hands and feet and legs due to prior chemotherapy use. Gastrointestinal: The patient denies having nausea, emesis, or diarrhea. The patient denies having any hematemesis or blood in the stool. Genitourinary: Patient denies having urinary urgency, frequency, or dysuria. The patient denies having blood in the urine. Psychological: The patient denies having symptoms of nervousness, anxiety, depression, or thoughts of harming himself some of this. Skin: Patient denies having skin rashes, skin, ulcerations, or unexplained itching or pruritus.   Musculoskeletal: The patient denies pains in muscles or joints    Objective:     Visit Vitals  /67   Pulse 64   Temp 98.1 °F (36.7 °C) (Oral)   Resp 16   Ht 4' 10\" (1.473 m)   Wt 47.2 kg (104 lb)   SpO2 99%   BMI 21.74 kg/m²     ECOG PS=0, pain score=0/10   Physical Exam:   Gen. Appearance: The patient is in no acute distress. Skin: There is no bruise or rash. HEENT: The exam is unremarkable. Neck: Supple without lymphadenopathy or thyromegaly. Lungs: Clear to auscultation and percussion; there are no wheezes or rhonchi. Heart: Regular rate and rhythm; there are no murmurs, gallops, or rubs. Abdomen: Bowel sounds are present and normal.  There is no guarding, tenderness, or hepatosplenomegaly. Extremities: There is no clubbing, cyanosis, or edema. Neurologic: There are no focal neurologic deficits, except for her complaints of paresthesias in her hands and feet. .  Lymphatics: There is no palpable peripheral lymphadenopathy. Musculoskeletal: The patient has full range of motion at all joints. There is no evidence of joint deformity or effusions. There is no focal joint tenderness. She does complain of pain on standing and with flexion and extension of the leg and hip and knee joints. Psychological/psychiatric: There is no clinical evidence of anxiety, depression, or melancholy. Lab data:      Results for orders placed or performed during the hospital encounter of 11/19/19   CBC WITH 3 PART DIFF     Status: Abnormal   Result Value Ref Range Status    WBC 6.6 4.5 - 13.0 K/uL Final    RBC 3.52 (L) 4.10 - 5.10 M/uL Final    HGB 11.4 (L) 12.0 - 16.0 g/dL Final    HCT 32.9 (L) 36 - 48 % Final    MCV 93.5 78 - 102 FL Final    MCH 32.4 25.0 - 35.0 PG Final    MCHC 34.7 31 - 37 g/dL Final    RDW 13.5 11.5 - 14.5 % Final    PLATELET 158 542 - 448 K/uL Final    NEUTROPHILS 78 (H) 40 - 70 % Final    MIXED CELLS 5 0.1 - 17 % Final    LYMPHOCYTES 17 14 - 44 % Final    ABS. NEUTROPHILS 5.2 1.8 - 9.5 K/UL Final    ABS. MIXED CELLS 0.3 0.0 - 2.3 K/uL Final    ABS.  LYMPHOCYTES 1.1 1.1 - 5.9 K/UL Final     Comment: Test performed at [facility] [address] [facility] Location. Reviewed by Medical Director. DF AUTOMATED   Final           Assessment:     1. Iron deficiency anemia, unspecified iron deficiency anemia type    2. Neutrophilia    3. Mantle cell lymphoma of lymph nodes of multiple sites [facility])      Plan:   Iron Deficiency Anemia: Today her CBC shows her hemoglobin is 11.4g/dL with hematocrit of 32.9%. I will obtain Iron Profile, Ferritin, and CMP at this time. I advised the patient to continue Iron OTC 1 tab PO daily. Neutrophilia: I explained to the patient that she is at risk for the development of myelodysplastic syndrome from prior chemotherapy. Her neutrophil count today is 78%. A prior flow cytometry did not show any evidence of immunophenotypic abnormalities. There was no evidence of any ongoing leukemia or recurrent lymphoma. Mantle cell lymphoma, multiple sites: The patient has completed a full course of systemic chemotherapy. Clinically there is no evidence of disease recurrence. On 08/30/19 her sedimentation rate was normal at 18mm/hr. I will obtain CMP and sedimentation rate at this time. The patient will return in 4 months for a complete reassessment or sooner if indicated.         Orders Placed This Encounter    COMPLETE CBC & AUTO DIFF WBC    InHouse CBC ([facility])     Standing Status:   Future     Number of Occurrences:   1     Standing Expiration Date:   11/26/2019    IRON PROFILE     Standing Status:   Future     Standing Expiration Date:   85/64/9121    METABOLIC PANEL, COMPREHENSIVE     Standing Status:   Future     Standing Expiration Date:   11/19/2020    FERRITIN     Standing Status:   Future     Standing Expiration Date:   11/19/2020    SED RATE (ESR)     Standing Status:   Future     Standing Expiration Date:   11/19/2020         Cony Fabian NP  11/19/2019       I have assessed the patient independently and  agree with the full assessment as outlined.   Laisha Du MD, Ruperto Myers

## 2019-11-20 LAB
FERRITIN SERPL-MCNC: 107 NG/ML (ref 8–388)
IRON SATN MFR SERPL: 25 %
IRON SERPL-MCNC: 89 UG/DL (ref 50–175)
TIBC SERPL-MCNC: 354 UG/DL (ref 250–450)

## 2020-02-25 ENCOUNTER — OFFICE VISIT (OUTPATIENT)
Dept: INTERNAL MEDICINE CLINIC | Age: 70
End: 2020-02-25

## 2020-02-25 ENCOUNTER — HOSPITAL ENCOUNTER (OUTPATIENT)
Dept: GENERAL RADIOLOGY | Age: 70
Discharge: HOME | End: 2020-02-25
Payer: MEDICARE

## 2020-02-25 VITALS
TEMPERATURE: 98.7 F | WEIGHT: 102 LBS | HEIGHT: 58 IN | HEART RATE: 70 BPM | RESPIRATION RATE: 14 BRPM | OXYGEN SATURATION: 99 % | SYSTOLIC BLOOD PRESSURE: 110 MMHG | DIASTOLIC BLOOD PRESSURE: 56 MMHG | BODY MASS INDEX: 21.41 KG/M2

## 2020-02-25 DIAGNOSIS — R05.9 COUGH: ICD-10-CM

## 2020-02-25 DIAGNOSIS — R39.15 URINARY URGENCY: Primary | ICD-10-CM

## 2020-02-25 LAB
BILIRUB UR QL STRIP: NEGATIVE
GLUCOSE UR-MCNC: NEGATIVE MG/DL
KETONES P FAST UR STRIP-MCNC: NORMAL MG/DL
PH UR STRIP: 5.5 [PH] (ref 4.6–8)
PROT UR QL STRIP: NEGATIVE
SP GR UR STRIP: 1.02 (ref 1–1.03)
UA UROBILINOGEN AMB POC: NORMAL (ref 0.2–1)
URINALYSIS CLARITY POC: CLEAR
URINALYSIS COLOR POC: YELLOW
URINE BLOOD POC: NEGATIVE
URINE LEUKOCYTES POC: NEGATIVE
URINE NITRITES POC: NEGATIVE

## 2020-02-25 PROCEDURE — 71046 X-RAY EXAM CHEST 2 VIEWS: CPT

## 2020-02-25 RX ORDER — AZITHROMYCIN 250 MG/1
TABLET, FILM COATED ORAL
Qty: 6 TAB | Refills: 0 | Status: SHIPPED | OUTPATIENT
Start: 2020-02-25 | End: 2020-05-04

## 2020-02-25 NOTE — PROGRESS NOTES
77 y.o. WHITE OR  female who presents for evaluation. About 8 days ago, she came down with what she thought were flulike symptoms. She had taken the flu shot appropriately. She had noted onset of achiness, temperatures up to 102, chills, no GI complaints. Very mild sinus and chest symptoms. She elected not to be evaluated and gradually felt better but over the last couple days, she has developed cough productive of mostly clear material, rhinorrhea, another temperature spike to 101 yesterday. Still no GI complaints, no dysuria, hematuria, frequency although she has had some urgency for several weeks. No bleeding to report. She knows of no overt exposures although she does work at [employer] History:   Diagnosis Date    Anxiety 6/15    IKE-7 was 12/21    Calculus of kidney     Clostridium difficile colitis 3/14    Dr. Kevin Montilla adenoma     10/11 Dr. Jenny Trejo Cystic breast     Depression 6/15    PHQ-9 was 15/27    Diverticulitis     recurrent x3 Dr Wilfrido Mckeon liver     10/10 on US, CT 6/12; Fib-4 was 0.74 from 1/15    GI bleed 2/14    ischemic colitis on CT, seen by Dr. Erik Wade Hyperlipidemia     calculated 10 year risk score was 3.2% (1/15)    Hypertension     Ischemic colitis ([facility] 3/14    Dr Ryley Gonzales    Labial abscess     + MRSA    Lymphoma ([facility] 12/13    Dr. Valentino Maxcy    Migraine     [facility] to [facility] 9[facility]    Prediabetes     Tobacco dependence syndrome 2/8/2019    Zoster 8/14    left T11     Current Outpatient Medications   Medication Sig    azithromycin (ZITHROMAX) 250 mg tablet Take 2 tablets today, then take 1 tablet daily    amLODIPine (NORVASC) 5 mg tablet TAKE 1 TABLET BY MOUTH ONCE DAILY    ferrous sulfate (IRON) 325 mg (65 mg iron) tablet Take  by mouth Daily (before breakfast).  ibuprofen (MOTRIN) 200 mg tablet Take 200 mg by mouth every six (6) hours as needed for Pain.      No current facility-administered medications for this visit. Allergies   Allergen Reactions    Meperidine Nausea Only and Other (comments)    Augmentin [Amoxicillin-Pot Clavulanate] Diarrhea and Nausea Only    Keflex [Cephalexin] Nausea Only    Sulfa (Sulfonamide Antibiotics) Rash     Visit Vitals  /56   Pulse 70   Temp 98.7 °F (37.1 °C) (Oral)   Resp 14   Ht 4' 10\" (1.473 m)   Wt 102 lb (46.3 kg)   SpO2 99%   BMI 21.32 kg/m²   Tympanic membranes normal, sinuses nontender, oropharynx without erythema or plaques, nontender shotty adenopathy bilaterally. Lungs are clear on the left, crackles at the right base. No wheezes or rhonchi. Heart showed regular rate rhythm. Abdomen soft and nontender, no hepatosplenomegaly or masses. Results for orders placed or performed in visit on 02/25/20   AMB POC URINALYSIS DIP STICK AUTO W/ MICRO   Result Value Ref Range    Color (UA POC) Yellow     Clarity (UA POC) Clear     Glucose (UA POC) Negative Negative    Bilirubin (UA POC) Negative Negative    Ketones (UA POC) Trace Negative    Specific gravity (UA POC) 1.020 1.001 - 1.035    Blood (UA POC) Negative Negative    pH (UA POC) 5.5 4.6 - 8.0    Protein (UA POC) Negative Negative    Urobilinogen (UA POC) 0.2 mg/dL 0.2 - 1    Nitrites (UA POC) Negative Negative    Leukocyte esterase (UA POC) Negative Negative     Assessment and plan:  1. Apparent viral syndrome, possibly influenza. Now with new respiratory complaints. Will get chest x-ray, empiric treatment with Zithromax, OTC meds for symptom relief. Call with an update        Above conditions discussed at length and patient vocalized understanding.   All questions answered to patient satisfaction

## 2020-02-25 NOTE — PROGRESS NOTES
Tanisha Lockwood presents today for   Chief Complaint   Patient presents with    Flu Like Symptoms     generalized body aches and a fever last week x 1 week low grade temp and chills started this week               Depression Screening:  3 most recent PHQ Screens 7/2/2019   Little interest or pleasure in doing things Not at all   Feeling down, depressed, irritable, or hopeless Not at all   Total Score PHQ 2 0       Learning Assessment:  Learning Assessment 2/8/2019   PRIMARY LEARNER Patient   HIGHEST LEVEL OF EDUCATION - PRIMARY LEARNER  [address] [place] [address] PRIMARY LEARNER NONE   CO-LEARNER CAREGIVER No   PRIMARY LANGUAGE ENGLISH   LEARNER PREFERENCE PRIMARY DEMONSTRATION     -     -     -     -   ANSWERED BY patient   RELATIONSHIP SELF       Abuse Screening:  Abuse Screening Questionnaire 7/2/2019   Do you ever feel afraid of your partner? N   Are you in a relationship with someone who physically or mentally threatens you? N   Is it safe for you to go home? Y       Fall Risk  Fall Risk Assessment, last 12 mths 11/19/2019   Able to walk? Yes   Fall in past 12 months? No           Coordination of Care:  1. Have you been to the ER, urgent care clinic since your last visit? Hospitalized since your last visit? no    2. Have you seen or consulted any other health care providers outside of the [address] [place] [address] since your last visit? Include any pap smears or colon screening.  no

## 2020-02-26 ENCOUNTER — TELEPHONE (OUTPATIENT)
Dept: INTERNAL MEDICINE CLINIC | Age: 70
End: 2020-02-26

## 2020-03-02 ENCOUNTER — TELEPHONE (OUTPATIENT)
Dept: INTERNAL MEDICINE CLINIC | Age: 70
End: 2020-03-02

## 2020-03-02 NOTE — TELEPHONE ENCOUNTER
Patient took a z-pack last week. She is not running a fever but she is still not feeling well. Please advise.

## 2020-03-02 NOTE — TELEPHONE ENCOUNTER
Patient was told to report to urgent care or she can see one of our providers on 3/3/20. Patient scheduled to see  3/3/20 for feeling fatigue and swollen neck glands.

## 2020-03-03 ENCOUNTER — OFFICE VISIT (OUTPATIENT)
Dept: INTERNAL MEDICINE CLINIC | Age: 70
End: 2020-03-03

## 2020-03-03 ENCOUNTER — HOSPITAL ENCOUNTER (OUTPATIENT)
Dept: LAB | Age: 70
Discharge: HOME | End: 2020-03-03
Payer: MEDICARE

## 2020-03-03 VITALS
BODY MASS INDEX: 21.7 KG/M2 | TEMPERATURE: 97.9 F | HEART RATE: 58 BPM | OXYGEN SATURATION: 100 % | DIASTOLIC BLOOD PRESSURE: 57 MMHG | WEIGHT: 103.4 LBS | RESPIRATION RATE: 12 BRPM | HEIGHT: 58 IN | SYSTOLIC BLOOD PRESSURE: 112 MMHG

## 2020-03-03 DIAGNOSIS — R05.9 COUGH: Primary | ICD-10-CM

## 2020-03-03 DIAGNOSIS — R53.83 FATIGUE, UNSPECIFIED TYPE: ICD-10-CM

## 2020-03-03 DIAGNOSIS — R05.9 COUGH: ICD-10-CM

## 2020-03-03 DIAGNOSIS — J02.9 SORE THROAT: ICD-10-CM

## 2020-03-03 LAB
ALBUMIN SERPL-MCNC: 3.7 G/DL (ref 3.4–5)
ALBUMIN/GLOB SERPL: 0.9 {RATIO} (ref 0.8–1.7)
ALP SERPL-CCNC: 63 U/L (ref 45–117)
ALT SERPL-CCNC: 22 U/L (ref 13–56)
ANION GAP SERPL CALC-SCNC: 6 MMOL/L (ref 3–18)
AST SERPL-CCNC: 13 U/L (ref 10–38)
BASOPHILS # BLD: 0 K/UL (ref 0–0.1)
BASOPHILS NFR BLD: 1 % (ref 0–2)
BILIRUB SERPL-MCNC: 0.2 MG/DL (ref 0.2–1)
BUN SERPL-MCNC: 13 MG/DL (ref 7–18)
BUN/CREAT SERPL: 19 (ref 12–20)
CALCIUM SERPL-MCNC: 9.1 MG/DL (ref 8.5–10.1)
CHLORIDE SERPL-SCNC: 109 MMOL/L (ref 100–111)
CO2 SERPL-SCNC: 27 MMOL/L (ref 21–32)
CREAT SERPL-MCNC: 0.68 MG/DL (ref 0.6–1.3)
DIFFERENTIAL METHOD BLD: ABNORMAL
EOSINOPHIL # BLD: 0.2 K/UL (ref 0–0.4)
EOSINOPHIL NFR BLD: 2 % (ref 0–5)
ERYTHROCYTE [DISTWIDTH] IN BLOOD BY AUTOMATED COUNT: 13.4 % (ref 11.6–14.5)
GLOBULIN SER CALC-MCNC: 3.9 G/DL (ref 2–4)
GLUCOSE SERPL-MCNC: 92 MG/DL (ref 74–99)
HCT VFR BLD AUTO: 32.4 % (ref 35–45)
HGB BLD-MCNC: 11.1 G/DL (ref 12–16)
LYMPHOCYTES # BLD: 1.4 K/UL (ref 0.9–3.6)
LYMPHOCYTES NFR BLD: 20 % (ref 21–52)
MCH RBC QN AUTO: 31 PG (ref 24–34)
MCHC RBC AUTO-ENTMCNC: 34.3 G/DL (ref 31–37)
MCV RBC AUTO: 90.5 FL (ref 74–97)
MONOCYTES # BLD: 0.4 K/UL (ref 0.05–1.2)
MONOCYTES NFR BLD: 6 % (ref 3–10)
NEUTS SEG # BLD: 5.1 K/UL (ref 1.8–8)
NEUTS SEG NFR BLD: 71 % (ref 40–73)
PLATELET # BLD AUTO: 380 K/UL (ref 135–420)
PMV BLD AUTO: 9 FL (ref 9.2–11.8)
POTASSIUM SERPL-SCNC: 4.6 MMOL/L (ref 3.5–5.5)
PROT SERPL-MCNC: 7.6 G/DL (ref 6.4–8.2)
QUICKVUE INFLUENZA TEST: NEGATIVE
RBC # BLD AUTO: 3.58 M/UL (ref 4.2–5.3)
S PYO AG THROAT QL: NEGATIVE
SODIUM SERPL-SCNC: 142 MMOL/L (ref 136–145)
VALID INTERNAL CONTROL?: YES
VALID INTERNAL CONTROL?: YES
WBC # BLD AUTO: 7 K/UL (ref 4.6–13.2)

## 2020-03-03 PROCEDURE — 86615 BORDETELLA ANTIBODY: CPT

## 2020-03-03 PROCEDURE — 36415 COLL VENOUS BLD VENIPUNCTURE: CPT

## 2020-03-03 PROCEDURE — 85025 COMPLETE CBC W/AUTO DIFF WBC: CPT

## 2020-03-03 PROCEDURE — 80053 COMPREHEN METABOLIC PANEL: CPT

## 2020-03-03 NOTE — PROGRESS NOTES
Wanda Love presents today for   Chief Complaint   Patient presents with    Mass     behind the left ear and jaw for the past 3 days    ROOM  11    Fatigue     x 1 week       Is someone accompanying this pt? no    Is the patient using any DME equipment during OV? no    Depression Screening:  3 most recent PHQ Screens 3/3/2020   Little interest or pleasure in doing things Not at all   Feeling down, depressed, irritable, or hopeless Not at all   Total Score PHQ 2 0       Learning Assessment:  Learning Assessment 3/3/2020   PRIMARY LEARNER Patient   HIGHEST LEVEL OF EDUCATION - PRIMARY LEARNER  [facility] PRIMARY LEARNER NONE   CO-LEARNER CAREGIVER No   PRIMARY LANGUAGE ENGLISH   LEARNER PREFERENCE PRIMARY DEMONSTRATION     -     -     -     -   ANSWERED BY patient   RELATIONSHIP SELF       Abuse Screening:  Abuse Screening Questionnaire 3/3/2020   Do you ever feel afraid of your partner? N   Are you in a relationship with someone who physically or mentally threatens you? N   Is it safe for you to go home? Y       Fall Risk  Fall Risk Assessment, last 12 mths 3/3/2020   Able to walk? Yes   Fall in past 12 months? No       Health Maintenance reviewed and discussed and ordered per Provider. Health Maintenance Due   Topic Date Due    Shingrix Vaccine Age 49> (1 of 2) 11/15/2001    GLAUCOMA SCREENING Q2Y  11/15/2016    A1C test (Diabetic or Prediabetic)  02/07/2020    Medicare Yearly Exam  02/09/2020   . Coordination of Care:  1. Have you been to the ER, urgent care clinic since your last visit? Hospitalized since your last visit? no    2. Have you seen or consulted any other health care providers outside of the [address] [place] [address] since your last visit? Include any pap smears or colon screening.  no

## 2020-03-03 NOTE — PROGRESS NOTES
[facility][note 3]:  3/3/2020, MRN: [id number]      Leslye Richards is a 77 y.o. female and presents to clinic for a Mass (behind the left ear and jaw for the past 3 days    ROOM  11) and Fatigue (x 1 week)    Subjective: The pt is a 76 yo female with h/o HTN, anemia, mantle cell lymphoma (2014) seeing Oncology every 4 months, nicotine dependence, M spike in 2017 (followed by Joon Urbano), vitamin D Deficiency, colon polyp, stable left renal cyst, gallstones, lumbar disc disease, pulmonary nodule, hiatal hernia, and diverticulosis. Fatigue and a Respiratory Tract Infection: On President's Day, she developed generalized fatigue, fever, and myalgias. She thought she had a virus. She eventually came in and saw her PCP for persistent sx. She was given a course of azithromycin for presumed bacterial respiratory tract infection. Her fever resolved. Since then, she continues to have a productive cough with yellow sputum. +Sore throat. No eye or ear pain. She has been exposed to others at work who had similar sx. She had an unremarkable CXR on 2/25/20. Her cough is slowing improving. Associated sx include hoarseness. Alleviating factors: gargling with listerine and salt water - and using lozenges. No bleeding. Tobacco: 1/2 ppd. No ETOH/drug use.        Patient Active Problem List    Diagnosis Date Noted    [facility] 05/08/2015     Priority: 1 - One    Mantle cell lymphoma of lymph nodes of multiple sites ([facility] 04/04/2014     Priority: 1 - One    Primary hypertension 07/18/2019    Tobacco dependence syndrome 02/08/2019    Colon adenoma 02/08/2019    Diverticulosis 02/08/2019    IFG (impaired fasting glucose) 10/22/2015    Arthritis, degenerative 07/06/2015    Hyperlipidemia 08/26/2011       Current Outpatient Medications   Medication Sig Dispense Refill    amLODIPine (NORVASC) 5 mg tablet TAKE 1 TABLET BY MOUTH ONCE DAILY 90 Tab 3    ferrous sulfate (IRON) 325 mg (65 mg iron) tablet Take  by mouth Daily (before breakfast).  ibuprofen (MOTRIN) 200 mg tablet Take 200 mg by mouth every six (6) hours as needed for Pain.  azithromycin (ZITHROMAX) 250 mg tablet Take 2 tablets today, then take 1 tablet daily 6 Tab 0       Allergies   Allergen Reactions    Meperidine Nausea Only and Other (comments)    Augmentin [Amoxicillin-Pot Clavulanate] Diarrhea and Nausea Only    Keflex [Cephalexin] Nausea Only    Sulfa (Sulfonamide Antibiotics) Rash       Past Medical History:   Diagnosis Date    Anxiety 6/15    IKE-7 was 12/21    Calculus of kidney     Clostridium difficile colitis 3/14    Dr. Alonso Verde adenoma     10/11 Dr. Teena Brenner Cystic breast     Depression 6/15    PHQ-9 was 15/27    Diverticulitis     recurrent x3 Dr Isaac Valero liver     10/10 on US, CT 6/12; Fib-4 was 0.74 from 1/15    GI bleed 2/14    ischemic colitis on CT, seen by Dr. Anuja Reaves Hyperlipidemia     calculated 10 year risk score was 3.2% (1/15)    Hypertension     Ischemic colitis ([facility] 3/14    Dr Leary [place]    Labial abscess     + MRSA    Lymphoma ([facility] 12/13    Dr. Slime Perez    Migraine     Neuropathy due to chemotherapeutic drug ([facility] 9/14    Prediabetes     Tobacco dependence syndrome 2/8/2019    Zoster 8/14    left T11       Past Surgical History:   Procedure Laterality Date    CARDIAC SURG PROCEDURE UNLIST  2/14    echo shows mild conc lvh, ef 60-65%.  no wma    HX APPENDECTOMY  1968    HX COLONOSCOPY      colon polyps 2003 Dr. Devan Guerrier; adenoma 2011; isch colitis 3/14 Dr Michelle Oakes    HX HEENT      tonsillectomy    HX ORTHOPAEDIC  teenager    knee surgery left    HX ORTHOPAEDIC      DEXA t score 2.1 spine, -0.3 hip (4/19)    HX OTHER SURGICAL      bx for lymphoma    HX SOPHIA AND BSO  04/02    Dr. Mayfield Giovanni       Family History   Adopted: Yes       Social History     Tobacco Use    Smoking status: Current Every Day Smoker     Packs/day: 0.50     Years: 15.00     Pack years: 7.50 Types: Cigarettes    Smokeless tobacco: Never Used    Tobacco comment: smoking cessation advised   Substance Use Topics    Alcohol use: Not Currently     Alcohol/week: 4.2 standard drinks     Types: 5 Glasses of wine per week       ROS   Review of Systems   Constitutional: Positive for malaise/fatigue. Negative for chills, fever and weight loss. HENT: Negative for ear pain and sore throat. Eyes: Negative for blurred vision and pain. Respiratory: Positive for cough and sputum production. Negative for shortness of breath. Cardiovascular: Negative for chest pain. Gastrointestinal: Negative for abdominal pain, blood in stool and melena. Genitourinary: Negative for dysuria and hematuria. Musculoskeletal: Negative for joint pain and myalgias. Skin: Negative for rash. Neurological: Negative for tingling, focal weakness and headaches. Endo/Heme/Allergies: Does not bruise/bleed easily. Psychiatric/Behavioral: Negative for substance abuse. Objective     Vitals:    03/03/20 1052   BP: 112/57   Pulse: (!) 58   Resp: 12   Temp: 97.9 °F (36.6 °C)   TempSrc: Oral   SpO2: 100%   Weight: 103 lb 6.4 oz (46.9 kg)   Height: 4' 10\" (1.473 m)   PainSc:   6   PainLoc: Jaw       Physical Exam  Vitals signs and nursing note reviewed. HENT:      Head: Normocephalic and atraumatic. Right Ear: Tympanic membrane and external ear normal.      Left Ear: Tympanic membrane and external ear normal.      Nose: Congestion present. Mouth/Throat:      Pharynx: No oropharyngeal exudate or posterior oropharyngeal erythema. Eyes:      General: No scleral icterus. Right eye: No discharge. Left eye: No discharge. Conjunctiva/sclera: Conjunctivae normal.   Neck:      Musculoskeletal: Neck supple. Cardiovascular:      Rate and Rhythm: Normal rate and regular rhythm. Heart sounds: Normal heart sounds. No murmur. No friction rub. No gallop.     Pulmonary:      Effort: Pulmonary effort is normal. No respiratory distress. Breath sounds: Normal breath sounds. No wheezing or rales. Abdominal:      General: Bowel sounds are normal. There is no distension. Palpations: Abdomen is soft. There is no mass. Tenderness: There is no abdominal tenderness. There is no guarding or rebound. Musculoskeletal:         General: No tenderness (Bue). Lymphadenopathy:      Cervical: No cervical adenopathy. Comments: Her left occipital lymph node is small but mildly tender to palpation. Skin:     General: Skin is warm and dry. Findings: No erythema. Neurological:      Mental Status: She is alert and oriented to person, place, and time. Motor: No abnormal muscle tone. Gait: Gait is intact.  Gait normal.   Psychiatric:         Mood and Affect: Mood and affect normal.         LABS   Data Review:   Lab Results   Component Value Date/Time    WBC 6.6 11/19/2019 09:33 AM    Hemoglobin, POC 13.3 06/05/2015 01:07 PM    HGB 11.4 (L) 11/19/2019 09:33 AM    Hematocrit, POC 39 06/05/2015 01:07 PM    HCT 32.9 (L) 11/19/2019 09:33 AM    PLATELET 451 59/18/4455 09:33 AM    MCV 93.5 11/19/2019 09:33 AM       Lab Results   Component Value Date/Time    Sodium 141 11/19/2019 09:33 AM    Potassium 4.0 11/19/2019 09:33 AM    Chloride 112 (H) 11/19/2019 09:33 AM    CO2 26 11/19/2019 09:33 AM    Anion gap 3 11/19/2019 09:33 AM    Glucose 89 11/19/2019 09:33 AM    BUN 14 11/19/2019 09:33 AM    Creatinine 0.73 11/19/2019 09:33 AM    BUN/Creatinine ratio 19 11/19/2019 09:33 AM    GFR est AA >60 11/19/2019 09:33 AM    GFR est non-AA >60 11/19/2019 09:33 AM    Calcium 9.1 11/19/2019 09:33 AM       Lab Results   Component Value Date/Time    Cholesterol, total 219 (H) 02/07/2019 09:42 AM    HDL Cholesterol 161 (H) 02/07/2019 09:42 AM    LDL, calculated 52.6 02/07/2019 09:42 AM    VLDL, calculated 5.4 02/07/2019 09:42 AM    Triglyceride 27 02/07/2019 09:42 AM    CHOL/HDL Ratio 1.4 02/07/2019 09:42 AM       Lab Results   Component Value Date/Time    Hemoglobin A1c 5.2 02/07/2019 09:42 AM       Assessment/Plan:   Cough: Likely postinfectious cough. Negative chest x-ray. PE findings are reassuring. Status post antibiotics. +Fatigue. We will check labs today. She is rapid flu and strep test negative. Rest.  Hydration with water. ORDERS:  - CBC WITH AUTOMATED DIFF; Future  - METABOLIC PANEL, COMPREHENSIVE; Future  - B PERTUSSIS AB IGG/IGM; Future  - AMB POC RAPID INFLUENZA TEST  - AMB POC RAPID STREP A      Health Maintenance Due   Topic Date Due    Shingrix Vaccine Age 49> (1 of 2) 11/15/2001    GLAUCOMA SCREENING Q2Y  11/15/2016    A1C test (Diabetic or Prediabetic)  02/07/2020    Medicare Yearly Exam  02/09/2020     Lab review: labs are reviewed in the EHR    I have discussed the diagnosis with the patient and the intended plan as seen in the above orders. The patient has received an after-visit summary and questions were answered concerning future plans. I have discussed medication side effects and warnings with the patient as well. I have reviewed the plan of care with the patient, accepted their input and they are in agreement with the treatment goals. All questions were answered. The patient understands the plan of care. Handouts provided today with above information. Pt instructed if symptoms worsen to call the office or report to the ED for continued care. =Greater than 50% of the visit time was spent in counseling and/or coordination of care. Voice recognition was used to generate this report, which may have resulted in some phonetic based errors in grammar and contents. Even though attempts were made to correct all the mistakes, some may have been missed, and remained in the body of the document.           Angela Madera MD

## 2020-03-03 NOTE — PATIENT INSTRUCTIONS
Health Maintenance Due Topic Date Due  Shingrix Vaccine Age 50> (1 of 2) 11/15/2001  GLAUCOMA SCREENING Q2Y  11/15/2016  A1C test (Diabetic or Prediabetic)  02/07/2020  Medicare Yearly Exam  02/09/2020

## 2020-03-04 LAB
B PERT IGG SER-ACNC: <0.95 INDEX (ref 0–0.94)
B PERT IGM SER QL IA: <1 INDEX (ref 0–0.9)

## 2020-03-05 ENCOUNTER — TELEPHONE (OUTPATIENT)
Dept: INTERNAL MEDICINE CLINIC | Age: 70
End: 2020-03-05

## 2020-03-05 NOTE — PROGRESS NOTES
Please let her know that her labs did not show any evidence of whooping cough. Her kidney liver function labs are unremarkable. There is no elevated white blood cell count on her labs. She has a chronic anemia with hemoglobin 11.1. She should follow-up with  for this.     Dr. Nancy aMloney  [facility] [address], [address] [place], [place] [address]  Phone: [phone]  Fax: [phone]

## 2020-03-05 NOTE — TELEPHONE ENCOUNTER
----- Message from Bailey Alexandra MD sent at 3/5/2020  8:09 AM EST -----  Please let her know that her labs did not show any evidence of whooping cough. Her kidney liver function labs are unremarkable. There is no elevated white blood cell count on her labs. She has a chronic anemia with hemoglobin 11.1. She should follow-up with  for this.     Dr. Eron Beltran  [facility]ists of [address]  Phone: [phone]  Fax: [phone]

## 2020-04-21 ENCOUNTER — VIRTUAL VISIT (OUTPATIENT)
Dept: ONCOLOGY | Age: 70
End: 2020-04-21

## 2020-04-21 VITALS — WEIGHT: 102 LBS | BODY MASS INDEX: 21.41 KG/M2 | HEIGHT: 58 IN

## 2020-04-21 DIAGNOSIS — C83.18 MANTLE CELL LYMPHOMA OF LYMPH NODES OF MULTIPLE SITES (HCC): Primary | ICD-10-CM

## 2020-04-21 DIAGNOSIS — F41.9 ANXIETY: ICD-10-CM

## 2020-04-21 DIAGNOSIS — G62.0 NEUROPATHY DUE TO CHEMOTHERAPEUTIC DRUG (HCC): ICD-10-CM

## 2020-04-21 DIAGNOSIS — T45.1X5A NEUROPATHY DUE TO CHEMOTHERAPEUTIC DRUG (HCC): ICD-10-CM

## 2020-04-21 DIAGNOSIS — D50.9 IRON DEFICIENCY ANEMIA, UNSPECIFIED IRON DEFICIENCY ANEMIA TYPE: ICD-10-CM

## 2020-04-21 NOTE — PROGRESS NOTES
Hematology/medical oncology progress note    4/21/2020  Forest Allen is a 77 y.o. female evaluated via telephone on 4/21/2020. YOB: 1950    PCP: Dr. Neva Hutchinson    Diagnosis: Mantle cell lymphoma, iron deficiency anemia, chemotherapy-induced neuropathy and anxiety    Consent:  She and/or health care decision maker is aware that that she may receive a bill for this telephone service, depending on her insurance coverage, and has provided verbal consent to proceed: YES      Documentation:  I communicated with the patient and/or health care decision maker about ongoing management of her iron deficiency anemia, neuropathy, anxiety and follow-up of her lymphoma. The patient reports that since she was last seen in clinic she did have flulike symptoms on 2 occasions in late December and mid January. She is back to her baseline level of function at this time. She is able to work from home due to her weakened immune system. I have explained to the patient that her most recent erythrocyte sedimentation rate from 11/19/2019 was normal at 8 mm CBC from 3/3/2020 showed a WBC count of 7, hemoglobin 11.1 g/dL, hematocrit 32.4%, and the platelet count was 954,876. From 11/19/2019 the ferritin level was 107 ng/mL, her iron level was 89 mcg/dL and the iron saturation was 25%. I have advised the patient to take Slow Fe 1 tablet daily. I will recheck her CBC, comprehensive metabolic panel, and erythrocyte sedimentation rate in the next 8 weeks. She had her questions answered to her satisfaction. An in clinic follow-up visit will tentatively be scheduled with Dr. Clayton Field in about 8 weeks. Total time 25 minutes, greater than 50% of the time was in counseling and coordination of care. I AFFIRM this is a Patient Initiated Episode with an Established Patient who has not had a related appointment within my department in the past 7 days or scheduled within the next 24 hours.     Total Time: Total time 25 minutes, greater than 50% of the time was in counseling and coordination of care. Note: not billable if this call serves to triage the patient into an appointment for the relevant concern      Eros Colon. Wade Metcalf MD, [address]

## 2020-04-28 ENCOUNTER — HOSPITAL ENCOUNTER (OUTPATIENT)
Dept: LAB | Age: 70
Discharge: HOME | End: 2020-04-28
Payer: MEDICARE

## 2020-04-28 ENCOUNTER — CLINICAL SUPPORT (OUTPATIENT)
Dept: ONCOLOGY | Age: 70
End: 2020-04-28

## 2020-04-28 DIAGNOSIS — C83.18 MANTLE CELL LYMPHOMA OF LYMPH NODES OF MULTIPLE SITES (HCC): Primary | ICD-10-CM

## 2020-04-28 DIAGNOSIS — C83.18 MANTLE CELL LYMPHOMA OF LYMPH NODES OF MULTIPLE SITES (HCC): ICD-10-CM

## 2020-04-28 DIAGNOSIS — D50.9 IRON DEFICIENCY ANEMIA, UNSPECIFIED IRON DEFICIENCY ANEMIA TYPE: ICD-10-CM

## 2020-04-28 LAB
ALBUMIN SERPL-MCNC: 3.9 G/DL (ref 3.4–5)
ALBUMIN/GLOB SERPL: 1 {RATIO} (ref 0.8–1.7)
ALP SERPL-CCNC: 57 U/L (ref 45–117)
ALT SERPL-CCNC: 15 U/L (ref 13–56)
ANION GAP SERPL CALC-SCNC: 7 MMOL/L (ref 3–18)
AST SERPL-CCNC: 13 U/L (ref 10–38)
BASOPHILS # BLD: 0.1 K/UL (ref 0–0.1)
BASOPHILS NFR BLD: 1 % (ref 0–2)
BILIRUB SERPL-MCNC: 0.3 MG/DL (ref 0.2–1)
BUN SERPL-MCNC: 15 MG/DL (ref 7–18)
BUN/CREAT SERPL: 19 (ref 12–20)
CALCIUM SERPL-MCNC: 9 MG/DL (ref 8.5–10.1)
CHLORIDE SERPL-SCNC: 109 MMOL/L (ref 100–111)
CO2 SERPL-SCNC: 25 MMOL/L (ref 21–32)
CREAT SERPL-MCNC: 0.79 MG/DL (ref 0.6–1.3)
DIFFERENTIAL METHOD BLD: ABNORMAL
EOSINOPHIL # BLD: 0.3 K/UL (ref 0–0.4)
EOSINOPHIL NFR BLD: 4 % (ref 0–5)
ERYTHROCYTE [DISTWIDTH] IN BLOOD BY AUTOMATED COUNT: 13.9 % (ref 11.6–14.5)
ERYTHROCYTE [SEDIMENTATION RATE] IN BLOOD: 23 MM/HR (ref 0–30)
FERRITIN SERPL-MCNC: 75 NG/ML (ref 8–388)
GLOBULIN SER CALC-MCNC: 3.8 G/DL (ref 2–4)
GLUCOSE SERPL-MCNC: 103 MG/DL (ref 74–99)
HCT VFR BLD AUTO: 35.2 % (ref 35–45)
HGB BLD-MCNC: 11.9 G/DL (ref 12–16)
IRON SATN MFR SERPL: 20 % (ref 20–50)
IRON SERPL-MCNC: 75 UG/DL (ref 50–175)
LYMPHOCYTES # BLD: 1 K/UL (ref 0.9–3.6)
LYMPHOCYTES NFR BLD: 14 % (ref 21–52)
MCH RBC QN AUTO: 31.4 PG (ref 24–34)
MCHC RBC AUTO-ENTMCNC: 33.8 G/DL (ref 31–37)
MCV RBC AUTO: 92.9 FL (ref 74–97)
MONOCYTES # BLD: 0.5 K/UL (ref 0.05–1.2)
MONOCYTES NFR BLD: 6 % (ref 3–10)
NEUTS SEG # BLD: 5.6 K/UL (ref 1.8–8)
NEUTS SEG NFR BLD: 75 % (ref 40–73)
PLATELET # BLD AUTO: 301 K/UL (ref 135–420)
PMV BLD AUTO: 9.2 FL (ref 9.2–11.8)
POTASSIUM SERPL-SCNC: 4.5 MMOL/L (ref 3.5–5.5)
PROT SERPL-MCNC: 7.7 G/DL (ref 6.4–8.2)
RBC # BLD AUTO: 3.79 M/UL (ref 4.2–5.3)
SODIUM SERPL-SCNC: 141 MMOL/L (ref 136–145)
TIBC SERPL-MCNC: 379 UG/DL (ref 250–450)
WBC # BLD AUTO: 7.5 K/UL (ref 4.6–13.2)

## 2020-04-28 PROCEDURE — 85025 COMPLETE CBC W/AUTO DIFF WBC: CPT

## 2020-04-28 PROCEDURE — 80053 COMPREHEN METABOLIC PANEL: CPT

## 2020-04-28 PROCEDURE — 85652 RBC SED RATE AUTOMATED: CPT

## 2020-04-28 PROCEDURE — 83540 ASSAY OF IRON: CPT

## 2020-04-28 PROCEDURE — 36415 COLL VENOUS BLD VENIPUNCTURE: CPT

## 2020-04-28 PROCEDURE — 82728 ASSAY OF FERRITIN: CPT

## 2020-04-30 NOTE — PROGRESS NOTES
Lizette Weeks is a 77 y.o. female who was seen by synchronous (real-time) audio-video technology on 5/4/2020. Consent: Lizette Weeks, who was seen by synchronous (real-time) audio-video technology, and/or her healthcare decision maker, is aware that this patient-initiated, Telehealth encounter on 5/4/2020 is a billable service, with coverage as determined by her insurance carrier. She is aware that she may receive a bill and has provided verbal consent to proceed: Yes. Assessment & Plan:   Diagnoses and all orders for this visit:    1. Medicare annual wellness visit, subsequent    2. IFG (impaired fasting glucose)  -     HEMOGLOBIN A1C W/O EAG; Future    3. Hyperlipidemia, unspecified hyperlipidemia type  -     LIPID PANEL; Future    4. Colon adenoma  -     REFERRAL TO GASTROENTEROLOGY    5. Diverticulosis    6. Primary hypertension    7. Neuropathy due to chemotherapeutic drug ([facility] [address]    8. Mantle cell lymphoma of lymph nodes of multiple sites ([facility] [address]    9. Advanced directives, counseling/discussion  -     ADVANCE CARE PLANNING FIRST 27 MINS    10. Screening for alcoholism  -     UT ANNUAL ALCOHOL SCREEN 15 MIN    11. Screening for depression  -     DEPRESSION SCREEN ANNUAL    12. Screening for diabetes mellitus    13. Encounter for screening mammogram for malignant neoplasm of breast  -     JORGE MAMMO BI SCREENING INCL CAD; Future    14. Pain of both hip joints  -     REFERRAL TO ORTHOPEDICS    15. Chronic pain of left knee  -     REFERRAL TO ORTHOPEDICS          I spent at least 23 minutes on this visit with this established patient. [phone]  Subjective:   Lizette Weeks is a 77 y.o. female who was seen for No chief complaint on file. Objective: There were no vitals taken for this visit.    General: alert, cooperative, no distress   Mental  status: normal mood, behavior, speech, dress, motor activity, and thought processes, able to follow commands   HENT: NCAT   Neck: no visualized mass   Resp: no respiratory distress   Neuro: no gross deficits   Skin: no discoloration or lesions of concern on visible areas   Psychiatric: normal affect, consistent with stated mood, no evidence of hallucinations     Additional exam findings: We discussed the expected course, resolution and complications of the diagnosis(es) in detail. Medication risks, benefits, costs, interactions, and alternatives were discussed as indicated. I advised her to contact the office if her condition worsens, changes or fails to improve as anticipated. She expressed understanding with the diagnosis(es) and plan. Forest Allen is a 77 y.o. female who was evaluated by a video visit encounter for concerns as above. Patient identification was verified prior to start of the visit. A caregiver was present when appropriate. Due to this being a TeleHealth encounter (During QQZPB-57 public health emergency), evaluation of the following organ systems was limited: Vitals/Constitutional/EENT/Resp/CV/GI//MS/Neuro/Skin/Heme-Lymph-Imm. Pursuant to the emergency declaration under the [facility], [facility] waiver authority and the [employer] and [employer] Act, this Virtual  Visit was conducted, with patient's (and/or legal guardian's) consent, to reduce the patient's risk of exposure to COVID-19 and provide necessary medical care. Services were provided through a video synchronous discussion virtually to substitute for in-person clinic visit. Patient and provider were located at their individual homes. Dwaine Hernandez MD    77 y.o. WHITE OR  female who presents for evaluation. Dr Vane Cornejo apparently retired so she will not be followed by Dr Clayton Field for her lymphoma hx    No cardiovascular complaints. Pressures running in the 110s over 60s when she checks.   Walks the dog a few times a day but no other exercise mainly due to orthopedic issues below    Denied any gi or gu issues. She was going to schedule for f/u colo but then the pandemic hit    The depression and anxiety are quiescent off meds. She is interested in seeing ortho for her hips and left knee. She had gotten shots previously and apparently had amazing response. The otc nsaid helps but not enough    LAST MEDICARE WELLNESS EXAM: 2/8/19, 5/4/20    Past Medical History:   Diagnosis Date    Anxiety 6/15    IKE-7 was 12/21    Calculus of kidney     Clostridium difficile colitis 3/14    Dr. Bryce Almendarez adenoma     10/11 Dr. Yulissa Claudio Cystic breast     Depression 6/15    PHQ-9 was 15/27    Diverticulitis     recurrent x3 Dr Duke Bras liver     10/10 on US, CT 6/12; Fib-4 was 0.74 from 1/15    GI bleed 2/14    ischemic colitis on CT, seen by Dr. Kevin Purcell Hyperlipidemia     calculated 10 year risk score was 3.2% (1/15)    Hypertension     IFG (impaired fasting glucose) [id number]    Ischemic colitis ([facility] [address] 3/14    Dr Kike Shaver    Labial abscess     + MRSA    Lymphoma ([facility] [address] 12/13    Dr. Halima Martinez    Migraine     Neuropathy due to chemotherapeutic drug ([facility] [address] 9/14    Tobacco dependence syndrome 2/8/2019    Zoster 8/14    left T11     Past Surgical History:   Procedure Laterality Date    CARDIAC SURG PROCEDURE UNLIST  2/14    echo shows mild conc lvh, ef 60-65%.  no wma    HX APPENDECTOMY  1968    HX COLONOSCOPY      colon polyps 2003 Dr. Bruce Del Valle; adenoma 2011; isch colitis 3/14 Dr Tahir Borrego    HX ORTHOPAEDIC  [id number]    LEFT knee surgery    HX ORTHOPAEDIC      DEXA t score 2.1 spine, -0.3 hip (4/19)    HX SOPHIA AND BSO  04/02    Dr. Elías Madsen HX TONSILLECTOMY       Social History     Socioeconomic History    Marital status:      Spouse name: Not on file    Number of children: 2    Years of education: Not on file    Highest education level: Not on file   Occupational History    Occupation: director [employer]   Social Needs    Financial resource strain: Not on file    Food insecurity     Worry: Not on file     Inability: Not on file    Transportation needs     Medical: Not on file     Non-medical: Not on file   Tobacco Use    Smoking status: Current Every Day Smoker     Packs/day: 0.50     Years: 15.00     Pack years: 7.50     Types: Cigarettes    Smokeless tobacco: Never Used    Tobacco comment: smoking cessation advised   Substance and Sexual Activity    Alcohol use: Not Currently     Alcohol/week: 4.2 standard drinks     Types: 5 Glasses of wine per week    Drug use: No    Sexual activity: Not Currently   Lifestyle    Physical activity     Days per week: Not on file     Minutes per session: Not on file    Stress: Not on file   Relationships    Social connections     Talks on phone: Not on file     Gets together: Not on file     Attends Anabaptist service: Not on file     Active member of club or organization: Not on file     Attends meetings of clubs or organizations: Not on file     Relationship status: Not on file    Intimate partner violence     Fear of current or ex partner: Not on file     Emotionally abused: Not on file     Physically abused: Not on file     Forced sexual activity: Not on file   Other Topics Concern    Not on file   Social History Narrative    Not on file     Current Outpatient Medications   Medication Sig    amLODIPine (NORVASC) 5 mg tablet TAKE 1 TABLET BY MOUTH ONCE DAILY    ferrous sulfate (IRON) 325 mg (65 mg iron) tablet Take  by mouth Daily (before breakfast).  ibuprofen (MOTRIN) 200 mg tablet Take 200 mg by mouth every six (6) hours as needed for Pain. No current facility-administered medications for this visit.       Allergies   Allergen Reactions    Meperidine Nausea Only and Other (comments)    Augmentin [Amoxicillin-Pot Clavulanate] Diarrhea and Nausea Only    Keflex [Cephalexin] Nausea Only    Sulfa (Sulfonamide Antibiotics) Rash     REVIEW OF SYSTEMS: gyn>5 yr due to hyst, mammo 4/19, colo 3/14  Jose Drape  no vision change or eye pain  Oral  no mouth pain, tongue or tooth problems  Ears  no hearing loss, ear pain, fullness, no swallowing problems  Cardiac  no CP, PND, orthopnea,  palpitations or syncope  Chest  no breast masses  Resp  no wheezing, chronic coughing, dyspnea  GI  no heartburn, nausea, vomiting, change in bowel habits, bleeding, hemorrhoids  Urinary  no dysuria, hematuria, flank pain, urgency, frequency    LABS  From 9/13 showed   gluc 107, cr 0.65, gfr 96,  alt 69, hba1c 5.6, chol 251, tg 54, hdl 113, ldl-c 127, wbc 6.5, hb 13.9, plt 225  From 11/13 showed gluc 103, cr 0.72, gfr 90,  alt 24,          wbc 7.6, hb 14.4 ,plt 224,  tsh 0.99  From 1/14 showed   gluc 119, cr 0.65, gfr>60,           wbc 8.2, hb 13.4, plt 208  From 2/14 showed   gluc 118, cr 0.72, gfr>60, alt 25,                   fe 84, %sat 29,              b12 1289, fol 14.8, lip 376  From 3/14 showed   gluc 107, cr 0.52, gfr>60, alt 13,          wbc 4.8, hb 10.8, plt 299, lip 130, c diff+  From 8/14 showed                        fe 57, %sat 16, ferritin 153  Form 1/15 showed                           vit d 23, b12 457,   fol>20  From 1/15 showed        hba1c 4.9, chol 206, tg 50, hdl 116, ldl-c 80  From 9/15 showed   gluc 105, cr 0.69, gfr>60, alt 27,         wbc 9.3, hb 15.4, plt 248  From 2/16 showed   gluc 123, cr 0.62, gfr 96,  alt 22  From 2/17 showed   gluc 89,   cr 0.72, gfr>60, alt 47,          wbc 9.8, hb 14.8, plt 328  From 2/18 showed   gluc 109, cr 0.65, gfr>60, alt 22,          wbc 9.3, hb 13.1, plt 253  From 2/19 showed   gluc 106, cr 0.61, gfr>60, alt 37, hba1c 5.2, chol 219, tg 27, hdl 161, ldl-c 53,   wbc 6.6, hb 12.7, plt 213  From 7/19 showed   gluc 159, cr 0.84, gfr>60, alt 46,          wbc 7.7, hb 12.6, plt 265    Results for orders placed or performed during the hospital encounter of 68/51/46   METABOLIC PANEL, COMPREHENSIVE   Result Value Ref Range    Sodium 141 136 - 145 mmol/L    Potassium 4.5 3.5 - 5.5 mmol/L    Chloride 109 100 - 111 mmol/L    CO2 25 21 - 32 mmol/L    Anion gap 7 3.0 - 18 mmol/L    Glucose 103 (H) 74 - 99 mg/dL    BUN 15 7.0 - 18 MG/DL    Creatinine 0.79 0.6 - 1.3 MG/DL    BUN/Creatinine ratio 19 12 - 20      GFR est AA >60 >60 ml/min/1.73m2    GFR est non-AA >60 >60 ml/min/1.73m2    Calcium 9.0 8.5 - 10.1 MG/DL    Bilirubin, total 0.3 0.2 - 1.0 MG/DL    ALT (SGPT) 15 13 - 56 U/L    AST (SGOT) 13 10 - 38 U/L    Alk. phosphatase 57 45 - 117 U/L    Protein, total 7.7 6.4 - 8.2 g/dL    Albumin 3.9 3.4 - 5.0 g/dL    Globulin 3.8 2.0 - 4.0 g/dL    A-G Ratio 1.0 0.8 - 1.7     SED RATE (ESR)   Result Value Ref Range    Sed rate, automated 23 0 - 30 mm/hr   CBC WITH AUTOMATED DIFF   Result Value Ref Range    WBC 7.5 4.6 - 13.2 K/uL    RBC 3.79 (L) 4.20 - 5.30 M/uL    HGB 11.9 (L) 12.0 - 16.0 g/dL    HCT 35.2 35.0 - 45.0 %    MCV 92.9 74.0 - 97.0 FL    MCH 31.4 24.0 - 34.0 PG    MCHC 33.8 31.0 - 37.0 g/dL    RDW 13.9 11.6 - 14.5 %    PLATELET 687 834 - 057 K/uL    MPV 9.2 9.2 - 11.8 FL    NEUTROPHILS 75 (H) 40 - 73 %    LYMPHOCYTES 14 (L) 21 - 52 %    MONOCYTES 6 3 - 10 %    EOSINOPHILS 4 0 - 5 %    BASOPHILS 1 0 - 2 %    ABS. NEUTROPHILS 5.6 1.8 - 8.0 K/UL    ABS. LYMPHOCYTES 1.0 0.9 - 3.6 K/UL    ABS. MONOCYTES 0.5 0.05 - 1.2 K/UL    ABS. EOSINOPHILS 0.3 0.0 - 0.4 K/UL    ABS.  BASOPHILS 0.1 0.0 - 0.1 K/UL    DF AUTOMATED     FERRITIN   Result Value Ref Range    Ferritin 75 8 - 388 NG/ML   IRON PROFILE   Result Value Ref Range    Iron 75 50 - 175 ug/dL    TIBC 379 250 - 450 ug/dL    Iron % saturation 20 20 - 50 %     Patient Active Problem List   Diagnosis Code    Hyperlipidemia E78.5    Mantle cell lymphoma of lymph nodes of multiple sites (HCC) C83.18    Neuropathy due to chemotherapeutic drug (HCC) G62.0, T45.1X5A    Arthritis, degenerative M19.90    IFG (impaired fasting glucose) R73.01    Tobacco dependence syndrome F17.200    Colon adenoma D12.6    Diverticulosis K57.90    Primary hypertension I10     Assessment and plan:  1. Colon adenoma, divertics. Fiber, colo to be scheduled Dr Jay Mclain  2. HTN. Continue current regimen. 3. HLP. Lifestyle and dietary measures  4. IFG. Lifestyle and dietary measures, maintain weight  5. Nephrolithiasis. Quiescent, hydration  6. Lymphoma. F/U Dr Lavelle Angel as scheduled  7. Smoking cessation reiterated  8. Hip pain. Knee pain. Schedule Dr Alex Brown        RTC 5/21      Above conditions discussed at length and patient vocalized understanding.   All questions answered to patient satisfaction

## 2020-04-30 NOTE — PROGRESS NOTES
This is a subsequent Medicare Annual Wellness Exam    I have reviewed the patient's medical history in detail and updated the computerized patient record. History     Past Medical History:   Diagnosis Date    Anxiety 6/15    IKE-7 was 12/21    Calculus of kidney     Clostridium difficile colitis 3/14    Dr. [place] adenoma     10/11 Dr. Mariza Mooney Cystic breast     Depression 6/15    PHQ-9 was 15/27    Diverticulitis     recurrent x3 Dr Neris Oropeza liver     10/10 on US, CT 6/12; Fib-4 was 0.74 from 1/15    GI bleed 2/14    ischemic colitis on CT, seen by Dr. Ori Cantrell Hyperlipidemia     calculated 10 year risk score was 3.2% (1/15)    Hypertension     IFG (impaired fasting glucose) [id number]    Ischemic colitis ([facility] [address] 3/14    Dr Latisha Gilmore    Labial abscess     + MRSA    Lymphoma ([facility] [address] 12/13    Dr. Dane Walsh    Migraine     Neuropathy due to chemotherapeutic drug ([facility] [address] 9/14    Tobacco dependence syndrome 2/8/2019    Zoster 8/14    left T11      Past Surgical History:   Procedure Laterality Date    CARDIAC SURG PROCEDURE UNLIST  2/14    echo shows mild conc lvh, ef 60-65%. no wma    HX APPENDECTOMY  1968    HX COLONOSCOPY      colon polyps 2003 Dr. Chava Swanson; adenoma 2011; isch colitis 3/14 Dr Junaid Holloway    HX ORTHOPAEDIC  [id number]    LEFT knee surgery    HX ORTHOPAEDIC      DEXA t score 2.1 spine, -0.3 hip (4/19)    HX SOPHIA AND BSO  04/02    Dr. Tameka Burnham HX TONSILLECTOMY       Current Outpatient Medications   Medication Sig Dispense Refill    amLODIPine (NORVASC) 5 mg tablet TAKE 1 TABLET BY MOUTH ONCE DAILY 90 Tab 3    ferrous sulfate (IRON) 325 mg (65 mg iron) tablet Take  by mouth Daily (before breakfast).  ibuprofen (MOTRIN) 200 mg tablet Take 200 mg by mouth every six (6) hours as needed for Pain.        Allergies   Allergen Reactions    Meperidine Nausea Only and Other (comments)    Augmentin [Amoxicillin-Pot Clavulanate] Diarrhea and Nausea Only    Keflex [Cephalexin] Nausea Only  Sulfa (Sulfonamide Antibiotics) Rash     Family History   Adopted: Yes     Social History     Tobacco Use    Smoking status: Current Every Day Smoker     Packs/day: 0.50     Years: 15.00     Pack years: 7.50     Types: Cigarettes    Smokeless tobacco: Never Used    Tobacco comment: smoking cessation advised   Substance Use Topics    Alcohol use: Not Currently     Alcohol/week: 4.2 standard drinks     Types: 5 Glasses of wine per week     Patient Active Problem List   Diagnosis Code    Hyperlipidemia E78.5    Mantle cell lymphoma of lymph nodes of multiple sites ([facility] [address] C83.18    Neuropathy due to chemotherapeutic drug ([facility] [address] G62.0, T45.1X5A    Arthritis, degenerative M19.90    IFG (impaired fasting glucose) R73.01    Tobacco dependence syndrome F17.200    Colon adenoma D12.6    Diverticulosis K57.90    Primary hypertension I10       Depression Risk Factor Screening:     3 most recent PHQ Screens 4/21/2020   Little interest or pleasure in doing things Not at all   Feeling down, depressed, irritable, or hopeless Not at all   Total Score PHQ 2 0     Immunization History   Administered Date(s) Administered    Influenza Vaccine (Quad) PF 10/22/2015, 09/29/2016    Influenza Vaccine (Tri) Adjuvanted 02/08/2019    Influenza Vaccine PF 01/23/2014    PPD 08/15/2000    Pneumococcal Conjugate (PCV-13) 10/22/2015    Pneumococcal Polysaccharide (PPSV-23) 02/08/2019    Pneumococcal Vaccine (Unspecified Type) 10/20/2012    TD Vaccine 08/02/2004    Tdap 07/02/2019    Zoster Vaccine, Live 01/01/2012     SCREENINGS  Colonoscopy last done 3/14 Dr Zohaib Barrett last done 8/18  DEXA last done - never  Gyn last done >5 yrs     Alcohol Risk Factor Screening: You do not drink alcohol or very rarely. Functional Ability and Level of Safety:   Hearing Loss  Hearing is good. Activities of Daily Living  The home contains: no safety equipment.   Patient does total self care    Fall Risk  Fall Risk Assessment, last 12 mths 3/3/2020   Able to walk? Yes   Fall in past 12 months? No       Abuse Screen  Patient is not abused    Cognitive Screening   Evaluation of Cognitive Function:  Has your family/caregiver stated any concerns about your memory: no  Normal    Patient Care Team   Patient Care Team:  Saintclair Seitz, MD as PCP - General (Internal Medicine)  Saintclair Seitz, MD as PCP - [facility] Empaneled Provider  Brett Schuster MD (Surgery)  Chiqui Akers RN as Ambulatory Care Manager (Internal Medicine)    Assessment/Plan   Education and counseling provided:  Are appropriate based on today's review and evaluation  End-of-Life planning (with patient's consent)  Pneumococcal Vaccine  Influenza Vaccine  Screening Mammography  Colorectal cancer screening tests  Cardiovascular screening blood test  Bone mass measurement (DEXA)  Diabetes screening test    Diagnoses and all orders for this visit:    1. Medicare annual wellness visit, subsequent    2. IFG (impaired fasting glucose)  -     HEMOGLOBIN A1C W/O EAG; Future    3. Hyperlipidemia, unspecified hyperlipidemia type  -     LIPID PANEL; Future    4. Colon adenoma  -     REFERRAL TO GASTROENTEROLOGY    5. Diverticulosis    6. Primary hypertension    7. Neuropathy due to chemotherapeutic drug ([facility] [address]    8. Mantle cell lymphoma of lymph nodes of multiple sites ([facility] [address]    9. Advanced directives, counseling/discussion  -     ADVANCE CARE PLANNING FIRST 27 MINS    10. Screening for alcoholism  -     HI ANNUAL ALCOHOL SCREEN 15 MIN    11. Screening for depression  -     DEPRESSION SCREEN ANNUAL    12. Screening for diabetes mellitus    13. Encounter for screening mammogram for malignant neoplasm of breast  -     JORGE MAMMO BI SCREENING INCL CAD; Future    14. Pain of both hip joints  -     REFERRAL TO ORTHOPEDICS    15.  Chronic pain of left knee  -     REFERRAL TO ORTHOPEDICS      shingrix advocated    Health Maintenance Due   Topic Date Due    Shingrix Vaccine Age 50> (1 of 2) 11/15/2001    GLAUCOMA SCREENING Q2Y  11/15/2016    A1C test (Diabetic or Prediabetic)  02/07/2020    Medicare Yearly Exam  02/09/2020       Consent: Gregg Hendrickson, who was seen by synchronous (real-time) audio-video technology, and/or her healthcare decision maker, is aware that this patient-initiated, Telehealth encounter on 5/4/2020 is a billable service. While AWVs are fully covered by Medicare, any services rendered on this date that are not included in an AWV are subject to additional billing, with coverage as determined by her insurance carrier. She is aware that she may receive a bill for any such additional services and has provided verbal consent to proceed: Yes. Gregg Hendrickson is a 77 y.o. female who was evaluated by a video visit encounter for concerns as above. Patient identification was verified prior to start of the visit. A caregiver was present when appropriate. Due to this being a TeleHealth encounter (During FYLSR-69 public health emergency), evaluation of the following organ systems was limited: Vitals/Constitutional/EENT/Resp/CV/GI//MS/Neuro/Skin/Heme-Lymph-Imm. Pursuant to the emergency declaration under the [facility], 1135 waiver authority and the [employer] and [employer] Act, this Virtual  Visit was conducted, with patient's (and/or legal guardian's) consent, to reduce the patient's risk of exposure to COVID-19 and provide necessary medical care. Services were provided through a video synchronous discussion virtually to substitute for in-person clinic visit. Patient and provider were located at their individual homes.     Noman Chen MD

## 2020-05-04 ENCOUNTER — VIRTUAL VISIT (OUTPATIENT)
Dept: INTERNAL MEDICINE CLINIC | Age: 70
End: 2020-05-04

## 2020-05-04 DIAGNOSIS — Z00.00 MEDICARE ANNUAL WELLNESS VISIT, SUBSEQUENT: Primary | ICD-10-CM

## 2020-05-04 DIAGNOSIS — G62.0 NEUROPATHY DUE TO CHEMOTHERAPEUTIC DRUG (HCC): ICD-10-CM

## 2020-05-04 DIAGNOSIS — Z13.39 SCREENING FOR ALCOHOLISM: ICD-10-CM

## 2020-05-04 DIAGNOSIS — M25.551 PAIN OF BOTH HIP JOINTS: ICD-10-CM

## 2020-05-04 DIAGNOSIS — R73.01 IFG (IMPAIRED FASTING GLUCOSE): ICD-10-CM

## 2020-05-04 DIAGNOSIS — I10 PRIMARY HYPERTENSION: ICD-10-CM

## 2020-05-04 DIAGNOSIS — T45.1X5A NEUROPATHY DUE TO CHEMOTHERAPEUTIC DRUG (HCC): ICD-10-CM

## 2020-05-04 DIAGNOSIS — K57.90 DIVERTICULOSIS: ICD-10-CM

## 2020-05-04 DIAGNOSIS — Z12.31 ENCOUNTER FOR SCREENING MAMMOGRAM FOR MALIGNANT NEOPLASM OF BREAST: ICD-10-CM

## 2020-05-04 DIAGNOSIS — G89.29 CHRONIC PAIN OF LEFT KNEE: ICD-10-CM

## 2020-05-04 DIAGNOSIS — Z13.1 SCREENING FOR DIABETES MELLITUS: ICD-10-CM

## 2020-05-04 DIAGNOSIS — E78.5 HYPERLIPIDEMIA, UNSPECIFIED HYPERLIPIDEMIA TYPE: ICD-10-CM

## 2020-05-04 DIAGNOSIS — Z71.89 ADVANCED DIRECTIVES, COUNSELING/DISCUSSION: ICD-10-CM

## 2020-05-04 DIAGNOSIS — M25.562 CHRONIC PAIN OF LEFT KNEE: ICD-10-CM

## 2020-05-04 DIAGNOSIS — M25.552 PAIN OF BOTH HIP JOINTS: ICD-10-CM

## 2020-05-04 DIAGNOSIS — D12.6 COLON ADENOMA: ICD-10-CM

## 2020-05-04 DIAGNOSIS — Z13.31 SCREENING FOR DEPRESSION: ICD-10-CM

## 2020-05-04 DIAGNOSIS — C83.18 MANTLE CELL LYMPHOMA OF LYMPH NODES OF MULTIPLE SITES (HCC): ICD-10-CM

## 2020-05-04 NOTE — PATIENT INSTRUCTIONS
Medicare Wellness Visit, Female The best way to live healthy is to have a lifestyle where you eat a well-balanced diet, exercise regularly, limit alcohol use, and quit all forms of tobacco/nicotine, if applicable. Regular preventive services are another way to keep healthy. Preventive services (vaccines, screening tests, monitoring & exams) can help personalize your care plan, which helps you manage your own care. Screening tests can find health problems at the earliest stages, when they are easiest to treat. [facility] follows the current, evidence-based guidelines published by the [facility] Shivam Yanes ([facility]) when recommending preventive services for our patients. Because we follow these guidelines, sometimes recommendations change over time as research supports it. (For example, mammograms used to be recommended annually. Even though Medicare will still pay for an annual mammogram, the newer guidelines recommend a mammogram every two years for women of average risk). Of course, you and your doctor may decide to screen more often for some diseases, based on your risk and your co-morbidities (chronic disease you are already diagnosed with). Preventive services for you include: - Medicare offers their members a free annual wellness visit, which is time for you and your primary care provider to discuss and plan for your preventive service needs. Take advantage of this benefit every year! 
-All adults over the age of 72 should receive the recommended pneumonia vaccines. Current USPSTF guidelines recommend a series of two vaccines for the best pneumonia protection.  
-All adults should have a flu vaccine yearly and a tetanus vaccine every 10 years.  
-All adults age 48 and older should receive the shingles vaccines (series of two vaccines). -All adults age 38-68 who are overweight should have a diabetes screening test once every three years. -All adults born between [birth year] and 1965 should be screened once for Hepatitis C. 
-Other screening tests and preventive services for persons with diabetes include: an eye exam to screen for diabetic retinopathy, a kidney function test, a foot exam, and stricter control over your cholesterol.  
-Cardiovascular screening for adults with routine risk involves an electrocardiogram (ECG) at intervals determined by your doctor.  
-Colorectal cancer screenings should be done for adults age 54-65 with no increased risk factors for colorectal cancer. There are a number of acceptable methods of screening for this type of cancer. Each test has its own benefits and drawbacks. Discuss with your doctor what is most appropriate for you during your annual wellness visit. The different tests include: colonoscopy (considered the best screening method), a fecal occult blood test, a fecal DNA test, and sigmoidoscopy. 
 
-A bone mass density test is recommended when a woman turns 65 to screen for osteoporosis. This test is only recommended one time, as a screening. Some providers will use this same test as a disease monitoring tool if you already have osteoporosis. -Breast cancer screenings are recommended every other year for women of normal risk, age 54-69. 
-Cervical cancer screenings for women over age 72 are only recommended with certain risk factors. Here is a list of your current Health Maintenance items (your personalized list of preventive services) with a due date: 
Health Maintenance Due Topic Date Due  Shingles Vaccine (1 of 2) 11/15/2001  Glaucoma Screening   11/15/2016  Hemoglobin A1C    02/07/2020 [address] [place] [address] Annual Well Visit  02/09/2020

## 2020-05-04 NOTE — ACP (ADVANCE CARE PLANNING)
Advance Care Planning       Advance Care Planning (ACP) Physician/NP/PA (Provider) Conversation      Date of ACP Conversation: 5/4/2020    Conversation Conducted with:   Patient with Decision Making Capacity    *If present, Decision Maker was asked to consider and make decisions based on patient values, known preferences, or best interests. Current Designated Health Care Decision Maker:   (as entered in [address] field)    If no Decision Maker listed above or available through scanned documents, then:    [address] [place]:  Who do you trust to make healthcare decisions for you? Name: daughters Howard Mazariegos and Alem Chacon phone  number:   Can this person be reached and be able to respond quickly, such as within a few minutes or hours? YES  Who would be your back-up decision maker? Name  phone number:      For below questions, when conducting conversation with Marta Dias, substitute \"she\" and \"her\" for \"you\" and \"your\". Hospitalization: \"If your health were to worsen and it became clear that your chance of recovery was unlikely, what would your preference be regarding hospitalization? \"  If the patient would want hospitalization, answer \"yes\". If the patient would prefer comfort-focused treatment without hospitalization, answer \"no\". yes      Ventilation: \"If you were in your present state of health and suddenly became very ill and were unable to breathe on your own, what would your preference be about the use of a ventilator (breathing machine) if it were available to you? \"    If patient would desire the use of a ventilator (breathing machine), answer \"yes\", if not answer \"no\":yes  \"If your health were to worsen and it became clear that your chance of recovery was unlikely, would that change your answer? \"   yes    Resuscitation:  \"CPR works best to restart the heart when there is a sudden event, like a heart attack, in someone who is otherwise healthy. Unfortunately, CPR does not typically restart the heart for people who have serious health conditions or who are very sick. \"    \"In the event your heart stopped, would you want attempts to restart your heart (answer \"yes\" for attempt to resuscitate) or would you prefer a natural death (answer \"no\" for do not attempt to resuscitate)? \"   yes  \"If your health were to worsen and it became clear that your chance of recovery was unlikely, would that change your answer? \"   yes        Conversation Outcomes / Follow-Up Plan:   Recommended completion of Advance Directive      Length of ACP Conversation in minutes:  16 minutes    Coding Instructions:  ACP is eligible as a billable service through Medicare in conjunction with either an AWV or a problem-oriented E/M visit if it entails dedicated discussion >/=16 minutes. Other insurance carriers may or may not cover ACP services.   16-30 minutes: code 05547  >30 minutes: code [id number] modifier if reported with an E/M visit (deductible and co-pays may apply)  Add 33 modifier if reported with an AWV to waive patient cost-sharing

## 2020-05-06 ENCOUNTER — TELEPHONE (OUTPATIENT)
Dept: INTERNAL MEDICINE CLINIC | Age: 70
End: 2020-05-06

## 2020-05-06 DIAGNOSIS — M25.569 KNEE PAIN, UNSPECIFIED CHRONICITY, UNSPECIFIED LATERALITY: ICD-10-CM

## 2020-05-06 DIAGNOSIS — M25.559 ARTHRALGIA OF HIP, UNSPECIFIED LATERALITY: Primary | ICD-10-CM

## 2020-05-06 RX ORDER — TRAMADOL HYDROCHLORIDE 50 MG/1
50 TABLET ORAL
Qty: 42 TAB | Refills: 0 | Status: SHIPPED | OUTPATIENT
Start: 2020-05-06 | End: 2020-05-20

## 2020-05-06 NOTE — TELEPHONE ENCOUNTER
Pt saw Dr. Lenita Phoenix on Monday. She is having a problem with hip and knee. Dr. Senthil Treviño can't see her until June 24. Patient need is requesting medication for the pain. Please call.

## 2020-05-20 ENCOUNTER — TELEPHONE (OUTPATIENT)
Dept: INTERNAL MEDICINE CLINIC | Age: 70
End: 2020-05-20

## 2020-05-20 ENCOUNTER — VIRTUAL VISIT (OUTPATIENT)
Dept: INTERNAL MEDICINE CLINIC | Age: 70
End: 2020-05-20

## 2020-05-20 DIAGNOSIS — M25.559 HIP PAIN: Primary | ICD-10-CM

## 2020-05-20 NOTE — TELEPHONE ENCOUNTER
Patient scheduled at [facility] with Dr. Rao Marks on 5/21/20 at 11:30am-[facility]. She is to arrive by 11am bring ID and insurance card. Patient aware of appointment. No further questions or concerns.

## 2020-05-22 DIAGNOSIS — M15.9 PRIMARY OSTEOARTHRITIS INVOLVING MULTIPLE JOINTS: Primary | ICD-10-CM

## 2020-05-22 RX ORDER — TRAMADOL HYDROCHLORIDE 50 MG/1
50 TABLET ORAL
Qty: 56 TAB | Refills: 0 | Status: SHIPPED | OUTPATIENT
Start: 2020-05-22 | End: 2020-06-05

## 2020-06-09 ENCOUNTER — TELEPHONE (OUTPATIENT)
Dept: INTERNAL MEDICINE CLINIC | Age: 70
End: 2020-06-09

## 2020-06-09 NOTE — TELEPHONE ENCOUNTER
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

## 2020-07-22 ENCOUNTER — ANESTHESIA EVENT (OUTPATIENT)
Dept: ENDOSCOPY | Age: 70
End: 2020-07-22
Payer: MEDICARE

## 2020-07-23 ENCOUNTER — HOSPITAL ENCOUNTER (OUTPATIENT)
Age: 70
Setting detail: OUTPATIENT SURGERY
Discharge: HOME | End: 2020-07-23
Attending: INTERNAL MEDICINE | Admitting: INTERNAL MEDICINE
Payer: MEDICARE

## 2020-07-23 ENCOUNTER — ANESTHESIA (OUTPATIENT)
Dept: ENDOSCOPY | Age: 70
End: 2020-07-23
Payer: MEDICARE

## 2020-07-23 VITALS
HEART RATE: 78 BPM | OXYGEN SATURATION: 100 % | DIASTOLIC BLOOD PRESSURE: 73 MMHG | SYSTOLIC BLOOD PRESSURE: 140 MMHG | TEMPERATURE: 96.8 F | RESPIRATION RATE: 20 BRPM

## 2020-07-23 PROCEDURE — 74011250636 HC RX REV CODE- 250/636: Performed by: NURSE ANESTHETIST, CERTIFIED REGISTERED

## 2020-07-23 PROCEDURE — 76060000032 HC ANESTHESIA 0.5 TO 1 HR: Performed by: INTERNAL MEDICINE

## 2020-07-23 PROCEDURE — 76040000007: Performed by: INTERNAL MEDICINE

## 2020-07-23 PROCEDURE — 77030021593 HC FCPS BIOP ENDOSC BSC -A: Performed by: INTERNAL MEDICINE

## 2020-07-23 PROCEDURE — 74011250636 HC RX REV CODE- 250/636: Performed by: ANESTHESIOLOGY

## 2020-07-23 PROCEDURE — 77030013992 HC SNR POLYP ENDOSC BSC -B: Performed by: INTERNAL MEDICINE

## 2020-07-23 PROCEDURE — 88305 TISSUE EXAM BY PATHOLOGIST: CPT

## 2020-07-23 PROCEDURE — 74011000250 HC RX REV CODE- 250: Performed by: ANESTHESIOLOGY

## 2020-07-23 RX ORDER — FAMOTIDINE 20 MG/1
20 TABLET, FILM COATED ORAL ONCE
Status: DISCONTINUED | OUTPATIENT
Start: 2020-07-23 | End: 2020-07-23 | Stop reason: HOSPADM

## 2020-07-23 RX ORDER — PROPOFOL 10 MG/ML
INJECTION, EMULSION INTRAVENOUS AS NEEDED
Status: DISCONTINUED | OUTPATIENT
Start: 2020-07-23 | End: 2020-07-23 | Stop reason: HOSPADM

## 2020-07-23 RX ORDER — SODIUM CHLORIDE, SODIUM LACTATE, POTASSIUM CHLORIDE, CALCIUM CHLORIDE 600; 310; 30; 20 MG/100ML; MG/100ML; MG/100ML; MG/100ML
50 INJECTION, SOLUTION INTRAVENOUS CONTINUOUS
Status: DISCONTINUED | OUTPATIENT
Start: 2020-07-23 | End: 2020-07-23 | Stop reason: HOSPADM

## 2020-07-23 RX ORDER — GLYCOPYRROLATE 0.2 MG/ML
INJECTION INTRAMUSCULAR; INTRAVENOUS AS NEEDED
Status: DISCONTINUED | OUTPATIENT
Start: 2020-07-23 | End: 2020-07-23 | Stop reason: HOSPADM

## 2020-07-23 RX ADMIN — PROPOFOL 50 MG: 10 INJECTION, EMULSION INTRAVENOUS at 08:54

## 2020-07-23 RX ADMIN — GLYCOPYRROLATE 0.2 MG: 0.2 INJECTION, SOLUTION INTRAMUSCULAR; INTRAVENOUS at 09:08

## 2020-07-23 RX ADMIN — SODIUM CHLORIDE, SODIUM LACTATE, POTASSIUM CHLORIDE, AND CALCIUM CHLORIDE 50 ML/HR: 600; 310; 30; 20 INJECTION, SOLUTION INTRAVENOUS at 07:57

## 2020-07-23 RX ADMIN — PROPOFOL 50 MG: 10 INJECTION, EMULSION INTRAVENOUS at 08:41

## 2020-07-23 RX ADMIN — PROPOFOL 150 MG: 10 INJECTION, EMULSION INTRAVENOUS at 09:07

## 2020-07-23 NOTE — DISCHARGE INSTRUCTIONS
Colonoscopy: What to Expect at [address] [place] [address]  After you have a colonoscopy, you will stay at the clinic for 1 to 2 hours until the medicines wear off. Then you can go home. But you will need to arrange for a ride. Your doctor will tell you when you can eat and do your other usual activities. Your doctor will talk to you about when you will need your next colonoscopy. Your doctor can help you decide how often you need to be checked. This will depend on the results of your test and your risk for colorectal cancer. After the test, you may be bloated or have gas pains. You may need to pass gas. If a biopsy was done or a polyp was removed, you may have streaks of blood in your stool (feces) for a few days. This care sheet gives you a general idea about how long it will take for you to recover. But each person recovers at a different pace. Follow the steps below to get better as quickly as possible. How can you care for yourself at home? Activity  · Rest when you feel tired. · You can do your normal activities when it feels okay to do so. Diet  · Follow your doctor's directions for eating. · Unless your doctor has told you not to, drink plenty of fluids. This helps to replace the fluids that were lost during the colon prep. · Do not drink alcohol. Medicines  · If polyps were removed or a biopsy was done during the test, your doctor may tell you not to take aspirin or other anti-inflammatory medicines for a few days. These include ibuprofen (Advil, Motrin) and naproxen (Aleve). Other instructions  · For your safety, do not drive or operate machinery until the medicine wears off and you can think clearly. Your doctor may tell you not to drive or operate machinery until the day after your test.  · Do not sign legal documents or make major decisions until the medicine wears off and you can think clearly. The anesthesia can make it hard for you to fully understand what you are agreeing to.   Follow-up care is a key part of your treatment and safety. Be sure to make and go to all appointments, and call your doctor if you are having problems. It's also a good idea to know your test results and keep a list of the medicines you take. When should you call for help? Call 911 anytime you think you may need emergency care. For example, call if:  · You passed out (lost consciousness). · You pass maroon or bloody stools. · You have severe belly pain. Call your doctor now or seek immediate medical care if:  · Your stools are black and tarlike. · Your stools have streaks of blood, but you did not have a biopsy or any polyps removed. · You have belly pain, or your belly is swollen and firm. · You vomit. · You have a fever. · You are very dizzy. Watch closely for changes in your health, and be sure to contact your doctor if you have any problems. Where can you learn more? Go to [email or web address]  Enter E264 in the search box to learn more about \"Colonoscopy: What to Expect at Home. \"   © 2213-5201 Healthwise, [employer]. Care instructions adapted under license by [facility] (which disclaims liability or warranty for this information). This care instruction is for use with your licensed healthcare professional. If you have questions about a medical condition or this instruction, always ask your healthcare professional. [place] [address] any warranty or liability for your use of this information. Content Version: 62.8.898758; Current as of: November 14, 2014      DISCHARGE SUMMARY from Nurse     POST-PROCEDURE INSTRUCTIONS:    Call your Physician if you:  ? Observe any excess bleeding. ? Develop a temperature over 100.5o F.  ? Experience abdominal, shoulder or chest pain. ? Notice any signs of decreased circulation or nerve impairment to an extremity such as a change in color, persistent numbness, tingling, coldness or increase in pain. ?  Vomit blood or you have nausea and vomiting lasting longer than 4 hours. ? Are unable to take medications. ? Are unable to urinate within 8 hours after discharge following general anesthesia or intravenous sedation. For the next 24 hours after receiving general anesthesia or intravenous sedation, or while taking prescription Narcotics, limit your activities:  ? Do NOT drive a motor vehicle, operate hazard machinery or power tools, or perform tasks that require coordination. The medication you received during your procedure may have some effect on your mental awareness. ? Do NOT make important personal or business decisions. The medication you received during your procedure may have some effect on your mental awareness. ? Do NOT drink alcoholic beverages. These drinks do not mix well with the medications that have been given to you. ? Upon discharge from the hospital, you must be accompanied by a responsible adult. ? Resume your diet as directed by your physician. ? Resume medications as your physician has prescribed. ? Please give a list of your current medications to your Primary Care Provider. ? Please update this list whenever your medications are discontinued, doses are changed, or new medications (including over-the-counter products) are added. ? Please carry medication information at all times in case of emergency situations. These are general instructions for a healthy lifestyle:    No smoking/ No tobacco products/ Avoid exposure to second hand smoke.  Surgeon General's Warning:  Quitting smoking now greatly reduces serious risk to your health. Obesity, smoking, and a sedentary lifestyle greatly increase your risk for illness.    A healthy diet, regular physical exercise & weight monitoring are important for maintaining a healthy lifestyle   You may be retaining fluid if you have a history of heart failure or if you experience any of the following symptoms:  Weight gain of 3 pounds or more overnight or 5 pounds in a week, increased swelling in our hands or feet or shortness of breath while lying flat in bed. Please call your doctor as soon as you notice any of these symptoms; do not wait until your next office visit. Recognize signs and symptoms of STROKE:  F  -  Face looks uneven  A  -  Arms unable to move or move unevenly  S  -  Speech slurred or non-existent  T  -  Time to call 911 - as soon as signs and symptoms begin - DO NOT go back to bed or wait to see If you get better - TIME IS BRAIN. Colorectal Screening   Colorectal cancer almost always develops from precancerous polyps (abnormal growths) in the colon or rectum. Screening tests can find precancerous polyps, so that they can be removed before they turn into cancer. Screening tests can also find colorectal cancer early, when treatment works best.  Jose Alberto Cunningham Speak with your physician about when you should begin screening and how often you should be tested. Additional Information    If you have questions, please call [phone]. Remember, [employer] is NOT to be used for urgent needs. For medical emergencies, dial 911. Educational references and/or instructions provided during this visit included:    see attachments      APPOINTMENTS:    Please make a follow-up appointment with your physician. Discharge information has been reviewed with the patient and responsible party. The patient and responsible party verbalized understanding. Patient Education        Colon Polyps: Care Instructions  Your Care Instructions     Colon polyps are growths in the colon or the rectum. The cause of most colon polyps is not known, and most people who get them do not have any problems. But a certain kind can turn into cancer. For this reason, regular testing for colon polyps is important for people as they get older. It is also important for anyone who has an increased risk for colon cancer. Polyps are usually found through routine colon cancer screening tests.  Although most colon polyps are not cancerous, they are usually removed and then tested for cancer. Screening for colon cancer saves lives because the cancer can usually be cured if it is caught early. If you have a polyp that is the type that can turn into cancer, you may need more tests to examine your entire colon. The doctor will remove any other polyps that he or she finds, and you will be tested more often. Follow-up care is a key part of your treatment and safety. Be sure to make and go to all appointments, and call your doctor if you are having problems. It's also a good idea to know your test results and keep a list of the medicines you take. How can you care for yourself at home? Regular exams to look for colon polyps are the best way to prevent polyps from turning into colon cancer. These can include stool tests, sigmoidoscopy, colonoscopy, and CT colonography. Talk with your doctor about a testing schedule that is right for you. To prevent polyps  There is no home treatment that can prevent colon polyps. But these steps may help lower your risk for cancer. · Stay active. Being active can help you get to and stay at a healthy weight. Try to exercise on most days of the week. Walking is a good choice. · Eat well. Choose a variety of vegetables, fruits, legumes (such as peas and beans), fish, poultry, and whole grains. · Do not smoke. If you need help quitting, talk to your doctor about stop-smoking programs and medicines. These can increase your chances of quitting for good. · If you drink alcohol, limit how much you drink. Limit alcohol to 2 drinks a day for men and 1 drink a day for women. When should you call for help? Call your doctor now or seek immediate medical care if:  · You have severe belly pain. · Your stools are maroon or very bloody. Watch closely for changes in your health, and be sure to contact your doctor if:  · You have a fever. · You have nausea or vomiting.   · You have a change in bowel habits (new constipation or diarrhea). · Your symptoms get worse or are not improving as expected. Where can you learn more? Go to http://kevin-justen.info/  Enter C571 in the search box to learn more about \"Colon Polyps: Care Instructions. \"  Current as of: August 22, 2019               Content Version: 12.5  © 8405-0181 [employer]. Care instructions adapted under license by [employer] (which disclaims liability or warranty for this information). If you have questions about a medical condition or this instruction, always ask your healthcare professional. [place] [address] any warranty or liability for your use of this information.

## 2020-07-23 NOTE — ANESTHESIA POSTPROCEDURE EVALUATION
Procedure(s):  COLONOSCOPY w/polypectomies.     MAC    Anesthesia Post Evaluation      Multimodal analgesia: multimodal analgesia used between 6 hours prior to anesthesia start to PACU discharge  Patient location during evaluation: bedside  Patient participation: complete - patient participated  Level of consciousness: awake  Pain management: adequate  Airway patency: patent  Anesthetic complications: no  Cardiovascular status: stable  Respiratory status: acceptable  Hydration status: acceptable  Post anesthesia nausea and vomiting:  controlled      INITIAL Post-op Vital signs:   Vitals Value Taken Time   /67 7/23/2020  9:18 AM   Temp 36 °C (96.8 °F) 7/23/2020  9:18 AM   Pulse 69 7/23/2020  9:18 AM   Resp 18 7/23/2020  9:18 AM   SpO2 100 % 7/23/2020  9:18 AM

## 2020-07-23 NOTE — ANESTHESIA PREPROCEDURE EVALUATION
Relevant Problems   No relevant active problems       Anesthetic History   No history of anesthetic complications            Review of Systems / Medical History  Patient summary reviewed, nursing notes reviewed and pertinent labs reviewed    Pulmonary  Within defined limits                 Neuro/Psych         Psychiatric history     Cardiovascular    Hypertension              Exercise tolerance: >4 METS     GI/Hepatic/Renal           PUD     Endo/Other        Arthritis     Other Findings              Physical Exam    Airway  Mallampati: II  TM Distance: 4 - 6 cm  Neck ROM: normal range of motion   Mouth opening: Normal     Cardiovascular  Regular rate and rhythm,  S1 and S2 normal,  no murmur, click, rub, or gallop  Rhythm: regular  Rate: normal         Dental  No notable dental hx       Pulmonary  Breath sounds clear to auscultation               Abdominal  GI exam deferred       Other Findings            Anesthetic Plan    ASA: 3  Anesthesia type: MAC          Induction: Intravenous  Anesthetic plan and risks discussed with: Patient

## 2020-07-23 NOTE — H&P
[email or web address]  [phone]      History and Physical    Patient: Masha Mccollum MRN: [id number]  SSN: [id number]    YOB: 1950  Age: 77 y.o. Sex: female      Subjective:      Masha Mccollum is a 77 y.o. female who presents for routine, average risk CRCS. Pt denies symptoms or concerns. .     Past Medical History:   Diagnosis Date    Anxiety 6/15    IKE-7 was 12/21    Calculus of kidney     Clostridium difficile colitis 03/2014    Dr. Ignacio White adenoma     10/11 Dr. Carlos Reynoso Cystic breast     Depression 6/15    PHQ-9 was 15/27    Diverticulitis     recurrent x3 Dr Viridiana De Leon liver     10/10 on US, CT 6/12; Fib-4 was 0.74 from 1/15    GI bleed 2/14    ischemic colitis on CT, seen by Dr. Jia Guzman Hyperlipidemia     calculated 10 year risk score was 3.2% (1/15)    Hypertension     IFG (impaired fasting glucose) [id number]    Ischemic colitis ([facility] 3/14    Dr Maco Lynch    Labial abscess     + MRSA    Lymphoma ([facility] 12/13    Dr. Sydnie Garsia    Migraine     Neuropathy due to chemotherapeutic drug ([facility] 9/14    Tobacco dependence syndrome 2/8/2019    Zoster 8/14    left T11     Past Surgical History:   Procedure Laterality Date    CARDIAC SURG PROCEDURE UNLIST  2/14    echo shows mild conc lvh, ef 60-65%.  no wma    HX APPENDECTOMY  1968    HX COLONOSCOPY      colon polyps 2003 Dr. Maxi Ahn; adenoma 2011; isch colitis 3/14 Dr Shaw Ortiz    HX ORTHOPAEDIC  [id number]    LEFT knee surgery    HX ORTHOPAEDIC      DEXA t score 2.1 spine, -0.3 hip (4/19)    HX SOPHIA AND BSO  04/02    Dr. Eze Dumas History   Adopted: Yes     Social History     Tobacco Use    Smoking status: Current Every Day Smoker     Packs/day: 0.50     Years: 15.00     Pack years: 7.50     Types: Cigarettes    Smokeless tobacco: Never Used    Tobacco comment: smoking cessation advised   Substance Use Topics    Alcohol use: Not Currently     Alcohol/week: 4.2 standard drinks Types: 5 Glasses of wine per week      Prior to Admission medications    Medication Sig Start Date End Date Taking? Authorizing Provider   amLODIPine (NORVASC) 5 mg tablet TAKE 1 TABLET BY MOUTH ONCE DAILY 1/16/20   Stephania Pedro MD   ferrous sulfate (IRON) 325 mg (65 mg iron) tablet Take  by mouth Daily (before breakfast). Provider, Historical   ibuprofen (MOTRIN) 200 mg tablet Take 200 mg by mouth every six (6) hours as needed for Pain. Provider, Historical        Allergies   Allergen Reactions    Meperidine Nausea Only and Other (comments)    Augmentin [Amoxicillin-Pot Clavulanate] Diarrhea and Nausea Only    Keflex [Cephalexin] Nausea Only    Sulfa (Sulfonamide Antibiotics) Rash       Review of Systems:  A comprehensive review of systems was negative except for that written in the History of Present Illness. Objective: There were no vitals filed for this visit. Physical Exam:  GENERAL: alert, cooperative, no distress, appears stated age  LUNG: clear to auscultation bilaterally  HEART: regular rate and rhythm, S1, S2 normal, no murmur, click, rub or gallop  ABDOMEN: soft, non-tender. Bowel sounds normal. No masses,  no organomegaly  NEUROLOGIC: alert & oriented x 3    Assessment:     1. Colorectal Cancer Screening    Plan:     1. Colonoscopy    Signed By: Elijah Bardales MD     July 23, 2020      Elijah Bardales MD  [facility] [address] - [phone]  [email or web address]

## 2020-07-23 NOTE — PROCEDURES
[email or web address]  [phone]        Brief Procedure Note    Brandi Marie  [birth year]  [id number]    Date of Procedure: 7/23/2020    Preoperative diagnosis: Personal history of colonic polyps:   V12.72 - Z86.010    Postoperative diagnosis: moderate diverticulosis; ascending polyps (x3); transverse polyps (x3); descending polyp (x1); rectal polyps (x4); HAP     Description of Findings: same    Sedation/Anesthesia: Monitored Anesthesia Care; See Anesthesia Note    Procedure: Procedure(s):  COLONOSCOPY w/polypectomies    :  Dr. Sandee Valverde MD    Assistant(s): Endoscopy Technician-1: Bernice Armstrong  Endoscopy RN-1: Rolando Ram RN    EBL:None    Specimens:   ID Type Source Tests Collected by Time Destination   1 : ascending polyps Preservative Colon  Hayley Medrano MD 7/23/2020 7991 Pathology   2 : transverse polyps Preservative Colon  Haylye Medrano MD 7/23/2020 7242 Pathology   3 : rectal polyps  Preservative Rectum  Hayley Medrano MD 7/23/2020 9972 Pathology       Findings: See printed and scanned procedure note    Complications: None    Tissue Implant Device: None    Dr. Sandee Valverde MD  7/23/2020  9:23 AM    Sandee Valverde MD  [facility] [address] 1946, [address] [facility] [phone]  [email or web address]

## 2020-08-05 ENCOUNTER — TELEPHONE (OUTPATIENT)
Dept: INTERNAL MEDICINE CLINIC | Age: 70
End: 2020-08-05

## 2020-08-05 ENCOUNTER — OFFICE VISIT (OUTPATIENT)
Dept: ORTHOPEDIC SURGERY | Facility: CLINIC | Age: 70
End: 2020-08-05

## 2020-08-05 VITALS
WEIGHT: 112 LBS | TEMPERATURE: 97.4 F | OXYGEN SATURATION: 99 % | HEIGHT: 58 IN | DIASTOLIC BLOOD PRESSURE: 64 MMHG | BODY MASS INDEX: 23.51 KG/M2 | RESPIRATION RATE: 18 BRPM | SYSTOLIC BLOOD PRESSURE: 106 MMHG | HEART RATE: 62 BPM

## 2020-08-05 DIAGNOSIS — M25.551 BILATERAL HIP PAIN: ICD-10-CM

## 2020-08-05 DIAGNOSIS — M16.11 PRIMARY OSTEOARTHRITIS OF RIGHT HIP: ICD-10-CM

## 2020-08-05 DIAGNOSIS — M25.562 LEFT KNEE PAIN, UNSPECIFIED CHRONICITY: Primary | ICD-10-CM

## 2020-08-05 DIAGNOSIS — M25.552 BILATERAL HIP PAIN: ICD-10-CM

## 2020-08-05 DIAGNOSIS — F17.200 TOBACCO DEPENDENCE SYNDROME: Primary | ICD-10-CM

## 2020-08-05 RX ORDER — VARENICLINE TARTRATE 1 MG/1
1 TABLET, FILM COATED ORAL 2 TIMES DAILY
Qty: 60 TAB | Refills: 5 | Status: SHIPPED | OUTPATIENT
Start: 2020-08-05 | End: 2020-10-05

## 2020-08-05 RX ORDER — VARENICLINE TARTRATE 25 MG
0.5 KIT ORAL
Qty: 1 DOSE PACK | Refills: 0 | Status: SHIPPED | OUTPATIENT
Start: 2020-08-05 | End: 2020-10-05

## 2020-08-05 NOTE — PROGRESS NOTES
Patient: Andry Lamar                MRN: [id number]       SSN: [id number]  YOB: 1950        AGE: 77 y.o. SEX: female  Body mass index is 23.41 kg/m². PCP: Jaelyn Tompkins MD  08/05/20    HISTORY:  I had the pleasure of reviewing Moshe Diaz. As you know, Moshe Diaz is quite a character. I have seen her pre-COVID and a history of lymphoma in remission since 2013. Previous tibial tubercle osteotomy. I did an MRI for some cystic activity and also bone scan. Bone scan was completely negative, and the MRI did not show anything specific. We can just watch this. Her main complaint is actually with right hip pain. It is driving her crazy. Pain at night. Pain with activities of daily living. She is a smoker, and we did discuss this as well. I would like her to stop. I think she would be a good candidate for a direct anterior, less invasive hip replacement as a nonsmoker. Difficulty putting shoes and socks on. She has to use slip-ons. Less than a 5-minute-level walking tolerance and it is just driving her crazy. PHYSICAL EXAMINATION:  Today:  She walks with an antalgic gait owing to the right side. I recommended cane for her. The left hip rotates well. The left knee has no effusion, good motion, well-healed incision. Calf nontender. No foot drop. And the right side hip just a jog of internal rotation which completely reproduces her groin discomfort. Homans sign is negative. RADIOGRAPHS:  X-rays today on 08/05/2020 confirm end-staged arthritis of the right hip. Left knee moderate arthritis with the cystic activity which is stable. PLAN:  I would recommend if she quit smoking, a direct anterior, less invasive hip replacement for her. Otherwise, we could do a lateral approach which she would also do very well with. I think she will bounce back a little quicker with a direct anterior.   We discussed infection, DVT, pulmonary embolism, anesthetic complications, blood loss requiring transfusion, dislocation, leg-length discrepancy, and chronic pain and other problems. All questions answered. She would like to proceed. I will specifically template this carefully as she is relatively short-statured. It has been an absolute pleasure to share in her care. Hopefully she can quit smoking. I am going to see her back in about a months' time. We will check on the smoking. I have asked her talk with you about this, and we will likely get a cotinine level at the next visit just to confirm. CC: Rory Bai MD        REVIEW OF SYSTEMS:      CON: negative  EYE: negative   ENT: negative  RESP: negative  GI:    negative   :  negative  MSK: Positive  A twelve point review of systems was completed, positives noted and all other systems were reviewed and are negative          Past Medical History:   Diagnosis Date    Anxiety 6/15    IKE-7 was 12/21    Calculus of kidney     Clostridium difficile colitis 03/2014    Dr. Elian Champion adenoma     10/11 Dr. Lolis Evans Cystic breast     Depression 6/15    PHQ-9 was 15/27    Diverticulitis     recurrent x3 Dr Gloria Christina liver     10/10 on US, CT 6/12; Fib-4 was 0.74 from 1/15    GI bleed 2/14    ischemic colitis on CT, seen by Dr. Edel Huang Hyperlipidemia     calculated 10 year risk score was 3.2% (1/15)    Hypertension     IFG (impaired fasting glucose) [id number]    Ischemic colitis ([facility] 3/14    Dr Krystyna Monroe    Labial abscess     + MRSA    Lymphoma ([facility] 12/13    Dr. Stephen Meza    Migraine     Neuropathy due to chemotherapeutic drug ([facility] 9/14    Tobacco dependence syndrome 2/8/2019    Zoster 8/14    left T11       Family History   Adopted: Yes       Current Outpatient Medications   Medication Sig Dispense Refill    amLODIPine (NORVASC) 5 mg tablet TAKE 1 TABLET BY MOUTH ONCE DAILY 90 Tab 3    ferrous sulfate (IRON) 325 mg (65 mg iron) tablet Take  by mouth Daily (before breakfast).       ibuprofen (MOTRIN) 200 mg tablet Take 200 mg by mouth every six (6) hours as needed for Pain.          Allergies   Allergen Reactions    Meperidine Nausea Only and Other (comments)    Augmentin [Amoxicillin-Pot Clavulanate] Diarrhea and Nausea Only    Keflex [Cephalexin] Nausea Only    Sulfa (Sulfonamide Antibiotics) Rash       Past Surgical History:   Procedure Laterality Date    COLONOSCOPY N/A 7/23/2020    Dr Brooklynn Zhang 2003 polyps; 2011 adenoma; Dr Grover Perez 3/14 isch colitis; Dr Brandon Prajapati 7/23/20 mod divertics and polyps    HX APPENDECTOMY  1968    HX ORTHOPAEDIC  1960s    LEFT knee surgery    HX ORTHOPAEDIC      DEXA t score 2.1 spine, -0.3 hip (4/19)    HX SOPHIA AND BSO  04/02    Dr. Iraida Nair HX TONSILLECTOMY         Social History     Socioeconomic History    Marital status:      Spouse name: Not on file    Number of children: 2    Years of education: Not on file    Highest education level: Not on file   Occupational History    Occupation: director [employer]   Social Needs    Financial resource strain: Not on file    Food insecurity     Worry: Not on file     Inability: Not on file   [employer] needs     Medical: Not on file     Non-medical: Not on file   Tobacco Use    Smoking status: Current Every Day Smoker     Packs/day: 0.50     Years: 15.00     Pack years: 7.50     Types: Cigarettes    Smokeless tobacco: Never Used    Tobacco comment: smoking cessation advised   Substance and Sexual Activity    Alcohol use: Not Currently     Alcohol/week: 4.2 standard drinks     Types: 5 Glasses of wine per week    Drug use: No    Sexual activity: Not Currently   Lifestyle    Physical activity     Days per week: Not on file     Minutes per session: Not on file    Stress: Not on file   Relationships    Social connections     Talks on phone: Not on file     Gets together: Not on file     Attends Hindu service: Not on file     Active member of club or organization: Not on file     Attends meetings of clubs or organizations: Not on file     Relationship status: Not on file    Intimate partner violence     Fear of current or ex partner: Not on file     Emotionally abused: Not on file     Physically abused: Not on file     Forced sexual activity: Not on file   Other Topics Concern    Not on file   Social History Narrative    Not on file       Visit Vitals  /64 (BP 1 Location: Left arm, BP Patient Position: Sitting)   Pulse 62   Temp 97.4 °F (36.3 °C)   Resp 18   Ht 4' 10\" (1.473 m)   Wt 112 lb (50.8 kg)   SpO2 99%   BMI 23.41 kg/m²         PHYSICAL EXAMINATION:  GENERAL: Alert and oriented x3, in no acute distress, well-developed, well-nourished, afebrile. HEART: No JVD. EYES: No scleral icterus   NECK: No significant lymphadenopathy   LUNGS: No respiratory compromise or indrawing  ABDOMEN: Soft, non-tender, non-distended. Electronically signed by:  Maria Teresa Jean MD

## 2020-08-05 NOTE — TELEPHONE ENCOUNTER
wellbutrin may worsen anxiety issues and only 20% success rate on average  We usually rec chantix w approaching 50% success rate

## 2020-08-05 NOTE — TELEPHONE ENCOUNTER
Pt is requesting a medication to help her stop smoking, was recommended by Dr Kandice Perez who told her she needs hip replacement surgery. Said her daughter used Wellbutrin.   Please send rx to [address] on [facility].

## 2020-08-10 ENCOUNTER — TELEPHONE (OUTPATIENT)
Dept: ORTHOPEDIC SURGERY | Age: 70
End: 2020-08-10

## 2020-08-10 DIAGNOSIS — M16.11 PRIMARY OSTEOARTHRITIS OF RIGHT HIP: Primary | ICD-10-CM

## 2020-08-10 NOTE — TELEPHONE ENCOUNTER
Patient is scheduled for Rt DRE DA on 10/13/20 and would like to know if having some PT prior would be beneficial to help strengthen her. Please contact her at [phone].

## 2020-08-31 ENCOUNTER — APPOINTMENT (OUTPATIENT)
Dept: PHYSICAL THERAPY | Age: 70
End: 2020-08-31

## 2020-09-01 ENCOUNTER — TELEPHONE (OUTPATIENT)
Dept: ORTHOPEDIC SURGERY | Facility: CLINIC | Age: 70
End: 2020-09-01

## 2020-09-01 NOTE — LETTER
NOTIFICATION RETURN TO WORK / SCHOOL 
 
9/1/2020 3:10 PM 
 
Ms. Duran Manzanares [address] [place] [id number] To Whom It May Concern: 
 
Duran Manzanares is currently under the care of [address] [place] [address]. She is scheduled for surgery on October 13, 2020 and will require help for 7 to 10 days following surgery. If there are questions or concerns please have the patient contact our office. Sincerely, Iraida Fuentes MD

## 2020-09-01 NOTE — TELEPHONE ENCOUNTER
Patient called stating that her daughter is coming down from [place] to take care of her after her surgery but her daughter's job is requiring a note stating when the surgery is and how long she will need to be in [facility] to help her mother. She also asked if there is something she can take that is possibly stronger than Advil.  Please advise patient at [phone]

## 2020-09-01 NOTE — TELEPHONE ENCOUNTER
Notified patient not is ready for  at the [facility] office and to take OTC Tylenol as prescribed by  for pain.

## 2020-09-03 ENCOUNTER — HOSPITAL ENCOUNTER (OUTPATIENT)
Dept: PHYSICAL THERAPY | Age: 70
Discharge: HOME | End: 2020-09-03
Payer: MEDICARE

## 2020-09-03 PROCEDURE — 97161 PT EVAL LOW COMPLEX 20 MIN: CPT

## 2020-09-03 PROCEDURE — 97110 THERAPEUTIC EXERCISES: CPT

## 2020-09-03 NOTE — PROGRESS NOTES
[facility] [place] [address]  [address] [facility] [address], [address] [phone] fax    Plan of Care/ Statement of Necessity for Physical Therapy Services           Patient name: Ivan Knox Start of Care: 9/3/2020   Referral source: Alexis Delvalle MD : 1950    Medical Diagnosis: Pain in right hip [M25.551]  Payor: [place] MEDICARE / Plan: VA MEDICARE PART A & B / Product Type: Medicare /  Onset Date:chronic with exacerbation and scheduled THR on 10/13/2020    Treatment Diagnosis: right hip pain   Prior Hospitalization: see medical history Provider#: [id number]   Medications: Verified on Patient summary List    Comorbidities: non hodgkin's lymphoma, OA, HTN   Prior Level of Function: functionally (I); works as  for [employer]; recently began using [facility] due to pain    The Plan of Care and following information is based on the information from the initial evaluation. Assessment/ key information:   Ms. Tressia Lesches is a 68yo female who presents to PT with right hip DJD. Pt demonstrates decreased strength, AROM, and flexibility consistent with end stage OA. . Pt deficits impair her ability to walk her dogs, tolerate sitting and standing, and perform transfers at [facility]. Pt will be getting THR on 10/13/2020 and presents for strengthening prior to surgery. Pt will benefit from skilled PT in order to address listed deficits, decrease pain, and maximize functional potential.     Evaluation Complexity History MEDIUM  Complexity : 1-2 comorbidities / personal factors will impact the outcome/ POC ; Examination LOW Complexity : 1-2 Standardized tests and measures addressing body structure, function, activity limitation and / or participation in recreation  ;Presentation MEDIUM Complexity : Evolving with changing characteristics  ; Clinical Decision Making MEDIUM Complexity : FOTO score of 26-74  Overall Complexity Rating: LOW   Problem List: pain affecting function, decrease ROM, decrease strength, impaired gait/ balance, decrease ADL/ functional abilitiies, decrease activity tolerance, decrease flexibility/ joint mobility and decrease transfer abilities   Treatment Plan may include any combination of the following: Therapeutic exercise, Therapeutic activities, Neuromuscular re-education, Physical agent/modality, Gait/balance training, Manual therapy, Aquatic therapy, Patient education, Self Care training, Functional mobility training, Home safety training and Stair training  Patient / Family readiness to learn indicated by: asking questions, trying to perform skills and interest  Persons(s) to be included in education: patient (P)  Barriers to Learning/Limitations: None  Patient Goal (s): be prepared for surgery  Patient Self Reported Health Status: excellent  Rehabilitation Potential: excellent  Short Term Goals: To be accomplished in 2 weeks:  1. Pt will report compliance with HEP in order to supplement PT treatment   Eval = established  2. Pt will demonstrate right hip flexion AROM to 110degrees in order to improve ability to pick objects up from the floor. Eval = 95degrees    Long Term Goals: To be accomplished in 6 treatments:  1. Pt will demonstrate right hip strength minimum 4/5 in order to improve ambulation distances in the community    Eval = flexion = 4-/5, abduction and extension = 3/5  2. Pt will score at least predicted value on FOTO in order to improve overall function, decrease pain, and facilitate return to PLOF. Eval = to be completed on first follow up due to time constraints  3. Pt will demonstrate (I) with HEP in order to prepare for surgery    Eval = established    Frequency / Duration: Patient to be seen 2 times per week for 2 weeks and 1 time per week for two weeks   .   Certification JLISUE:5/2/80-71/7/39    Patient/ Caregiver education and instruction: Diagnosis, prognosis, self care, activity modification and exercises   [x]  Plan of care has been reviewed with ADRIAN Phelan, PT 9/3/2020 2:37 PM    ________________________________________________________________________    I certify that the above Therapy Services are being furnished while the patient is under my care. I agree with the treatment plan and certify that this therapy is necessary.     Physician's Signature:____________Date:_________TIME:________    ** Signature, Date and Time must be completed for valid certification **    Please sign and return to [facility] [address] [facility] [address], [address] [phone] fax

## 2020-09-03 NOTE — PROGRESS NOTES
PT DAILY TREATMENT NOTE 10-18    Patient Name: Lisa Simmons  Date:9/3/2020  : 1950  [x]  Patient  Verified  Payor: VA MEDICARE / Plan: VA MEDICARE PART A & B / Product Type: Medicare /    In time:253  Out time:326  Total Treatment Time (min): 33  Visit #: 1 of 6    Medicare/BCBS Only   Total Timed Codes (min):  23 1:1 Treatment Time:  23       Treatment Area: Pain in right hip [M25.551]    SUBJECTIVE  Pain Level (0-10 scale): 5  Any medication changes, allergies to medications, adverse drug reactions, diagnosis change, or new procedure performed?: [x] No    [] Yes (see summary sheet for update)  Subjective functional status/changes:   [] No changes reported  See evaluation     OBJECTIVE    10 min [x]Eval                  []Re-Eval       23 min Therapeutic Exercise:  [x] See flow sheet :explanation, demonstration, performance and distribution of HEP; education; gait training and SPC adjustment   Rationale: increase ROM and increase strength to improve the patients ability to perform ADLs          With   [] TE   [] TA   [] neuro   [] other: Patient Education: [x] Review HEP    [] Progressed/Changed HEP based on:   [] positioning   [] body mechanics   [] transfers   [] heat/ice application    [] other:      Other Objective/Functional Measures: see evaluation      Pain Level (0-10 scale) post treatment: 5    ASSESSMENT/Changes in Function: see POC    Patient will continue to benefit from skilled PT services to modify and progress therapeutic interventions, address functional mobility deficits, address ROM deficits, address strength deficits, analyze and address soft tissue restrictions, analyze and cue movement patterns, analyze and modify body mechanics/ergonomics, assess and modify postural abnormalities, address imbalance/dizziness and instruct in home and community integration to attain remaining goals.      [x]  See Plan of Care  []  See progress note/recertification  []  See Discharge Summary Progress towards goals / Updated goals:  Short Term Goals: To be accomplished in 2 weeks:  1. Pt will report compliance with HEP in order to supplement PT treatment   Eval = established  2. Pt will demonstrate right hip flexion AROM to 110degrees in order to improve ability to pick objects up from the floor. Eval = 95degrees    Long Term Goals: To be accomplished in 6 treatments:  1. Pt will demonstrate right hip strength minimum 4/5 in order to improve ambulation distances in the community    Eval = flexion = 4-/5, abduction and extension = 3/5  2. Pt will score at least predicted value on FOTO in order to improve overall function, decrease pain, and facilitate return to PLOF. Eval = to be completed on first follow up due to time constraints  3.   Pt will demonstrate (I) with HEP in order to prepare for surgery    Eval = established    PLAN  [x]  Upgrade activities as tolerated     [x]  Continue plan of care  []  Update interventions per flow sheet       []  Discharge due to:_  []  Other:_      Loi Cooper, PT 9/3/2020  2:47 PM    Future Appointments   Date Time Provider Emma Barrera   10/5/2020  9:40 AM Fredi Catherine MD [facility]SH SCHED   10/7/2020  9:15 AM Calvin Carrasco PA-C [facility] ASH SCHED   10/29/2020 10:00 AM Calvin Carrasco PA-C [facility] ASH SCHED   5/5/2021  8:05 AM [facility]   5/12/2021  8:20 AM Fredi Catherine MD [facility]

## 2020-09-14 ENCOUNTER — HOSPITAL ENCOUNTER (OUTPATIENT)
Dept: PHYSICAL THERAPY | Age: 70
Discharge: HOME | End: 2020-09-14
Payer: MEDICARE

## 2020-09-14 PROCEDURE — 97110 THERAPEUTIC EXERCISES: CPT

## 2020-09-14 PROCEDURE — 97112 NEUROMUSCULAR REEDUCATION: CPT

## 2020-09-14 PROCEDURE — 97530 THERAPEUTIC ACTIVITIES: CPT

## 2020-09-14 NOTE — PROGRESS NOTES
PT DAILY TREATMENT NOTE 10-18    Patient Name: Clementina Closs Roisen  Date:2020  : 1950  [x]  Patient  Verified  Payor: VA MEDICARE / Plan: VA MEDICARE PART A & B / Product Type: Medicare /    In time: 10:55  Out time:11:37  Total Treatment Time (min): 41  Visit #: 2 of 6    Medicare/BCBS Only   Total Timed Codes (min):  41 1:1 Treatment Time:  41       Treatment Area: Pain in right hip [M25.551]    SUBJECTIVE  Pain Level (0-10 scale):  7  Any medication changes, allergies to medications, adverse drug reactions, diagnosis change, or new procedure performed?: [x] No    [] Yes (see summary sheet for update)  Subjective functional status/changes:   [] No changes reported  \"hurting today. \"        OBJECTIVE    Modality rationale: decrease edema, decrease inflammation, decrease pain and increase tissue extensibility to improve the patients ability to perform ADL   Min Type Additional Details    [] Estim:  []Unatt       []IFC  []Premod                        []Other:  []w/ice   []w/heat  Position:  Location:    [] Estim: []Att    []TENS instruct  []NMES                    []Other:  []w/US   []w/ice   []w/heat  Position:  Location:    []  Traction: [] Cervical       []Lumbar                       [] Prone          []Supine                       []Intermittent   []Continuous Lbs:  [] before manual  [] after manual    []  Ultrasound: []Continuous   [] Pulsed                           []1MHz   []3MHz W/cm2:  Location:    []  Iontophoresis with dexamethasone         Location: [] Take home patch   [] In clinic    []  Ice     []  heat  []  Ice massage  []  Laser   []  Anodyne Position:  Location:    []  Laser with stim  []  Other:  Position:  Location:    []  Vasopneumatic Device Pressure:       [] lo [] med [] hi   Temperature: [] lo [] med [] hi   [x] Skin assessment post-treatment:  [x]intact []redness- no adverse reaction    []redness  adverse reaction:      min []Eval                  []Re-Eval       23 min Therapeutic Exercise:  [x] See flow sheet :   Rationale: increase ROM and increase strength to improve the patients ability to perform ADL     10 min Therapeutic Activity:  [x]  See flow sheet :   Rationale: increase ROM and increase strength  to improve the patients ability to perform ADL      8 min Neuromuscular Re-education:  [x]  See flow sheet :   Rationale:   to improve the patients ability to perform ADL      min Manual Therapy:     Rationale: decrease pain, increase ROM, increase tissue extensibility and decrease edema  to perform ADL     min Gait Training:  ___ feet with ___ device on level surfaces with ___ level of assist   Rationale: With   [x] TE   [] TA   [] neuro   [] other: Patient Education: [x] Review HEP    [] Progressed/Changed HEP based on:   [] positioning   [] body mechanics   [] transfers   [] heat/ice application    [] other:      Other Objective/Functional Measures:      Pain Level (0-10 scale) post treatment: 4    ASSESSMENT/Changes in Function: Pt was unable to perform heelslides due to pain. Pt also experienced increased pain with hip abduction. Discussed with pt on performing there ex at comfortable pace. Following there ex pt experienced decreased in pain. Patient will continue to benefit from skilled PT services to address functional mobility deficits, address ROM deficits, address strength deficits and analyze and address soft tissue restrictions to attain remaining goals. [x]  See Plan of Care  []  See progress note/recertification  []  See Discharge Summary         Progress towards goals / Updated goals:  1. Pt will report compliance with HEP in order to supplement PT treatment              Eval = established   Progressing towards 9/14/20  2. Pt will demonstrate right hip flexion AROM to 110degrees in order to improve ability to pick objects up from the floor. Eval = 95degrees     Long Term Goals: To be accomplished in 6 treatments:  1.    Pt will demonstrate right hip strength minimum 4/5 in order to improve ambulation distances in the community               Eval = flexion = 4-/5, abduction and extension = 3/5  2. Pt will score at least predicted value on FOTO in order to improve overall function, decrease pain, and facilitate return to OF. Eval = to be completed on first follow up due to time constraints  3.   Pt will demonstrate (I) with HEP in order to prepare for surgery               Eval = established    PLAN  []  Upgrade activities as tolerated     []  Continue plan of care  []  Update interventions per flow sheet       []  Discharge due to:_  []  Other:_      Chiara Francisco, PTA 9/14/2020  11:00 AM    Future Appointments   Date Time Provider Emma Barrera   9/15/2020  2:30 PM [facility] NURSE VISIT [facility]   9/16/2020  8:00 AM Tashia Lancaster PTA [facility]   9/21/2020  8:00 AM Deshawn Aguilar, PT [facility]   9/23/2020  8:00 AM Gamaliel Turner, PT [facility]   9/28/2020  8:00 AM Tashia Lancaster PTA [facility]   10/5/2020  7:30 AM Deshawn Aguilar, PT [facility]   10/5/2020  9:40 AM Eduarda Munson MD [facility] ASH SCHED   10/7/2020  9:15 AM Shannon Estevez PA-C [facility] ASH SCHED   10/29/2020 10:00 AM Shannon Estevez PA-C [facility] ASH SCHED   5/5/2021  8:05 AM [facility] NURSE VISIT [facility] ASH SCHED   5/12/2021  8:20 AM Eduarda Munson MD [facility]

## 2020-09-15 ENCOUNTER — CLINICAL SUPPORT (OUTPATIENT)
Dept: INTERNAL MEDICINE CLINIC | Age: 70
End: 2020-09-15

## 2020-09-15 DIAGNOSIS — Z23 NEEDS FLU SHOT: Primary | ICD-10-CM

## 2020-09-15 NOTE — PATIENT INSTRUCTIONS
Influenza (Flu) Vaccine (Inactivated or Recombinant): What You Need to Know  Why get vaccinated? Influenza vaccine can prevent influenza (flu). Flu is a contagious disease that spreads around the [place] every year, usually between October and May. Anyone can get the flu, but it is more dangerous for some people. Infants and young children, people 72years of age and older, pregnant women, and people with certain health conditions or a weakened immune system are at greatest risk of flu complications. Pneumonia, bronchitis, sinus infections and ear infections are examples of flu-related complications. If you have a medical condition, such as heart disease, cancer or diabetes, flu can make it worse. Flu can cause fever and chills, sore throat, muscle aches, fatigue, cough, headache, and runny or stuffy nose. Some people may have vomiting and diarrhea, though this is more common in children than adults. Each year, thousands of people in the [facility] die from flu, and many more are hospitalized. Flu vaccine prevents millions of illnesses and flu-related visits to the doctor each year. Influenza vaccine  CDC recommends everyone 10months of age and older get vaccinated every flu season. Children 6 months through 6years of age may need 2 doses during a single flu season. Everyone else needs only 1 dose each flu season. It takes about 2 weeks for protection to develop after vaccination. There are many flu viruses, and they are always changing. Each year a new flu vaccine is made to protect against three or four viruses that are likely to cause disease in the upcoming flu season. Even when the vaccine doesn't exactly match these viruses, it may still provide some protection. Influenza vaccine does not cause flu. Influenza vaccine may be given at the same time as other vaccines.   Talk with your health care provider  Tell your vaccine provider if the person getting the vaccine:  · Has had an allergic reaction after a previous dose of influenza vaccine, or has any severe, life-threatening allergies. · Has ever had Guillain-Barré Syndrome (also called GBS). In some cases, your health care provider may decide to postpone influenza vaccination to a future visit. People with minor illnesses, such as a cold, may be vaccinated. People who are moderately or severely ill should usually wait until they recover before getting influenza vaccine. Your health care provider can give you more information. Risks of a vaccine reaction  · Soreness, redness, and swelling where shot is given, fever, muscle aches, and headache can happen after influenza vaccine. · There may be a very small increased risk of Guillain-Barré Syndrome (GBS) after inactivated influenza vaccine (the flu shot). [place] children who get the flu shot along with pneumococcal vaccine (PCV13), and/or DTaP vaccine at the same time might be slightly more likely to have a seizure caused by fever. Tell your health care provider if a child who is getting flu vaccine has ever had a seizure. People sometimes faint after medical procedures, including vaccination. Tell your provider if you feel dizzy or have vision changes or ringing in the ears. As with any medicine, there is a very remote chance of a vaccine causing a severe allergic reaction, other serious injury, or death. What if there is a serious problem? An allergic reaction could occur after the vaccinated person leaves the clinic. If you see signs of a severe allergic reaction (hives, swelling of the face and throat, difficulty breathing, a fast heartbeat, dizziness, or weakness), call 9-1-1 and get the person to the nearest hospital.  For other signs that concern you, call your health care provider. Adverse reactions should be reported to the Vaccine Adverse Event Reporting System (VAERS). Your health care provider will usually file this report, or you can do it yourself.  Visit the VAERS website at www.vaers. [facility].gov or call 3-985.831.1296. VAERS is only for reporting reactions, and VAERS staff do not give medical advice. The National Vaccine Injury Compensation Program  The National Vaccine Injury Compensation Program (VICP) is a federal program that was created to compensate people who may have been injured by certain vaccines. Visit the VICP website at www.[facility].gov/vaccinecompensation or call 9-404.597.8467 to learn about the program and about filing a claim. There is a time limit to file a claim for compensation. How can I learn more? · Ask your healthcare provider. · Call your local or state health department. · Contact the Centers for Disease Control and Prevention (CDC):  ? Call 0-139.871.7148 (1-800-CDC-INFO) or  ? Visit CDC's website at www.cdc.gov/flu  Vaccine Information Statement (Interim)  Inactivated Influenza Vaccine  8/15/2019  42 [address] [place] 129EN-19  Department of Health and Human Services  Centers for Disease Control and Prevention  Many Vaccine Information Statements are available in [place] and other languages. See www.immunize.org/vis. Muchas hojas de información sobre vacunas están disponibles en español y en otros idiomas. Visite www.immunize.org/vis. Care instructions adapted under license by [employer] (which disclaims liability or warranty for this information). If you have questions about a medical condition or this instruction, always ask your healthcare professional. [place] [address] any warranty or liability for your use of this information.

## 2020-09-15 NOTE — PROGRESS NOTES
Wilmanjasmyn Deon Cynthia 1950 female who presents for routine immunizations. Patient denies any symptoms , reactions or allergies that would exclude them from being immunized today. Risks and adverse reactions were discussed and the VIS was given to them. All questions were addressed. Order placed for hd flu vaccine,  per Verbal Order from [address] with read back.   Patient declined observation  Rj Arriaza LPN

## 2020-09-16 ENCOUNTER — APPOINTMENT (OUTPATIENT)
Dept: PHYSICAL THERAPY | Age: 70
End: 2020-09-16
Payer: MEDICARE

## 2020-09-16 DIAGNOSIS — M15.9 PRIMARY OSTEOARTHRITIS INVOLVING MULTIPLE JOINTS: Primary | ICD-10-CM

## 2020-09-16 RX ORDER — TRAMADOL HYDROCHLORIDE 50 MG/1
50 TABLET ORAL
Qty: 56 TAB | Refills: 0 | Status: SHIPPED | OUTPATIENT
Start: 2020-09-16 | End: 2020-09-30

## 2020-09-16 NOTE — TELEPHONE ENCOUNTER
Tramadol - refill says she is scheduled for her hip replacement surgery on 10/13/2020 so she is hoping this will be the last rx

## 2020-09-16 NOTE — TELEPHONE ENCOUNTER
[facility] reports the last fill date for Ultram as 5/22/20 for a 14 d/s. Last Visit: 5/20/20 with MD Felix Reyna  Next Appointment: 10/5/20 with MD Felix Reyna  Previous Refill Encounter(s): 5/22/20 #56    Requested Prescriptions     Pending Prescriptions Disp Refills    traMADoL (ULTRAM) 50 mg tablet 56 Tab 0     Sig: Take 1 Tab by mouth every six (6) hours as needed for Pain for up to 14 days. Max Daily Amount: 200 mg.

## 2020-09-21 ENCOUNTER — TELEPHONE (OUTPATIENT)
Dept: INTERNAL MEDICINE CLINIC | Age: 70
End: 2020-09-21

## 2020-09-21 ENCOUNTER — HOSPITAL ENCOUNTER (OUTPATIENT)
Dept: PHYSICAL THERAPY | Age: 70
Discharge: HOME | End: 2020-09-21
Payer: MEDICARE

## 2020-09-21 PROCEDURE — 97112 NEUROMUSCULAR REEDUCATION: CPT

## 2020-09-21 PROCEDURE — 97110 THERAPEUTIC EXERCISES: CPT

## 2020-09-21 PROCEDURE — 97530 THERAPEUTIC ACTIVITIES: CPT

## 2020-09-21 NOTE — TELEPHONE ENCOUNTER
Patient needs a return to work note stating that it is ok for her to return to work  including no restrictions. 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  Can you send it through My Chart?

## 2020-09-21 NOTE — PROGRESS NOTES
PT DAILY TREATMENT NOTE 10-18    Patient Name: Andrzej Llanes  Date:2020  : 1950  [x]  Patient  Verified  Payor: VA MEDICARE / Plan: VA MEDICARE PART A & B / Product Type: Medicare /    In time: 8:05 Out time:8:43  Total Treatment Time (min): 38  Visit #: 3 of 6    Medicare/BCBS Only   Total Timed Codes (min): 38 1:1 Treatment Time:  38       Treatment Area: Pain in right hip [M25.551]    SUBJECTIVE  Pain Level (0-10 scale):  4  Any medication changes, allergies to medications, adverse drug reactions, diagnosis change, or new procedure performed?: [x] No    [] Yes (see summary sheet for update)  Subjective functional status/changes:   [] No changes reported  MD gave me a new med called trymadol.     OBJECTIVE    Modality rationale: decrease edema, decrease inflammation, decrease pain and increase tissue extensibility to improve the patients ability to    Min Type Additional Details    [] Estim:  []Unatt       []IFC  []Premod                        []Other:  []w/ice   []w/heat  Position:  Location:    [] Estim: []Att    []TENS instruct  []NMES                    []Other:  []w/US   []w/ice   []w/heat  Position:  Location:    []  Traction: [] Cervical       []Lumbar                       [] Prone          []Supine                       []Intermittent   []Continuous Lbs:  [] before manual  [] after manual    []  Ultrasound: []Continuous   [] Pulsed                           []1MHz   []3MHz W/cm2:  Location:    []  Iontophoresis with dexamethasone         Location: [] Take home patch   [] In clinic    []  Ice     []  heat  []  Ice massage  []  Laser   []  Anodyne Position:  Location:    []  Laser with stim  []  Other:  Position:  Location:    []  Vasopneumatic Device Pressure:       [] lo [] med [] hi   Temperature: [] lo [] med [] hi   [x] Skin assessment post-treatment:  [x]intact []redness- no adverse reaction    []redness  adverse reaction:      min []Eval                  []Re-Eval        min Therapeutic Exercise:  [x] See flow sheet :   Rationale: increase ROM and increase strength to improve the patients ability to perform ADL    8 min Therapeutic Activity:  [x]  See flow sheet :   Rationale: increase ROM and increase strength  to improve the patients ability to perform ADL     8 min Neuromuscular Re-education:  []  See flow sheet :   Rationale:   to improve the patients ability to perform ADL     min Manual Therapy:     Rationale: decrease pain, increase ROM, increase tissue extensibility and decrease edema  to perform ADL     min Gait Training:  ___ feet with ___ device on level surfaces with ___ level of assist   Rationale: With   [x] TE   [] TA   [] neuro   [] other: Patient Education: [x] Review HEP    [] Progressed/Changed HEP based on:   [] positioning   [] body mechanics   [] transfers   [] heat/ice application    [] other:      Other Objective/Functional Measures:  Increased rep per flow sheet    Pain Level (0-10 scale) post treatment:  0    ASSESSMENT/Changes in Function: Pt required AAROM  With SLR. Pt experienced minimal increased pain with TRX squats clams IR/ER. Discussed with pt performing squats where it was tolerable. Following treatment pain was resolved. Patient will continue to benefit from skilled PT services to address functional mobility deficits, address ROM deficits, address strength deficits, analyze and address soft tissue restrictions and analyze and cue movement patterns to attain remaining goals. [x]  See Plan of Care  []  See progress note/recertification  []  See Discharge Summary         Progress towards goals / Updated goals:   1.   Pt will report compliance with HEP in order to supplement PT treatment              Eval = established   Progressing towards 9/14/20  2.   Pt will demonstrate right hip flexion AROM to 110degrees in order to improve ability to pick objects up from the floor.              Eval = 95degrees  Current progressing 9/21     Long Term Goals: To be accomplished in 6 treatments:  1.   Pt will demonstrate right hip strength minimum 4/5 in order to improve ambulation distances in the community               Eval = flexion = 4-/5, abduction and extension = 3/5  2.  Pt will score at least predicted value on FOTO in order to improve overall function, decrease pain, and facilitate return to PLOF.               Eval = to be completed on first follow up due to time constraints  3.  Pt will demonstrate (I) with HEP in order to prepare for surgery               Eval = established  PLAN  []  Upgrade activities as tolerated     []  Continue plan of care  []  Update interventions per flow sheet       []  Discharge due to:_  []  Other:_      Chiara Francisco, PTA 9/21/2020  8:11 AM    Future Appointments   Date Time Provider Emma Barrera   9/23/2020  8:00 AM [facility]   9/28/2020  8:00 AM [facility]   10/5/2020  7:30 AM [facility]   10/5/2020  9:40 AM Salty Corrigan MD [facility] BS AMB   10/7/2020  9:15 AM Camilo Urena PA-C [facility] BS AMB   10/29/2020 10:00 AM Camilo Urena PA-C [facility] BS AMB   5/5/2021  8:05 AM IO NURSE VISIT [facility] BS AMB   5/12/2021  8:20 AM Salty Corrigan MD [facility] BS AMB

## 2020-09-23 ENCOUNTER — HOSPITAL ENCOUNTER (OUTPATIENT)
Dept: PHYSICAL THERAPY | Age: 70
Discharge: HOME | End: 2020-09-23
Payer: MEDICARE

## 2020-09-23 DIAGNOSIS — Z01.818 PREOPERATIVE TESTING: Primary | ICD-10-CM

## 2020-09-23 DIAGNOSIS — M16.11 PRIMARY OSTEOARTHRITIS OF RIGHT HIP: ICD-10-CM

## 2020-09-23 PROCEDURE — 97110 THERAPEUTIC EXERCISES: CPT

## 2020-09-23 NOTE — PROGRESS NOTES
PT DAILY TREATMENT NOTE 10-18    Patient Name: Lu Alarcon  Date:2020  : 1950  [x]  Patient  Verified  Payor: VA MEDICARE / Plan: VA MEDICARE PART A & B / Product Type: Medicare /    In time:802  Out time:842  Total Treatment Time (min): 40  Visit #: 4 of 6    Medicare/BCBS Only   Total Timed Codes (min):  40 1:1 Treatment Time:  40       Treatment Area: Pain in right hip [M25.551]    SUBJECTIVE  Pain Level (0-10 scale): 6  Any medication changes, allergies to medications, adverse drug reactions, diagnosis change, or new procedure performed?: [x] No    [] Yes (see summary sheet for update)  Subjective functional status/changes:   [] No changes reported  Pt reports her pain was very high yesterday, couldn't even bear weight. OBJECTIVE    40 min Therapeutic Exercise:  [x] See flow sheet :   Rationale: increase ROM and increase strength to improve the patients ability to perform ADLs    With   [] TE   [] TA   [] neuro   [] other: Patient Education: [x] Review HEP    [] Progressed/Changed HEP based on:   [] positioning   [] body mechanics   [] transfers   [] heat/ice application    [] other:      Other Objective/Functional Measures: progressed exercises per flow sheet      Pain Level (0-10 scale) post treatment: 1    ASSESSMENT/Changes in Function: Pt tolerated progression of exercises with out increase in symptoms. Pt continues to have pain with AROM heel slides, adjusted to AAROM with improved tolerance.  [place] with exercise is improving with decreased cues    Patient will continue to benefit from skilled PT services to modify and progress therapeutic interventions, address functional mobility deficits, address ROM deficits, address strength deficits, analyze and address soft tissue restrictions, analyze and cue movement patterns, analyze and modify body mechanics/ergonomics, assess and modify postural abnormalities, address imbalance/dizziness and instruct in home and community integration to attain remaining goals. []  See Plan of Care  []  See progress note/recertification  []  See Discharge Summary         Progress towards goals / Updated goals: 1.   Pt will report compliance with HEP in order to supplement PT treatment              Eval = established      Progressing towards 9/14/20  2.   Pt will demonstrate right hip flexion AROM to 110degrees in order to improve ability to pick objects up from the floor.              Eval = 95degrees     Deferred due to pain     Long Term Goals: To be accomplished in 6 treatments:  1.   Pt will demonstrate right hip strength minimum 4/5 in order to improve ambulation distances in the community               Eval = flexion = 4-/5, abduction and extension = 3/5   Tolerating progression of symptoms  2.  Pt will score at least predicted value on FOTO in order to improve overall function, decrease pain, and facilitate return to PLOF.               Eval = to be completed on first follow up due to time constraints  3.  Pt will demonstrate (I) with HEP in order to prepare for surgery               Eval = established   Reports compliance    PLAN  [x]  Upgrade activities as tolerated     [x]  Continue plan of care  []  Update interventions per flow sheet       []  Discharge due to:_  []  Other:_      Tiera Cook, PT 9/23/2020  8:08 AM    Future Appointments   Date Time Provider Emma Silveri   9/28/2020  8:00 AM Tashia Lancaster PTA [facility]   10/1/2020  9:00 AM Dana Leslie MD [phone] BS AMB   10/5/2020  7:30 [facility]   10/5/2020  9:40 AM Eduarda Munson MD [facility] BS AMB   10/7/2020  9:15 AM Shannon Estevez PA-C [facility] BS AMB   10/29/2020 10:00 AM Shannon Estevez PA-C [facility] BS AMB   5/5/2021  8:05 AM [facility] NURSE VISIT [facility] BS AMB   5/12/2021  8:20 AM Eduarda Munson MD [facility] BS AMB

## 2020-09-28 ENCOUNTER — HOSPITAL ENCOUNTER (OUTPATIENT)
Dept: PHYSICAL THERAPY | Age: 70
Discharge: HOME | End: 2020-09-28
Payer: MEDICARE

## 2020-10-01 ENCOUNTER — OFFICE VISIT (OUTPATIENT)
Age: 70
End: 2020-10-01
Payer: MEDICARE

## 2020-10-01 VITALS
DIASTOLIC BLOOD PRESSURE: 75 MMHG | SYSTOLIC BLOOD PRESSURE: 127 MMHG | OXYGEN SATURATION: 98 % | WEIGHT: 110.4 LBS | RESPIRATION RATE: 18 BRPM | BODY MASS INDEX: 23.07 KG/M2 | HEART RATE: 51 BPM

## 2020-10-01 DIAGNOSIS — F17.200 TOBACCO DEPENDENCE SYNDROME: ICD-10-CM

## 2020-10-01 DIAGNOSIS — D72.9 NEUTROPHILIA: ICD-10-CM

## 2020-10-01 DIAGNOSIS — C83.18 MANTLE CELL LYMPHOMA OF LYMPH NODES OF MULTIPLE SITES (HCC): Primary | ICD-10-CM

## 2020-10-01 DIAGNOSIS — D50.9 IRON DEFICIENCY ANEMIA, UNSPECIFIED IRON DEFICIENCY ANEMIA TYPE: ICD-10-CM

## 2020-10-01 PROCEDURE — 3017F COLORECTAL CA SCREEN DOC REV: CPT | Performed by: INTERNAL MEDICINE

## 2020-10-01 PROCEDURE — G8754 DIAS BP LESS 90: HCPCS | Performed by: INTERNAL MEDICINE

## 2020-10-01 PROCEDURE — G8427 DOCREV CUR MEDS BY ELIG CLIN: HCPCS | Performed by: INTERNAL MEDICINE

## 2020-10-01 PROCEDURE — 1090F PRES/ABSN URINE INCON ASSESS: CPT | Performed by: INTERNAL MEDICINE

## 2020-10-01 PROCEDURE — G8420 CALC BMI NORM PARAMETERS: HCPCS | Performed by: INTERNAL MEDICINE

## 2020-10-01 PROCEDURE — 99214 OFFICE O/P EST MOD 30 MIN: CPT | Performed by: INTERNAL MEDICINE

## 2020-10-01 PROCEDURE — 1101F PT FALLS ASSESS-DOCD LE1/YR: CPT | Performed by: INTERNAL MEDICINE

## 2020-10-01 PROCEDURE — G8752 SYS BP LESS 140: HCPCS | Performed by: INTERNAL MEDICINE

## 2020-10-01 PROCEDURE — G8399 PT W/DXA RESULTS DOCUMENT: HCPCS | Performed by: INTERNAL MEDICINE

## 2020-10-01 PROCEDURE — G0463 HOSPITAL OUTPT CLINIC VISIT: HCPCS | Performed by: INTERNAL MEDICINE

## 2020-10-01 PROCEDURE — G8432 DEP SCR NOT DOC, RNG: HCPCS | Performed by: INTERNAL MEDICINE

## 2020-10-01 PROCEDURE — G8536 NO DOC ELDER MAL SCRN: HCPCS | Performed by: INTERNAL MEDICINE

## 2020-10-01 PROCEDURE — G9899 SCRN MAM PERF RSLTS DOC: HCPCS | Performed by: INTERNAL MEDICINE

## 2020-10-01 NOTE — PROGRESS NOTES
Hematology/Oncology  Progress Note     Name: Chase Keller  Date:10/01/20  : 1950     PCP: Robbin Trejo MD       Current therapy: Supportive care and active surveillance.     Subjective:      Ms. Marie is a 69-year-old woman who has a history of mantle cell lymphoma. She was diagnosed in 2014. She has completed a full course of systemic chemotherapy. She was being followed by Dr. Ajay Morin who retired. She denies night sweats, fevers or unexplained infections. She denies weight loss. She states she is doing well and denies any abdominal pain or discomfort. She reports right hip pain for which she is planned for hip replacement. She has good appetite. All other points of ROS have been reviewed and were negative. ECOG=1.   Past medical history, family history, and social history: these were reviewed and remains unchanged.         Past Medical History:   Diagnosis Date    Anxiety 6/15    IKE-7 was     Calculus of kidney     Clostridium difficile colitis 2014    Dr. Rosenberg Rein adenoma 10/2011    Dr. Atiya Esposito; Dr Ashley Steele     Cystic breast     Depression 6/15    PHQ-9 was 15/27    Diverticulitis     recurrent x3 Dr Manju Bledsoe liver     10/10 on US, CT ; Fib-4 was 0.74 from 1/15    GI bleed     ischemic colitis on CT, seen by Dr. Deepika Gil Hyperlipidemia     calculated 10 year risk score was 3.2% (1/15)    Hypertension     IFG (impaired fasting glucose) [id number]    Ischemic colitis ([facility] 3/14    Dr Ursula Munoz    Labial abscess     + MRSA    Lymphoma ([facility]     Dr. Erin Newell    Migraine     Neuropathy due to chemotherapeutic drug ([facility]     Tobacco dependence syndrome 2019    Zoster     left T11     Past Surgical History:   Procedure Laterality Date    COLONOSCOPY N/A 2020    Dr Atiya Esposito 2003 polyps;  adenoma; Dr Denise Reeves 3/14 isch colitis; Dr Ashley Steele 20 mod divertics and adenoma    HX APPENDECTOMY      HX ORTHOPAEDIC 1960s    LEFT knee surgery    HX ORTHOPAEDIC      DEXA t score 2.1 spine, -0.3 hip (4/19)    HX SOPHIA AND BSO  04/02    Dr. Lowell Cordero History     Socioeconomic History    Marital status:      Spouse name: Not on file    Number of children: 2    Years of education: Not on file    Highest education level: Not on file   Occupational History    Occupation: director [employer]   Tobacco Use    Smoking status: Current Every Day Smoker     Packs/day: 0.50     Years: 15.00     Pack years: 7.50     Types: Cigarettes    Smokeless tobacco: Never Used    Tobacco comment: smoking cessation advised   Substance and Sexual Activity    Alcohol use: Not Currently     Alcohol/week: 4.2 standard drinks     Types: 5 Glasses of wine per week    Drug use: No    Sexual activity: Not Currently     Family History   Adopted: Yes       Current Outpatient Medications   Medication Sig Dispense Refill    varenicline (CHANTIX STARTER DANIELLE) 0.5 mg (11)- 1 mg (42) DsPk Take 0.5 mg by mouth two (2) times daily (after meals). 1 Dose Pack 0    varenicline (CHANTIX) 1 mg tablet Take 1 Tab by mouth two (2) times a day. 60 Tab 5    amLODIPine (NORVASC) 5 mg tablet TAKE 1 TABLET BY MOUTH ONCE DAILY 90 Tab 3    ferrous sulfate (IRON) 325 mg (65 mg iron) tablet Take  by mouth Daily (before breakfast).  ibuprofen (MOTRIN) 200 mg tablet Take 200 mg by mouth every six (6) hours as needed for Pain.          Allergies   Allergen Reactions    Meperidine Nausea Only and Other (comments)    Augmentin [Amoxicillin-Pot Clavulanate] Diarrhea and Nausea Only    Keflex [Cephalexin] Nausea Only    Sulfa (Sulfonamide Antibiotics) Rash       Review of Systems  As per HPI      Objective:  Visit Vitals  /75   Pulse (!) 51   Resp 18   Wt 50.1 kg (110 lb 6.4 oz)   SpO2 98%   BMI 23.07 kg/m²         Physical Exam:   General appearance - alert, well appearing, and in no distress  Mental status - alert, oriented to person, place, and time  EYE-ALEISHA, EOMI  ENT-ENT exam normal, no neck nodes or sinus tenderness  Mouth - mucous membranes moist, pharynx normal without lesions  Neck - supple, no significant adenopathy   Chest - clear to auscultation, no wheezes, rales or rhonchi, symmetric air entry   Heart - normal rate and regular rhythm   Abdomen - soft, nontender, nondistended, no masses or organomegaly  Lymph- no adenopathy palpable  Ext-no pedal edema noted  Skin-Warm and dry. Neuro -alert, oriented, normal speech, no focal findings or movement disorder noted       Diagnostic Imaging     Results for orders placed during the hospital encounter of 07/16/15   IR REMOVE TUNL CVAD W/O PORT / PUMP    Narrative PROCEDURE(S):  1. Subcutaneous infusion port removal    INDICATION: 70-year-old female. History of malignancy. Subcutaneous infusion  port removal requested. TECHNIQUE: Expected benefits, potential risks, and alternatives to the procedure  were discussed with the patient (and/or surrogate decision maker, as applicable)  and all questions were answered. Discussed risks include - but are not limited  to - bleeding, infection, vascular injury, arrhythmia, thromboembolic events,  pneumothorax, retained foreign body, and medication reaction. Informed consent  was obtained. The patient was placed on the procedure table, the neck/chest was  prepared in usual sterile fashion, and the ensuing procedure was performed with  full barrier precaution, including caps, masks, gowns, gloves, and drapes. Procedure verification was completed. Moderate sedation was administered by  qualified nursing personnel, who also monitored the patient throughout the  procedure. A preprocedure image was obtained. Following local infiltration of 1% lidocaine,  a dermal incision was made over the port. The port was freed from the  subcutaneous pocket using blunt dissection. The port and catheter were removed  en bloc.  Hemostasis was achieved with manual compression. The subcutaneous  pocket was vigorously irrigated. A postprocedure image was obtained to exclude  retained foreign body. The dermal incision was closed with interrupted  absorbable sutures and sterilely dressed. The patient tolerated the procedure  well. ACCESS: Right internal jugular vein (via existing port)    CONTRAST: None    FLUOROSCOPY: 0.1 minutes    MEDICATION(S): Local lidocaine; moderate sedation    SEDATION TIME: None    : Elizabeth Green MD    ASSISTANT(S): None    SPECIMEN: None    ESTIMATED BLOOD LOSS: Minimal    BLOOD ADMINISTERED: None    DRAIN(S): None    IMPLANT(S): None    COMPLICATION(S): None    FINDINGS:  1. Successful port removal  2. No radiopaque retained foreign body      Impression IMPRESSION:  Technically successful removal of subcutaneous infusion port. Results for orders placed during the hospital encounter of 02/25/20   XR CHEST PA LAT    Narrative EXAM: XR CHEST PA LAT    HISTORY: Crackles in lungs, fever times a week, body aches    COMPARISON: September 8, 2015    FINDINGS:    There is a thin linear density in the left lower lung zone consistent with a  small parenchymal scar. No evidence of focal consolidation or acute pulmonary  infiltrate. Lung volumes are satisfactory and there is no evidence of a pleural  effusion or pneumothorax. Cardiac size and pulmonary vascular markings are normal.  There are mild osteoarthritic changes in the thoracic spine. Impression IMPRESSION[de-identified]    1.  No evidence of acute cardiopulmonary disease. [place]              Results for orders placed in visit on 10/17/16   CT ABD PELV W CONT       Lab Results  Lab Results   Component Value Date/Time    WBC 7.5 04/28/2020 09:04 AM    HGB 11.9 (L) 04/28/2020 09:04 AM    HCT 35.2 04/28/2020 09:04 AM    PLATELET 042 76/03/7754 09:04 AM    MCV 92.9 04/28/2020 09:04 AM       Lab Results   Component Value Date/Time    Sodium 141 04/28/2020 09:04 AM    Potassium 4.5 04/28/2020 09:04 AM    Chloride 109 04/28/2020 09:04 AM    CO2 25 04/28/2020 09:04 AM    Anion gap 7 04/28/2020 09:04 AM    Glucose 103 (H) 04/28/2020 09:04 AM    BUN 15 04/28/2020 09:04 AM    Creatinine 0.79 04/28/2020 09:04 AM    BUN/Creatinine ratio 19 04/28/2020 09:04 AM    GFR est AA >60 04/28/2020 09:04 AM    GFR est non-AA >60 04/28/2020 09:04 AM    Calcium 9.0 04/28/2020 09:04 AM    Alk. phosphatase 57 04/28/2020 09:04 AM    Protein, total 7.7 04/28/2020 09:04 AM    Albumin 3.9 04/28/2020 09:04 AM    Globulin 3.8 04/28/2020 09:04 AM    A-G Ratio 1.0 04/28/2020 09:04 AM    ALT (SGPT) 15 04/28/2020 09:04 AM   F/u with PCP for health maintenance    Assessment/Plan:    ICD-10-CM ICD-9-CM    1. Mantle cell lymphoma [facility] lymph nodes of [facility] sites ([facility]  C83.18 200.48    2. Iron deficiency anemia, unspecified iron deficiency anemia type  D50.9 280.9 CBC WITH AUTOMATED DIFF      FERRITIN      IRON PROFILE      VITAMIN B12 & FOLATE      TSH 3RD GENERATION      METABOLIC PANEL, COMPREHENSIVE      SED RATE (ESR)      C REACTIVE PROTEIN, QT   3. Neutrophilia  D72.9 288.8    4.  Tobacco dependence syndrome  F17.200 305.1      Orders Placed This Encounter    CBC WITH AUTOMATED DIFF     Standing Status:   Future     Standing Expiration Date:   10/2/2021    FERRITIN     Standing Status:   Future     Standing Expiration Date:   10/2/2021    IRON PROFILE     Standing Status:   Future     Standing Expiration Date:   10/2/2021    VITAMIN B12 & FOLATE     Standing Status:   Future     Standing Expiration Date:   10/2/2021    TSH 3RD GENERATION     Standing Status:   Future     Standing Expiration Date:   21/9/1347    METABOLIC PANEL, COMPREHENSIVE     Standing Status:   Future     Standing Expiration Date:   10/2/2021    SED RATE (ESR)     Standing Status:   Future     Standing Expiration Date:   10/2/2021    C REACTIVE PROTEIN, QT     Standing Status:   Future     Standing Expiration Date:   10/2/2021     Mantle cell lymphoma, multiple sites: Stage IV MCL Diagnosed in January 2014. She is s/p chemotherapy completed in September 2014. Clinically there is no evidence of disease recurrence. Ok to proceed to hip replacement surgery oncology/hematology wise. Iron Deficiency Anemia:  Recheck labs today. She is on ferrous sulfate every other day. Neutrophilia: This was likely from cigarette smoking. She quit 2 weeks ago she says in anticipation of surgery. RTC one week virtual               The patient will return in 4 months for a complete reassessment or sooner if indicated. continue present plan, routine labs ordered  There are no Patient Instructions on file for this visit.        Zuleyma Salas MD

## 2020-10-01 NOTE — PROGRESS NOTES
Aquilino Beverly is a 77 y.o. female presenting today for a new patient appointment. Patient is ambulatory with a cane and reports 5/10 pain in right hip (planned hip replacement). Provider was advised. Chief Complaint   Patient presents with    Lymphoma     Mantle Cell Lymphoma       Visit Vitals  /75   Pulse (!) 51   Resp 18   Wt 110 lb 6.4 oz (50.1 kg)   SpO2 98%   BMI 23.07 kg/m²       Current Outpatient Medications   Medication Sig    varenicline (CHANTIX STARTER DANIELLE) 0.5 mg (11)- 1 mg (42) DsPk Take 0.5 mg by mouth two (2) times daily (after meals).  varenicline (CHANTIX) 1 mg tablet Take 1 Tab by mouth two (2) times a day.  amLODIPine (NORVASC) 5 mg tablet TAKE 1 TABLET BY MOUTH ONCE DAILY    ferrous sulfate (IRON) 325 mg (65 mg iron) tablet Take  by mouth Daily (before breakfast).  ibuprofen (MOTRIN) 200 mg tablet Take 200 mg by mouth every six (6) hours as needed for Pain. No current facility-administered medications for this visit. Fall Risk Assessment, last 12 mths 8/5/2020   Able to walk? Yes   Fall in past 12 months? No       3 most recent PHQ Screens 4/21/2020   Little interest or pleasure in doing things Not at all   Feeling down, depressed, irritable, or hopeless Not at all   Total Score PHQ 2 0       Abuse Screening Questionnaire 4/21/2020   Do you ever feel afraid of your partner? N   Are you in a relationship with someone who physically or mentally threatens you? N   Is it safe for you to go home?  Y       Learning Assessment 3/3/2020   PRIMARY LEARNER Patient   HIGHEST LEVEL OF EDUCATION - PRIMARY LEARNER  SOME COLLEGE   BARRIERS PRIMARY LEARNER NONE   CO-LEARNER CAREGIVER No   PRIMARY LANGUAGE ENGLISH   LEARNER PREFERENCE PRIMARY DEMONSTRATION     -     -     -     -   ANSWERED BY patient   RELATIONSHIP SELF

## 2020-10-02 ENCOUNTER — HOSPITAL ENCOUNTER (OUTPATIENT)
Dept: PREADMISSION TESTING | Age: 70
Discharge: HOME | End: 2020-10-02
Payer: MEDICARE

## 2020-10-02 ENCOUNTER — HOSPITAL ENCOUNTER (OUTPATIENT)
Dept: GENERAL RADIOLOGY | Age: 70
Discharge: HOME | End: 2020-10-02
Payer: MEDICARE

## 2020-10-02 ENCOUNTER — HOSPITAL ENCOUNTER (OUTPATIENT)
Dept: LAB | Age: 70
Discharge: HOME | End: 2020-10-02
Payer: MEDICARE

## 2020-10-02 DIAGNOSIS — Z01.818 PREOPERATIVE TESTING: ICD-10-CM

## 2020-10-02 DIAGNOSIS — M16.11 PRIMARY OSTEOARTHRITIS OF RIGHT HIP: ICD-10-CM

## 2020-10-02 DIAGNOSIS — D50.9 IRON DEFICIENCY ANEMIA, UNSPECIFIED IRON DEFICIENCY ANEMIA TYPE: ICD-10-CM

## 2020-10-02 LAB
ABO + RH BLD: NORMAL
ALBUMIN SERPL-MCNC: 4 G/DL (ref 3.4–5)
ALBUMIN SERPL-MCNC: 4.2 G/DL (ref 3.4–5)
ALBUMIN/GLOB SERPL: 1 {RATIO} (ref 0.8–1.7)
ALP SERPL-CCNC: 86 U/L (ref 45–117)
ALT SERPL-CCNC: 20 U/L (ref 13–56)
ANION GAP SERPL CALC-SCNC: 4 MMOL/L (ref 3–18)
ANION GAP SERPL CALC-SCNC: 4 MMOL/L (ref 3–18)
APPEARANCE UR: CLEAR
APTT PPP: 23.6 SEC (ref 23–36.4)
AST SERPL-CCNC: 22 U/L (ref 10–38)
ATRIAL RATE: 53 BPM
BASOPHILS # BLD: 0 K/UL (ref 0–0.1)
BASOPHILS # BLD: 0 K/UL (ref 0–0.1)
BASOPHILS NFR BLD: 1 % (ref 0–2)
BASOPHILS NFR BLD: 1 % (ref 0–2)
BILIRUB SERPL-MCNC: 0.5 MG/DL (ref 0.2–1)
BILIRUB UR QL: NEGATIVE
BLOOD GROUP ANTIBODIES SERPL: NORMAL
BUN SERPL-MCNC: 18 MG/DL (ref 7–18)
BUN SERPL-MCNC: 18 MG/DL (ref 7–18)
BUN/CREAT SERPL: 20 (ref 12–20)
BUN/CREAT SERPL: 21 (ref 12–20)
CALCIUM SERPL-MCNC: 9.3 MG/DL (ref 8.5–10.1)
CALCIUM SERPL-MCNC: 9.4 MG/DL (ref 8.5–10.1)
CALCULATED P AXIS, ECG09: 33 DEGREES
CALCULATED R AXIS, ECG10: 25 DEGREES
CALCULATED T AXIS, ECG11: 41 DEGREES
CHLORIDE SERPL-SCNC: 106 MMOL/L (ref 100–111)
CHLORIDE SERPL-SCNC: 107 MMOL/L (ref 100–111)
CO2 SERPL-SCNC: 25 MMOL/L (ref 21–32)
CO2 SERPL-SCNC: 26 MMOL/L (ref 21–32)
COLOR UR: YELLOW
CREAT SERPL-MCNC: 0.87 MG/DL (ref 0.6–1.3)
CREAT SERPL-MCNC: 0.9 MG/DL (ref 0.6–1.3)
CRP SERPL-MCNC: <0.3 MG/DL (ref 0–0.3)
DIAGNOSIS, 93000: NORMAL
DIFFERENTIAL METHOD BLD: ABNORMAL
DIFFERENTIAL METHOD BLD: ABNORMAL
EOSINOPHIL # BLD: 0.3 K/UL (ref 0–0.4)
EOSINOPHIL # BLD: 0.3 K/UL (ref 0–0.4)
EOSINOPHIL NFR BLD: 4 % (ref 0–5)
EOSINOPHIL NFR BLD: 4 % (ref 0–5)
ERYTHROCYTE [DISTWIDTH] IN BLOOD BY AUTOMATED COUNT: 13.1 % (ref 11.6–14.5)
ERYTHROCYTE [DISTWIDTH] IN BLOOD BY AUTOMATED COUNT: 13.1 % (ref 11.6–14.5)
ERYTHROCYTE [SEDIMENTATION RATE] IN BLOOD: 33 MM/HR (ref 0–30)
EST. AVERAGE GLUCOSE BLD GHB EST-MCNC: 103 MG/DL
FERRITIN SERPL-MCNC: 69 NG/ML (ref 8–388)
FOLATE SERPL-MCNC: 16.7 NG/ML (ref 3.1–17.5)
GLOBULIN SER CALC-MCNC: 4.4 G/DL (ref 2–4)
GLUCOSE SERPL-MCNC: 102 MG/DL (ref 74–99)
GLUCOSE SERPL-MCNC: 97 MG/DL (ref 74–99)
GLUCOSE UR STRIP.AUTO-MCNC: NEGATIVE MG/DL
HBA1C MFR BLD: 5.2 % (ref 4.2–5.6)
HCT VFR BLD AUTO: 36 % (ref 35–45)
HCT VFR BLD AUTO: 36.5 % (ref 35–45)
HGB BLD-MCNC: 12.3 G/DL (ref 12–16)
HGB BLD-MCNC: 12.3 G/DL (ref 12–16)
HGB UR QL STRIP: NEGATIVE
INR PPP: 1 (ref 0.8–1.2)
IRON SATN MFR SERPL: 19 % (ref 20–50)
IRON SERPL-MCNC: 91 UG/DL (ref 50–175)
KETONES UR QL STRIP.AUTO: NEGATIVE MG/DL
LEUKOCYTE ESTERASE UR QL STRIP.AUTO: NEGATIVE
LYMPHOCYTES # BLD: 1.2 K/UL (ref 0.9–3.6)
LYMPHOCYTES # BLD: 1.4 K/UL (ref 0.9–3.6)
LYMPHOCYTES NFR BLD: 15 % (ref 21–52)
LYMPHOCYTES NFR BLD: 18 % (ref 21–52)
MCH RBC QN AUTO: 30.7 PG (ref 24–34)
MCH RBC QN AUTO: 31.1 PG (ref 24–34)
MCHC RBC AUTO-ENTMCNC: 33.7 G/DL (ref 31–37)
MCHC RBC AUTO-ENTMCNC: 34.2 G/DL (ref 31–37)
MCV RBC AUTO: 91 FL (ref 74–97)
MCV RBC AUTO: 91.1 FL (ref 74–97)
MONOCYTES # BLD: 0.4 K/UL (ref 0.05–1.2)
MONOCYTES # BLD: 0.5 K/UL (ref 0.05–1.2)
MONOCYTES NFR BLD: 5 % (ref 3–10)
MONOCYTES NFR BLD: 6 % (ref 3–10)
NEUTS SEG # BLD: 5.6 K/UL (ref 1.8–8)
NEUTS SEG # BLD: 5.6 K/UL (ref 1.8–8)
NEUTS SEG NFR BLD: 72 % (ref 40–73)
NEUTS SEG NFR BLD: 74 % (ref 40–73)
NITRITE UR QL STRIP.AUTO: NEGATIVE
P-R INTERVAL, ECG05: 172 MS
PH UR STRIP: 5 [PH] (ref 5–8)
PLATELET # BLD AUTO: 297 K/UL (ref 135–420)
PLATELET # BLD AUTO: 298 K/UL (ref 135–420)
PMV BLD AUTO: 9.1 FL (ref 9.2–11.8)
PMV BLD AUTO: 9.2 FL (ref 9.2–11.8)
POTASSIUM SERPL-SCNC: 4.6 MMOL/L (ref 3.5–5.5)
POTASSIUM SERPL-SCNC: 4.6 MMOL/L (ref 3.5–5.5)
PROT SERPL-MCNC: 8.6 G/DL (ref 6.4–8.2)
PROT UR STRIP-MCNC: NEGATIVE MG/DL
PROTHROMBIN TIME: 12.8 SEC (ref 11.5–15.2)
Q-T INTERVAL, ECG07: 410 MS
QRS DURATION, ECG06: 80 MS
QTC CALCULATION (BEZET), ECG08: 384 MS
RBC # BLD AUTO: 3.95 M/UL (ref 4.2–5.3)
RBC # BLD AUTO: 4.01 M/UL (ref 4.2–5.3)
SODIUM SERPL-SCNC: 136 MMOL/L (ref 136–145)
SODIUM SERPL-SCNC: 136 MMOL/L (ref 136–145)
SP GR UR REFRACTOMETRY: 1.02 (ref 1–1.03)
SPECIMEN EXP DATE BLD: NORMAL
TIBC SERPL-MCNC: 469 UG/DL (ref 250–450)
TSH SERPL DL<=0.05 MIU/L-ACNC: 1.6 UIU/ML (ref 0.36–3.74)
UROBILINOGEN UR QL STRIP.AUTO: 0.2 EU/DL (ref 0.2–1)
VENTRICULAR RATE, ECG03: 53 BPM
VIT B12 SERPL-MCNC: 345 PG/ML (ref 211–911)
WBC # BLD AUTO: 7.5 K/UL (ref 4.6–13.2)
WBC # BLD AUTO: 7.7 K/UL (ref 4.6–13.2)

## 2020-10-02 PROCEDURE — 86900 BLOOD TYPING SEROLOGIC ABO: CPT

## 2020-10-02 PROCEDURE — 83036 HEMOGLOBIN GLYCOSYLATED A1C: CPT

## 2020-10-02 PROCEDURE — 71046 X-RAY EXAM CHEST 2 VIEWS: CPT

## 2020-10-02 PROCEDURE — 93005 ELECTROCARDIOGRAM TRACING: CPT

## 2020-10-02 PROCEDURE — 85025 COMPLETE CBC W/AUTO DIFF WBC: CPT

## 2020-10-02 PROCEDURE — 80053 COMPREHEN METABOLIC PANEL: CPT

## 2020-10-02 PROCEDURE — 85730 THROMBOPLASTIN TIME PARTIAL: CPT

## 2020-10-02 PROCEDURE — 36415 COLL VENOUS BLD VENIPUNCTURE: CPT

## 2020-10-02 PROCEDURE — 84443 ASSAY THYROID STIM HORMONE: CPT

## 2020-10-02 PROCEDURE — 85610 PROTHROMBIN TIME: CPT

## 2020-10-02 PROCEDURE — 81003 URINALYSIS AUTO W/O SCOPE: CPT

## 2020-10-02 PROCEDURE — 82728 ASSAY OF FERRITIN: CPT

## 2020-10-02 PROCEDURE — 82607 VITAMIN B-12: CPT

## 2020-10-02 PROCEDURE — 86140 C-REACTIVE PROTEIN: CPT

## 2020-10-02 PROCEDURE — 85652 RBC SED RATE AUTOMATED: CPT

## 2020-10-02 PROCEDURE — 83540 ASSAY OF IRON: CPT

## 2020-10-02 PROCEDURE — 82040 ASSAY OF SERUM ALBUMIN: CPT

## 2020-10-02 PROCEDURE — 87086 URINE CULTURE/COLONY COUNT: CPT

## 2020-10-03 LAB
BACTERIA SPEC CULT: NORMAL
SERVICE CMNT-IMP: NORMAL

## 2020-10-04 NOTE — PROGRESS NOTES
77 y.o. WHITE OR  female who presents for evaluation. Here for med clearance prior to planned RIGHT THR by Dr Maximus Evans. Assuming she does well with that, they are planning on possibly doing the right knee next apparently    No cardiovascular complaints. Pressures running in the 110s over 60s when she checks. Unable to walk much so no exercise    Denied any gi or gu issues. The depression and anxiety are quiescent off meds. She is now f/u w Dr Theo Dumont with Dr Julian Pal[facility]' [facility].  No complaints to report    LAST MEDICARE WELLNESS EXAM: 2/8/19, 5/4/20    Past Medical History:   Diagnosis Date    Anxiety 6/15    IKE-7 was 12/21    Clostridium difficile colitis 03/2014    Dr. Dove Evita adenoma 10/2011    Dr. Estelle Reyna; Dr Todd Arias 7/20    Cystic breast     Depression 6/15    PHQ-9 was 15/27    Diverticulitis     recurrent x3 Dr Ogden Days liver     10/10 on US, CT 6/12; Fib-4 was 0.74 from 1/15    GI bleed 2/14    ischemic colitis on CT, seen by Dr. Jonny Li Hyperlipidemia     calculated 10 year risk score was 3.2% (1/15)    Hypertension     IFG (impaired fasting glucose) [id number]    Ischemic colitis ([facility] 3/14    Dr Nannette Lucio    Labial abscess     + MRSA    Lymphoma ([facility] 12/13    Dr. Julian Palumbo    Migraine     Nephrolithiasis     Neuropathy due to chemotherapeutic drug ([facility] 9/14    Osteoarthritis     hips and knees; Dr Mansoor Akers Tobacco dependence syndrome 2/8/2019    Zoster 8/14    left T11     Past Surgical History:   Procedure Laterality Date    COLONOSCOPY N/A 7/23/2020    Dr Estelle Reyna 2003 polyps; 2011 adenoma;  [facility] 3/14 isch colitis; Dr Todd Arias 7/23/20 mod divertics and adenoma    HX APPENDECTOMY  1968    HX ORTHOPAEDIC  1960s    LEFT knee surgery    HX ORTHOPAEDIC      DEXA t score 2.1 spine, -0.3 hip (4/19)    HX SOPHIA AND BSO  04/02    Dr. Leroy Pulido HX TONSILLECTOMY       Social History     Socioeconomic History    Marital status:      Spouse name: Not on file    Number of children: 2    Years of education: Not on file    Highest education level: Not on file   Occupational History    Occupation: director [employer]   Social Needs    Financial resource strain: Not on file    Food insecurity     Worry: Not on file     Inability: Not on file   [employer] needs     Medical: Not on file     Non-medical: Not on file   Tobacco Use    Smoking status: Former Smoker     Packs/day: 0.50     Years: 15.00     Pack years: 7.50     Types: Cigarettes     Last attempt to quit: 2020     Years since quittin.0    Smokeless tobacco: Never Used    Tobacco comment: smoking cessation advised   Substance and Sexual Activity    Alcohol use: Not Currently     Alcohol/week: 4.2 standard drinks     Types: 5 Glasses of wine per week    Drug use: No    Sexual activity: Not Currently   Lifestyle    Physical activity     Days per week: Not on file     Minutes per session: Not on file    Stress: Not on file   Relationships    Social connections     Talks on phone: Not on file     Gets together: Not on file     Attends Sikhism service: Not on file     Active member of club or organization: Not on file     Attends meetings of clubs or organizations: Not on file     Relationship status: Not on file    Intimate partner violence     Fear of current or ex partner: Not on file     Emotionally abused: Not on file     Physically abused: Not on file     Forced sexual activity: Not on file   Other Topics Concern    Not on file   Social History Narrative    Not on file     Current Outpatient Medications   Medication Sig    traMADoL (ULTRAM) 50 mg tablet Take 50 mg by mouth every eight (8) hours as needed for Pain.  amLODIPine (NORVASC) 5 mg tablet TAKE 1 TABLET BY MOUTH ONCE DAILY    ferrous sulfate (IRON) 325 mg (65 mg iron) tablet Take  by mouth Daily (before breakfast).  ibuprofen (MOTRIN) 200 mg tablet Take 200 mg by mouth every six (6) hours as needed for Pain. No current facility-administered medications for this visit. Allergies   Allergen Reactions    Meperidine Nausea Only and Other (comments)    Augmentin [Amoxicillin-Pot Clavulanate] Diarrhea and Nausea Only    Keflex [Cephalexin] Nausea Only    Sulfa (Sulfonamide Antibiotics) Rash     Visit Vitals  /66 (BP 1 Location: Right arm, BP Patient Position: Sitting)   Pulse (!) 52   Temp 97.5 °F (36.4 °C) (Temporal)   Resp 16   Ht 4' 10\" (1.473 m)   Wt 111 lb (50.3 kg)   SpO2 98%   BMI 23.20 kg/m²   A&O x3  Affect is appropriate. Mood stable  No apparent distress  Anicteric, no JVD, adenopathy or thyromegaly. No carotid bruits or radiated murmur  Lungs clear to auscultation, no wheezes or rales  Heart showed regular rate and rhythm. No murmur, rubs, gallops  Abdomen soft nontender, no hepatosplenomegaly or masses. Extremities without edema.   Pulses 1-2+ symmetrically    REVIEW OF SYSTEMS: gyn>5 yr due to hyst, mammo 4/19, colo 3/14 Dr Favian Barnes  Ophtho  no vision change or eye pain  Oral  no mouth pain, tongue or tooth problems  Ears  no hearing loss, ear pain, fullness, no swallowing problems  Cardiac  no CP, PND, orthopnea,  palpitations or syncope  Chest  no breast masses  Resp  no wheezing, chronic coughing, dyspnea  GI  no heartburn, nausea, vomiting, change in bowel habits, bleeding, hemorrhoids  Urinary  no dysuria, hematuria, flank pain, urgency, frequency    LABS  From 9/13 showed   gluc 107, cr 0.65, gfr 96,  alt 69, hba1c 5.6, chol 251, tg 54, hdl 113, ldl-c 127, wbc 6.5, hb 13.9, plt 225  From 11/13 showed gluc 103, cr 0.72, gfr 90,  alt 24,          wbc 7.6, hb 14.4 ,plt 224,  tsh 0.99  From 1/14 showed   gluc 119, cr 0.65, gfr>60,           wbc 8.2, hb 13.4, plt 208  From 2/14 showed   gluc 118, cr 0.72, gfr>60, alt 25,                   fe 84, %sat 29,                   b12 1289, fol 14.8, lip 376  From 3/14 showed   gluc 107, cr 0.52, gfr>60, alt 13,          wbc 4.8, hb 10.8, plt 299, lip 130, c diff+  From 8/14 showed                        fe 57, %sat 16, ferritin 153  Form 1/15 showed                                  b12 457,  Fol>20,   vit d 23,  From 1/15 showed        hba1c 4.9, chol 206, tg 50, hdl 116, ldl-c 80  From 9/15 showed   gluc 105, cr 0.69, gfr>60, alt 27,         wbc 9.3, hb 15.4, plt 248  From 2/16 showed   gluc 123, cr 0.62, gfr 96,  alt 22  From 2/17 showed   gluc 89,   cr 0.72, gfr>60, alt 47,          wbc 9.8, hb 14.8, plt 328  From 2/18 showed   gluc 109, cr 0.65, gfr>60, alt 22,          wbc 9.3, hb 13.1, plt 253  From 2/19 showed   gluc 106, cr 0.61, gfr>60, alt 37, hba1c 5.2, chol 219, tg 27, hdl 161, ldl-c 53,   wbc 6.6, hb 12.7, plt 213  From 7/19 showed   gluc 159, cr 0.84, gfr>60, alt 46,          wbc 7.7, hb 12.6, plt 265  From 5/20 showed   gluc 103, cr 0.79, gfr>60, alt 15,          wbc 7.5, hb 11.9, plt 301, fe 75, %sat 20, ferritin 75    Results for orders placed or performed during the hospital encounter of 10/02/20   CULTURE, URINE    Specimen: Urine   Result Value Ref Range    Special Requests: NO SPECIAL REQUESTS      Culture result: No significant growth, <10,000 CFU/mL     CBC WITH AUTOMATED DIFF   Result Value Ref Range    WBC 7.5 4.6 - 13.2 K/uL    RBC 4.01 (L) 4.20 - 5.30 M/uL    HGB 12.3 12.0 - 16.0 g/dL    HCT 36.5 35.0 - 45.0 %    MCV 91.0 74.0 - 97.0 FL    MCH 30.7 24.0 - 34.0 PG    MCHC 33.7 31.0 - 37.0 g/dL    RDW 13.1 11.6 - 14.5 %    PLATELET 707 682 - 869 K/uL    MPV 9.1 (L) 9.2 - 11.8 FL    NEUTROPHILS 74 (H) 40 - 73 %    LYMPHOCYTES 15 (L) 21 - 52 %    MONOCYTES 6 3 - 10 %    EOSINOPHILS 4 0 - 5 %    BASOPHILS 1 0 - 2 %    ABS. NEUTROPHILS 5.6 1.8 - 8.0 K/UL    ABS. LYMPHOCYTES 1.2 0.9 - 3.6 K/UL    ABS. MONOCYTES 0.5 0.05 - 1.2 K/UL    ABS. EOSINOPHILS 0.3 0.0 - 0.4 K/UL    ABS.  BASOPHILS 0.0 0.0 - 0.1 K/UL    DF AUTOMATED     PROTHROMBIN TIME + INR   Result Value Ref Range    Prothrombin time 12.8 11.5 - 15.2 sec    INR 1.0 0.8 - 1. 2     PTT   Result Value Ref Range    aPTT 23.6 23.0 - 25.1 SEC   METABOLIC PANEL, BASIC   Result Value Ref Range    Sodium 136 136 - 145 mmol/L    Potassium 4.6 3.5 - 5.5 mmol/L    Chloride 107 100 - 111 mmol/L    CO2 25 21 - 32 mmol/L    Anion gap 4 3.0 - 18 mmol/L    Glucose 102 (H) 74 - 99 mg/dL    BUN 18 7.0 - 18 MG/DL    Creatinine 0.90 0.6 - 1.3 MG/DL    BUN/Creatinine ratio 20 12 - 20      GFR est AA >60 >60 ml/min/1.73m2    GFR est non-AA >60 >60 ml/min/1.73m2    Calcium 9.3 8.5 - 10.1 MG/DL   ALBUMIN   Result Value Ref Range    Albumin 4.0 3.4 - 5.0 g/dL   HEMOGLOBIN A1C WITH EAG   Result Value Ref Range    Hemoglobin A1c 5.2 4.2 - 5.6 %    Est. average glucose 103 mg/dL   URINALYSIS W/ RFLX MICROSCOPIC   Result Value Ref Range    Color YELLOW      Appearance CLEAR      Specific gravity 1.017 1.005 - 1.030      pH (UA) 5.0 5.0 - 8.0      Protein Negative NEG mg/dL    Glucose Negative NEG mg/dL    Ketone Negative NEG mg/dL    Bilirubin Negative NEG      Blood Negative NEG      Urobilinogen 0.2 0.2 - 1.0 EU/dL    Nitrites Negative NEG      Leukocyte Esterase Negative NEG     EKG, 12 LEAD, INITIAL   Result Value Ref Range    Ventricular Rate 53 BPM    Atrial Rate 53 BPM    P-R Interval 172 ms    QRS Duration 80 ms    Q-T Interval 410 ms    QTC Calculation (Bezet) 384 ms    Calculated P Axis 33 degrees    Calculated R Axis 25 degrees    Calculated T Axis 41 degrees    Diagnosis       Sinus bradycardia  Possible Left atrial enlargement  Anterolateral infarct (cited on or before 02-OCT-2020)  Abnormal ECG  When compared with ECG of 17-MAR-2014 11:46,  Vent.  rate has decreased BY  35 BPM  Nonspecific T wave abnormality no longer evident in Inferior leads  QT has shortened  Confirmed by Bernabe Ma ([id number]) on 10/2/2020 10:21:50 AM     TYPE & SCREEN   Result Value Ref Range    Crossmatch Expiration 10/16/2020     ABO/Rh(D) B POSITIVE     Antibody screen NEG      EKG independently read by me showed sb rate 53, nl int, poor r wave progression, no acute changes, no change from prior tracing 12/14    Patient Active Problem List   Diagnosis Code    Hyperlipidemia E78.5    Mantle cell lymphoma of lymph nodes of multiple sites ([facility] C83.18    Neuropathy due to chemotherapeutic drug ([facility] G62.0, T45.1X5A    Arthritis, degenerative M19.90    IFG (impaired fasting glucose) R73.01    Tobacco dependence syndrome F17.200    Colon adenoma D12.6    Diverticulosis K57.90    Primary hypertension I10    Iron deficiency anemia D50.9    Neutrophilia D72.9     Assessment and plan:  1. Colon adenoma, divertics. Fiber  2. HTN. Continue current regimen. 3. HLP. Lifestyle and dietary measures  4. IFG. Lifestyle and dietary measures, maintain weight  5. Nephrolithiasis. Quiescent, hydration  6. Lymphoma. F/U Dr Dejah Rosen as scheduled  7. Smoking cessation reiterated  8. Ortho. She is felt to be average risk for the planned procedure, no contraindications noted. Routine postop prophylaxis per protocol. If needed, cover with correction insulin per hospital protocol        RTC 4/21      Above conditions discussed at length and patient vocalized understanding.   All questions answered to patient satisfaction

## 2020-10-05 ENCOUNTER — OFFICE VISIT (OUTPATIENT)
Dept: INTERNAL MEDICINE CLINIC | Age: 70
End: 2020-10-05
Payer: MEDICARE

## 2020-10-05 ENCOUNTER — HOSPITAL ENCOUNTER (OUTPATIENT)
Dept: PHYSICAL THERAPY | Age: 70
Discharge: HOME | End: 2020-10-05
Payer: MEDICARE

## 2020-10-05 ENCOUNTER — TELEPHONE (OUTPATIENT)
Dept: INTERNAL MEDICINE CLINIC | Age: 70
End: 2020-10-05

## 2020-10-05 VITALS
HEIGHT: 58 IN | DIASTOLIC BLOOD PRESSURE: 66 MMHG | WEIGHT: 111 LBS | SYSTOLIC BLOOD PRESSURE: 120 MMHG | TEMPERATURE: 97.5 F | RESPIRATION RATE: 16 BRPM | OXYGEN SATURATION: 98 % | BODY MASS INDEX: 23.3 KG/M2 | HEART RATE: 52 BPM

## 2020-10-05 DIAGNOSIS — R73.01 IFG (IMPAIRED FASTING GLUCOSE): ICD-10-CM

## 2020-10-05 DIAGNOSIS — C83.18 MANTLE CELL LYMPHOMA OF LYMPH NODES OF MULTIPLE SITES (HCC): ICD-10-CM

## 2020-10-05 DIAGNOSIS — I10 PRIMARY HYPERTENSION: ICD-10-CM

## 2020-10-05 DIAGNOSIS — M15.9 PRIMARY OSTEOARTHRITIS INVOLVING MULTIPLE JOINTS: ICD-10-CM

## 2020-10-05 DIAGNOSIS — Z01.818 PREOP EXAM FOR INTERNAL MEDICINE: Primary | ICD-10-CM

## 2020-10-05 PROCEDURE — G8399 PT W/DXA RESULTS DOCUMENT: HCPCS | Performed by: INTERNAL MEDICINE

## 2020-10-05 PROCEDURE — G8420 CALC BMI NORM PARAMETERS: HCPCS | Performed by: INTERNAL MEDICINE

## 2020-10-05 PROCEDURE — 1090F PRES/ABSN URINE INCON ASSESS: CPT | Performed by: INTERNAL MEDICINE

## 2020-10-05 PROCEDURE — G8754 DIAS BP LESS 90: HCPCS | Performed by: INTERNAL MEDICINE

## 2020-10-05 PROCEDURE — G8427 DOCREV CUR MEDS BY ELIG CLIN: HCPCS | Performed by: INTERNAL MEDICINE

## 2020-10-05 PROCEDURE — 99214 OFFICE O/P EST MOD 30 MIN: CPT | Performed by: INTERNAL MEDICINE

## 2020-10-05 PROCEDURE — G8536 NO DOC ELDER MAL SCRN: HCPCS | Performed by: INTERNAL MEDICINE

## 2020-10-05 PROCEDURE — G8432 DEP SCR NOT DOC, RNG: HCPCS | Performed by: INTERNAL MEDICINE

## 2020-10-05 PROCEDURE — G0463 HOSPITAL OUTPT CLINIC VISIT: HCPCS | Performed by: INTERNAL MEDICINE

## 2020-10-05 PROCEDURE — G8752 SYS BP LESS 140: HCPCS | Performed by: INTERNAL MEDICINE

## 2020-10-05 PROCEDURE — 1101F PT FALLS ASSESS-DOCD LE1/YR: CPT | Performed by: INTERNAL MEDICINE

## 2020-10-05 PROCEDURE — G9899 SCRN MAM PERF RSLTS DOC: HCPCS | Performed by: INTERNAL MEDICINE

## 2020-10-05 PROCEDURE — 97110 THERAPEUTIC EXERCISES: CPT

## 2020-10-05 PROCEDURE — 3017F COLORECTAL CA SCREEN DOC REV: CPT | Performed by: INTERNAL MEDICINE

## 2020-10-05 RX ORDER — TRAMADOL HYDROCHLORIDE 50 MG/1
50 TABLET ORAL
COMMUNITY
End: 2020-10-14

## 2020-10-05 NOTE — TELEPHONE ENCOUNTER
Dr. Justyn Garner office calling, says RD saw pt today for med clearance. Says note in cc doesn't say she is cleared. Asking  add it to the note.

## 2020-10-05 NOTE — PROGRESS NOTES
PT DISCHARGE DAILY NOTE AND [id number]    Patient name: Pamela Singh Start of Care: 9/3/2020   Referral source: Han Prado MD : 1950                Medical Diagnosis: Pain in right hip [M25.551]  Payor: VA MEDICARE / Plan: VA MEDICARE PART A & B / Product Type: Medicare /  Onset Date:chronic with exacerbation and scheduled THR on 10/13/2020                Treatment Diagnosis: right hip pain   Prior Hospitalization: see medical history Provider#: [id number]   Medications: Verified on Patient summary List    Comorbidities: non hodgkin's lymphoma, OA, HTN   Prior Level of Function: functionally (I); works as  for [employer]; recently began using [facility] due to pain      Visits from Start of Care: 4    Missed Visits: 2    Reporting Period : 20 to 10/5/20    Date:10/5/2020  : 1950  [x]  Patient  Verified  Payor: VA MEDICARE / Plan: VA MEDICARE PART A & B / Product Type: Medicare /    In time:730  Out time:808  Total Treatment Time (min): 38  Visit #: 1 of 1    Medicare/BCBS Only   Total Timed Codes (min):  38 1:1 Treatment Time:  38       SUBJECTIVE  Pain Level (0-10 scale): 6  Any medication changes, allergies to medications, adverse drug reactions, diagnosis change, or new procedure performed?: [x] No    [] Yes (see summary sheet for update)  Subjective functional status/changes:   [] No changes reported  \"I'm doing better overall. Just still the pain from the arthritis. I'm scheduled for surgery next week. \"    OBJECTIVE        38 min Therapeutic Exercise:  [x]?  See flow sheet :   Rationale: increase ROM and increase strength to improve the patients ability to perform ADLs               With   [] TE   [] TA   [] neuro   [] other: Patient Education: [x] Review HEP    [] Progressed/Changed HEP based on:   [] positioning   [] body mechanics   [] transfers   [] heat/ice application    [] other:      Other Objective/Functional Measures:   Functional Gains: hip strength and ROM improved  Functional Deficits: walking, standing, stairs, squatting   % improvement: 50%  Pain   Average: 5-6/10       Best: 3/10     Worst: 8/10 - with walking  Patient Goal: \"to keep working until my surgery next week. \"       Pain Level (0-10 scale) post treatment: 3    Summary of Care:  Progress towards goals / Updated goals: 1.   Pt will report compliance with HEP in order to supplement PT treatment              Eval = established                 MET  2.   Pt will demonstrate right hip flexion AROM to 110degrees in order to improve ability to pick objects up from the floor.              Eval = 95degrees                PROGRESSED: 105 deg     Long Term Goals: To be accomplished in 6 treatments:  1.   Pt will demonstrate right hip strength minimum 4/5 in order to improve ambulation distances in the community               Eval = flexion = 4-/5, abduction and extension = 3/5              MET: 5/5  2.  Pt will score at least predicted value on FOTO in order to improve overall function, decrease pain, and facilitate return to PLOF.             Eval = to be completed on first follow up due to time constraint   Not reassesed  3.  Pt will demonstrate (I) with HEP in order to prepare for surgery               Eval = established              MET: Reports compliance    ASSESSMENT/Changes in Function: Ms. Louis Toussaint has seen good response to PT for improved hip ROM and strength. She is scheduled for total hip replacement next week and will continue HEP until that time. She will return to outpatient PT post operatively as needed. We will discharge from outpatient PT services at this time.      Thank you for this referral!      PLAN  [x]Discontinue therapy: [x]Patient has reached or is progressing toward set goals/scheduled for surgery      []Patient is non-compliant or has abdicated      []Due to lack of appreciable progress towards set [address], PT 10/5/2020  7:42 AM

## 2020-10-05 NOTE — PROGRESS NOTES
[facility]  [address], [address] [phone] fax    Continued Plan of Care/ Re-certification for Physical Therapy Services    Patient name: Oumou Subramanian Start of Care: 9/3/2020   Referral source: Nhung Lal MD NQK: [id number]                Medical Diagnosis: Pain in right hip [M25.551]  Payor: VA MEDICARE / Plan: Varghese Yates / Product Type: Medicare /  Onset Date:chronic with exacerbation and scheduled THR on 10/13/2020                Treatment Diagnosis: right hip pain   Prior Hospitalization: see medical history Provider#: [id number]   Medications: Verified on Patient summary List    Comorbidities: non hodgkin's lymphoma, OA, HTN   Prior Level of Function: functionally (I); works as  for [employer]; recently began using [address] due to pain        Visits from [facility] of Care: 5                                     Missed Visits: 2      The Plan of Care and following information is based on the patient's current status:  Other Objective/Functional Measures:   Functional Gains: hip strength and ROM improved  Functional Deficits: walking, standing, stairs, squatting   % improvement: 50%  Pain   Average: 5-6/10       Best: 3/10     Worst: 8/10 - with walking  Patient Goal: \"to keep working until my surgery next week. \"       Pain Level (0-10 scale) post treatment: 3    Summary of Care:  Progress towards goals / Updated goals:   1.   Pt will report compliance with HEP in order to supplement PT treatment              Eval = established                 MET  2.   Pt will demonstrate right hip flexion AROM to 110degrees in order to improve ability to pick objects up from the floor.              Eval = 95degrees                PROGRESSED: 105 deg     Long Term Goals: To be accomplished in 6 treatments:  1.   Pt will demonstrate right hip strength minimum 4/5 in order to improve ambulation distances in the community             Eval = flexion = 4-/5, abduction and extension = 3/5              MET: 5/5  2.  Pt will score at least predicted value on FOTO in order to improve overall function, decrease pain, and facilitate return to PLOF.             Eval = to be completed on first follow up due to time constraint   Not reassesed  3.  Pt will demonstrate (I) with HEP in order to prepare for surgery               Eval = established              MET: Reports compliance    Problems/ barriers to goal attainment: pain, weakness    Problem List: pain affecting function, decrease ROM, decrease strength, edema affecting function, impaired gait/ balance, decrease ADL/ functional abilitiies, decrease activity tolerance, decrease flexibility/ joint mobility and decrease transfer abilities    Treatment Plan: Therapeutic exercise, Therapeutic activities, Neuromuscular re-education, Physical agent/modality, Gait/balance training, Manual therapy, Aquatic therapy, Patient education, Self Care training, Functional mobility training, Home safety training and Stair training       Frequency / Duration: Patient to be seen 1 times per week for 1 treatments:    Assessment / Recommendations:Ms. Marie was seen for prehab for right hip again. She was seen for one visit past certification period to allow reassessment of goals and to update HEP. We will discharge after today's session. Certification Period: 10/5/20 - 11/3/20    Janee Duran, PT 10/5/2020 8:13 AM    ________________________________________________________________________  I certify that the above Therapy Services are being furnished while the patient is under my care. I agree with the treatment plan and certify that this therapy is necessary. [] I have read the above and request that my patient continue as recommended.   [] I have read the above report and request that my patient continue therapy with the following changes/special instructions: _____________________________________________  [] I have read the above report and request that my patient be discharged from therapy    Physician's Signature:____________Date:_________TIME:________    ** Signature, Date and Time must be completed for valid certification **    Please sign and return to [facility] [address], [address] [phone] fax

## 2020-10-07 ENCOUNTER — VIRTUAL VISIT (OUTPATIENT)
Age: 70
End: 2020-10-07
Payer: MEDICARE

## 2020-10-07 ENCOUNTER — OFFICE VISIT (OUTPATIENT)
Dept: ORTHOPEDIC SURGERY | Age: 70
End: 2020-10-07
Payer: MEDICARE

## 2020-10-07 VITALS
BODY MASS INDEX: 23.09 KG/M2 | WEIGHT: 110 LBS | OXYGEN SATURATION: 96 % | TEMPERATURE: 97.2 F | DIASTOLIC BLOOD PRESSURE: 60 MMHG | HEIGHT: 58 IN | RESPIRATION RATE: 16 BRPM | SYSTOLIC BLOOD PRESSURE: 148 MMHG | HEART RATE: 52 BPM

## 2020-10-07 DIAGNOSIS — D50.9 IRON DEFICIENCY ANEMIA, UNSPECIFIED IRON DEFICIENCY ANEMIA TYPE: ICD-10-CM

## 2020-10-07 DIAGNOSIS — Z01.818 PRE-OPERATIVE EXAM: ICD-10-CM

## 2020-10-07 DIAGNOSIS — F17.200 TOBACCO DEPENDENCE SYNDROME: ICD-10-CM

## 2020-10-07 DIAGNOSIS — M16.11 PRIMARY OSTEOARTHRITIS OF RIGHT HIP: Primary | ICD-10-CM

## 2020-10-07 DIAGNOSIS — D72.9 NEUTROPHILIA: ICD-10-CM

## 2020-10-07 DIAGNOSIS — C83.18 MANTLE CELL LYMPHOMA OF LYMPH NODES OF MULTIPLE SITES (HCC): Primary | ICD-10-CM

## 2020-10-07 PROCEDURE — 99442 PR PHYS/QHP TELEPHONE EVALUATION 11-20 MIN: CPT | Performed by: INTERNAL MEDICINE

## 2020-10-07 PROCEDURE — G8754 DIAS BP LESS 90: HCPCS | Performed by: INTERNAL MEDICINE

## 2020-10-07 PROCEDURE — G8420 CALC BMI NORM PARAMETERS: HCPCS | Performed by: INTERNAL MEDICINE

## 2020-10-07 PROCEDURE — G9899 SCRN MAM PERF RSLTS DOC: HCPCS | Performed by: INTERNAL MEDICINE

## 2020-10-07 PROCEDURE — G8428 CUR MEDS NOT DOCUMENT: HCPCS | Performed by: INTERNAL MEDICINE

## 2020-10-07 PROCEDURE — 1090F PRES/ABSN URINE INCON ASSESS: CPT | Performed by: INTERNAL MEDICINE

## 2020-10-07 PROCEDURE — 1101F PT FALLS ASSESS-DOCD LE1/YR: CPT | Performed by: INTERNAL MEDICINE

## 2020-10-07 PROCEDURE — G8399 PT W/DXA RESULTS DOCUMENT: HCPCS | Performed by: INTERNAL MEDICINE

## 2020-10-07 PROCEDURE — G8536 NO DOC ELDER MAL SCRN: HCPCS | Performed by: INTERNAL MEDICINE

## 2020-10-07 PROCEDURE — 3017F COLORECTAL CA SCREEN DOC REV: CPT | Performed by: INTERNAL MEDICINE

## 2020-10-07 PROCEDURE — G8753 SYS BP > OR = 140: HCPCS | Performed by: INTERNAL MEDICINE

## 2020-10-07 PROCEDURE — 73502 X-RAY EXAM HIP UNI 2-3 VIEWS: CPT | Performed by: PHYSICIAN ASSISTANT

## 2020-10-07 PROCEDURE — G8432 DEP SCR NOT DOC, RNG: HCPCS | Performed by: INTERNAL MEDICINE

## 2020-10-07 RX ORDER — ACETAMINOPHEN 325 MG/1
1000 TABLET ORAL ONCE
Status: CANCELLED | OUTPATIENT
Start: 2020-10-07 | End: 2020-10-07

## 2020-10-07 RX ORDER — PREGABALIN 25 MG/1
75 CAPSULE ORAL ONCE
Status: CANCELLED | OUTPATIENT
Start: 2020-10-07 | End: 2020-10-07

## 2020-10-07 RX ORDER — DEXAMETHASONE SODIUM PHOSPHATE 4 MG/ML
8 INJECTION, SOLUTION INTRA-ARTICULAR; INTRALESIONAL; INTRAMUSCULAR; INTRAVENOUS; SOFT TISSUE
Status: CANCELLED | OUTPATIENT
Start: 2020-10-07 | End: 2020-10-07

## 2020-10-07 NOTE — H&P
[address] [place] [address]  [phone]           HISTORY & PHYSICAL      Patient: True Reyes                MRN: [id number]       SSN: [id number]  YOB: 1950        AGE: 77 y.o. SEX: female  Body mass index is 22.99 kg/m². PCP: Dawn Mai MD  10/07/20      CC: right hip end stage OA  Problem List Items Addressed This Visit        Skeletal    Arthritis, degenerative - Primary    Relevant Orders    AMB POC X-RAY RADEX HIP UNI WITH PELVIS 2-3 VIEWS (Completed)      Other Visit Diagnoses     Pre-operative exam        Relevant Orders    AMB POC X-RAY RADEX HIP UNI WITH PELVIS 2-3 VIEWS (Completed)            HPI:  The patient is a pleasant 77 y.o. whom has end stage OA of their Right hip and has failed conservative treatment including but not limited to NSAIDS, cortisone injections, viscosupplementation, PT, and pain medicine. Due to the current findings and affected activities of daily living, surgical intervention is indicated. The alternatives, risks, complications, as well as expected outcome were discussed. These include but are not limited to infection, blood loss, need for blood transfusion, neurovascular damage, DVT, PE,  post-op stiffness and pain, leg length discrepancy, dislocation, anesthetic complications, prothesis longevity, need for more surgery, MI, stroke, and even death. The patient understands and wishes to proceed with surgery.       Past Medical History:   Diagnosis Date    Anxiety 6/15    IKE-7 was 12/21    Clostridium difficile colitis 03/2014    Dr. Ronald Perales adenoma 10/2011    Dr. Analisa Trinidad; Dr Benitez Goodness 7/20    Cystic breast     Depression 6/15    PHQ-9 was 15/27    Diverticulitis     recurrent x3 Dr Frey John liver     10/10 on US, CT 6/12; Fib-4 was 0.74 from 1/15    GI bleed 2/14    ischemic colitis on CT, seen by Dr. Bay Lynch Hyperlipidemia calculated 10 year risk score was 3.2% (1/15)    Hypertension     IFG (impaired fasting glucose) [id number]    [facility] 3/14    Dr Loco Colon    Labial abscess     + MRSA    Lymphoma ([facility]     Dr. Curt Prieto    Migraine     Nephrolithiasis     Neuropathy due to chemotherapeutic drug ([facility]     Osteoarthritis     hips and knees; Dr Fior Solorio Tobacco dependence syndrome 2019    Zoster     left T11         Current Outpatient Medications:     traMADoL (ULTRAM) 50 mg tablet, Take 50 mg by mouth every eight (8) hours as needed for Pain., Disp: , Rfl:     amLODIPine (NORVASC) 5 mg tablet, TAKE 1 TABLET BY MOUTH ONCE DAILY, Disp: 90 Tab, Rfl: 3    ferrous sulfate (IRON) 325 mg (65 mg iron) tablet, Take  by mouth Daily (before breakfast). , Disp: , Rfl:     ibuprofen (MOTRIN) 200 mg tablet, Take 200 mg by mouth every six (6) hours as needed for Pain., Disp: , Rfl:     Allergies   Allergen Reactions    Meperidine Nausea Only and Other (comments)    Augmentin [Amoxicillin-Pot Clavulanate] Diarrhea and Nausea Only    Keflex [Cephalexin] Nausea Only    Sulfa (Sulfonamide Antibiotics) Rash       Social History     Socioeconomic History    Marital status:      Spouse name: Not on file    Number of children: 2    Years of education: Not on file    Highest education level: Not on file   Occupational History    Occupation: director [employer]   Social Needs    Financial resource strain: Not on file    Food insecurity     Worry: Not on file     Inability: Not on file   [employer] needs     Medical: Not on file     Non-medical: Not on file   Tobacco Use    Smoking status: Former Smoker     Packs/day: 0.50     Years: 15.00     Pack years: 7.50     Types: Cigarettes     Last attempt to quit: 2020     Years since quittin.0    Smokeless tobacco: Never Used    Tobacco comment: smoking cessation advised   Substance and Sexual Activity    Alcohol use: Not Currently Alcohol/week: 4.2 standard drinks     Types: 5 Glasses of wine per week    Drug use: No    Sexual activity: Not Currently   Lifestyle    Physical activity     Days per week: Not on file     Minutes per session: Not on file    Stress: Not on file   Relationships    Social connections     Talks on phone: Not on file     Gets together: Not on file     Attends Holiness service: Not on file     Active member of club or organization: Not on file     Attends meetings of clubs or organizations: Not on file     Relationship status: Not on file    Intimate partner violence     Fear of current or ex partner: Not on file     Emotionally abused: Not on file     Physically abused: Not on file     Forced sexual activity: Not on file   Other Topics Concern    Not on file   Social History Narrative    Not on file       Past Surgical History:   Procedure Laterality Date    COLONOSCOPY N/A 7/23/2020    Dr Laverne Caruso 2003 polyps; 2011 adenoma; Dr Favian Barnes 3/14 isch colitis; Dr Kendell Gonsales 7/23/20 mod divertics and adenoma    HX APPENDECTOMY  1968    HX ORTHOPAEDIC  1960s    LEFT knee surgery    HX ORTHOPAEDIC      DEXA t score 2.1 spine, -0.3 hip (4/19)    HX SOPHIA AND BSO  04/02    Dr. Guillermo Castellon HX TONSILLECTOMY         Family History:  Non-contributory. PE:  Visit Vitals  BP (!) 148/60 (BP 1 Location: Left arm, BP Patient Position: Sitting)   Pulse (!) 52   Temp 97.2 °F (36.2 °C)   Resp 16   Ht 4' 10\" (1.473 m)   Wt 110 lb (49.9 kg)   SpO2 96%   BMI 22.99 kg/m²     A&O X3, NAD, well develop, well nourished  Heart: S1-S2, rrr  Lungs: CTA bilat  Abd: soft, nt, nt, + bs in all quadrants  Ext:  Pos distal pulses to DP, PT  Leg lengths show the right LE to be longer grossly sitting in the chair, this was explained to the patient    X-ray: right hip shows end stage OA    Labs: labs were reviewed and wnl.  ua neg    A:  Right  hip end stage OA    P:  At this point we will move forward with surgery.   Again, the alternatives, risks, complications, as well as expected outcome were discussed and the patient wishes to proceed with surgery. Pt has been instructed to stop aspirin, nsaids, rheumatologic medications and blood thinners. They have also been instructed to continue on any heart and bp meds and to take them the morning of surgery with sips of water. Lateral approach. The patient will require in-patient admission. Admission as an in-patient is reasonable and necessary due to increased risk of surgery due to the factors indicated as well as the possible need for prolonged in-hospital or skilled post-acute care in order to improve this patient's functional ability. The patient was counseled at length about the risks of jordyn Covid-19 during their perioperative period and any recovery window from their procedure. The patient was made aware that jordyn Covid-19  may worsen their prognosis for recovering from their procedure and lend to a higher morbidity and/or mortality risk. All material risks, benefits, and reasonable alternatives including postponing the procedure were discussed. The patient does  wish to proceed with the procedure at this time.           Sheyla Malone

## 2020-10-07 NOTE — PROGRESS NOTES
Melonie Tate is a 77 y.o. female presenting today for a follow-up appointment via Telehealth. Identified patient using two identifiers (full name and ). Patient denies any complaints. Provider was advised. Chief Complaint   Patient presents with    Lymphoma       Current Outpatient Medications   Medication Sig    traMADoL (ULTRAM) 50 mg tablet Take 50 mg by mouth every eight (8) hours as needed for Pain.  amLODIPine (NORVASC) 5 mg tablet TAKE 1 TABLET BY MOUTH ONCE DAILY    ferrous sulfate (IRON) 325 mg (65 mg iron) tablet Take  by mouth Daily (before breakfast).  ibuprofen (MOTRIN) 200 mg tablet Take 200 mg by mouth every six (6) hours as needed for Pain. No current facility-administered medications for this visit. Fall Risk Assessment, last 12 mths 10/7/2020   Able to walk? Yes   Fall in past 12 months? No       3 most recent PHQ Screens 2020   Little interest or pleasure in doing things Not at all   Feeling down, depressed, irritable, or hopeless Not at all   Total Score PHQ 2 0       Abuse Screening Questionnaire 2020   Do you ever feel afraid of your partner? N   Are you in a relationship with someone who physically or mentally threatens you? N   Is it safe for you to go home? Y       1. Have you been to the ER, urgent care clinic since your last visit? Hospitalized since your last visit? No    2. Have you seen or consulted any other health care providers outside of the [address] [place] [address] since your last visit? Include any pap smears or colon screening.  No

## 2020-10-07 NOTE — H&P (VIEW-ONLY)
[address] [facility], [address] [place] [id number] 
[phone] HISTORY & PHYSICAL Patient: Jessie Wheeler                MRN: [id number]       SSN: [id number] YOB: 1950        AGE: 77 y.o. SEX: female Body mass index is 22.99 kg/m². PCP: Rupal Alvarado MD 
10/07/20 CC: right hip end stage OA Problem List Items Addressed This Visit Skeletal  
 Arthritis, degenerative - Primary Relevant Orders AMB POC X-RAY RADEX HIP UNI WITH PELVIS 2-3 VIEWS (Completed) Other Visit Diagnoses Pre-operative exam      
 Relevant Orders AMB POC X-RAY RADEX HIP UNI WITH PELVIS 2-3 VIEWS (Completed) HPI:  The patient is a pleasant 77 y.o. whom has end stage OA of their Right hip and has failed conservative treatment including but not limited to NSAIDS, cortisone injections, viscosupplementation, PT, and pain medicine. Due to the current findings and affected activities of daily living, surgical intervention is indicated. The alternatives, risks, complications, as well as expected outcome were discussed. These include but are not limited to infection, blood loss, need for blood transfusion, neurovascular damage, DVT, PE,  post-op stiffness and pain, leg length discrepancy, dislocation, anesthetic complications, prothesis longevity, need for more surgery, MI, stroke, and even death. The patient understands and wishes to proceed with surgery. Past Medical History:  
Diagnosis Date  Anxiety 6/15 IKE-7 was 12/21  Clostridium difficile colitis 03/2014 Dr. Oletha Mortimer  Colon adenoma 10/2011 Dr. Jorge Trejo; Dr Jeni Mcdaniel 7/20  Cystic breast   
 Depression 6/15 PHQ-9 was 15/27  Diverticulitis   
 recurrent x3 Dr Deanne Restrepo  Fatty liver 10/10 on US, CT 6/12; Fib-4 was 0.74 from 1/15  GI bleed 2/14  
 ischemic colitis on CT, seen by Dr. Oletha Mortimer  Hyperlipidemia calculated 10 year risk score was 3.2% (1/15)  Hypertension  IFG (impaired fasting glucose) [id number]  Ischemic colitis ([facility] 3/14 Dr Oletha Mortimer  Labial abscess + MRSA  Lymphoma ([facility]  Dr. Savanna Vasquez  Migraine  Nephrolithiasis  Neuropathy due to chemotherapeutic drug ([facility]   Osteoarthritis   
 hips and knees; Dr Everardo Celeste  Tobacco dependence syndrome 2019  Zoster   
 left T11 Current Outpatient Medications:  
  traMADoL (ULTRAM) 50 mg tablet, Take 50 mg by mouth every eight (8) hours as needed for Pain., Disp: , Rfl:  
  amLODIPine (NORVASC) 5 mg tablet, TAKE 1 TABLET BY MOUTH ONCE DAILY, Disp: 90 Tab, Rfl: 3 
  ferrous sulfate (IRON) 325 mg (65 mg iron) tablet, Take  by mouth Daily (before breakfast). , Disp: , Rfl:  
  ibuprofen (MOTRIN) 200 mg tablet, Take 200 mg by mouth every six (6) hours as needed for Pain., Disp: , Rfl:  
 
Allergies Allergen Reactions  Meperidine Nausea Only and Other (comments)  Augmentin [Amoxicillin-Pot Clavulanate] Diarrhea and Nausea Only  Keflex [Cephalexin] Nausea Only  Sulfa (Sulfonamide Antibiotics) Rash Social History Socioeconomic History  Marital status:  Spouse name: Not on file  Number of children: 2  
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  
 Sexual activity: Not Currently Lifestyle  Physical activity Days per week: Not on file Minutes per session: Not on file  Stress: Not on file Relationships  Social connections Talks on phone: Not on file Gets together: Not on file Attends Confucianism service: Not on file Active member of club or organization: Not on file Attends meetings of clubs or organizations: Not on file Relationship status: Not on file  Intimate partner violence Fear of current or ex partner: Not on file Emotionally abused: Not on file Physically abused: Not on file Forced sexual activity: Not on file Other Topics Concern  Not on file Social History Narrative  Not on file Past Surgical History:  
Procedure Laterality Date  COLONOSCOPY N/A 7/23/2020 Dr Jorge Trejo 2003 polyps; 2011 adenoma; Dr Nicoletta Hodgkin 3/14 isch colitis; Dr Jeni Mcdaniel 7/23/20 mod divertics and adenoma [address]  HX ORTHOPAEDIC  1960s LEFT knee surgery  HX ORTHOPAEDIC    
 DEXA t score 2.1 spine, -0.3 hip (4/19)  HX SOPHIA AND BSO  04/02 Dr. Maria L Land  HX TONSILLECTOMY Family History:  Non-contributory. PE: 
Visit Vitals BP (!) 148/60 (BP 1 Location: Left arm, BP Patient Position: Sitting) Pulse (!) 52 Temp 97.2 °F (36.2 °C) Resp 16 Ht 4' 10\" (1.473 m) Wt 110 lb (49.9 kg) SpO2 96% BMI 22.99 kg/m² A&O X3, NAD, well develop, well nourished Heart: S1-S2, rrr 
Lungs: CTA bilat Abd: soft, nt, nt, + bs in all quadrants Ext:  Pos distal pulses to DP, PT Leg lengths show the right LE to be longer grossly sitting in the chair, this was explained to the patient X-ray: right hip shows end stage OA Labs: labs were reviewed and wnl.  ua neg A:  Right  hip end stage OA 
 
P:  At this point we will move forward with surgery.   Again, the alternatives, risks, complications, as well as expected outcome were discussed and the patient wishes to proceed with surgery. Pt has been instructed to stop aspirin, nsaids, rheumatologic medications and blood thinners. They have also been instructed to continue on any heart and bp meds and to take them the morning of surgery with sips of water. Lateral approach. The patient will require in-patient admission. Admission as an in-patient is reasonable and necessary due to increased risk of surgery due to the factors indicated as well as the possible need for prolonged in-hospital or skilled post-acute care in order to improve this patient's functional ability. The patient was counseled at length about the risks of jordyn Covid-19 during their perioperative period and any recovery window from their procedure. The patient was made aware that jordyn Covid-19  may worsen their prognosis for recovering from their procedure and lend to a higher morbidity and/or mortality risk. All material risks, benefits, and reasonable alternatives including postponing the procedure were discussed. The patient does  wish to proceed with the procedure at this time. Pedro Macdonald

## 2020-10-07 NOTE — PROGRESS NOTES
Cipriano Flores is a 77 y.o. female who was seen by synchronous (real-time) audio-technology on 10/7/2020      Hematology/Oncology  Progress Note     Name: Oumou Webb  Date:10/07/20  : 1950     PCP: Prashanth Bynum MD       Current therapy: Supportive care and active surveillance. Assessment & Plan:   Diagnoses and all orders for this visit:    1. Mantle cell lymphoma of lymph nodes of multiple sites ([facility] [place] [address]    2. Iron deficiency anemia, unspecified iron deficiency anemia type    3. Neutrophilia    4. Tobacco dependence syndrome        The complexity of medical decision making for this visit is moderate       Mantle cell lymphoma, multiple sites: Stage IV MCL Diagnosed in 2014. She is s/p chemotherapy completed in 2014. Clinically there is no evidence of disease recurrence. Labs on 10/02/2020 showed normal C-reactive protein and mildly elevated ESR at 33 (030), BUN 18, creatinine 0.87, TSH 1.60, normal B12 and folate, transferrin saturation 19%, ferritin 69, WBC 7.7, H&H 12.3/36, MCV 91.1, platelet 580. Normal differential.  Urine culture ordered by PCP was negative. Ok to proceed to hip replacement surgery oncology/hematology wise.        Iron Deficiency Anemia: Patient has mild iron deficiency without anemia with a ferritin of 69 and transferrin saturation of 19%. I will give her oral iron with ferrous sulfate 325 mg every other day with vitamin C or few sips of orange juice. Neutrophilia: Resolved after she stopped smoking cigarette. [address] on 10/02/2020 was 5.6 (1.88.0). This was likely from cigarette smoking. She quit 2 weeks ago she says in anticipation of surgery. Cigarette smoking: Congratulated patient since he quit about 2 weeks ago. RTc 6 months  I spent at least 15 minutes on this visit with this established patient. 712  Subjective:   Ms. Priya Christiansen is a 60-year-old woman who has a history of mantle cell lymphoma.  She was diagnosed in April of 2014. She has completed a full course of systemic chemotherapy. She was being followed by Dr. Evette Atkinson who retired. She denies night sweats, fevers or unexplained infections. She denies weight loss. She states she is doing well and denies any abdominal pain or discomfort. She reports right hip pain for which she is planned for hip replacement. She has good appetite. All other points of ROS have been reviewed and were negative. ECOG=1. Past medical history, family history, and social history: these were reviewed and remains unchanged. Prior to Admission medications    Medication Sig Start Date End Date Taking? Authorizing Provider   traMADoL (ULTRAM) 50 mg tablet Take 50 mg by mouth every eight (8) hours as needed for Pain. Yes Provider, Historical   amLODIPine (NORVASC) 5 mg tablet TAKE 1 TABLET BY MOUTH ONCE DAILY 1/16/20  Yes Rosenda Busby MD   ferrous sulfate (IRON) 325 mg (65 mg iron) tablet Take  by mouth Daily (before breakfast). Yes Provider, Historical   ibuprofen (MOTRIN) 200 mg tablet Take 200 mg by mouth every six (6) hours as needed for Pain.    Yes Provider, Historical     Patient Active Problem List   Diagnosis Code    Hyperlipidemia E78.5    Mantle cell lymphoma of lymph nodes of multiple sites ([facility] [address] C83.18    Neuropathy due to chemotherapeutic drug ([facility] [address] G62.0, T45.1X5A    Arthritis, degenerative M19.90    IFG (impaired fasting glucose) R73.01    Tobacco dependence syndrome F17.200    Colon adenoma D12.6    Diverticulosis K57.90    Primary hypertension I10    Iron deficiency anemia D50.9    Neutrophilia D72.9     Patient Active Problem List    Diagnosis Date Noted    Neuropathy due to chemotherapeutic drug ([facility] [address] 05/08/2015     Priority: 1 - One    Mantle cell lymphoma of lymph nodes of multiple sites ([facility] [address] 04/04/2014     Priority: 1 - One    Iron deficiency anemia 10/01/2020    Neutrophilia 10/01/2020    Primary hypertension 07/18/2019    Tobacco dependence syndrome 02/08/2019    Colon adenoma 02/08/2019    Diverticulosis 02/08/2019    IFG (impaired fasting glucose) 10/22/2015    Arthritis, degenerative 07/06/2015    Hyperlipidemia 08/26/2011     Current Outpatient Medications   Medication Sig Dispense Refill    traMADoL (ULTRAM) 50 mg tablet Take 50 mg by mouth every eight (8) hours as needed for Pain.  amLODIPine (NORVASC) 5 mg tablet TAKE 1 TABLET BY MOUTH ONCE DAILY 90 Tab 3    ferrous sulfate (IRON) 325 mg (65 mg iron) tablet Take  by mouth Daily (before breakfast).  ibuprofen (MOTRIN) 200 mg tablet Take 200 mg by mouth every six (6) hours as needed for Pain.        Allergies   Allergen Reactions    Meperidine Nausea Only and Other (comments)    Augmentin [Amoxicillin-Pot Clavulanate] Diarrhea and Nausea Only    Keflex [Cephalexin] Nausea Only    Sulfa (Sulfonamide Antibiotics) Rash     Past Medical History:   Diagnosis Date    Anxiety 6/15    IKE-7 was 12/21    Clostridium difficile colitis 03/2014    Dr. Polo Arroyo adenoma 10/2011    Dr. Sang Huddleston; Dr Francoise Nova 7/20    Cystic breast     Depression 6/15    PHQ-9 was 15/27    Diverticulitis     recurrent x3 Dr Myriam Rod liver     10/10 on US, CT 6/12; Fib-4 was 0.74 from 1/15    GI bleed 2/14    ischemic colitis on CT, seen by Dr. Francisco Arambula Hyperlipidemia     calculated 10 year risk score was 3.2% (1/15)    Hypertension     IFG (impaired fasting glucose) [id number]    Ischemic colitis ([facility] [address] 3/14    Dr Kevin Vigil    Labial abscess     + MRSA    Lymphoma ([facility] [address] 12/13    Dr. Colletta Cava    Migraine     Nephrolithiasis     Neuropathy due to chemotherapeutic drug ([facility] [address] 9/14    Osteoarthritis     hips and knees; Dr Kenyon Souza Tobacco dependence syndrome 2/8/2019    Zoster 8/14    left T11     Past Surgical History:   Procedure Laterality Date    COLONOSCOPY N/A 7/23/2020    Dr Sang Huddleston 2003 polyps; 2011 adenoma; Dr Polo Fragmahesh 3/14 isch colitis; Dr Francoise Nova 7/23/20 mod divertics and adenoma    HX APPENDECTOMY  1968    HX ORTHOPAEDIC  1960s    LEFT knee surgery    HX ORTHOPAEDIC      DEXA t score 2.1 spine, -0.3 hip ()    HX SOPHIA AND BSO      Dr. Nahum Stapleton History   Adopted: Yes     Social History     Tobacco Use    Smoking status: Former Smoker     Packs/day: 0.50     Years: 15.00     Pack years: 7.50     Types: Cigarettes     Last attempt to quit: 2020     Years since quittin.0    Smokeless tobacco: Never Used    Tobacco comment: smoking cessation advised   Substance Use Topics    Alcohol use: Not Currently     Alcohol/week: 4.2 standard drinks     Types: 5 Glasses of wine per week       ROS    Objective:   No flowsheet data found. General: alert, cooperative, no distress     Additional exam findings: We discussed the expected course, resolution and complications of the diagnosis(es) in detail. Medication risks, benefits, costs, interactions, and alternatives were discussed as indicated. I advised her to contact the office if her condition worsens, changes or fails to improve as anticipated. She expressed understanding with the diagnosis(es) and plan. Terri Marie, who was evaluated through a patient-initiated, synchronous (real-time) audio-encounter, and/or her healthcare decision maker, is aware that it is a billable service, with coverage as determined by her insurance carrier. She provided verbal consent to proceed: Yes, and patient identification was verified. It was conducted pursuant to the emergency declaration under the [facility], [address] [place] [address] authority and the [employer] Act. A caregiver was present when appropriate. Ability to conduct physical exam was limited. I was at home. The patient was at home.       Zuleyma Salas MD

## 2020-10-08 ENCOUNTER — HOSPITAL ENCOUNTER (OUTPATIENT)
Dept: PREADMISSION TESTING | Age: 70
Discharge: HOME | End: 2020-10-08
Payer: MEDICARE

## 2020-10-08 PROCEDURE — 87635 SARS-COV-2 COVID-19 AMP PRB: CPT

## 2020-10-09 LAB — SARS-COV-2, COV2NT: NOT DETECTED

## 2020-10-09 RX ORDER — ACETAMINOPHEN 325 MG/1
325 TABLET ORAL
COMMUNITY
End: 2021-08-26

## 2020-10-12 ENCOUNTER — ANESTHESIA EVENT (OUTPATIENT)
Dept: SURGERY | Age: 70
End: 2020-10-12
Payer: MEDICARE

## 2020-10-13 ENCOUNTER — HOSPITAL ENCOUNTER (OUTPATIENT)
Age: 70
Setting detail: OUTPATIENT SURGERY
Discharge: HOME HEALTH | End: 2020-10-14
Attending: ORTHOPAEDIC SURGERY | Admitting: ORTHOPAEDIC SURGERY
Payer: MEDICARE

## 2020-10-13 ENCOUNTER — ANESTHESIA (OUTPATIENT)
Dept: SURGERY | Age: 70
End: 2020-10-13
Payer: MEDICARE

## 2020-10-13 ENCOUNTER — APPOINTMENT (OUTPATIENT)
Dept: GENERAL RADIOLOGY | Age: 70
End: 2020-10-13
Attending: PHYSICIAN ASSISTANT
Payer: MEDICARE

## 2020-10-13 DIAGNOSIS — Q65.89 DYSPLASIA OF HIP: ICD-10-CM

## 2020-10-13 DIAGNOSIS — M21.70 LEG LENGTH DISCREPANCY: ICD-10-CM

## 2020-10-13 DIAGNOSIS — M16.10 HIP ARTHRITIS: ICD-10-CM

## 2020-10-13 LAB — GLUCOSE BLD STRIP.AUTO-MCNC: 111 MG/DL (ref 70–110)

## 2020-10-13 PROCEDURE — 74011250636 HC RX REV CODE- 250/636: Performed by: ANESTHESIOLOGY

## 2020-10-13 PROCEDURE — 76010000131 HC OR TIME 2 TO 2.5 HR: Performed by: ORTHOPAEDIC SURGERY

## 2020-10-13 PROCEDURE — 27130 TOTAL HIP ARTHROPLASTY: CPT | Performed by: ORTHOPAEDIC SURGERY

## 2020-10-13 PROCEDURE — 77030026438 HC STYL ET INTUB CARD -A: Performed by: ANESTHESIOLOGY

## 2020-10-13 PROCEDURE — 2709999900 HC NON-CHARGEABLE SUPPLY: Performed by: ORTHOPAEDIC SURGERY

## 2020-10-13 PROCEDURE — 97161 PT EVAL LOW COMPLEX 20 MIN: CPT

## 2020-10-13 PROCEDURE — 74011250636 HC RX REV CODE- 250/636: Performed by: NURSE ANESTHETIST, CERTIFIED REGISTERED

## 2020-10-13 PROCEDURE — 88304 TISSUE EXAM BY PATHOLOGIST: CPT

## 2020-10-13 PROCEDURE — 88311 DECALCIFY TISSUE: CPT

## 2020-10-13 PROCEDURE — 74011000250 HC RX REV CODE- 250: Performed by: NURSE ANESTHETIST, CERTIFIED REGISTERED

## 2020-10-13 PROCEDURE — 74011250636 HC RX REV CODE- 250/636: Performed by: PHYSICIAN ASSISTANT

## 2020-10-13 PROCEDURE — 64520 N BLOCK LUMBAR/THORACIC: CPT | Performed by: ANESTHESIOLOGY

## 2020-10-13 PROCEDURE — 74011250636 HC RX REV CODE- 250/636: Performed by: ORTHOPAEDIC SURGERY

## 2020-10-13 PROCEDURE — 82962 GLUCOSE BLOOD TEST: CPT

## 2020-10-13 PROCEDURE — 77030006835 HC BLD SAW SAG STRY -B: Performed by: ORTHOPAEDIC SURGERY

## 2020-10-13 PROCEDURE — 2709999900 HC NON-CHARGEABLE SUPPLY

## 2020-10-13 PROCEDURE — 01210 ANES OPEN PX HIP JOINT NOS: CPT | Performed by: NURSE ANESTHETIST, CERTIFIED REGISTERED

## 2020-10-13 PROCEDURE — 77030040922 HC BLNKT HYPOTHRM STRY -A: Performed by: ORTHOPAEDIC SURGERY

## 2020-10-13 PROCEDURE — 97530 THERAPEUTIC ACTIVITIES: CPT

## 2020-10-13 PROCEDURE — C9290 INJ, BUPIVACAINE LIPOSOME: HCPCS | Performed by: ORTHOPAEDIC SURGERY

## 2020-10-13 PROCEDURE — 74011000258 HC RX REV CODE- 258: Performed by: PHYSICIAN ASSISTANT

## 2020-10-13 PROCEDURE — 76210000016 HC OR PH I REC 1 TO 1.5 HR: Performed by: ORTHOPAEDIC SURGERY

## 2020-10-13 PROCEDURE — 64449 NJX AA&/STRD LMBR PLEX NFS: CPT | Performed by: ANESTHESIOLOGY

## 2020-10-13 PROCEDURE — 77030020294 HC ABD PLLW HIP DERY -B: Performed by: ORTHOPAEDIC SURGERY

## 2020-10-13 PROCEDURE — 77030027138 HC INCENT SPIROMETER -A: Performed by: ORTHOPAEDIC SURGERY

## 2020-10-13 PROCEDURE — C1776 JOINT DEVICE (IMPLANTABLE): HCPCS | Performed by: ORTHOPAEDIC SURGERY

## 2020-10-13 PROCEDURE — 74011000250 HC RX REV CODE- 250: Performed by: PHYSICIAN ASSISTANT

## 2020-10-13 PROCEDURE — 77030013079 HC BLNKT BAIR HGGR 3M -A: Performed by: ANESTHESIOLOGY

## 2020-10-13 PROCEDURE — 73502 X-RAY EXAM HIP UNI 2-3 VIEWS: CPT

## 2020-10-13 PROCEDURE — 77030013708 HC HNDPC SUC IRR PULS STRY –B: Performed by: ORTHOPAEDIC SURGERY

## 2020-10-13 PROCEDURE — 77030002933 HC SUT MCRYL J&J -A: Performed by: ORTHOPAEDIC SURGERY

## 2020-10-13 PROCEDURE — 74011000250 HC RX REV CODE- 250: Performed by: ORTHOPAEDIC SURGERY

## 2020-10-13 PROCEDURE — 74011000258 HC RX REV CODE- 258: Performed by: ORTHOPAEDIC SURGERY

## 2020-10-13 PROCEDURE — 65270000029 HC RM PRIVATE

## 2020-10-13 PROCEDURE — 77030003029 HC SUT VCRL J&J -B: Performed by: ORTHOPAEDIC SURGERY

## 2020-10-13 PROCEDURE — 74011250637 HC RX REV CODE- 250/637: Performed by: NURSE ANESTHETIST, CERTIFIED REGISTERED

## 2020-10-13 PROCEDURE — 77030003666 HC NDL SPINAL BD -A: Performed by: ORTHOPAEDIC SURGERY

## 2020-10-13 PROCEDURE — 77030008462 HC STPLR SKN PROX J&J -A: Performed by: ORTHOPAEDIC SURGERY

## 2020-10-13 PROCEDURE — 76060000035 HC ANESTHESIA 2 TO 2.5 HR: Performed by: ORTHOPAEDIC SURGERY

## 2020-10-13 PROCEDURE — 77030019557 HC ELECTRD VES SEAL MEDT -F: Performed by: ORTHOPAEDIC SURGERY

## 2020-10-13 PROCEDURE — 27130 TOTAL HIP ARTHROPLASTY: CPT | Performed by: PHYSICIAN ASSISTANT

## 2020-10-13 PROCEDURE — 77030008683 HC TU ET CUF COVD -A: Performed by: ANESTHESIOLOGY

## 2020-10-13 PROCEDURE — 74011250637 HC RX REV CODE- 250/637: Performed by: PHYSICIAN ASSISTANT

## 2020-10-13 PROCEDURE — 01210 ANES OPEN PX HIP JOINT NOS: CPT | Performed by: ANESTHESIOLOGY

## 2020-10-13 PROCEDURE — C1713 ANCHOR/SCREW BN/BN,TIS/BN: HCPCS | Performed by: ORTHOPAEDIC SURGERY

## 2020-10-13 PROCEDURE — 74011250637 HC RX REV CODE- 250/637: Performed by: ORTHOPAEDIC SURGERY

## 2020-10-13 PROCEDURE — 74011250636 HC RX REV CODE- 250/636

## 2020-10-13 PROCEDURE — 77030012890: Performed by: ORTHOPAEDIC SURGERY

## 2020-10-13 PROCEDURE — 77030031139 HC SUT VCRL2 J&J -A: Performed by: ORTHOPAEDIC SURGERY

## 2020-10-13 PROCEDURE — 77030012935 HC DRSG AQUACEL BMS -B: Performed by: ORTHOPAEDIC SURGERY

## 2020-10-13 DEVICE — HEAD FEM DELT V40 -5MM NK 36MM -- V40 BIOLOX: Type: IMPLANTABLE DEVICE | Site: HIP | Status: FUNCTIONAL

## 2020-10-13 DEVICE — STEM FEM SZ 3 127D 30X102MM -- ACCOLADE II V40: Type: IMPLANTABLE DEVICE | Site: HIP | Status: FUNCTIONAL

## 2020-10-13 DEVICE — COMPONENT TOT HIP CAPPED LNR POLYETH [H2STRYKER] [STRYKER CORP]: Type: IMPLANTABLE DEVICE | Site: HIP | Status: FUNCTIONAL

## 2020-10-13 DEVICE — LINER ACET SZ D ID36MM THK3.9MM 0DEG HIP X3 LOK RNG FOR: Type: IMPLANTABLE DEVICE | Site: HIP | Status: FUNCTIONAL

## 2020-10-13 DEVICE — SCREW BNE L15MM DIA6.5MM LO PROF HEX TRIDENT LL: Type: IMPLANTABLE DEVICE | Site: HIP | Status: FUNCTIONAL

## 2020-10-13 DEVICE — SCR HEX 6.5X25MM -- TRIDENT II: Type: IMPLANTABLE DEVICE | Site: HIP | Status: FUNCTIONAL

## 2020-10-13 DEVICE — SHELL ACET CLUS H 48D TRTANIUM -- TRIDENT II: Type: IMPLANTABLE DEVICE | Site: HIP | Status: FUNCTIONAL

## 2020-10-13 RX ORDER — DEXAMETHASONE SODIUM PHOSPHATE 4 MG/ML
8 INJECTION, SOLUTION INTRA-ARTICULAR; INTRALESIONAL; INTRAMUSCULAR; INTRAVENOUS; SOFT TISSUE
Status: COMPLETED | OUTPATIENT
Start: 2020-10-13 | End: 2020-10-13

## 2020-10-13 RX ORDER — ROCURONIUM BROMIDE 10 MG/ML
INJECTION, SOLUTION INTRAVENOUS AS NEEDED
Status: DISCONTINUED | OUTPATIENT
Start: 2020-10-13 | End: 2020-10-13 | Stop reason: HOSPADM

## 2020-10-13 RX ORDER — ZOLPIDEM TARTRATE 5 MG/1
5 TABLET ORAL
Status: DISCONTINUED | OUTPATIENT
Start: 2020-10-13 | End: 2020-10-14 | Stop reason: HOSPADM

## 2020-10-13 RX ORDER — SODIUM CHLORIDE 0.9 % (FLUSH) 0.9 %
5-40 SYRINGE (ML) INJECTION EVERY 8 HOURS
Status: DISCONTINUED | OUTPATIENT
Start: 2020-10-13 | End: 2020-10-13 | Stop reason: HOSPADM

## 2020-10-13 RX ORDER — CEFAZOLIN SODIUM 2 G/50ML
2 SOLUTION INTRAVENOUS EVERY 8 HOURS
Status: COMPLETED | OUTPATIENT
Start: 2020-10-13 | End: 2020-10-14

## 2020-10-13 RX ORDER — SODIUM CHLORIDE 0.9 % (FLUSH) 0.9 %
5-40 SYRINGE (ML) INJECTION AS NEEDED
Status: DISCONTINUED | OUTPATIENT
Start: 2020-10-13 | End: 2020-10-13 | Stop reason: HOSPADM

## 2020-10-13 RX ORDER — PREGABALIN 25 MG/1
25 CAPSULE ORAL 2 TIMES DAILY
Status: DISCONTINUED | OUTPATIENT
Start: 2020-10-13 | End: 2020-10-14 | Stop reason: HOSPADM

## 2020-10-13 RX ORDER — NALOXONE HYDROCHLORIDE 0.4 MG/ML
0.4 INJECTION, SOLUTION INTRAMUSCULAR; INTRAVENOUS; SUBCUTANEOUS AS NEEDED
Status: DISCONTINUED | OUTPATIENT
Start: 2020-10-13 | End: 2020-10-14 | Stop reason: HOSPADM

## 2020-10-13 RX ORDER — ASPIRIN 81 MG/1
243 TABLET ORAL 2 TIMES DAILY
Status: DISCONTINUED | OUTPATIENT
Start: 2020-10-14 | End: 2020-10-14 | Stop reason: HOSPADM

## 2020-10-13 RX ORDER — FAMOTIDINE 20 MG/1
20 TABLET, FILM COATED ORAL ONCE
Status: COMPLETED | OUTPATIENT
Start: 2020-10-13 | End: 2020-10-13

## 2020-10-13 RX ORDER — PREGABALIN 75 MG/1
75 CAPSULE ORAL ONCE
Status: COMPLETED | OUTPATIENT
Start: 2020-10-13 | End: 2020-10-13

## 2020-10-13 RX ORDER — GLYCOPYRROLATE 0.2 MG/ML
INJECTION INTRAMUSCULAR; INTRAVENOUS AS NEEDED
Status: DISCONTINUED | OUTPATIENT
Start: 2020-10-13 | End: 2020-10-13 | Stop reason: HOSPADM

## 2020-10-13 RX ORDER — ACETAMINOPHEN 500 MG
1000 TABLET ORAL EVERY 6 HOURS
Status: DISCONTINUED | OUTPATIENT
Start: 2020-10-13 | End: 2020-10-14 | Stop reason: HOSPADM

## 2020-10-13 RX ORDER — HYDROMORPHONE HYDROCHLORIDE 2 MG/ML
0.5 INJECTION, SOLUTION INTRAMUSCULAR; INTRAVENOUS; SUBCUTANEOUS
Status: DISCONTINUED | OUTPATIENT
Start: 2020-10-13 | End: 2020-10-13 | Stop reason: HOSPADM

## 2020-10-13 RX ORDER — KETOROLAC TROMETHAMINE 15 MG/ML
15 INJECTION, SOLUTION INTRAMUSCULAR; INTRAVENOUS
Status: DISPENSED | OUTPATIENT
Start: 2020-10-13 | End: 2020-10-14

## 2020-10-13 RX ORDER — POLYMYXIN B 500000 [USP'U]/1
INJECTION, POWDER, LYOPHILIZED, FOR SOLUTION INTRAMUSCULAR; INTRATHECAL; INTRAVENOUS; OPHTHALMIC AS NEEDED
Status: DISCONTINUED | OUTPATIENT
Start: 2020-10-13 | End: 2020-10-13 | Stop reason: HOSPADM

## 2020-10-13 RX ORDER — LIDOCAINE HYDROCHLORIDE 10 MG/ML
0.1 INJECTION, SOLUTION EPIDURAL; INFILTRATION; INTRACAUDAL; PERINEURAL AS NEEDED
Status: DISCONTINUED | OUTPATIENT
Start: 2020-10-13 | End: 2020-10-13 | Stop reason: HOSPADM

## 2020-10-13 RX ORDER — CEFAZOLIN SODIUM 2 G/50ML
2 SOLUTION INTRAVENOUS
Status: COMPLETED | OUTPATIENT
Start: 2020-10-13 | End: 2020-10-13

## 2020-10-13 RX ORDER — FENTANYL CITRATE 50 UG/ML
INJECTION, SOLUTION INTRAMUSCULAR; INTRAVENOUS
Status: COMPLETED | OUTPATIENT
Start: 2020-10-13 | End: 2020-10-13

## 2020-10-13 RX ORDER — ROPIVACAINE HYDROCHLORIDE 2 MG/ML
30 INJECTION, SOLUTION EPIDURAL; INFILTRATION; PERINEURAL
Status: COMPLETED | OUTPATIENT
Start: 2020-10-13 | End: 2020-10-13

## 2020-10-13 RX ORDER — ACETAMINOPHEN 500 MG
1000 TABLET ORAL ONCE
Status: COMPLETED | OUTPATIENT
Start: 2020-10-13 | End: 2020-10-13

## 2020-10-13 RX ORDER — ONDANSETRON 2 MG/ML
INJECTION INTRAMUSCULAR; INTRAVENOUS AS NEEDED
Status: DISCONTINUED | OUTPATIENT
Start: 2020-10-13 | End: 2020-10-13 | Stop reason: HOSPADM

## 2020-10-13 RX ORDER — MIDAZOLAM HYDROCHLORIDE 1 MG/ML
2 INJECTION, SOLUTION INTRAMUSCULAR; INTRAVENOUS ONCE
Status: COMPLETED | OUTPATIENT
Start: 2020-10-13 | End: 2020-10-13

## 2020-10-13 RX ORDER — ROPIVACAINE HYDROCHLORIDE 2 MG/ML
INJECTION, SOLUTION EPIDURAL; INFILTRATION; PERINEURAL
Status: COMPLETED | OUTPATIENT
Start: 2020-10-13 | End: 2020-10-13

## 2020-10-13 RX ORDER — LIDOCAINE HYDROCHLORIDE 20 MG/ML
INJECTION, SOLUTION EPIDURAL; INFILTRATION; INTRACAUDAL; PERINEURAL AS NEEDED
Status: DISCONTINUED | OUTPATIENT
Start: 2020-10-13 | End: 2020-10-13 | Stop reason: HOSPADM

## 2020-10-13 RX ORDER — OXYCODONE HYDROCHLORIDE 5 MG/1
5-10 TABLET ORAL
Status: DISCONTINUED | OUTPATIENT
Start: 2020-10-13 | End: 2020-10-14 | Stop reason: HOSPADM

## 2020-10-13 RX ORDER — ONDANSETRON 2 MG/ML
4 INJECTION INTRAMUSCULAR; INTRAVENOUS ONCE
Status: DISCONTINUED | OUTPATIENT
Start: 2020-10-13 | End: 2020-10-13 | Stop reason: HOSPADM

## 2020-10-13 RX ORDER — OXYCODONE AND ACETAMINOPHEN 7.5; 325 MG/1; MG/1
1-2 TABLET ORAL
Qty: 56 TAB | Refills: 0 | Status: SHIPPED | OUTPATIENT
Start: 2020-10-13 | End: 2020-10-26 | Stop reason: SDUPTHER

## 2020-10-13 RX ORDER — SODIUM CHLORIDE, SODIUM LACTATE, POTASSIUM CHLORIDE, CALCIUM CHLORIDE 600; 310; 30; 20 MG/100ML; MG/100ML; MG/100ML; MG/100ML
75 INJECTION, SOLUTION INTRAVENOUS CONTINUOUS
Status: DISCONTINUED | OUTPATIENT
Start: 2020-10-13 | End: 2020-10-13 | Stop reason: HOSPADM

## 2020-10-13 RX ORDER — SODIUM CHLORIDE 0.9 % (FLUSH) 0.9 %
5-40 SYRINGE (ML) INJECTION AS NEEDED
Status: DISCONTINUED | OUTPATIENT
Start: 2020-10-13 | End: 2020-10-14 | Stop reason: HOSPADM

## 2020-10-13 RX ORDER — PROPOFOL 10 MG/ML
INJECTION, EMULSION INTRAVENOUS AS NEEDED
Status: DISCONTINUED | OUTPATIENT
Start: 2020-10-13 | End: 2020-10-13 | Stop reason: HOSPADM

## 2020-10-13 RX ORDER — DOCUSATE SODIUM 100 MG/1
100 CAPSULE, LIQUID FILLED ORAL 2 TIMES DAILY
Qty: 60 CAP | Refills: 2 | Status: SHIPPED | OUTPATIENT
Start: 2020-10-13 | End: 2021-01-11

## 2020-10-13 RX ORDER — SODIUM CHLORIDE 0.9 % (FLUSH) 0.9 %
5-40 SYRINGE (ML) INJECTION EVERY 8 HOURS
Status: DISCONTINUED | OUTPATIENT
Start: 2020-10-13 | End: 2020-10-14 | Stop reason: HOSPADM

## 2020-10-13 RX ORDER — ONDANSETRON 2 MG/ML
4 INJECTION INTRAMUSCULAR; INTRAVENOUS
Status: DISCONTINUED | OUTPATIENT
Start: 2020-10-13 | End: 2020-10-14 | Stop reason: HOSPADM

## 2020-10-13 RX ORDER — AMLODIPINE BESYLATE 5 MG/1
5 TABLET ORAL DAILY
Status: DISCONTINUED | OUTPATIENT
Start: 2020-10-14 | End: 2020-10-14 | Stop reason: HOSPADM

## 2020-10-13 RX ORDER — MIDAZOLAM HYDROCHLORIDE 1 MG/ML
INJECTION, SOLUTION INTRAMUSCULAR; INTRAVENOUS
Status: COMPLETED | OUTPATIENT
Start: 2020-10-13 | End: 2020-10-13

## 2020-10-13 RX ORDER — CLINDAMYCIN HYDROCHLORIDE 300 MG/1
300 CAPSULE ORAL 3 TIMES DAILY
Qty: 9 CAP | Refills: 0 | Status: SHIPPED | OUTPATIENT
Start: 2020-10-13 | End: 2020-10-16

## 2020-10-13 RX ORDER — FENTANYL CITRATE 50 UG/ML
INJECTION, SOLUTION INTRAMUSCULAR; INTRAVENOUS AS NEEDED
Status: DISCONTINUED | OUTPATIENT
Start: 2020-10-13 | End: 2020-10-13 | Stop reason: HOSPADM

## 2020-10-13 RX ORDER — AMOXICILLIN 250 MG
1 CAPSULE ORAL 2 TIMES DAILY
Status: DISCONTINUED | OUTPATIENT
Start: 2020-10-13 | End: 2020-10-14 | Stop reason: HOSPADM

## 2020-10-13 RX ORDER — LABETALOL HYDROCHLORIDE 5 MG/ML
INJECTION, SOLUTION INTRAVENOUS AS NEEDED
Status: DISCONTINUED | OUTPATIENT
Start: 2020-10-13 | End: 2020-10-13 | Stop reason: HOSPADM

## 2020-10-13 RX ORDER — POVIDONE-IODINE 10 %
SOLUTION, NON-ORAL TOPICAL AS NEEDED
Status: DISCONTINUED | OUTPATIENT
Start: 2020-10-13 | End: 2020-10-13 | Stop reason: HOSPADM

## 2020-10-13 RX ORDER — ASPIRIN 325 MG
325 TABLET ORAL 2 TIMES DAILY
Qty: 60 TAB | Refills: 0 | Status: SHIPPED | OUTPATIENT
Start: 2020-10-13 | End: 2021-04-06

## 2020-10-13 RX ORDER — FLUMAZENIL 0.1 MG/ML
0.2 INJECTION INTRAVENOUS AS NEEDED
Status: DISCONTINUED | OUTPATIENT
Start: 2020-10-13 | End: 2020-10-14 | Stop reason: HOSPADM

## 2020-10-13 RX ORDER — VANCOMYCIN HYDROCHLORIDE 1 G/20ML
INJECTION, POWDER, LYOPHILIZED, FOR SOLUTION INTRAVENOUS AS NEEDED
Status: DISCONTINUED | OUTPATIENT
Start: 2020-10-13 | End: 2020-10-13 | Stop reason: HOSPADM

## 2020-10-13 RX ORDER — LANOLIN ALCOHOL/MO/W.PET/CERES
1 CREAM (GRAM) TOPICAL 2 TIMES DAILY WITH MEALS
Status: DISCONTINUED | OUTPATIENT
Start: 2020-10-13 | End: 2020-10-14 | Stop reason: HOSPADM

## 2020-10-13 RX ORDER — FENTANYL CITRATE 50 UG/ML
100 INJECTION, SOLUTION INTRAMUSCULAR; INTRAVENOUS ONCE
Status: COMPLETED | OUTPATIENT
Start: 2020-10-13 | End: 2020-10-13

## 2020-10-13 RX ORDER — DEXAMETHASONE SODIUM PHOSPHATE 4 MG/ML
INJECTION, SOLUTION INTRA-ARTICULAR; INTRALESIONAL; INTRAMUSCULAR; INTRAVENOUS; SOFT TISSUE AS NEEDED
Status: DISCONTINUED | OUTPATIENT
Start: 2020-10-13 | End: 2020-10-13 | Stop reason: HOSPADM

## 2020-10-13 RX ORDER — PROMETHAZINE HYDROCHLORIDE 25 MG/ML
INJECTION, SOLUTION INTRAMUSCULAR; INTRAVENOUS AS NEEDED
Status: DISCONTINUED | OUTPATIENT
Start: 2020-10-13 | End: 2020-10-13 | Stop reason: HOSPADM

## 2020-10-13 RX ORDER — SODIUM CHLORIDE 9 MG/ML
75 INJECTION, SOLUTION INTRAVENOUS CONTINUOUS
Status: DISPENSED | OUTPATIENT
Start: 2020-10-13 | End: 2020-10-14

## 2020-10-13 RX ORDER — NEOSTIGMINE METHYLSULFATE 1 MG/ML
INJECTION, SOLUTION INTRAVENOUS AS NEEDED
Status: DISCONTINUED | OUTPATIENT
Start: 2020-10-13 | End: 2020-10-13 | Stop reason: HOSPADM

## 2020-10-13 RX ORDER — NALOXONE HYDROCHLORIDE 0.4 MG/ML
INJECTION, SOLUTION INTRAMUSCULAR; INTRAVENOUS; SUBCUTANEOUS AS NEEDED
Status: DISCONTINUED | OUTPATIENT
Start: 2020-10-13 | End: 2020-10-13 | Stop reason: HOSPADM

## 2020-10-13 RX ORDER — SUCCINYLCHOLINE CHLORIDE 20 MG/ML
INJECTION INTRAMUSCULAR; INTRAVENOUS AS NEEDED
Status: DISCONTINUED | OUTPATIENT
Start: 2020-10-13 | End: 2020-10-13 | Stop reason: HOSPADM

## 2020-10-13 RX ADMIN — ROPIVACAINE HYDROCHLORIDE 60 MG: 2 INJECTION, SOLUTION EPIDURAL; INFILTRATION at 07:26

## 2020-10-13 RX ADMIN — PROPOFOL 100 MG: 10 INJECTION, EMULSION INTRAVENOUS at 08:06

## 2020-10-13 RX ADMIN — Medication 3 MG: at 09:12

## 2020-10-13 RX ADMIN — LIDOCAINE HYDROCHLORIDE 0.1 ML: 10 INJECTION, SOLUTION EPIDURAL; INFILTRATION; INTRACAUDAL; PERINEURAL at 07:16

## 2020-10-13 RX ADMIN — FENTANYL CITRATE 100 MCG: 50 INJECTION, SOLUTION INTRAMUSCULAR; INTRAVENOUS at 07:15

## 2020-10-13 RX ADMIN — ROPIVACAINE HYDROCHLORIDE 60 MG: 2 INJECTION, SOLUTION EPIDURAL; INFILTRATION; PERINEURAL at 07:18

## 2020-10-13 RX ADMIN — CEFAZOLIN SODIUM 2 G: 2 SOLUTION INTRAVENOUS at 14:09

## 2020-10-13 RX ADMIN — FENTANYL CITRATE 50 MCG: 50 INJECTION, SOLUTION INTRAMUSCULAR; INTRAVENOUS at 08:08

## 2020-10-13 RX ADMIN — ROCURONIUM BROMIDE 30 MG: 50 INJECTION INTRAVENOUS at 07:46

## 2020-10-13 RX ADMIN — GLYCOPYRROLATE 0.2 MG: 0.2 INJECTION INTRAMUSCULAR; INTRAVENOUS at 08:46

## 2020-10-13 RX ADMIN — LIDOCAINE HYDROCHLORIDE 60 MG: 20 INJECTION, SOLUTION EPIDURAL; INFILTRATION; INTRACAUDAL; PERINEURAL at 07:36

## 2020-10-13 RX ADMIN — FENTANYL CITRATE 50 MCG: 50 INJECTION, SOLUTION INTRAMUSCULAR; INTRAVENOUS at 07:57

## 2020-10-13 RX ADMIN — CEFAZOLIN SODIUM 2 G: 2 SOLUTION INTRAVENOUS at 07:39

## 2020-10-13 RX ADMIN — FENTANYL CITRATE 100 MCG: 50 INJECTION, SOLUTION INTRAMUSCULAR; INTRAVENOUS at 07:26

## 2020-10-13 RX ADMIN — SUCCINYLCHOLINE CHLORIDE 100 MG: 20 INJECTION, SOLUTION INTRAMUSCULAR; INTRAVENOUS at 07:36

## 2020-10-13 RX ADMIN — LABETALOL HYDROCHLORIDE 5 MG: 5 INJECTION, SOLUTION INTRAVENOUS at 08:10

## 2020-10-13 RX ADMIN — DOCUSATE SODIUM 50 MG AND SENNOSIDES 8.6 MG 1 TABLET: 8.6; 5 TABLET, FILM COATED ORAL at 17:44

## 2020-10-13 RX ADMIN — GLYCOPYRROLATE 0.4 MG: 0.2 INJECTION INTRAMUSCULAR; INTRAVENOUS at 09:12

## 2020-10-13 RX ADMIN — Medication 10 ML: at 21:36

## 2020-10-13 RX ADMIN — FERROUS SULFATE TAB 325 MG (65 MG ELEMENTAL FE) 325 MG: 325 (65 FE) TAB at 17:44

## 2020-10-13 RX ADMIN — MIDAZOLAM HYDROCHLORIDE 2 MG: 2 INJECTION, SOLUTION INTRAMUSCULAR; INTRAVENOUS at 07:26

## 2020-10-13 RX ADMIN — FENTANYL CITRATE 50 MCG: 50 INJECTION, SOLUTION INTRAMUSCULAR; INTRAVENOUS at 08:43

## 2020-10-13 RX ADMIN — SODIUM CHLORIDE 75 ML/HR: 900 INJECTION, SOLUTION INTRAVENOUS at 12:18

## 2020-10-13 RX ADMIN — DEXAMETHASONE SODIUM PHOSPHATE 4 MG: 4 INJECTION, SOLUTION INTRAMUSCULAR; INTRAVENOUS at 07:48

## 2020-10-13 RX ADMIN — TRANEXAMIC ACID 1 G: 1 INJECTION, SOLUTION INTRAVENOUS at 07:50

## 2020-10-13 RX ADMIN — FAMOTIDINE 20 MG: 20 TABLET ORAL at 06:47

## 2020-10-13 RX ADMIN — ACETAMINOPHEN 1000 MG: 500 TABLET ORAL at 17:44

## 2020-10-13 RX ADMIN — MIDAZOLAM 2 MG: 1 INJECTION INTRAMUSCULAR; INTRAVENOUS at 07:15

## 2020-10-13 RX ADMIN — FENTANYL CITRATE 50 MCG: 50 INJECTION, SOLUTION INTRAMUSCULAR; INTRAVENOUS at 07:34

## 2020-10-13 RX ADMIN — LIDOCAINE HYDROCHLORIDE 40 MG: 20 INJECTION, SOLUTION EPIDURAL; INFILTRATION; INTRACAUDAL; PERINEURAL at 07:57

## 2020-10-13 RX ADMIN — ONDANSETRON 4 MG: 2 INJECTION INTRAMUSCULAR; INTRAVENOUS at 07:34

## 2020-10-13 RX ADMIN — PROMETHAZINE HYDROCHLORIDE 6.25 MG: 25 INJECTION INTRAMUSCULAR; INTRAVENOUS at 08:04

## 2020-10-13 RX ADMIN — KETOROLAC TROMETHAMINE 15 MG: 15 INJECTION, SOLUTION INTRAMUSCULAR; INTRAVENOUS at 21:36

## 2020-10-13 RX ADMIN — TRANEXAMIC ACID 1 G: 1 INJECTION, SOLUTION INTRAVENOUS at 08:55

## 2020-10-13 RX ADMIN — DEXAMETHASONE SODIUM PHOSPHATE 8 MG: 4 INJECTION, SOLUTION INTRA-ARTICULAR; INTRALESIONAL; INTRAMUSCULAR; INTRAVENOUS; SOFT TISSUE at 06:50

## 2020-10-13 RX ADMIN — PREGABALIN 75 MG: 75 CAPSULE ORAL at 06:47

## 2020-10-13 RX ADMIN — NALXONE HYDROCHLORIDE 0.2 MG: 0.4 INJECTION INTRAMUSCULAR; INTRAVENOUS; SUBCUTANEOUS at 09:26

## 2020-10-13 RX ADMIN — PROPOFOL 100 MG: 10 INJECTION, EMULSION INTRAVENOUS at 07:36

## 2020-10-13 RX ADMIN — ACETAMINOPHEN 1000 MG: 500 TABLET ORAL at 06:47

## 2020-10-13 RX ADMIN — PROPOFOL 100 MG: 10 INJECTION, EMULSION INTRAVENOUS at 07:57

## 2020-10-13 RX ADMIN — SODIUM CHLORIDE, SODIUM LACTATE, POTASSIUM CHLORIDE, AND CALCIUM CHLORIDE 75 ML/HR: 600; 310; 30; 20 INJECTION, SOLUTION INTRAVENOUS at 06:47

## 2020-10-13 NOTE — INTERVAL H&P NOTE
Update History & Physical    The Patient's History and Physical of October 13,   It was reviewed with the patient and I examined the patient. There was no change. The surgical site was confirmed by the patient and me. Plan:  The risk, benefits, expected outcome, and alternative to the recommended procedure have been discussed with the patient. Patient understands and wants to proceed with the procedure.     Electronically signed by Angela Schmid MD on 10/13/2020 at 6:55 AM

## 2020-10-13 NOTE — PROGRESS NOTES
conducted a pre-surgery visit with Tavia Smiley, who is a 77 y.o.,female. The  provided the following Interventions:  Initiated a relationship of care and support. Plan:  Chaplains will continue to follow and will provide pastoral care on an as needed/requested basis.  recommends bedside caregivers page  on duty if patient shows signs of acute spiritual or emotional distress.     [facility] [address]  [phone]

## 2020-10-13 NOTE — ANESTHESIA POSTPROCEDURE EVALUATION
Procedure(s):  RIGHT TOTAL HIP ARTHROPLASTY LATERAL  APPROACH. general    Anesthesia Post Evaluation      Multimodal analgesia: multimodal analgesia used between 6 hours prior to anesthesia start to PACU discharge  Patient location during evaluation: PACU  Patient participation: complete - patient participated  Level of consciousness: lethargic  Pain score: 4  Pain management: adequate  Airway patency: patent  Anesthetic complications: no  Cardiovascular status: acceptable  Respiratory status: acceptable  Hydration status: acceptable  Post anesthesia nausea and vomiting:  none  Final Post Anesthesia Temperature Assessment:  Normothermia (36.0-37.5 degrees C)      INITIAL Post-op Vital signs:   Vitals Value Taken Time   /76 10/13/2020 10:12 AM   Temp 36.3 °C (97.4 °F) 10/13/2020  9:35 AM   Pulse 57 10/13/2020 10:13 AM   Resp 19 10/13/2020 10:13 AM   SpO2 100 % 10/13/2020 10:13 AM   Vitals shown include unvalidated device data.

## 2020-10-13 NOTE — ANESTHESIA PREPROCEDURE EVALUATION
Relevant Problems   No relevant active problems       Anesthetic History               Review of Systems / Medical History  Patient summary reviewed and pertinent labs reviewed    Pulmonary          Smoker         Neuro/Psych         Psychiatric history    Comments: Anxiety  Depression  neuropathy Cardiovascular    Hypertension: well controlled              Exercise tolerance: <4 METS     GI/Hepatic/Renal           Liver disease    Comments: Fatty liver Endo/Other        Arthritis     Other Findings   Comments: H/o of lymphoma and adenoma           Physical Exam    Airway  Mallampati: III  TM Distance: > 6 cm  Neck ROM: normal range of motion   Mouth opening: Normal     Cardiovascular  Regular rate and rhythm,  S1 and S2 normal,  no murmur, click, rub, or gallop             Dental    Dentition: Full lower dentures and Full upper dentures     Pulmonary  Breath sounds clear to auscultation               Abdominal  GI exam deferred       Other Findings            Anesthetic Plan    ASA: 3  Anesthesia type: general      Post-op pain plan if not by surgeon: peripheral nerve block single    Induction: Intravenous  Anesthetic plan and risks discussed with: Patient

## 2020-10-13 NOTE — BRIEF OP NOTE
Brief Postoperative Note    Patient: Dane Alexander  YOB: 1950  MRN: [id number]    Date of Procedure: 10/13/2020     Pre-Op Diagnosis: Osteoarthritis of right hip, unspecified osteoarthritis type with dysplasia [M16.11]    Post-Op Diagnosis: Same as preoperative diagnosis. Procedure(s):  RIGHT TOTAL HIP ARTHROPLASTY LATERAL  APPROACH    Surgeon(s):  Alicia Toribio MD    Surgical Assistant: Physician Assistant: Soto Ceja PA-C    Anesthesia: General     Estimated Blood Loss (mL): 992     Complications: None    Specimens:   ID Type Source Tests Collected by Time Destination   1 : Right Femoral Head Preservative Hip, right  Alicia Toribio MD 10/13/2020 0813 Pathology        Implants:   Implant Name Type Inv. Item Serial No.  Lot No. LRB No. Used Action   HEX DOME HOLE PLUG    Shaser [id number] Right 1 Implanted   6.5MM LOW PROFILE HEX SCREW    Shaser [id number] Right 1 Implanted   SCR HEX 6.5X25MM -- TRIDENT II - [id number]  SCR HEX 6.5X25MM -- TRIDENT II  [employer] [facility]_ [id number] Right 1 Implanted   SHELL ACET CLUS H 48D TRTANIUM -- TRIDENT II - [id number]  SHELL ACET CLUS H 48D TRTANIUM -- TRIDENT II  [employer] [facility]_ [id number] Right 1 Implanted   SCREW BNE L15MM DIA6. 5MM LO PROF HEX TRIDENT LL - [id number]  SCREW BNE L15MM DIA6. 5MM LO PROF HEX TRIDENT LL  [employer] [facility]_WD [id number] Right 1 Implanted   INSERT 0DEG TRIDN X3 POLY 36 D --  - [id number]  INSERT 0DEG TRIDN X3 POLY 36 D --   [employer] HOW_ [id number] Right 1 Implanted   STEM FEM SZ 3 L102MM NK L30MM 38MM OFFSET 127DEG HIP TI - [id number]  STEM FEM SZ 3 L102MM NK L30MM 38MM OFFSET 127DEG HIP TI  [employer]_ [id number] Right 1 Implanted   HEAD FEM DELT V40 -5MM NK 36MM -- V40 BIOLOX - [id number]  HEAD FEM DELT V40 -5MM NK 36MM -- V40 BIOLOX  [employer] [facility]_ [id number] Right 1 Implanted       Drains: * No LDAs found *    Findings: same    Electronically Signed by Linda Cueva MD on 10/13/2020 at 9:00 AM

## 2020-10-13 NOTE — DISCHARGE SUMMARY
10/13/2020  5:44 AM    10/14/2020, 9:24 AM    Primary Dx:right Orthopedic / Rheumatologic: Total Hip Replacement  Secondary Dx: Etiological Diagnoses: none    HPI:  Pt has end stage OA and had failed conservative treatment. Due to the current findings and affected activity of daily living surgical intervention is indicated. The alternatives, risks, complications as well as expected outcome were discussed, the patient understands and wishes to proceed with surgery    Past Medical History:   Diagnosis Date    Anxiety 6/15    IKE-7 was 12/21    Clostridium difficile colitis 03/2014    Dr. Patel Odor adenoma 10/2011    Dr. Radha Sosa; Dr Judge Home 7/20    Cystic breast     Depression 6/15    PHQ-9 was 15/27    Diverticulitis     recurrent x3 Dr Holley Kltiny liver     10/10 on US, CT 6/12; Fib-4 was 0.74 from 1/15    GI bleed 2/14    ischemic colitis on CT, seen by Dr. Andrew Davies Hyperlipidemia     calculated 10 year risk score was 3.2% (1/15)    Hypertension     IFG (impaired fasting glucose) [id number]    Ischemic colitis ([facility] [address] 3/14    Dr Andry Cobian    Labial abscess     + MRSA    Lymphoma ([facility] [address] 12/13    Dr. Avis Puri    Migraine     Nephrolithiasis     Neuropathy due to chemotherapeutic drug ([facility] [address] 9/14    Osteoarthritis     hips and knees; Dr Murphy David dependence syndrome 2/8/2019    Zoster 8/14    left T11       No current facility-administered medications for this encounter.      Facility-Administered Medications Ordered in Other Encounters:     fentaNYL citrate (PF) injection, , IntraVENous, PRN, Hux, Laura L, CRNA, 50 mcg at 10/13/20 0843    ondansetron (ZOFRAN) injection, , IntraVENous, PRN, Hux, Laura L, CRNA, 4 mg at 10/13/20 0734    rocuronium injection, , IntraVENous, PRN, Hux, Laura L, CRNA, 30 mg at 10/13/20 0746    lidocaine (PF) (XYLOCAINE) 20 mg/mL (2 %) injection, , IntraVENous, PRN, Hux, Laura L, CRNA, 40 mg at 10/13/20 0757    propofoL (DIPRIVAN) 10 mg/mL injection, , IntraVENous, PRN, Hux, Laura L, CRNA, 100 mg at 10/13/20 0806    succinylcholine (ANECTINE) injection, , IntraVENous, PRN, Hux, Laura L, CRNA, 100 mg at 10/13/20 0736    dexamethasone (DECADRON) 4 mg/mL injection, , , PRN, Hux, Laura L, CRNA, 4 mg at 10/13/20 0748    promethazine (PHENERGAN) injection, , , PRN, Hux, Laura L, CRNA, 6.25 mg at 10/13/20 0804    labetaloL (NORMODYNE;TRANDATE) injection, , , PRN, Hux, Laura L, CRNA, 5 mg at 10/13/20 0810    glycopyrrolate (ROBINUL) injection, , IntraVENous, PRN, Hux, Laura L, CRNA, 0.4 mg at 10/13/20 0912    neostigmine (PROSTIGMINE) 1 mg/mL syringe, , IntraVENous, PRN, Hux, Laura L, CRNA, 3 mg at 10/13/20 0912    Meperidine; Augmentin [amoxicillin-pot clavulanate]; Keflex [cephalexin]; and Sulfa (sulfonamide antibiotics)    Physical Exam:  General A&O x3 NAD, well developed, well nourished, normal affect  Heart: S1-S2, RRR  Lungs: CTA Bilat  Abd: soft NT, ND  Ext: n/v intact    Hospital Course:    Pt. Had rightOrthopedic / Rheumatologic: Total Hip Replacement    Post -op Course: The patient tolerated the procedure well. They were followed by internal medicine for help with medical management. Pt. Was place on Abx pre and post-op for prophylaxis against infection as well as coumadin pre and post-op for prophylaxis against DVT. Vitals signs remained stable, remained af. The wound wasclean, dry, no drainage. Pain was well controlled. Pt. Had negative calf tenderness or swelling, no evidence for DVT. Patient had PT/OT consult for evaluation and treatment.     CBC  Lab Results   Component Value Date/Time    WBC 7.5 10/02/2020 08:48 AM    RBC 4.01 (L) 10/02/2020 08:48 AM    HCT 36.5 10/02/2020 08:48 AM    MCV 91.0 10/02/2020 08:48 AM    MCH 30.7 10/02/2020 08:48 AM    MCHC 33.7 10/02/2020 08:48 AM    RDW 13.1 10/02/2020 08:48 AM     Coagulation  Lab Results   Component Value Date    INR 1.0 10/02/2020    APTT 23.6 10/02/2020      Basic Metabolic Profile  Lab Results   Component Value Date     10/02/2020    CO2 25 10/02/2020    BUN 18 10/02/2020       Discharge Meds:  Current Discharge Medication List      START taking these medications    Details   oxyCODONE-acetaminophen (Percocet) 7.5-325 mg per tablet Take 1-2 Tabs by mouth every six (6) hours as needed for Pain for up to 7 days. Max Daily Amount: 8 Tabs. Qty: 56 Tab, Refills: 0    Associated Diagnoses: Hip arthritis      docusate sodium (Colace) 100 mg capsule Take 1 Cap by mouth two (2) times a day for 90 days. Qty: 60 Cap, Refills: 2    Associated Diagnoses: Hip arthritis      aspirin (ASPIRIN) 325 mg tablet Take 1 Tab by mouth two (2) times a day. Qty: 60 Tab, Refills: 0    Associated Diagnoses: Hip arthritis      clindamycin (CLEOCIN) 300 mg capsule Take 1 Cap by mouth three (3) times daily for 3 days. Qty: 9 Cap, Refills: 0    Associated Diagnoses: Hip arthritis         CONTINUE these medications which have NOT CHANGED    Details   acetaminophen (TylenoL) 325 mg tablet Take  by mouth every four (4) hours as needed for Pain. amLODIPine (NORVASC) 5 mg tablet TAKE 1 TABLET BY MOUTH ONCE DAILY  Qty: 90 Tab, Refills: 3    Associated Diagnoses: Primary hypertension      ferrous sulfate (IRON) 325 mg (65 mg iron) tablet Take  by mouth. Takes every other day         STOP taking these medications       traMADoL (ULTRAM) 50 mg tablet Comments:   Reason for Stopping:         ibuprofen (MOTRIN) 200 mg tablet Comments:   Reason for Stopping:               Discharge Plan:  The patient will be d/c'd to home, total hip protocol, WBAT. She will have [facility] PT and nursing. Total joint protocol. Pt safe for homebound transfer, sp Total joint replacement. A walker, bedside commode, and shower chair will be utilized for ADL's. Follow up with Dr. Demetria Pastrana in 10-12 days. Call with any questions or concerns.

## 2020-10-13 NOTE — PROGRESS NOTES
Problem: Mobility Impaired (Adult and Pediatric)  Goal: *Acute Goals and Plan of Care (Insert Text)  Description: Physical Therapy Goals  Initiated 10/13/2020 and to be accomplished within 7 day(s)  1. Patient will move from supine to sit and sit to supine  and roll side to side in bed with  contact guard assist.    2.  Patient will transfer from bed to chair and chair to bed with supervision/set-up using the least restrictive device. 3.  Patient will perform sit to stand with supervision/set-up. 4.  Patient will ambulate with supervision/set-up for 150 feet with the least restrictive device. 5.  Patient will ascend/descend 4 stairs with 1-2 handrail(s) with minimal assistance/contact guard assist.     Prior Level of Function:   Patient was modified independence for all mobility including gait using RW. Patient lives alone in a single story home 15 steps to enter per pt and per chart 4 steps to enter (pt was a little confused during initial part of evaluation as she stated she lived with her mom but later said she lives alone). She stated at the end of the session that her daughter is coming to assist her after surgery. Outcome: Progressing Towards Goal   PHYSICAL THERAPY EVALUATION    Patient: Tr Solomon (89 y.o. female)  Date: 10/13/2020  Primary Diagnosis: Osteoarthritis of right hip, unspecified osteoarthritis type [M16.11]  Hip arthritis [M16.10]  Procedure(s) (LRB):  RIGHT TOTAL HIP ARTHROPLASTY LATERAL  APPROACH (Right) Day of Surgery   Precautions:   Fall, WBAT, Total hip    ASSESSMENT :  PT orders received and patient cleared by nursing to participate with therapy. Patient is a 77 y.o. female admitted to the hospital due to s/p R total hip arthroplasty Dr. Ryley Montalvo 10/13/2020. Patient consents to PT evaluation and treatment. Pt educated on safety, mobility, therex, total hip precautions, and OOB 3-5 times with nursing assistance.  Pt is very drowsy throughout session and seemed slightly confused during initial part of evaluation. Performed supine to sit moderate assistance. Initial sit to stand moderate assistance and other 3 sit to stands minimal assistance. Gait bed to [facility] and to chair 3 feet with RW minimal assistance. Pt mostly limited by being drowsy from her surgery. Pt ended therapy sitting in recliner with all needs met. Patient will benefit from skilled intervention to address the above impairments. Patient's rehabilitation potential is considered to be Good  Factors which may influence rehabilitation potential include:    []         None noted  []         Mental ability/status  [x]         Medical condition  []         Home/family situation and support systems  []         Safety awareness  []         Pain tolerance/management  []         Other:      PLAN :  Recommendations and Planned Interventions:    [x]           Bed Mobility Training             [x]    Neuromuscular Re-Education  [x]           Transfer Training                   []    Orthotic/Prosthetic Training  [x]           Gait Training                          [x]    Modalities  [x]           Therapeutic Exercises           [x]    Edema Management/Control  [x]           Therapeutic Activities            [x]    Family Training/Education  [x]           Patient Education  []           Other (comment):    Frequency/Duration: Patient will be followed by physical therapy 1-2 times per day/4-7 days per week to address goals. Discharge Recommendations: Home Health  Further Equipment Recommendations for Discharge: N/A     SUBJECTIVE:   Patient stated I'm from [facility]).     OBJECTIVE DATA SUMMARY:     Past Medical History:   Diagnosis Date    Anxiety 6/15    IKE-7 was 12/21    Clostridium difficile colitis 03/2014    Dr. Marco A Goldsmith adenoma 10/2011    Dr. Estelle Reyna; Dr Todd Arias 7/20    Cystic breast     Depression 6/15    PHQ-9 was 15/27    Diverticulitis     recurrent x3 Dr Shyanne Caldwell     10/10 on [address], [place] 6/12; Fib-4 was 0.74 from 1/15    GI bleed 2/14    ischemic colitis on CT, seen by Dr. Diamond Kidd    Hyperlipidemia     calculated 10 year risk score was 3.2% (1/15)    Hypertension     IFG (impaired fasting glucose) [id number]    Ischemic colitis ([facility] [address] 3/14    Dr Diamond Kidd    Labial abscess     + MRSA    Lymphoma ([facility] [address] 12/13    Dr. Conrado Martinez    Migraine     Nephrolithiasis     Neuropathy due to chemotherapeutic drug ([facility] [address] 9/14    Osteoarthritis     hips and knees; Dr Jaelyn Hammond    Tobacco dependence syndrome 2/8/2019    Zoster 8/14    left T11     Past Surgical History:   Procedure Laterality Date    COLONOSCOPY N/A 7/23/2020    Dr Yareli Baker 2003 polyps; 2011 adenoma; Dr Danilo Silva 3/14 isch colitis; Dr Yudi Buck 7/23/20 mod divertics and adenoma    HX APPENDECTOMY  1968    HX ORTHOPAEDIC  1960s    LEFT knee surgery    HX ORTHOPAEDIC      DEXA t score 2.1 spine, -0.3 hip (4/19)    HX SOPHIA AND BSO  04/02    Dr. Luanne Harvey TONSILLECTOMY       Barriers to Learning/Limitations: yes;  altered mental status (i.e.Sedation, Confusion)  Compensate with: Visual Cues, Verbal Cues, and Tactile Cues  Home Situation:  Home Situation  Home Environment: Private residence  # Steps to Enter: 4  Rails to Enter: Yes  Hand Rails : Bilateral  One/Two Story Residence: One story  Living Alone: No  Support Systems: Family member(s)  Patient Expects to be Discharged to[de-identified] Private residence  Current DME Used/Available at Home: Margart Golden, rolling, Commode, bedside, Shower chair  Critical Behavior:  Neurologic State: Alert; Anesthetized;Drowsy  Orientation Level: Oriented X4  Cognition: Follows commands;Decreased attention/concentration  Safety/Judgement: Fall prevention  Psychosocial  Patient Behaviors: Calm; Cooperative  Purposeful Interaction: Yes  Skin Condition/Temp: Dry;Warm     Skin Integrity: Incision (comment)  Skin Integumentary  Skin Color: Appropriate for ethnicity  Skin Condition/Temp: Dry;Warm  Skin Integrity: Incision (comment)  Turgor: Non-tenting  Hair Growth: Present  Varicosities: Absent  Nails: Within Defined Limits     B LE Strength:    Strength: Generally decreased, functional              B LE Tone & Sensation:   Tone: Normal          Sensation: Intact           B LE Range Of Motion:  AROM: Generally decreased, functional                 Posture:  Posture (WDL): Exceptions to WDL  Posture Assessment: Forward head  Functional Mobility:  Bed Mobility:     Supine to Sit: Moderate assistance     Scooting: Minimum assistance  Transfers:  Sit to Stand: Minimum assistance   Stand to Sit: Minimum assistance                       Balance:   Sitting: Impaired; With support  Sitting - Static: Fair (occasional)  Sitting - Dynamic: Fair (occasional)  Standing: Impaired; With support  Standing - Static: Fair  Standing - Dynamic : Fair       Ambulation/Gait Training:  Distance (ft): 3 Feet (ft)  Assistive Device: Walker, rolling  Ambulation - Level of Assistance: Minimal assistance     Gait Abnormalities: Decreased step clearance; Antalgic; Shuffling gait  Right Side Weight Bearing: As tolerated  Left Side Weight Bearing: Full  Base of Support: Center of gravity altered     Speed/Nicolle: Slow  Step Length: Right shortened;Left shortened             Therapeutic Exercises:   Reviewed and performed ankle pumps to increase blood flow and circulation. Pain:  Pain level pre-treatment: 7/10 R hip  Pain level post-treatment: same  Pain Intervention(s) : Medication (see MAR); Rest, Ice, Repositioning  Response to intervention: Nurse notified, See doc flow    Activity Tolerance:   Fair-  Please refer to the flowsheet for vital signs taken during this treatment.     After treatment:   [x]         Patient left in no apparent distress sitting up in chair  []         Patient left in no apparent distress in bed  [x]         Call bell left within reach  [x]   Personal items in reach   [x]         Nursing notified Adriana Riding   []         Caregiver present  []         Bed/chair alarm activated  [] SCDs applied     COMMUNICATION/EDUCATION:   [x]         Role of Physical Therapy in the acute care setting. [x]         Fall prevention education was provided and the patient/caregiver indicated understanding. [x]         Patient/family have participated as able in goal setting and plan of care. [x]         Patient/family agree to work toward stated goals and plan of care. []         Patient understands intent and goals of therapy, but is neutral about his/her participation. []         Patient is unable to participate in goal setting/plan of care: ongoing with therapy staff. [x]         Out of bed with nursing assistance 3-5 times a day. []         Other:     Thank you for this referral.  Hellen Baumann, PT, DPT   Time Calculation: 28 mins      Eval Complexity: History: MEDIUM  Complexity : 1-2 comorbidities / personal factors will impact the outcome/ POC Exam:HIGH Complexity : 4+ Standardized tests and measures addressing body structure, function, activity limitation and / or participation in recreation  Presentation: LOW Complexity : Stable, uncomplicated  Clinical Decision Making:Low Complexity    Overall Complexity:LOW

## 2020-10-13 NOTE — OP NOTES
[facility]  OPERATIVE REPORT    Name:  Luis Haq  MR#:   [id number]  :  1950  ACCOUNT #:  [de-identified]  DATE OF SERVICE:  10/13/2020    PREOPERATIVE DIAGNOSIS:  End-stage arthritis of the right hip with significant acetabular dysplasia and leg length discrepancy starting longer on the affected right leg. POSTOPERATIVE DIAGNOSIS:  End-stage arthritis of the right hip with significant acetabular dysplasia and leg length discrepancy starting longer on the affected right leg. PROCEDURE PERFORMED:  Right total hip replacement using the Chavez Accolade II system with a size 3 Accolade  high offset femoral component. A 48-mm Trident II Tritanium acetabular shell with screw augmentation and a neutral 36 liner, 36 -5 ceramic femoral head. SURGEON:  Se Steven MD    FIRST ASSISTANT:  Mis Alvarez first assistant. SECOND ASSISTANT:  Brooke Chaudhari. ANESTHESIOLOGIST:  Dr. Karan Sharma. ANESTHESIA:  Preoperative nerve block with light general.    Mis Alvarez was the first assistant who assisted with all phases of surgery commencing with patient positioning, patient prep, patient drape, leg positioning during the surgery, retracting, assisting with the surgery itself, closure, dressing placement, and transfer. COMPLICATIONS:  No complications. SPECIMENS REMOVED:  Femoral head. IMPLANTS:  As above mentioned. ESTIMATED BLOOD LOSS:  About 250 mL. DESCRIPTION OF PROCEDURE AND CLINICAL NOTE:  The patient is a very pleasant lady with developmental dysplasia of the hip, and she has a known leg length discrepancy identified in the office with the right leg being longer than the left. She understood that the leg would not be made shorter. In fact, it will be made slightly longer, but we would be very careful with the preoperative planning, templating, and also we would use an offset and leg length guide during the surgery to maintain and control leg lengths.   She is also very short statured at 4 feet 11 inches as well and we kept this in mind. We also had developmental dysplasia cups as well. All questions were answered and the patient elected to proceed. After the anesthetic was successfully induced, leg lengths again were determined and found to be long on the right side. The patient was placed appropriately, making sure the pelvis was square, and standard prep and drape, antibiotics confirmed given, and the time-out was performed. Standard approach. Good hemostasis was achieved. IT band was delineated and incised sharply. Sciatic nerve was identified, protected, and avoided, and the Charnley retractor was introduced. Eleanora Mariah was released using the capsular scissors. Minimus and capsule divided directly over the femoral neck. Somewhat attenuated due to disuse, but otherwise in good shape and anteriorly along down the vastus lateralis. Whole cuff of tissue was taken in one contiguous layer and lesser trochanter identified. A pin was placed in the superior iliac crest for offset and leg length measurement, and the hip was dislocated uneventfully using the smallest fingerbreadth. As per the preoperative templating, femoral neck was divided. Posterior capsule reflected with a Damon elevator. We instrumented around the acetabulum, and she was fairly dysplastic anteriorly and also superiorly where she had worn. There was a fair a bit of fibrofatty pulvinar material in the inferior acetabulum, which was meticulously debrided and then blunt Hohmann placed inferiorly. Meticulous debridement of the cup identified the medial wall for the foveal notch, medialized appropriately, and appropriate inclination and anteversion, reamed up to accommodate the cup. We went for alignment liner, reamed, and trialed this.   I was very pleased with it and implanted definitive shell with appropriate inclination and anteversion and augmented with a couple of screws in the safe zone.    We removed the small posterior osteophytes and used the Aquamantys and Exparel cocktail. We would trial a liner initially, but go back and place a definitive liner, which is a neutral 36. I made sure it was fully seated and protected with a Ray-Slim gauze later to be accounted for. Leg in the sterile leg bag being lateralized and started a little bit posterior as well and broached away up to accommodate the size 3. The size 2 was nonsupportive. The size 3 was very snug. We went back and forth between the 2 and 3, and made sure we were fully seated and used the calcar planer, reduced the hip, and referred back to our offset and leg length guides. The hip had been lengthened minimally, and offset was nicely restored. There was no impingement and excellent stability and combined anteversion was excellent. We then implanted the  definitive components and re-trialed. I was happiest with the -5. We then tried and impacted it on again reducing the hip and again referring back to her leg length and offset guide. Dilute Betadine wash, routine closure. At the end of the case, the instrument, sponge, and needle counts were correct. No complications. The patient tolerated the procedure well. Blood loss less than 250 mL. Excellent outcome at the end of the case.       Dustin Park MD AM/PEDRO PABLO_DOMINIC/MICHELE_DEBRA  D:  10/13/2020 9:15  T:  10/13/2020 14:17  JOB #:  [id number]

## 2020-10-13 NOTE — ROUTINE PROCESS
Bedside and Verbal shift change report given to Linda Louie (oncoming nurse) by Pankaj Carbajal (offgoing nurse). Report included the following information SBAR, Kardex and MAR.

## 2020-10-13 NOTE — PERIOP NOTES
TRANSFER - OUT REPORT:    Verbal report given to Alberto Osman RN(name) on Zaid Roll  being transferred to 2 surgical(unit) for routine post - op       Report consisted of patients Situation, Background, Assessment and   Recommendations(SBAR). Information from the following report(s) SBAR, Kardex, OR Summary, Procedure Summary, Intake/Output, MAR, Recent Results and Med Rec Status was reviewed with the receiving nurse. Lines:   Peripheral IV 10/13/20 Left; Inner Forearm (Active)   Site Assessment Clean, dry, & intact 10/13/20 0935   Phlebitis Assessment 0 10/13/20 0935   Infiltration Assessment 0 10/13/20 0935   Dressing Status Clean, dry, & intact 10/13/20 0935   Dressing Type Transparent;Tape 10/13/20 0935   Hub Color/Line Status Pink; Infusing 10/13/20 0935        Opportunity for questions and clarification was provided.       Patient transported with:   [employer]

## 2020-10-13 NOTE — ANESTHESIA PROCEDURE NOTES
Peripheral Block    Start time: 10/13/2020 7:19 AM  End time: 10/13/2020 7:29 AM  Performed by: Abhishek Montero MD  Authorized by: Abhishek Montero MD       Pre-procedure:    Indications: at surgeon's request and post-op pain management    Preanesthetic Checklist: patient identified, risks and benefits discussed, site marked, timeout performed, anesthesia consent given and patient being monitored    Timeout Time: 07:19          Block Type:   Block Type:  Lumbar plexus  Laterality:  Right  Monitoring:  Standard ASA monitoring, responsive to questions, oxygen, continuous pulse ox, frequent vital sign checks and heart rate  Injection Technique:  Single shot  Patient Position: left lateral decubitus  Prep: chlorhexidine    Location:  Sacral area  Needle Type:  Stimuplex  Needle Gauge:  20 G  Needle Localization:  Anatomical landmarks and nerve stimulator  Motor Response comment:   Motor Response: minimal motor response >0.4 mA   Medication Injected:  FentaNYL citrate (PF) injection sedation initial, 100 mcg  midazolam (VERSED) injection, 2 mg  ropivacaine (NAROPIN) 2 mg/mL (0.2 %) injection, 60 mg  Med Admin Time: 10/13/2020 7:26 AM    Assessment:  Number of attempts:  1  Injection Assessment:  Incremental injection every 5 mL, negative aspiration for CSF, no paresthesia, no intravascular symptoms, negative aspiration for blood and local visualized surrounding nerve on ultrasound  Patient tolerance:  Patient tolerated the procedure well with no immediate complications

## 2020-10-14 ENCOUNTER — HOME HEALTH ADMISSION (OUTPATIENT)
Dept: HOME HEALTH SERVICES | Facility: HOME HEALTH | Age: 70
End: 2020-10-14
Payer: MEDICARE

## 2020-10-14 VITALS
SYSTOLIC BLOOD PRESSURE: 130 MMHG | OXYGEN SATURATION: 96 % | RESPIRATION RATE: 18 BRPM | TEMPERATURE: 98.2 F | BODY MASS INDEX: 21.77 KG/M2 | WEIGHT: 108 LBS | DIASTOLIC BLOOD PRESSURE: 52 MMHG | HEART RATE: 60 BPM | HEIGHT: 59 IN

## 2020-10-14 PROCEDURE — 97116 GAIT TRAINING THERAPY: CPT

## 2020-10-14 PROCEDURE — 97165 OT EVAL LOW COMPLEX 30 MIN: CPT

## 2020-10-14 PROCEDURE — 97535 SELF CARE MNGMENT TRAINING: CPT

## 2020-10-14 PROCEDURE — 74011250637 HC RX REV CODE- 250/637: Performed by: ORTHOPAEDIC SURGERY

## 2020-10-14 PROCEDURE — 2709999900 HC NON-CHARGEABLE SUPPLY

## 2020-10-14 PROCEDURE — 74011250636 HC RX REV CODE- 250/636: Performed by: ORTHOPAEDIC SURGERY

## 2020-10-14 RX ADMIN — DOCUSATE SODIUM 50 MG AND SENNOSIDES 8.6 MG 1 TABLET: 8.6; 5 TABLET, FILM COATED ORAL at 08:49

## 2020-10-14 RX ADMIN — Medication 10 ML: at 06:06

## 2020-10-14 RX ADMIN — OXYCODONE 5 MG: 5 TABLET ORAL at 11:41

## 2020-10-14 RX ADMIN — ACETAMINOPHEN 1000 MG: 500 TABLET ORAL at 11:40

## 2020-10-14 RX ADMIN — ASPIRIN 243 MG: 81 TABLET ORAL at 08:49

## 2020-10-14 RX ADMIN — PREGABALIN 25 MG: 25 CAPSULE ORAL at 08:49

## 2020-10-14 RX ADMIN — CEFAZOLIN SODIUM 2 G: 2 SOLUTION INTRAVENOUS at 06:06

## 2020-10-14 RX ADMIN — ACETAMINOPHEN 1000 MG: 500 TABLET ORAL at 06:05

## 2020-10-14 RX ADMIN — CEFAZOLIN SODIUM 2 G: 2 SOLUTION INTRAVENOUS at 00:39

## 2020-10-14 RX ADMIN — OXYCODONE 5 MG: 5 TABLET ORAL at 06:05

## 2020-10-14 RX ADMIN — ACETAMINOPHEN 1000 MG: 500 TABLET ORAL at 00:39

## 2020-10-14 RX ADMIN — OXYCODONE 5 MG: 5 TABLET ORAL at 00:39

## 2020-10-14 RX ADMIN — AMLODIPINE BESYLATE 5 MG: 5 TABLET ORAL at 08:49

## 2020-10-14 RX ADMIN — FERROUS SULFATE TAB 325 MG (65 MG ELEMENTAL FE) 325 MG: 325 (65 FE) TAB at 08:49

## 2020-10-14 NOTE — PROGRESS NOTES
Reason for Admission:  Osteoarthritis of right hip, unspecified osteoarthritis type [M16.11]  Hip arthritis [M16.10]  Hip arthritis [M16.10]                 RUR Score:    7%            Plan for utilizing home health:    [address] for [facility]                      Likelihood of Readmission:   LOW                         Transition of Care Plan:              Initial assessment completed with patient. Cognitive status of patient: oriented to time, place, person and situation. Face sheet information confirmed:  yes. The patient designates daughterJoaquin to participate in her discharge plan and to receive any needed information. This patient lives in a single family home with patient and daughter. Patient is able to navigate steps as needed. Prior to hospitalization, patient was considered to be independent with ADLs/IADLS : yes . Patient has a current ACP document on file: no  The patient and daughter will be available to transport patient home upon discharge. The patient already has [employer] Porch chair, BSC, and  medical equipment available in the home. Patient is not currently active with home health. Patient has not stayed in a skilled nursing facility or rehab. This patient is on dialysis :no    List of available Home Health agencies were provided and reviewed with the patient prior to discharge. Freedom of choice signed: yes, for [facility]. Currently, the discharge plan is Home with [address] [place] [address] [facility] [place]. The patient states that she can obtain her medications from the pharmacy, and take her medications as directed. Patient's current insurance is Medicare/supplement       Care Management Interventions  PCP Verified by CM:  Yes  Mode of Transport at Discharge: Self  Transition of Care Consult (CM Consult): [address]: Yes  Discharge Durable Medical Equipment: No  Physical Therapy Consult: Yes  Occupational Therapy Consult: No  Current Support Network: Lives Alone(family to assist for  several weeks)  Confirm Follow Up Transport: Family  The Plan for Transition of Care is Related to the Following Treatment Goals : home health  The Patient and/or Patient Representative was Provided with a Choice of Provider and Agrees with the Discharge Plan?: Yes  Freedom of Choice List was Provided with Basic Dialogue that Supports the Patient's Individualized Plan of Care/Goals, Treatment Preferences and Shares the Quality Data Associated with the Providers?: Yes  Discharge Location  Discharge Placement: Home with home health        DAVID Lester, [place]- [phone]

## 2020-10-14 NOTE — PROGRESS NOTES
Orthopedic Coordinator Rounding Note    2020  Admit Date: 10/13/2020  Admit Diagnosis: Osteoarthritis of right hip, unspecified osteoarthritis type [M16.11]  Hip arthritis [M16.10]  Hip arthritis [M16.10]  Procedure: Procedure(s):  RIGHT TOTAL HIP ARTHROPLASTY LATERAL  APPROACH  Post Op day: 1 Day Post-Op    Vital Signs:    Blood pressure (!) 130/52, pulse 60, temperature 98.2 °F (36.8 °C), resp. rate 18, height 4' 11\" (1.499 m), weight 49 kg (108 lb), SpO2 96 %, not currently breastfeeding. Temp (24hrs), Av °F (36.7 °C), Min:97.5 °F (36.4 °C), Max:98.2 °F (36.8 °C)    PAIN RELIEF: some relief of pain with current prescribed medications. I/O  No intake/output data recorded.   10/12 190 - 10/14 0700  In: 2286.8 [P.O.:1080; I.V.:1206.8]  Out: 1700 [Urine:1700]    Surgical Wound:  Wound Hip Right (Active)   Dressing Status Clean, dry, and intact 10/14/20 0848   Dressing Type Aquacel 10/14/20 0848   Splint Type/Material Other(Comment) 10/13/20 2044   Incision Site Well Approximated Yes 10/14/20 0848   Assessment Clean, dry, and intact 10/13/20 2044   Drainage Amount None 10/13/20 2044   Number of days: 1         PT/OT:   PATIENT MOBILITY    Bed Mobility  Supine to Sit: Stand-by assistance  Scooting: Stand-by assistance  Transfers  Sit to Stand: Stand-by assistance  Stand to Sit: Stand-by assistance      Gait  Base of Support: Narrowed  Speed/Nicolle: Pace decreased (<100 feet/min)  Step Length: Right shortened, Left shortened  Gait Abnormalities: Decreased step clearance  Ambulation - Level of Assistance: Stand-by assistance  Distance (ft): 300 Feet (ft)  Assistive Device: Walker, rolling  Rail Use: Both  Stairs - Level of Assistance: Stand-by assistance  Number of Stairs Trained: 8   Weight Bearing Status  Right Side Weight Bearing: As tolerated  Left Side Weight Bearing: Full        Discharge To: HOME with home health     Discharge medications reviewed with the patient and appropriate educational materials and side effects teaching were provided along with surgery specific patient education guidebook. Reviewed activity orders and incentive spirometry with return demonstration. Opportunity for questions and clarification provided.     Maxim Xiao, DIMITRIN, RN, [address] [place] [address]  Orthopedic

## 2020-10-14 NOTE — PROGRESS NOTES
Problem: Pain  Goal: *Control of Pain  Outcome: Progressing Towards Goal  Goal: *PALLIATIVE CARE:  Alleviation of Pain  Outcome: Progressing Towards Goal     Problem: Patient Education: Go to Patient Education Activity  Goal: Patient/Family Education  Outcome: Progressing Towards Goal     Problem: Patient Education: Go to Patient Education Activity  Goal: Patient/Family Education  Outcome: Progressing Towards Goal     Problem: Falls - Risk of  Goal: *Absence of Falls  Description: Document Rosalina Bond Fall Risk and appropriate interventions in the flowsheet.   Outcome: Progressing Towards Goal  Note: Fall Risk Interventions:  Mobility Interventions: Patient to call before getting OOB         Medication Interventions: Teach patient to arise slowly, Patient to call before getting OOB    Elimination Interventions: Call light in reach, Patient to call for help with toileting needs, Stay With Me (per policy)              Problem: Patient Education: Go to Patient Education Activity  Goal: Patient/Family Education  Outcome: Progressing Towards Goal

## 2020-10-14 NOTE — PROGRESS NOTES
Problem: Mobility Impaired (Adult and Pediatric)  Goal: *Acute Goals and Plan of Care (Insert Text)  Description: Physical Therapy Goals  Initiated 10/13/2020 and to be accomplished within 7 day(s)  1. Patient will move from supine to sit and sit to supine  and roll side to side in bed with  contact guard assist.    2.  Patient will transfer from bed to chair and chair to bed with supervision/set-up using the least restrictive device. 3.  Patient will perform sit to stand with supervision/set-up. 4.  Patient will ambulate with supervision/set-up for 150 feet with the least restrictive device. 5.  Patient will ascend/descend 4 stairs with 1-2 handrail(s) with minimal assistance/contact guard assist.     Prior Level of Function:   Patient was modified independence for all mobility including gait using RW. Patient lives alone in a single story home 15 steps to enter per pt and per chart 4 steps to enter (pt was a little confused during initial part of evaluation as she stated she lived with her mom but later said she lives alone). She stated at the end of the session that her daughter is coming to assist her after surgery. Outcome: Resolved/Met     PHYSICAL THERAPY TREATMENT AND DISCHARGE    Patient: Kylie Sparrow (50 y.o. female)  Date: 10/14/2020  Diagnosis: Osteoarthritis of right hip, unspecified osteoarthritis type [M16.11]  Hip arthritis [M16.10]   <principal problem not specified>  Procedure(s) (LRB):  RIGHT TOTAL HIP ARTHROPLASTY LATERAL  APPROACH (Right) 1 Day Post-Op  Precautions: Total hip(R DRE)    ASSESSMENT:  Based on the objective data described below, the patient presents with decreased endurance and pain in R hip. RN cleared for PT to work with pt. Pt found supine in be, willing to work with PT, reporting that she feels better than yesterday. Pt able to recall hip precautions. She states her daughter will be staying with her once she gets home.  Pt able to perform supine-sit transfer with SBA. Pt requested to use restroom and performed sit to stand with SBA and RW. Pt ambulated to bathroom to void urine. Once finished, pt ambulated 300 feet throughout hallways SBA with RW and ascended/descended 8 stair with B HR in a step-to pattern SBA without difficulty. No LOB or instability. Pt returned back in room and sat up in recliner. B LE elevated, call bell nearby, no new questions or concerns. Pt is safe to go home as she demonstrated safety with all mobility and stairs. Recommend d/c home with [facility] and assist from daughter as needed. Pt has all needed equipment. PLAN:  Maximum therapeutic gains met at current level of care and patient will be discharged from physical therapy at this time. Rationale for discharge:  [x]     Goals Achieved  []     [address]  []     Patient not participating in therapy  []     Other:  Discharge Recommendations:  Home Health with assist from daughter  Further Equipment Recommendations for Discharge:  N/A     SUBJECTIVE:   Patient stated i'm feeling much better today.     OBJECTIVE DATA SUMMARY:   Critical Behavior:  Neurologic State: Alert, Appropriate for age, Eyes open spontaneously  Orientation Level: Appropriate for age, Oriented X4  Cognition: Appropriate decision making, Appropriate for age attention/concentration, Appropriate safety awareness  Safety/Judgement: Fall prevention  Functional Mobility Training:  Bed Mobility:     Supine to Sit: Stand-by assistance     Scooting: Stand-by assistance         Transfers:  Sit to Stand: Stand-by assistance  Stand to Sit: Stand-by assistance    Balance:  Sitting: Intact; Without support  Standing: Intact; With support   Ambulation/Gait Training:  Distance (ft): 300 Feet (ft)  Assistive Device: Walker, rolling  Ambulation - Level of Assistance: Stand-by assistance        Gait Abnormalities: Decreased step clearance  Right Side Weight Bearing: As tolerated     Base of Support: Narrowed     Speed/Nicolle: Pace decreased (<100 feet/min)  Step Length: Right shortened;Left shortened   Stairs:  8 steps with B HR SBA      Pain:  Pain level pre-treatment: 3/10   Pain level post-treatment: 3/10   Pain Intervention(s): Medication (see MAR); Rest, Repositioning   Response to intervention: Nurse notified, See doc flow    Activity Tolerance:   Pt tolerated mobility well today  Please refer to the flowsheet for vital signs taken during this treatment. After treatment:   [x] Patient left in no apparent distress sitting up in chair  [] Patient left in no apparent distress in bed  [x] Call bell left within reach  [x] Nursing notified  [] Caregiver present  [] Bed alarm activated  [] SCDs applied      COMMUNICATION/EDUCATION:   [x]         Role of Physical Therapy in the acute care setting. [x]         Fall prevention education was provided and the patient/caregiver indicated understanding. [x]         Patient/family have participated as able and agree with findings and recommendations. []         Patient is unable to participate in plan of care at this time.   []         Other:        Latisha Devi   Time Calculation: 19 mins

## 2020-10-14 NOTE — PROGRESS NOTES
Ortho    Pt. Seen and evaluated. Doing well, pain well controlled progressed well with PT  Denies cp, sob, abd pain    Visit Vitals  BP (!) 146/78 (BP 1 Location: Right arm, BP Patient Position: At rest)   Pulse 69   Temp 97.9 °F (36.6 °C)   Resp 17   Ht 4' 11\" (1.499 m)   Wt 108 lb (49 kg)   SpO2 93%   Breastfeeding No   BMI 21.81 kg/m²       right total hip replacement  right hip Woundclean, dry, no drainage  Sensory intact to LT  Motor intact  nv intact  Neg calf tenderness. Labs.   CBC  @  CBC:   Lab Results   Component Value Date/Time    WBC 7.5 10/02/2020 08:48 AM    RBC 4.01 (L) 10/02/2020 08:48 AM    HGB 12.3 10/02/2020 08:48 AM    HCT 36.5 10/02/2020 08:48 AM    PLATELET 319 08/88/3104 08:48 AM    and BMP:   Lab Results   Component Value Date/Time    Glucose 102 (H) 10/02/2020 08:48 AM    Sodium 136 10/02/2020 08:48 AM    Potassium 4.6 10/02/2020 08:48 AM    Chloride 107 10/02/2020 08:48 AM    CO2 25 10/02/2020 08:48 AM    BUN 18 10/02/2020 08:48 AM    Creatinine 0.90 10/02/2020 08:48 AM    Calcium 9.3 10/02/2020 08:48 AM   @  Coagulation  Lab Results   Component Value Date    INR 1.0 10/02/2020    APTT 23.6 10/02/2020      Basic Metabolic Profile  Lab Results   Component Value Date     10/02/2020    CO2 25 10/02/2020    BUN 18 10/02/2020         Assesment:rightOrthopedic / Rheumatologic: Total Hip Replacement  Past Medical History:   Diagnosis Date    Anxiety 6/15    IKE-7 was 12/21    Clostridium difficile colitis 03/2014    Dr. Gab Proctor adenoma 10/2011    Dr. Wendy Newman; Dr Garcia 7/20    Cystic breast     Depression 6/15    PHQ-9 was 15/27    Diverticulitis     recurrent x3 Dr Garzon Coad liver     10/10 on US, CT 6/12; Fib-4 was 0.74 from 1/15    GI bleed 2/14    ischemic colitis on CT, seen by Dr. Ana Ferrell Hyperlipidemia     calculated 10 year risk score was 3.2% (1/15)    Hypertension     IFG (impaired fasting glucose) [id number]    [facility] 3/14    Dr Mariposa Buckley  Labial abscess     + MRSA    Lymphoma ([facility] 12/13    Dr. 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 Plan:  Aspirin, PT, DC to home if cleared by PT and ok with medicine.

## 2020-10-14 NOTE — PROGRESS NOTES
Problem: Pain  Goal: *Control of Pain  10/14/2020 1247 by Kristen Sizer  Outcome: Resolved/Met  10/14/2020 0938 by Kristen Sizer  Outcome: Progressing Towards Goal  Goal: *PALLIATIVE CARE:  Alleviation of Pain  10/14/2020 1247 by Kristen Sizer  Outcome: Resolved/Met  10/14/2020 0938 by Kristen Sizer  Outcome: Progressing Towards Goal     Problem: Patient Education: Go to Patient Education Activity  Goal: Patient/Family Education  10/14/2020 1247 by Kristen Sizer  Outcome: Resolved/Met  10/14/2020 0938 by Kristen Sizer  Outcome: Progressing Towards Goal     Problem: Patient Education: Go to Patient Education Activity  Goal: Patient/Family Education  10/14/2020 1247 by Kristen Sizer  Outcome: Resolved/Met  10/14/2020 0938 by Kristen Sizer  Outcome: Progressing Towards Goal     Problem: Falls - Risk of  Goal: *Absence of Falls  Description: Document Shena Fall Risk and appropriate interventions in the flowsheet.   10/14/2020 1247 by Kristen Sizer  Outcome: Resolved/Met  10/14/2020 0938 by Kristen Sizer  Outcome: Progressing Towards Goal  Note: Fall Risk Interventions:  Mobility Interventions: Patient to call before getting OOB, Utilize walker, cane, or other assistive device         Medication Interventions: Teach patient to arise slowly, Patient to call before getting OOB    Elimination Interventions: Call light in reach, Patient to call for help with toileting needs              Problem: Patient Education: Go to Patient Education Activity  Goal: Patient/Family Education  10/14/2020 1247 by Kristen Sizer  Outcome: Resolved/Met  10/14/2020 0938 by Kristen Sizer  Outcome: Progressing Towards Goal     Problem: Patient Education: Go to Patient Education Activity  Goal: Patient/Family Education  Outcome: Resolved/Met

## 2020-10-14 NOTE — PROGRESS NOTES
Mobility Intervention:       [] Pt dangled at edge of bed    [x] Pt assisted OOB to bedside commode    [] Pt assisted OOB to chair    [] Pt ambulated to bathroom    [] Patient was ambulated in room/hallway    Assistive Device Utilized:       [x] Rolling walker   [] Crutches   [] Straight Cane   [] Knee immobilizer   [] IV pole    After Mobilization:     [] Patient left in no apparent distress sitting up in chair  [x] Patient left in no apparent distress in bed  [x] Call bell left within reach  [x] SCDs on & machine turned on  [x] Ice applied  [] pain med  [] Caregiver present  [] Bed alarm activated    Reason patient not mobilized:      [] Patient refused   [] Nausea/vomiting   [] Low blood pressure   [] Drowsy/lethargic    Pain Rating:     [] 0  [] 1  Assistive Device:        [] 2  [x] 3  [] 4  [] 5  [] 6  Assistive Device:        [] 7  [] 8  [] 9  [] 10    Comments:

## 2020-10-14 NOTE — HOME CARE
Received [facility] referral; Discharge order noted for today; [facility] will follow for SN,PT, Lukasz Hip protocol ; [facility] services explained to pt and answered all questions; pt has all needed DME; Patient states her daughter Amy Tamayo) will be staying with her x 1 week to assist her after discharge; [facility] referral processed to [facility] central intake KIRSTY SCHROEDER.

## 2020-10-14 NOTE — PROGRESS NOTES
Mobility Intervention:       [] Pt dangled at edge of bed    [x] Pt assisted OOB to bedside commode    [] Pt assisted OOB to chair    [] Pt ambulated to bathroom    [] Patient was ambulated in room/hallway    Assistive Device Utilized:       [x] Rolling walker   [] Crutches   [] Straight Cane   [] Knee immobilizer   [] IV pole    After Mobilization:     [] Patient left in no apparent distress sitting up in chair  [x] Patient left in no apparent distress in bed  [x] Call bell left within reach  [x] SCDs on & machine turned on  [x] Ice applied  [x] pain med  [] Caregiver present  [] Bed alarm activated    Reason patient not mobilized:      [] Patient refused   [] Nausea/vomiting   [] Low blood pressure   [] Drowsy/lethargic    Pain Rating:     [] 0  [] 1  Assistive Device:        [] 2  [] 3  [] 4  [x] 5  [] 6  Assistive Device:        [] 7  [] 8  [] 9  [] 10    Comments:

## 2020-10-14 NOTE — PROGRESS NOTES
Problem: Self Care Deficits Care Plan (Adult)  Goal: *Acute Goals and Plan of Care (Insert Text)  Outcome: Resolved/Met     OCCUPATIONAL THERAPY EVALUATION/DISCHARGE    Patient: Korin Alexander (53 y.o. female)  Date: 10/14/2020  Primary Diagnosis: Osteoarthritis of right hip, unspecified osteoarthritis type [M16.11]  Hip arthritis [M16.10]  Hip arthritis [M16.10]  Procedure(s) (LRB):  RIGHT TOTAL HIP ARTHROPLASTY LATERAL  APPROACH (Right) 1 Day Post-Op   Precautions: Total hip, WBAT, Fall  PLOF: Patient was independent with self-care and used a cane for functional mobility PTA. ASSESSMENT AND RECOMMENDATIONS:  Upon entering the room, the patient was seated in chair, alert, and agreeable to participate in OT evaluation. Patient educated on the role of OT, WB status, hip precautions, adaptive equipment (reacher, sock aid, long handled sponge, long handled shoe horn, raised toilet seat) with patient demonstrating good understanding. Patient issued adaptive equipment to utilize while following hip precautions, and used during evaluation to get dressed with stand by assist for lower body tasks. Verbal cues needed for reminding patient not to bend while utilizing reacher in opposite hand. Patient educated on sleeping with pillow between knees to maintain post-op precautions and provided with a booklet of precautions along with techniques /strategies to follow in order to perform ADL/IADL tasks safely and efficiently. Based on the objective data described below, the patient is able to perform basic self care tasks independently, using AE given after demonstration while seated. The pt presents with good static standing and fair dynamic standing balance, however will defer to PT for functional balance and functional mobility tasks. Pt is motivated to return home and will have her daughter at home to assist prn. No further skilled OT needed at this level of care. OT to d/c from caseload.      Skilled occupational therapy is not indicated at this time.   Discharge Recommendations: Home Health  Further Equipment Recommendations for Discharge: Patient has all DME      SUBJECTIVE:   Patient stated I bought a reacher but ill have two    OBJECTIVE DATA SUMMARY:     Past Medical History:   Diagnosis Date    Anxiety 6/15    IKE-7 was 12/21    Clostridium difficile colitis 03/2014    Dr. Ba Dunn adenoma 10/2011    Dr. Stefani Forbes; Dr Anisa Plata 7/20    Cystic breast     Depression 6/15    PHQ-9 was 15/27    Diverticulitis     recurrent x3 Dr Guillermina Barriga liver     10/10 on US, CT 6/12; Fib-4 was 0.74 from 1/15    GI bleed 2/14    ischemic colitis on CT, seen by Dr. Juan Rajan Hyperlipidemia     calculated 10 year risk score was 3.2% (1/15)    Hypertension     IFG (impaired fasting glucose) [id number]    Ischemic colitis ([facility] 3/14    Dr Tiffanie Marti    Labial abscess     + MRSA    Lymphoma ([facility] 12/13    Dr. 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 Stefanie Ramirez HX TONSILLECTOMY       Barriers to Learning/Limitations: None  Compensate with: visual, verbal, tactile, kinesthetic cues/model    Home Situation:   Home Situation  Home Environment: Private residence  # Steps to Enter: 4  Rails to Enter: Yes  Hand Rails : Bilateral  One/Two Story Residence: One story  Living Alone: No  Support Systems: Family member(s)  Patient Expects to be Discharged to[de-identified] Private residence  Current DME Used/Available at Home: Walker, rolling, Commode, bedside, Shower chair  Tub or Shower Type: Tub/Shower combination  [x]     Right hand dominant   []     Left hand dominant    Cognitive/Behavioral Status:  Neurologic State: Alert  Orientation Level: Oriented X4  Cognition: Follows commands;Decreased attention/concentration  Safety/Judgement: Fall prevention    Skin: Intact  Edema: None noted    Vision/Perceptual:    Acuity: Within Defined Limits      Coordination: BUE  Fine Motor Skills-Upper: Left Intact; Right Intact    Gross Motor Skills-Upper: Left Intact; Right Intact    Balance:  Sitting: Intact  Sitting - Static: Good (unsupported)  Sitting - Dynamic: Good (unsupported)  Standing: Impaired; With support  Standing - Static: Good  Standing - Dynamic : Fair    Strength: BUE  Strength: Generally decreased, functional    Tone & Sensation: BUE  Tone: Normal  Sensation: Intact    Range of Motion: BUE  AROM: Within functional limits    Functional Mobility and Transfers for ADLs:  Bed Mobility:  Supine to Sit: Stand-by assistance  Scooting: Stand-by assistance     Transfers:  Sit to Stand: Stand-by assistance  Stand to Sit: Stand-by assistance   Toilet Transfer : Stand-by assistance     ADL Assessment:  Feeding: Independent    Oral Facial Hygiene/Grooming: Independent    Bathing: Stand-by assistance; Adaptive equipment    Upper Body Dressing: Independent    Lower Body Dressing: Stand-by assistance; Adaptive equipment    Toileting: Stand by assistance    ADL Intervention:  Grooming  Grooming Assistance: Independent  Position Performed: Standing  Washing Face: Independent    Upper Body Bathing  Bathing Assistance: Independent  Position Performed: Seated in chair    Lower Body Bathing  Bathing Assistance: Stand-by assistance  Adaptive Equipment: Long handled sponge    Upper Body Dressing Assistance  Dressing Assistance: Independent  Bra: Independent  Pullover Shirt: Independent    Lower Body Dressing Assistance  Dressing Assistance: Stand-by assistance  Underpants: Stand-by assistance  Socks: Stand-by assistance  Slip on Shoes with Back: Stand-by assistance  Position Performed: Seated in chair;Standing  Cues: Verbal cues provided  Adaptive Equipment Used: Reacher;Sock aid; Long handled shoe horn    Toileting  Toileting Assistance: Stand-by assistance  Bladder Hygiene: Stand-by assistance(standing)    Cognitive Retraining  Safety/Judgement: Fall prevention      Pain:  Pain level pre-treatment: 0/10   Pain level post-treatment: 0/10   Pain Intervention(s): Medication (see MAR); Response to intervention: Nurse notified, See doc flow    Activity Tolerance:   Fair+    Please refer to the flowsheet for vital signs taken during this treatment. After treatment:   [x]  Patient left in no apparent distress sitting up in chair  []  Patient left in no apparent distress in bed  [x]  Call bell left within reach  [x]  Nursing notified  []  Caregiver present  []  Bed alarm activated    COMMUNICATION/EDUCATION:   [x]      Role of Occupational Therapy in the acute care setting  [x]      Home safety education was provided and the patient/caregiver indicated understanding. [x]      Patient/family have participated as able and agree with findings and recommendations. []      Patient is unable to participate in plan of care at this time. Thank you for this referral.  Josue Strange OTR/L  Time Calculation: 23 mins      Eval Complexity: History: LOW Complexity : Brief history review ; Examination: LOW Complexity : 1-3 performance deficits relating to physical, cognitive , or psychosocial skils that result in activity limitations and / or participation restrictions ;    Decision Making:LOW Complexity : No comorbidities that affect functional and no verbal or physical assistance needed to complete eval tasks

## 2020-10-14 NOTE — PROGRESS NOTES
Discharge order noted for today. Pt has been accepted to [facility] agency. Met with patient and is agreeable to the transition plan today. Transport has been arranged through family. Patient's discharge summary and home health  orders have been forwarded to [id number] via care connect. Updated bedside RN, Vicky Jenkins,  to the transition plan.   Discharge information has been documented on the AVS.       DAVID Buitrago, [facility]- [phone]

## 2020-10-14 NOTE — HOME CARE
Rounded on this \"[facility]\" patient via phone  , explained [facility] services offered and answered all questions, pt states she has DME: has RW,BSC,shower chair and cane; [facility] will be available if needed. KIRSTY SCHROEDER.

## 2020-10-14 NOTE — PROGRESS NOTES
Problem: Pain  Goal: *Control of Pain  Outcome: Progressing Towards Goal  Goal: *PALLIATIVE CARE:  Alleviation of Pain  Outcome: Progressing Towards Goal     Problem: Patient Education: Go to Patient Education Activity  Goal: Patient/Family Education  Outcome: Progressing Towards Goal     Problem: Patient Education: Go to Patient Education Activity  Goal: Patient/Family Education  Outcome: Progressing Towards Goal     Problem: Falls - Risk of  Goal: *Absence of Falls  Description: Document Namrata Mccollum Fall Risk and appropriate interventions in the flowsheet.   Outcome: Progressing Towards Goal  Note: Fall Risk Interventions:  Mobility Interventions: Patient to call before getting OOB, Utilize walker, cane, or other assistive device         Medication Interventions: Teach patient to arise slowly, Patient to call before getting OOB    Elimination Interventions: Call light in reach, Patient to call for help with toileting needs              Problem: Patient Education: Go to Patient Education Activity  Goal: Patient/Family Education  Outcome: Progressing Towards Goal

## 2020-10-14 NOTE — PROGRESS NOTES
Mobility Intervention:       [] Pt dangled at edge of bed    [x] Pt assisted OOB to bedside commode    [] Pt assisted OOB to chair    [] Pt ambulated to bathroom    [] Patient was ambulated in room/hallway    Assistive Device Utilized:       [x] Rolling walker   [] Crutches   [] Straight Cane   [] Knee immobilizer   [] IV pole    After Mobilization:     [] Patient left in no apparent distress sitting up in chair  [x] Patient left in no apparent distress in bed  [x] Call bell left within reach  [x] SCDs on & machine turned on  [x] Ice applied  [x] pain med  [] Caregiver present  [] Bed alarm activated    Reason patient not mobilized:      [] Patient refused   [] Nausea/vomiting   [] Low blood pressure   [] Drowsy/lethargic    Pain Rating:     [] 0  [] 1  Assistive Device:        [] 2  [] 3  [] 4  [] 5  [] 6  Assistive Device:        [x] 7  [] 8  [] 9  [] 10    Comments:

## 2020-10-14 NOTE — PROGRESS NOTES
Bedside and Verbal shift change report given to Devonte Dowell RN (oncoming nurse) by Jerrod Roldan RN (offgoing nurse). Report included the following information SBAR, Kardex, OR Summary, Procedure Summary, Intake/Output and MAR.

## 2020-10-15 ENCOUNTER — HOME CARE VISIT (OUTPATIENT)
Dept: SCHEDULING | Facility: HOME HEALTH | Age: 70
End: 2020-10-15
Payer: MEDICARE

## 2020-10-15 ENCOUNTER — HOME CARE VISIT (OUTPATIENT)
Dept: HOME HEALTH SERVICES | Facility: HOME HEALTH | Age: 70
End: 2020-10-15
Payer: MEDICARE

## 2020-10-15 VITALS
SYSTOLIC BLOOD PRESSURE: 110 MMHG | TEMPERATURE: 97.5 F | DIASTOLIC BLOOD PRESSURE: 70 MMHG | OXYGEN SATURATION: 93 % | HEART RATE: 63 BPM

## 2020-10-15 VITALS
OXYGEN SATURATION: 98 % | DIASTOLIC BLOOD PRESSURE: 70 MMHG | HEART RATE: 60 BPM | TEMPERATURE: 98.2 F | SYSTOLIC BLOOD PRESSURE: 110 MMHG | RESPIRATION RATE: 16 BRPM

## 2020-10-15 PROCEDURE — G0151 HHCP-SERV OF PT,EA 15 MIN: HCPCS

## 2020-10-15 PROCEDURE — G0299 HHS/HOSPICE OF RN EA 15 MIN: HCPCS

## 2020-10-15 PROCEDURE — 3331090002 HH PPS REVENUE DEBIT

## 2020-10-15 PROCEDURE — 3331090001 HH PPS REVENUE CREDIT

## 2020-10-15 PROCEDURE — 400013 HH SOC

## 2020-10-16 ENCOUNTER — HOME CARE VISIT (OUTPATIENT)
Dept: SCHEDULING | Facility: HOME HEALTH | Age: 70
End: 2020-10-16
Payer: MEDICARE

## 2020-10-16 ENCOUNTER — HOME CARE VISIT (OUTPATIENT)
Dept: HOME HEALTH SERVICES | Facility: HOME HEALTH | Age: 70
End: 2020-10-16
Payer: MEDICARE

## 2020-10-16 VITALS
OXYGEN SATURATION: 94 % | TEMPERATURE: 97.6 F | DIASTOLIC BLOOD PRESSURE: 63 MMHG | RESPIRATION RATE: 18 BRPM | SYSTOLIC BLOOD PRESSURE: 125 MMHG | HEART RATE: 71 BPM

## 2020-10-16 PROCEDURE — G0157 HHC PT ASSISTANT EA 15: HCPCS

## 2020-10-16 PROCEDURE — 3331090001 HH PPS REVENUE CREDIT

## 2020-10-16 PROCEDURE — 3331090002 HH PPS REVENUE DEBIT

## 2020-10-17 ENCOUNTER — HOME CARE VISIT (OUTPATIENT)
Dept: SCHEDULING | Facility: HOME HEALTH | Age: 70
End: 2020-10-17
Payer: MEDICARE

## 2020-10-17 PROCEDURE — 3331090002 HH PPS REVENUE DEBIT

## 2020-10-17 PROCEDURE — 3331090001 HH PPS REVENUE CREDIT

## 2020-10-17 PROCEDURE — G0157 HHC PT ASSISTANT EA 15: HCPCS

## 2020-10-18 ENCOUNTER — HOME CARE VISIT (OUTPATIENT)
Dept: SCHEDULING | Facility: HOME HEALTH | Age: 70
End: 2020-10-18
Payer: MEDICARE

## 2020-10-18 PROCEDURE — G0157 HHC PT ASSISTANT EA 15: HCPCS

## 2020-10-18 PROCEDURE — 3331090001 HH PPS REVENUE CREDIT

## 2020-10-18 PROCEDURE — 3331090002 HH PPS REVENUE DEBIT

## 2020-10-19 ENCOUNTER — HOME CARE VISIT (OUTPATIENT)
Dept: SCHEDULING | Facility: HOME HEALTH | Age: 70
End: 2020-10-19
Payer: MEDICARE

## 2020-10-19 VITALS
HEART RATE: 65 BPM | TEMPERATURE: 98.4 F | DIASTOLIC BLOOD PRESSURE: 63 MMHG | RESPIRATION RATE: 117 BRPM | OXYGEN SATURATION: 95 % | SYSTOLIC BLOOD PRESSURE: 116 MMHG

## 2020-10-19 VITALS
TEMPERATURE: 98.4 F | SYSTOLIC BLOOD PRESSURE: 112 MMHG | HEART RATE: 59 BPM | OXYGEN SATURATION: 97 % | TEMPERATURE: 98 F | OXYGEN SATURATION: 96 % | RESPIRATION RATE: 14 BRPM | RESPIRATION RATE: 13 BRPM | DIASTOLIC BLOOD PRESSURE: 60 MMHG | HEART RATE: 62 BPM | DIASTOLIC BLOOD PRESSURE: 68 MMHG | SYSTOLIC BLOOD PRESSURE: 108 MMHG

## 2020-10-19 PROCEDURE — G0157 HHC PT ASSISTANT EA 15: HCPCS

## 2020-10-19 PROCEDURE — 3331090001 HH PPS REVENUE CREDIT

## 2020-10-19 PROCEDURE — 3331090002 HH PPS REVENUE DEBIT

## 2020-10-20 ENCOUNTER — VIRTUAL VISIT (OUTPATIENT)
Dept: INTERNAL MEDICINE CLINIC | Age: 70
End: 2020-10-20
Payer: MEDICARE

## 2020-10-20 ENCOUNTER — HOME CARE VISIT (OUTPATIENT)
Dept: SCHEDULING | Facility: HOME HEALTH | Age: 70
End: 2020-10-20
Payer: MEDICARE

## 2020-10-20 VITALS
SYSTOLIC BLOOD PRESSURE: 110 MMHG | RESPIRATION RATE: 19 BRPM | HEART RATE: 63 BPM | DIASTOLIC BLOOD PRESSURE: 60 MMHG | OXYGEN SATURATION: 97 % | TEMPERATURE: 97 F

## 2020-10-20 VITALS
RESPIRATION RATE: 14 BRPM | OXYGEN SATURATION: 97 % | HEART RATE: 63 BPM | SYSTOLIC BLOOD PRESSURE: 110 MMHG | DIASTOLIC BLOOD PRESSURE: 63 MMHG | TEMPERATURE: 97 F

## 2020-10-20 DIAGNOSIS — Z96.641 STATUS POST RIGHT HIP REPLACEMENT: Primary | ICD-10-CM

## 2020-10-20 DIAGNOSIS — I10 PRIMARY HYPERTENSION: ICD-10-CM

## 2020-10-20 PROCEDURE — G9899 SCRN MAM PERF RSLTS DOC: HCPCS | Performed by: INTERNAL MEDICINE

## 2020-10-20 PROCEDURE — 1090F PRES/ABSN URINE INCON ASSESS: CPT | Performed by: INTERNAL MEDICINE

## 2020-10-20 PROCEDURE — G8756 NO BP MEASURE DOC: HCPCS | Performed by: INTERNAL MEDICINE

## 2020-10-20 PROCEDURE — G8427 DOCREV CUR MEDS BY ELIG CLIN: HCPCS | Performed by: INTERNAL MEDICINE

## 2020-10-20 PROCEDURE — G0157 HHC PT ASSISTANT EA 15: HCPCS

## 2020-10-20 PROCEDURE — G8432 DEP SCR NOT DOC, RNG: HCPCS | Performed by: INTERNAL MEDICINE

## 2020-10-20 PROCEDURE — 1101F PT FALLS ASSESS-DOCD LE1/YR: CPT | Performed by: INTERNAL MEDICINE

## 2020-10-20 PROCEDURE — 99214 OFFICE O/P EST MOD 30 MIN: CPT | Performed by: INTERNAL MEDICINE

## 2020-10-20 PROCEDURE — 3331090001 HH PPS REVENUE CREDIT

## 2020-10-20 PROCEDURE — G0299 HHS/HOSPICE OF RN EA 15 MIN: HCPCS

## 2020-10-20 PROCEDURE — G8399 PT W/DXA RESULTS DOCUMENT: HCPCS | Performed by: INTERNAL MEDICINE

## 2020-10-20 PROCEDURE — 3331090002 HH PPS REVENUE DEBIT

## 2020-10-20 PROCEDURE — G0463 HOSPITAL OUTPT CLINIC VISIT: HCPCS | Performed by: INTERNAL MEDICINE

## 2020-10-20 PROCEDURE — 3017F COLORECTAL CA SCREEN DOC REV: CPT | Performed by: INTERNAL MEDICINE

## 2020-10-20 PROCEDURE — 1111F DSCHRG MED/CURRENT MED MERGE: CPT | Performed by: INTERNAL MEDICINE

## 2020-10-20 NOTE — PROGRESS NOTES
Tavia Smiley is a 77 y.o. female who was seen by synchronous (real-time) audio-video technology on 10/20/2020 for No chief complaint on file. Assessment & Plan:   Diagnoses and all orders for this visit:    1. Status post right hip replacement    2. Primary hypertension        I spent at least 11 minutes on this visit with this established patient. 712  Subjective:     Objective:   No flowsheet data found. General: alert, cooperative, no distress   Mental  status: normal mood, behavior, speech, dress, motor activity, and thought processes, able to follow commands   HENT: NCAT   Neck: no visualized mass   Resp: no respiratory distress   Neuro: no gross deficits   Skin: no discoloration or lesions of concern on visible areas   Psychiatric: normal affect, consistent with stated mood, no evidence of hallucinations     Additional exam findings: We discussed the expected course, resolution and complications of the diagnosis(es) in detail. Medication risks, benefits, costs, interactions, and alternatives were discussed as indicated. I advised her to contact the office if her condition worsens, changes or fails to improve as anticipated. She expressed understanding with the diagnosis(es) and plan. Vicky Marie, who was evaluated through a patient-initiated, synchronous (real-time) audio-video encounter, and/or her healthcare decision maker, is aware that it is a billable service, with coverage as determined by her insurance carrier. She provided verbal consent to proceed: Yes, and patient identification was verified. It was conducted pursuant to the emergency declaration under the [facility], [address] [place] [address] authority and the [employer] Act. A caregiver was present when appropriate. Ability to conduct physical exam was limited. I was in the office. The patient was at home.       Swapna To MD    We are f/u after her RIGHT THR. She was admitted 10/13- and successfully ujnderwent RIGHT THR without incident. Postop, the constant hiop aoin is gone, the postop pain is controlled on percocet which she is weaning down. Moving her bowels, good po intake, she getting PT and walking about 20 min. Her bp is running in the 031-147 systolic and no other complaints    . mnedic  Past Surgical History:   Procedure Laterality Date    COLONOSCOPY N/A 2020    Dr Douglas Castro 2003 polyps;  adenoma; Dr Mcnamara Necessary 3/14 isch colitis; Dr Yecenia Nair 20 mod divertics and adenoma    Lluvia Days      Dr Penelope Moreau; RIGHT 10/20    HX ORTHOPAEDIC  1960s    LEFT knee surgery    HX ORTHOPAEDIC      DEXA t score 2.1 spine, -0.3 hip ()    HX SOPHIA AND BSO      Dr. Magaly North HX TONSILLECTOMY       Social History     Socioeconomic History    Marital status:      Spouse name: Not on file    Number of children: 2    Years of education: Not on file    Highest education level: Not on file   Occupational History    Occupation: director [employer]   Social Needs    Financial resource strain: Not on file    Food insecurity     Worry: Not on file     Inability: Not on file   [employer] needs     Medical: Not on file     Non-medical: Not on file   Tobacco Use    Smoking status: Former Smoker     Packs/day: 0.50     Years: 15.00     Pack years: 7.50     Types: Cigarettes     Last attempt to quit: 2020     Years since quittin.0    Smokeless tobacco: Never Used    Tobacco comment: smoking cessation advised   Substance and Sexual Activity    Alcohol use: Not Currently     Alcohol/week: 4.2 standard drinks     Types: 5 Glasses of wine per week    Drug use: No    Sexual activity: Not Currently   Lifestyle    Physical activity     Days per week: Not on file     Minutes per session: Not on file    Stress: Not on file   Relationships    Social connections     Talks on phone: Not on file     Gets together: Not on file     Attends Faith service: Not on file     Active member of club or organization: Not on file     Attends meetings of clubs or organizations: Not on file     Relationship status: Not on file    Intimate partner violence     Fear of current or ex partner: Not on file     Emotionally abused: Not on file     Physically abused: Not on file     Forced sexual activity: Not on file   Other Topics Concern    Not on file   Social History Narrative    Not on file     Current Outpatient Medications   Medication Sig    oxyCODONE-acetaminophen (Percocet) 7.5-325 mg per tablet Take 1-2 Tabs by mouth every six (6) hours as needed for Pain for up to 7 days. Max Daily Amount: 8 Tabs.  docusate sodium (Colace) 100 mg capsule Take 1 Cap by mouth two (2) times a day for 90 days.  aspirin (ASPIRIN) 325 mg tablet Take 1 Tab by mouth two (2) times a day.  acetaminophen (TylenoL) 325 mg tablet Take  by mouth every four (4) hours as needed for Pain.  amLODIPine (NORVASC) 5 mg tablet TAKE 1 TABLET BY MOUTH ONCE DAILY    ferrous sulfate (IRON) 325 mg (65 mg iron) tablet Take  by mouth. Takes every other day     No current facility-administered medications for this visit. Allergies   Allergen Reactions    Meperidine Nausea Only and Other (comments)    Augmentin [Amoxicillin-Pot Clavulanate] Diarrhea and Nausea Only    Keflex [Cephalexin] Nausea Only    Sulfa (Sulfonamide Antibiotics) Rash     Assessment and plan:  1. S/P RIGHT THR. Doing well, continue rehab/PT, f/u Dr Ileana Guo as directed  2. HTN. Continue current regimen. RTC as prev scheduled      Above conditions discussed at length and patient vocalized understanding.   All questions answered to patient satisfaction

## 2020-10-21 ENCOUNTER — HOME CARE VISIT (OUTPATIENT)
Dept: SCHEDULING | Facility: HOME HEALTH | Age: 70
End: 2020-10-21
Payer: MEDICARE

## 2020-10-21 VITALS
RESPIRATION RATE: 19 BRPM | TEMPERATURE: 99.6 F | HEART RATE: 56 BPM | SYSTOLIC BLOOD PRESSURE: 116 MMHG | OXYGEN SATURATION: 98 % | DIASTOLIC BLOOD PRESSURE: 64 MMHG

## 2020-10-21 PROCEDURE — 3331090001 HH PPS REVENUE CREDIT

## 2020-10-21 PROCEDURE — G0157 HHC PT ASSISTANT EA 15: HCPCS

## 2020-10-21 PROCEDURE — 3331090002 HH PPS REVENUE DEBIT

## 2020-10-22 ENCOUNTER — HOME CARE VISIT (OUTPATIENT)
Dept: SCHEDULING | Facility: HOME HEALTH | Age: 70
End: 2020-10-22
Payer: MEDICARE

## 2020-10-22 VITALS
HEART RATE: 71 BPM | OXYGEN SATURATION: 97 % | DIASTOLIC BLOOD PRESSURE: 63 MMHG | RESPIRATION RATE: 18 BRPM | TEMPERATURE: 99 F | SYSTOLIC BLOOD PRESSURE: 121 MMHG

## 2020-10-22 PROCEDURE — A6213 FOAM DRG >16<=48 SQ IN W/BDR: HCPCS

## 2020-10-22 PROCEDURE — 3331090001 HH PPS REVENUE CREDIT

## 2020-10-22 PROCEDURE — 3331090002 HH PPS REVENUE DEBIT

## 2020-10-22 PROCEDURE — A6197 ALGINATE DRSG >16 <=48 SQ IN: HCPCS

## 2020-10-22 PROCEDURE — G0157 HHC PT ASSISTANT EA 15: HCPCS

## 2020-10-23 ENCOUNTER — HOME CARE VISIT (OUTPATIENT)
Dept: SCHEDULING | Facility: HOME HEALTH | Age: 70
End: 2020-10-23
Payer: MEDICARE

## 2020-10-23 VITALS
HEART RATE: 67 BPM | TEMPERATURE: 98.7 F | OXYGEN SATURATION: 96 % | DIASTOLIC BLOOD PRESSURE: 61 MMHG | RESPIRATION RATE: 18 BRPM | SYSTOLIC BLOOD PRESSURE: 128 MMHG

## 2020-10-23 PROCEDURE — G0157 HHC PT ASSISTANT EA 15: HCPCS

## 2020-10-23 PROCEDURE — 3331090002 HH PPS REVENUE DEBIT

## 2020-10-23 PROCEDURE — 3331090001 HH PPS REVENUE CREDIT

## 2020-10-24 ENCOUNTER — HOME CARE VISIT (OUTPATIENT)
Dept: SCHEDULING | Facility: HOME HEALTH | Age: 70
End: 2020-10-24
Payer: MEDICARE

## 2020-10-24 VITALS
HEART RATE: 70 BPM | RESPIRATION RATE: 13 BRPM | SYSTOLIC BLOOD PRESSURE: 122 MMHG | DIASTOLIC BLOOD PRESSURE: 78 MMHG | OXYGEN SATURATION: 99 % | TEMPERATURE: 68 F

## 2020-10-24 PROCEDURE — G0157 HHC PT ASSISTANT EA 15: HCPCS

## 2020-10-24 PROCEDURE — 3331090001 HH PPS REVENUE CREDIT

## 2020-10-24 PROCEDURE — 3331090002 HH PPS REVENUE DEBIT

## 2020-10-25 ENCOUNTER — HOME CARE VISIT (OUTPATIENT)
Dept: SCHEDULING | Facility: HOME HEALTH | Age: 70
End: 2020-10-25
Payer: MEDICARE

## 2020-10-25 PROCEDURE — G0157 HHC PT ASSISTANT EA 15: HCPCS

## 2020-10-25 PROCEDURE — 3331090001 HH PPS REVENUE CREDIT

## 2020-10-25 PROCEDURE — 3331090002 HH PPS REVENUE DEBIT

## 2020-10-26 ENCOUNTER — HOSPITAL ENCOUNTER (OUTPATIENT)
Dept: GENERAL RADIOLOGY | Age: 70
Discharge: HOME | End: 2020-10-26
Payer: MEDICARE

## 2020-10-26 ENCOUNTER — HOSPITAL ENCOUNTER (OUTPATIENT)
Dept: LAB | Age: 70
Discharge: HOME | End: 2020-10-26
Payer: MEDICARE

## 2020-10-26 ENCOUNTER — HOME CARE VISIT (OUTPATIENT)
Dept: SCHEDULING | Facility: HOME HEALTH | Age: 70
End: 2020-10-26
Payer: MEDICARE

## 2020-10-26 ENCOUNTER — TELEPHONE (OUTPATIENT)
Dept: ORTHOPEDIC SURGERY | Age: 70
End: 2020-10-26

## 2020-10-26 ENCOUNTER — OFFICE VISIT (OUTPATIENT)
Dept: ORTHOPEDIC SURGERY | Age: 70
End: 2020-10-26
Payer: MEDICARE

## 2020-10-26 VITALS
RESPIRATION RATE: 19 BRPM | TEMPERATURE: 99.9 F | OXYGEN SATURATION: 96 % | HEART RATE: 74 BPM | DIASTOLIC BLOOD PRESSURE: 50 MMHG | SYSTOLIC BLOOD PRESSURE: 99 MMHG

## 2020-10-26 VITALS
OXYGEN SATURATION: 96 % | SYSTOLIC BLOOD PRESSURE: 126 MMHG | DIASTOLIC BLOOD PRESSURE: 72 MMHG | RESPIRATION RATE: 13 BRPM | TEMPERATURE: 99 F | HEART RATE: 62 BPM

## 2020-10-26 DIAGNOSIS — Z96.641 STATUS POST TOTAL REPLACEMENT OF RIGHT HIP: ICD-10-CM

## 2020-10-26 DIAGNOSIS — M25.551 PAIN, JOINT, HIP, RIGHT: ICD-10-CM

## 2020-10-26 DIAGNOSIS — Z98.890 POST-OPERATIVE STATE: ICD-10-CM

## 2020-10-26 DIAGNOSIS — Z47.1 AFTERCARE FOLLOWING RIGHT HIP JOINT REPLACEMENT SURGERY: Primary | ICD-10-CM

## 2020-10-26 DIAGNOSIS — Z98.890 POST-OPERATIVE STATE: Primary | ICD-10-CM

## 2020-10-26 DIAGNOSIS — R50.82 POST-PROCEDURAL FEVER: ICD-10-CM

## 2020-10-26 DIAGNOSIS — Z96.641 AFTERCARE FOLLOWING RIGHT HIP JOINT REPLACEMENT SURGERY: Primary | ICD-10-CM

## 2020-10-26 DIAGNOSIS — Z48.89 ENCOUNTER FOR POSTOPERATIVE WOUND CHECK: ICD-10-CM

## 2020-10-26 DIAGNOSIS — M16.10 HIP ARTHRITIS: ICD-10-CM

## 2020-10-26 LAB
APPEARANCE UR: CLEAR
BACTERIA URNS QL MICRO: ABNORMAL /HPF
BILIRUB UR QL: NEGATIVE
COLOR UR: YELLOW
EPITH CASTS URNS QL MICRO: ABNORMAL /LPF (ref 0–5)
GLUCOSE UR STRIP.AUTO-MCNC: NEGATIVE MG/DL
HGB UR QL STRIP: NEGATIVE
KETONES UR QL STRIP.AUTO: NEGATIVE MG/DL
LEUKOCYTE ESTERASE UR QL STRIP.AUTO: NEGATIVE
NITRITE UR QL STRIP.AUTO: NEGATIVE
PH UR STRIP: 5 [PH] (ref 5–8)
PROT UR STRIP-MCNC: ABNORMAL MG/DL
RBC #/AREA URNS HPF: NEGATIVE /HPF (ref 0–5)
SP GR UR REFRACTOMETRY: 1.02 (ref 1–1.03)
UROBILINOGEN UR QL STRIP.AUTO: 0.2 EU/DL (ref 0.2–1)
WBC URNS QL MICRO: ABNORMAL /HPF (ref 0–4)

## 2020-10-26 PROCEDURE — 99024 POSTOP FOLLOW-UP VISIT: CPT | Performed by: PHYSICIAN ASSISTANT

## 2020-10-26 PROCEDURE — 71046 X-RAY EXAM CHEST 2 VIEWS: CPT

## 2020-10-26 PROCEDURE — 3331090001 HH PPS REVENUE CREDIT

## 2020-10-26 PROCEDURE — G0157 HHC PT ASSISTANT EA 15: HCPCS

## 2020-10-26 PROCEDURE — 81001 URINALYSIS AUTO W/SCOPE: CPT

## 2020-10-26 PROCEDURE — 3331090002 HH PPS REVENUE DEBIT

## 2020-10-26 RX ORDER — OXYCODONE AND ACETAMINOPHEN 7.5; 325 MG/1; MG/1
1 TABLET ORAL
Qty: 28 TAB | Refills: 0 | Status: SHIPPED | OUTPATIENT
Start: 2020-10-26 | End: 2020-11-02

## 2020-10-26 NOTE — TELEPHONE ENCOUNTER
Patient daughter called and said that the patient was seen today [facility] ( pt saw Elva Camarillo ). That she wanted  to know that the patient temperature has gone back up to 101.8      Taylor Brown would like to know if they have found anything in the patient Chest Xray. aJyy Garcia tel. [phone]. Note : pt see  for the Right hip.

## 2020-10-26 NOTE — TELEPHONE ENCOUNTER
Post op Patient daughter Comfort Gonzalez called and is asking to speak to Lachelle Rojo or a nurse as soon as possible. Ms. Hillary Nichols said that the patient had Hip surgery done by  10 days ago, and the patient is now running a fever of 100 and is having Headaches and pain down the leg. Ms. Hillary Nichols is requesting a call back as soon as possible. Ms. Hillary Nichols tel.  [phone]

## 2020-10-26 NOTE — PROGRESS NOTES
Mrs. Brit Choi returns to the office 13 days status post her primary right total hip replacement under the care of Dr. Marlena Dean. She had some difficulty with pain over the past weekend. She also ran a fever within the past 24 hours T-max 102. 1. The temp came down to 101.6 she believes following administration of Tylenol. Patient is currently taking Tylenol 325 mg tablets every 6 hours in addition to her Percocet dosing. She was cautioned to avoid more than 3000 mg of acetaminophen per day. She has had no change in her dressing since surgery. The dressing was removed today to reveal of the anterior lateral thigh right a intact surgical incision measuring 15 cm [id number]. Surgical staples are noted. Skin is soft surrounding. Skin is clean and dry. There are surgical staples present. There is no evidence of induration. No erythema. Wound borders are well approximated. Associated 5 cm proximal from the superior pole of the surgical site there is a single stab incision noted again clean and dry with no erythema surrounding nontender. A single staple is noted. Patient has no palpable tenderness associated globally about the surgical site. With her sitting at bedside right knee flexed at 90 degrees her passive internal or external rotation of hip 8 and 12 degrees respectively without pain. Hip flexors tested against resistance today noted on the right at 3+/5. Quad and femoral nerve full activity right lower extremity. No calf tenderness or evidence of DVT. Plan: Patient was ordered a chest x-ray and a UA per Mg Cartagena PA-C. Her wound is benign today on exam.  There does not appear any sepsis. Patient is not having any difficulty with shortness of breath or chest pain. She does have an appointment later this week with Marlena Dean and Mg Cartagena PA-C which she will keep.   She may continue her Tylenol dose with Percocet but remember to limit her daily allowance of acetaminophen to no more than 3000 mg. Today all of her and her daughter's questions answered to her satisfaction.

## 2020-10-26 NOTE — TELEPHONE ENCOUNTER
Message given to the [facility] to contact patient to schedule an appointment to see MOSHE Oglesby today for wound check. Orders entered for UA and CXR. Spoke with nurse Lianne Toussaint and notified her of this information and that patient can go to any [facility] location for the CXR and UA. She states she is getting ready to see the patient now so she is going to talk with them.

## 2020-10-26 NOTE — TELEPHONE ENCOUNTER
Mikael Levy, [facility] home care called to report the fever same as below. Brody Self can be reached at [phone].

## 2020-10-27 ENCOUNTER — HOME CARE VISIT (OUTPATIENT)
Dept: SCHEDULING | Facility: HOME HEALTH | Age: 70
End: 2020-10-27
Payer: MEDICARE

## 2020-10-27 ENCOUNTER — HOSPITAL ENCOUNTER (EMERGENCY)
Age: 70
Discharge: HOME | End: 2020-10-27
Attending: EMERGENCY MEDICINE
Payer: MEDICARE

## 2020-10-27 ENCOUNTER — HOME CARE VISIT (OUTPATIENT)
Dept: HOME HEALTH SERVICES | Facility: HOME HEALTH | Age: 70
End: 2020-10-27
Payer: MEDICARE

## 2020-10-27 VITALS
BODY MASS INDEX: 20.99 KG/M2 | HEART RATE: 73 BPM | HEIGHT: 58 IN | RESPIRATION RATE: 16 BRPM | DIASTOLIC BLOOD PRESSURE: 65 MMHG | TEMPERATURE: 100.2 F | SYSTOLIC BLOOD PRESSURE: 114 MMHG | WEIGHT: 100 LBS | OXYGEN SATURATION: 91 %

## 2020-10-27 DIAGNOSIS — D72.829 LEUKOCYTOSIS, UNSPECIFIED TYPE: Primary | ICD-10-CM

## 2020-10-27 LAB
ANION GAP SERPL CALC-SCNC: 6 MMOL/L (ref 3–18)
BASOPHILS # BLD: 0 K/UL (ref 0–0.06)
BASOPHILS NFR BLD: 0 % (ref 0–3)
BUN SERPL-MCNC: 15 MG/DL (ref 7–18)
BUN/CREAT SERPL: 19 (ref 12–20)
CALCIUM SERPL-MCNC: 8.3 MG/DL (ref 8.5–10.1)
CHLORIDE SERPL-SCNC: 105 MMOL/L (ref 100–111)
CO2 SERPL-SCNC: 24 MMOL/L (ref 21–32)
CREAT SERPL-MCNC: 0.8 MG/DL (ref 0.6–1.3)
DIFFERENTIAL METHOD BLD: ABNORMAL
EOSINOPHIL # BLD: 0.2 K/UL (ref 0–0.4)
EOSINOPHIL NFR BLD: 1 % (ref 0–5)
ERYTHROCYTE [DISTWIDTH] IN BLOOD BY AUTOMATED COUNT: 13 % (ref 11.6–14.5)
GLUCOSE SERPL-MCNC: 108 MG/DL (ref 74–99)
HCT VFR BLD AUTO: 26.9 % (ref 35–45)
HGB BLD-MCNC: 9 G/DL (ref 12–16)
LYMPHOCYTES # BLD: 1.4 K/UL (ref 0.8–3.5)
LYMPHOCYTES NFR BLD: 7 % (ref 20–51)
MCH RBC QN AUTO: 31 PG (ref 24–34)
MCHC RBC AUTO-ENTMCNC: 33.5 G/DL (ref 31–37)
MCV RBC AUTO: 92.8 FL (ref 74–97)
MONOCYTES # BLD: 0.6 K/UL (ref 0–1)
MONOCYTES NFR BLD: 3 % (ref 2–9)
NEUTS SEG # BLD: 17.9 K/UL (ref 1.8–8)
NEUTS SEG NFR BLD: 89 % (ref 42–75)
PLATELET # BLD AUTO: 301 K/UL (ref 135–420)
PLATELET COMMENTS,PCOM: ABNORMAL
PMV BLD AUTO: 8.3 FL (ref 9.2–11.8)
POTASSIUM SERPL-SCNC: 3.4 MMOL/L (ref 3.5–5.5)
RBC # BLD AUTO: 2.9 M/UL (ref 4.2–5.3)
RBC MORPH BLD: ABNORMAL
SODIUM SERPL-SCNC: 135 MMOL/L (ref 136–145)
WBC # BLD AUTO: 20.1 K/UL (ref 4.6–13.2)

## 2020-10-27 PROCEDURE — 3331090002 HH PPS REVENUE DEBIT

## 2020-10-27 PROCEDURE — 74011250637 HC RX REV CODE- 250/637: Performed by: EMERGENCY MEDICINE

## 2020-10-27 PROCEDURE — 80048 BASIC METABOLIC PNL TOTAL CA: CPT

## 2020-10-27 PROCEDURE — 85025 COMPLETE CBC W/AUTO DIFF WBC: CPT

## 2020-10-27 PROCEDURE — 3331090001 HH PPS REVENUE CREDIT

## 2020-10-27 PROCEDURE — 99284 EMERGENCY DEPT VISIT MOD MDM: CPT

## 2020-10-27 RX ORDER — ACETAMINOPHEN 325 MG/1
650 TABLET ORAL ONCE
Status: COMPLETED | OUTPATIENT
Start: 2020-10-27 | End: 2020-10-27

## 2020-10-27 RX ORDER — LEVOFLOXACIN 500 MG/1
500 TABLET, FILM COATED ORAL
Status: COMPLETED | OUTPATIENT
Start: 2020-10-27 | End: 2020-10-27

## 2020-10-27 RX ORDER — LEVOFLOXACIN 500 MG/1
500 TABLET, FILM COATED ORAL DAILY
Qty: 10 TAB | Refills: 0 | Status: SHIPPED | OUTPATIENT
Start: 2020-10-27 | End: 2020-11-18 | Stop reason: ALTCHOICE

## 2020-10-27 RX ADMIN — ACETAMINOPHEN 650 MG: 325 TABLET ORAL at 11:38

## 2020-10-27 RX ADMIN — LEVOFLOXACIN 500 MG: 500 TABLET, FILM COATED ORAL at 13:48

## 2020-10-27 NOTE — TELEPHONE ENCOUNTER
Fortino Lopez states that she spoke with PCP last evening and the PCP suggested ED as well. Patient refused. Will call again and stress the importance of her going to ED immediately.

## 2020-10-27 NOTE — TELEPHONE ENCOUNTER
Patient daughter called again and said they went to [facility]. That she wants to know what the patient white blood count cell is. That the ER saw the patient temp was 100.8. That the ER gave the patient some Antibiotics that is causing the patient to become nauseated. Mrs. Watts would like to know if their is a medication that can stop the Nausea. That the patient has taken other antibiotics that has caused her reactions before, and it looks like this Antibiotic is doing the same thing. [facility] pharmacy on [address] in [place]. She did not know the pharmacy tel #.    Mrs. Watts  tel. [phone].

## 2020-10-27 NOTE — DISCHARGE INSTRUCTIONS
Patient Education        Learning About High White Blood Cell Counts  What are white blood cells? White blood cells (leukocytes) help protect your body from infection. Normally, when germs get inside your body, your body makes more white blood cells that search for and destroy the germs. Less often, there are medical problems where the body may make a lot more white blood cells than it needs. What happens when you have a high white blood cell count? Your white blood cell count may be high because your body is fighting an infection. But other things can cause it, such as some medicines, burns, an illness, or other health problems. When your doctor sees that your white blood cell count is high, he or she will try to find out why, and then treat the cause. What are the symptoms? A high white blood cell count alone doesn't cause any symptoms. The symptoms you feel may come from the medical problem that your white blood cells are fighting. For example, if you have pneumonia, you may have a fever and trouble breathing. These are symptoms of pneumonia, not of a high white blood cell count. How is it treated? · Your doctor may do more tests to find the problem that's making your white blood cell count high. Once your doctor finds the problem, he or she may be able to treat it. · Part of your treatment may be telling your doctor if you feel worse. Watch your temperature, and call your doctor if your fever goes up and stays up. Follow-up care is a key part of your treatment and safety. Be sure to make and go to all appointments, and call your doctor if you are having problems. It's also a good idea to know your test results and keep a list of the medicines you take. Where can you learn more? Go to http://www.gray.com/  Enter V383 in the search box to learn more about \"Learning About High White Blood Cell Counts. \"  Current as of: December 9, 2019               Content Version: 12.6  © 7964-3580 Healthwise, Incorporated. Care instructions adapted under license by [employer] (which disclaims liability or warranty for this information). If you have questions about a medical condition or this instruction, always ask your healthcare professional. [place] [address] any warranty or liability for your use of this information.

## 2020-10-27 NOTE — ED TRIAGE NOTES
Patient chief complaint is intermittent fever since Sunday. She had surgery to right hip 2 weeks ago. She saw her doctor yesterday who checked urine and a chest xray and both were negative and they said incision looked good. She also c/o some nausea and decreased appetite for a few days. Temp at triage 100.6. Last took tylenol and percocet at 0400.

## 2020-10-27 NOTE — TELEPHONE ENCOUNTER
Spoke with [facility]. [facility] states that patient still has a fever. Any recommendations?

## 2020-10-27 NOTE — ED NOTES
Dane Alexander is a 77 y.o. female that was discharged in stable condition. The patients diagnosis, condition and treatment were explained to  patient and aftercare instructions were given. The patient verbalized understanding. Patient armband removed and shredded.

## 2020-10-27 NOTE — ED PROVIDER NOTES
HPI patient says she is having low-grade fever for 3 days. She denies any other symptoms or complaints. She says 2 weeks ago she had a right hip replacement. She saw her orthopedist yesterday for a follow-up visit and she says they told her the wound was healing well and looked good. I also checked a urinalysis and chest x-ray on her and told her that the results were normal as well. Patient denies any hip pain or redness or other complications around her hip surgery site. She presents today for evaluation because she is concerned of what the fever may be coming from. She denies any chest congestion cough or shortness of breath. No other complaints given at this time.     Past Medical History:   Diagnosis Date    Anxiety 6/15    IKE-7 was 12/21    Clostridium difficile colitis 03/2014    Dr. Yuri Jeong adenoma 10/2011    Dr. Hue Stone; Dr Hilary Celeste 7/20    Cystic breast     Depression 6/15    PHQ-9 was 15/27    Diverticulitis     recurrent x3 Dr Annelise Baird liver     10/10 on US, CT 6/12; Fib-4 was 0.74 from 1/15    GI bleed 2/14    ischemic colitis on CT, seen by Dr. Alvin Arellano Hyperlipidemia     calculated 10 year risk score was 3.2% (1/15)    Hypertension     IFG (impaired fasting glucose) [id number]    Ischemic colitis ([address] 3/14    Dr Arteaga Hazard    Labial abscess     + MRSA    Lymphoma [facility] 12/13    Dr. Cj Briceno    Migraine     Nephrolithiasis     Neuropathy due to chemotherapeutic drug ([facility] [address] 9/14    Osteoarthritis     hips and knees; Dr Lora Dears dependence syndrome 2/8/2019    Zoster 8/14    left T11       Past Surgical History:   Procedure Laterality Date    COLONOSCOPY N/A 7/23/2020    Dr Hue Stone 2003 polyps; 2011 adenoma; Dr Radha Montemayor 3/14 isch colitis; Dr Hilary Celeste 7/23/20 mod divertics and adenoma    Lilli Robins; RIGHT 10/20    HX ORTHOPAEDIC  1960s    LEFT knee surgery    HX ORTHOPAEDIC      DEXA t score 2.1 spine, -0.3 hip ()    HX SOPHIA AND BSO      Dr. 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[employer]   Social Needs    Financial resource strain: Not on file    Food insecurity     Worry: Not on file     Inability: Not on file   [employer] needs     Medical: Not on file     Non-medical: Not on file   Tobacco Use    Smoking status: Former Smoker     Packs/day: 0.50     Years: 15.00     Pack years: 7.50     Types: Cigarettes     Last attempt to quit: 2020     Years since quittin.0    Smokeless tobacco: Never Used    Tobacco comment: smoking cessation advised   Substance and Sexual Activity    Alcohol use: Not Currently     Alcohol/week: 4.2 standard drinks     Types: 5 Glasses of wine per week    Drug use: No    Sexual activity: Not Currently   Lifestyle    Physical activity     Days per week: Not on file     Minutes per session: Not on file    Stress: Not on file   Relationships    Social connections     Talks on phone: Not on file     Gets together: Not on file     Attends Nondenominational service: Not on file     Active member of club or organization: Not on file     Attends meetings of clubs or organizations: Not on file     Relationship status: Not on file    Intimate partner violence     Fear of current or ex partner: Not on file     Emotionally abused: Not on file     Physically abused: Not on file     Forced sexual activity: Not on file   Other Topics Concern    Not on file   Social History Narrative    Not on file         ALLERGIES: Meperidine; Augmentin [amoxicillin-pot clavulanate]; Keflex [cephalexin]; and Sulfa (sulfonamide antibiotics)    Review of Systems   Constitutional: Positive for fever. HENT: Negative.     Eyes: Negative. Respiratory: Negative. Cardiovascular: Negative. Gastrointestinal: Negative. Genitourinary: Negative. Musculoskeletal: Negative. Skin: Negative. Neurological: Negative. Psychiatric/Behavioral: Negative. Vitals:    10/27/20 1115   BP: (!) 108/93   Pulse: 73   Resp: 16   Temp: (!) 100.6 °F (38.1 °C)   SpO2: 95%   Weight: 45.4 kg (100 lb)   Height: 4' 9.5\" (1.461 m)            Physical Exam  Vitals signs and nursing note reviewed. Constitutional:       Appearance: She is well-developed. HENT:      Head: Normocephalic and atraumatic. Nose: Nose normal.      Mouth/Throat:      Mouth: Mucous membranes are moist.   Eyes:      Conjunctiva/sclera: Conjunctivae normal.      Pupils: Pupils are equal, round, and reactive to light. Neck:      Musculoskeletal: Normal range of motion and neck supple. Cardiovascular:      Rate and Rhythm: Normal rate and regular rhythm. Pulmonary:      Effort: Pulmonary effort is normal.      Breath sounds: Normal breath sounds. Abdominal:      Palpations: Abdomen is soft. Musculoskeletal: Normal range of motion. Skin:     General: Skin is warm and dry. Comments: Surgical incision of the right hip is clean healing well with no evidence of infection   Neurological:      Mental Status: She is alert and oriented to person, place, and time. MDM  Number of Diagnoses or Management Options  Leukocytosis, unspecified type:   Diagnosis management comments: I reviewed the results of the ER evaluation with the patient. She says she is feeling much better now. She has a leukocytosis with an undetermined source or etiology. I discussed this with her and stated that we will go ahead and start her on antibiotics to cover for possible subclinical infection.   I have discussed this fully with her and that even though she does not have any clinical evidence of a infection at this time we will watch her closely and go ahead and initiate antibiotics on her. Patient understands this plan and agrees with this disposition and follow-up. Patient has a follow-up appointment with her orthopedist in 2 days for further evaluation. I instructed her to return emergency room should any symptoms change or worsen she understands and agrees with this plan.   Chava Alcantara MD 1:30 PM         Procedures

## 2020-10-28 ENCOUNTER — HOME CARE VISIT (OUTPATIENT)
Dept: SCHEDULING | Facility: HOME HEALTH | Age: 70
End: 2020-10-28
Payer: MEDICARE

## 2020-10-28 ENCOUNTER — HOME CARE VISIT (OUTPATIENT)
Dept: HOME HEALTH SERVICES | Facility: HOME HEALTH | Age: 70
End: 2020-10-28
Payer: MEDICARE

## 2020-10-28 ENCOUNTER — TELEPHONE (OUTPATIENT)
Dept: INTERNAL MEDICINE CLINIC | Age: 70
End: 2020-10-28

## 2020-10-28 ENCOUNTER — PATIENT OUTREACH (OUTPATIENT)
Dept: CASE MANAGEMENT | Age: 70
End: 2020-10-28

## 2020-10-28 VITALS
DIASTOLIC BLOOD PRESSURE: 65 MMHG | OXYGEN SATURATION: 94 % | HEART RATE: 83 BPM | SYSTOLIC BLOOD PRESSURE: 131 MMHG | TEMPERATURE: 99.6 F

## 2020-10-28 PROCEDURE — G0151 HHCP-SERV OF PT,EA 15 MIN: HCPCS

## 2020-10-28 PROCEDURE — 3331090002 HH PPS REVENUE DEBIT

## 2020-10-28 PROCEDURE — 3331090001 HH PPS REVENUE CREDIT

## 2020-10-28 RX ORDER — ONDANSETRON 4 MG/1
4 TABLET, FILM COATED ORAL
Qty: 30 TAB | Refills: 1 | Status: SHIPPED | OUTPATIENT
Start: 2020-10-28 | End: 2021-04-06

## 2020-10-28 NOTE — TELEPHONE ENCOUNTER
Patients daughter reached, and instructed to bring her mother back to the hospital. She states that the patient doesn't want to but she will bring her to [facility]. Patients daughter states that the highest her fever was 103. Patients daughter instructed that the ER is our recommendation for the patient.

## 2020-10-28 NOTE — PROGRESS NOTES
Date/Time:  10/28/2020 9:32 AM   Call within 2 business days of discharge: Yes   Attempted to reach patient by telephone. Left HIPPA compliant message requesting a return call. Will attempt to reach patient again.

## 2020-10-28 NOTE — TELEPHONE ENCOUNTER
Patient's daughter is calling stating her Mother went to [facility] ED yesterday. Her temp was 100.6. WBC was 20. They did not test her for COVID. Gave her Levaquine and sent her home. This morning her temp is 106.1. She took Tylenol at 5 am and now it is 101.9. She's achy. Headache on an off. A lot of gas, bloating, abdominal cramping. Very nauseated. No appetite. Not drinking fluids.

## 2020-10-28 NOTE — TELEPHONE ENCOUNTER
Spoke with patient daughter- Coarin-advised Rx for nausea was sent  to pharmacy. She states patient still had a fever-103, also aches.  Edward Zavala suggests calling PCP

## 2020-10-29 ENCOUNTER — PATIENT OUTREACH (OUTPATIENT)
Dept: CASE MANAGEMENT | Age: 70
End: 2020-10-29

## 2020-10-29 ENCOUNTER — HOSPITAL ENCOUNTER (OUTPATIENT)
Dept: PREADMISSION TESTING | Age: 70
Discharge: HOME | End: 2020-10-29
Payer: MEDICARE

## 2020-10-29 ENCOUNTER — OFFICE VISIT (OUTPATIENT)
Dept: ORTHOPEDIC SURGERY | Age: 70
End: 2020-10-29
Payer: MEDICARE

## 2020-10-29 VITALS
WEIGHT: 108 LBS | HEIGHT: 58 IN | RESPIRATION RATE: 16 BRPM | SYSTOLIC BLOOD PRESSURE: 86 MMHG | DIASTOLIC BLOOD PRESSURE: 56 MMHG | TEMPERATURE: 98.4 F | OXYGEN SATURATION: 95 % | BODY MASS INDEX: 22.67 KG/M2

## 2020-10-29 DIAGNOSIS — R50.9 FEVER, UNSPECIFIED FEVER CAUSE: ICD-10-CM

## 2020-10-29 DIAGNOSIS — Z96.641 STATUS POST TOTAL REPLACEMENT OF RIGHT HIP: ICD-10-CM

## 2020-10-29 DIAGNOSIS — M79.89 PAIN AND SWELLING OF LOWER EXTREMITY, RIGHT: ICD-10-CM

## 2020-10-29 DIAGNOSIS — M79.605 PAIN AND SWELLING OF LOWER EXTREMITY, LEFT: ICD-10-CM

## 2020-10-29 DIAGNOSIS — M25.551 RIGHT HIP PAIN: ICD-10-CM

## 2020-10-29 DIAGNOSIS — M79.89 PAIN AND SWELLING OF LOWER EXTREMITY, LEFT: ICD-10-CM

## 2020-10-29 DIAGNOSIS — M79.605 LEFT LEG PAIN: ICD-10-CM

## 2020-10-29 DIAGNOSIS — M79.604 PAIN AND SWELLING OF LOWER EXTREMITY, RIGHT: ICD-10-CM

## 2020-10-29 DIAGNOSIS — M79.604 RIGHT LEG PAIN: ICD-10-CM

## 2020-10-29 DIAGNOSIS — Z96.641 STATUS POST TOTAL REPLACEMENT OF RIGHT HIP: Primary | ICD-10-CM

## 2020-10-29 LAB
BASOPHILS # BLD: 0 K/UL (ref 0–0.06)
BASOPHILS NFR BLD: 0 % (ref 0–3)
CRP SERPL-MCNC: 33.8 MG/DL (ref 0–0.3)
DIFFERENTIAL METHOD BLD: ABNORMAL
EOSINOPHIL # BLD: 0 K/UL (ref 0–0.4)
EOSINOPHIL NFR BLD: 0 % (ref 0–5)
ERYTHROCYTE [DISTWIDTH] IN BLOOD BY AUTOMATED COUNT: 13.2 % (ref 11.6–14.5)
ERYTHROCYTE [SEDIMENTATION RATE] IN BLOOD: 86 MM/HR (ref 0–30)
HCT VFR BLD AUTO: 28.2 % (ref 35–45)
HGB BLD-MCNC: 9.6 G/DL (ref 12–16)
LYMPHOCYTES # BLD: 1.5 K/UL (ref 0.8–3.5)
LYMPHOCYTES NFR BLD: 5 % (ref 20–51)
MCH RBC QN AUTO: 30 PG (ref 24–34)
MCHC RBC AUTO-ENTMCNC: 34 G/DL (ref 31–37)
MCV RBC AUTO: 88.1 FL (ref 74–97)
MONOCYTES # BLD: 0.9 K/UL (ref 0–1)
MONOCYTES NFR BLD: 3 % (ref 2–9)
NEUTS BAND NFR BLD MANUAL: 4 % (ref 0–5)
NEUTS SEG # BLD: 26.8 K/UL (ref 1.8–8)
NEUTS SEG NFR BLD: 88 % (ref 42–75)
PLATELET # BLD AUTO: 313 K/UL (ref 135–420)
PLATELET COMMENTS,PCOM: ABNORMAL
PMV BLD AUTO: 8.8 FL (ref 9.2–11.8)
RBC # BLD AUTO: 3.2 M/UL (ref 4.2–5.3)
RBC MORPH BLD: ABNORMAL
URATE SERPL-MCNC: 4.2 MG/DL (ref 2.6–7.2)
WBC # BLD AUTO: 29.2 K/UL (ref 4.6–13.2)

## 2020-10-29 PROCEDURE — 3331090002 HH PPS REVENUE DEBIT

## 2020-10-29 PROCEDURE — 36415 COLL VENOUS BLD VENIPUNCTURE: CPT

## 2020-10-29 PROCEDURE — 99024 POSTOP FOLLOW-UP VISIT: CPT | Performed by: PHYSICIAN ASSISTANT

## 2020-10-29 PROCEDURE — 85652 RBC SED RATE AUTOMATED: CPT

## 2020-10-29 PROCEDURE — 84550 ASSAY OF BLOOD/URIC ACID: CPT

## 2020-10-29 PROCEDURE — 83520 IMMUNOASSAY QUANT NOS NONAB: CPT

## 2020-10-29 PROCEDURE — 3331090001 HH PPS REVENUE CREDIT

## 2020-10-29 PROCEDURE — 87040 BLOOD CULTURE FOR BACTERIA: CPT

## 2020-10-29 PROCEDURE — 86140 C-REACTIVE PROTEIN: CPT

## 2020-10-29 PROCEDURE — 85025 COMPLETE CBC W/AUTO DIFF WBC: CPT

## 2020-10-29 NOTE — PROGRESS NOTES
[address] [place] [address]  [phone]           Patient: Vince Harry                MRN: [id number]       SSN: [id number]  YOB: 1950        AGE: 77 y.o. SEX: female  Body mass index is 22.97 kg/m². PCP: Dina Luevano MD  10/29/20      This office note has been dictated. REVIEW OF SYSTEMS:  Constitutional: Negative for fever, chills, weight loss and malaise/fatigue. HENT: Negative. Eyes: Negative. Respiratory: Negative. Cardiovascular: Negative. Gastrointestinal: No bowel incontinence or constipation. Genitourinary: No bladder incontinence or saddle anesthesia. Skin: Negative. Neurological: Negative. Endo/Heme/Allergies: Negative. Psychiatric/Behavioral: Negative. Musculoskeletal: As per HPI above.      Past Medical History:   Diagnosis Date    Anxiety 6/15    IKE-7 was 12/21    Clostridium difficile colitis 03/2014    Dr. Amos Rocha adenoma 10/2011    Dr. Fatoumata Espino; Dr Ahmadi Yue 7/20    Cystic breast     Depression 6/15    PHQ-9 was 15/27    Diverticulitis     recurrent x3 Dr Savita French liver     10/10 on US, CT 6/12; Fib-4 was 0.74 from 1/15    GI bleed 2/14    ischemic colitis on CT, seen by Dr. Richie Draper Hyperlipidemia     calculated 10 year risk score was 3.2% (1/15)    Hypertension     IFG (impaired fasting glucose) [id number]    Ischemic colitis ([facility] [address] 3/14    Dr Janiya Holcomb    Labial abscess     + MRSA    Lymphoma ([facility] [address] 12/13    Dr. Pito Phelan    Migraine     Nephrolithiasis     Neuropathy due to chemotherapeutic drug ([facility] [address] 9/14    Osteoarthritis     hips and knees; Dr Carmen Coppola Tobacco dependence syndrome 2/8/2019    Zoster 8/14    left T11         Current Outpatient Medications:     ondansetron hcl (Zofran) 4 mg tablet, Take 1 Tab by mouth every eight (8) hours as needed for Nausea or Vomiting., Disp: 30 Tab, Rfl: 1    levoFLOXacin (Levaquin) 500 mg tablet, Take 1 Tab by mouth daily. , Disp: 10 Tab, Rfl: 0    oxyCODONE-acetaminophen (Percocet) 7.5-325 mg per tablet, Take 1 Tab by mouth every six (6) hours as needed for Pain for up to 7 days. Max Daily Amount: 4 Tabs., Disp: 28 Tab, Rfl: 0    docusate sodium (Colace) 100 mg capsule, Take 1 Cap by mouth two (2) times a day for 90 days. , Disp: 60 Cap, Rfl: 2    aspirin (ASPIRIN) 325 mg tablet, Take 1 Tab by mouth two (2) times a day., Disp: 60 Tab, Rfl: 0    acetaminophen (TylenoL) 325 mg tablet, Take  by mouth every four (4) hours as needed for Pain., Disp: , Rfl:     amLODIPine (NORVASC) 5 mg tablet, TAKE 1 TABLET BY MOUTH ONCE DAILY, Disp: 90 Tab, Rfl: 3    ferrous sulfate (IRON) 325 mg (65 mg iron) tablet, Take  by mouth.  Takes every other day, Disp: , Rfl:     Allergies   Allergen Reactions    Meperidine Nausea Only and Other (comments)    Augmentin [Amoxicillin-Pot Clavulanate] Diarrhea and Nausea Only    Keflex [Cephalexin] Nausea Only    Sulfa (Sulfonamide Antibiotics) Rash       Social History     Socioeconomic History    Marital status:      Spouse name: Not on file    Number of children: 2    Years of education: Not on file    Highest education level: Not on file   Occupational History    Occupation: director [employer]   Social Needs    Financial resource strain: Not on file    Food insecurity     Worry: Not on file     Inability: Not on file   [employer] needs     Medical: Not on file     Non-medical: Not on file   Tobacco Use    Smoking status: Former Smoker     Packs/day: 0.50     Years: 15.00     Pack years: 7.50     Types: Cigarettes     Last attempt to quit: 2020     Years since quittin.1    Smokeless tobacco: Never Used    Tobacco comment: smoking cessation advised   Substance and Sexual Activity    Alcohol use: Not Currently     Alcohol/week: 4.2 standard drinks     Types: 5 Glasses of wine per week    Drug use: No    Sexual activity: Not Currently Lifestyle    Physical activity     Days per week: Not on file     Minutes per session: Not on file    Stress: Not on file   Relationships    Social connections     Talks on phone: Not on file     Gets together: Not on file     Attends Sikhism service: Not on file     Active member of club or organization: Not on file     Attends meetings of clubs or organizations: Not on file     Relationship status: Not on file    Intimate partner violence     Fear of current or ex partner: Not on file     Emotionally abused: Not on file     Physically abused: Not on file     Forced sexual activity: Not on file   Other Topics Concern    Not on file   Social History Narrative    Not on file       Past Surgical History:   Procedure Laterality Date    COLONOSCOPY N/A 7/23/2020    Dr Pedro De Leon 2003 polyps; 2011 adenoma; Dr Jara Memory 3/14 isch colitis; Dr Sosa Many 7/23/20 mod divertics and adenoma    Larina Job      Dr Ryley Montalvo; RIGHT 10/20    HX ORTHOPAEDIC  1960s    LEFT knee surgery    HX ORTHOPAEDIC      DEXA t score 2.1 spine, -0.3 hip (4/19)    HX SOPHIA AND BSO  04/02    Dr. Rashid Black           Patient seen evaluate today for her right hip. She is now 16 days status post right total replacement surgery. She been having some fevers which are recurrent. She was seen by Mr. Jak French earlier in the week without any wound issues to be found. She did present to the ER in which she had a white count of 20 the empirically start her on Levaquin. She states she had fevers and chills last night however feels better today. She denies any increasing hip pain. She reports an occasional cough. She reports achiness. She denies diarrhea. Patient denies recent fevers, chills, chest pain, SOB, or injuries. No recent systemic changes noted. A 12-point review of systems is performed today. Pertinent positives are noted.   All other systems reviewed and otherwise are negative. Physical exam: General: Alert and oriented x3, nad.  well-developed, well nourished. normal affect, AF. NC/AT, EOMI, neck supple, trachea midline, no JVD present. Breathing is non-labored. Examination of the right hip reveals skin intact. There is no erythema, ecchymosis, warmth. There are no signs for infection or cellulitis present. She has no increase in discomfort with range of motion of the hip. Negative straight leg raise. Negative calf tenderness. Negative Homans. No signs of DVT present. Review of labs:  UA-negative  Chest x-ray-negative    CBC-WBC-20.1    Assessment: Status post right total hip replacement, intermittent fevers    Plan: At this point, there is  no evidence for hip infection or cellulitis present. We will order infectious labs including CBC, ESR, CRP, IL-6, uric acid. We will order blood cultures. We will order Doppler ultrasound bilateral lower extremities. We will order COVID-19 as well as influenza tests. She is instructed if she has any changes in how she feels she should present to the emergency room for further evaluation.               JR Marty CISNEROS, PAROYAL, ATC

## 2020-10-30 ENCOUNTER — PATIENT OUTREACH (OUTPATIENT)
Dept: CASE MANAGEMENT | Age: 70
End: 2020-10-30

## 2020-10-30 PROCEDURE — 3331090002 HH PPS REVENUE DEBIT

## 2020-10-30 PROCEDURE — 3331090001 HH PPS REVENUE CREDIT

## 2020-10-30 NOTE — PROGRESS NOTES
Complex Case Management      Date/Time:  10/30/2020 10:46 AM    Method of communication with patient:phone    [address] [place] [address] ([facility]) contacted the patient by telephone to perform Ambulatory Care Coordination. Verified name and  (PHI) with patient as identifiers. Provided introduction to self, and explanation of the Ambulatory Care Manager's role. Reviewed most recent clinic visit w/ patient who verbalized understanding. Patient given an opportunity to ask questions. Top Challenges reviewed with the patient   1. Spoke with patient, Stated she is doing better at present  2. Continues on Levoquin, c/o GI issues. Taking Zofran to help  3. Medication reconciliation done at this encounter with patient. Shows understanding of medication therapy. 4. Patient has [facility] contact information and will be followed per [facility] protocol. The patient agrees to contact the PCP office or the [address] [place] [address] for questions related to their healthcare. Provided contact information for future reference. Disease Specific:   N/A    Home Health Active: No    DME Active: Yes    Barriers to care? depression, lack of knowledge about disease, medication management    Advance Care Planning:   Does patient have an Advance Directive:  reviewed and current     Medication(s):   Medication reconciliation was performed with patient, who verbalizes understanding of administration of home medications. There were no barriers to obtaining medications identified at this time. Referral to Pharm D needed: no     Current Outpatient Medications   Medication Sig    ondansetron hcl (Zofran) 4 mg tablet Take 1 Tab by mouth every eight (8) hours as needed for Nausea or Vomiting.  levoFLOXacin (Levaquin) 500 mg tablet Take 1 Tab by mouth daily.  oxyCODONE-acetaminophen (Percocet) 7.5-325 mg per tablet Take 1 Tab by mouth every six (6) hours as needed for Pain for up to 7 days. Max Daily Amount: 4 Tabs.     docusate sodium (Colace) 100 mg capsule Take 1 Cap by mouth two (2) times a day for 90 days.  aspirin (ASPIRIN) 325 mg tablet Take 1 Tab by mouth two (2) times a day.  acetaminophen (TylenoL) 325 mg tablet Take  by mouth every four (4) hours as needed for Pain.  amLODIPine (NORVASC) 5 mg tablet TAKE 1 TABLET BY MOUTH ONCE DAILY    ferrous sulfate (IRON) 325 mg (65 mg iron) tablet Take  by mouth. Takes every other day     No current facility-administered medications for this visit.         BS follow up appointment(s):   Future Appointments   Date Time Provider Emma Barrera   11/5/2020  9:10 AM Keyla Smalls PA-C [facility] AMB   4/7/2021  8:30 AM Los Edwards MD [id number] [facility]   5/5/2021  8:05 AM [facility] NURSE VISIT [facility]   5/12/2021  8:20 AM Soheila Padron MD [facility] AMB            Goals Addressed                 This Visit's Progress     Attend follow up appointments on schedule   On track     Prepare patients and caregivers for end of life decisions (ie. need for hospice, pain management, symptom relief, advance directives etc.)   On track     Takes all medications as ordered   On track

## 2020-10-31 PROCEDURE — 3331090001 HH PPS REVENUE CREDIT

## 2020-10-31 PROCEDURE — 3331090002 HH PPS REVENUE DEBIT

## 2020-11-01 PROCEDURE — 3331090001 HH PPS REVENUE CREDIT

## 2020-11-01 PROCEDURE — 3331090002 HH PPS REVENUE DEBIT

## 2020-11-02 PROCEDURE — 3331090002 HH PPS REVENUE DEBIT

## 2020-11-02 PROCEDURE — 3331090001 HH PPS REVENUE CREDIT

## 2020-11-03 ENCOUNTER — HOSPITAL ENCOUNTER (OUTPATIENT)
Dept: LAB | Age: 70
Discharge: HOME | End: 2020-11-03
Payer: MEDICARE

## 2020-11-03 ENCOUNTER — VIRTUAL VISIT (OUTPATIENT)
Dept: INTERNAL MEDICINE CLINIC | Age: 70
End: 2020-11-03
Payer: MEDICARE

## 2020-11-03 ENCOUNTER — HOSPITAL ENCOUNTER (OUTPATIENT)
Dept: GENERAL RADIOLOGY | Age: 70
Discharge: HOME | End: 2020-11-03
Attending: PHYSICIAN ASSISTANT
Payer: MEDICARE

## 2020-11-03 DIAGNOSIS — Z96.641 STATUS POST TOTAL REPLACEMENT OF RIGHT HIP: ICD-10-CM

## 2020-11-03 DIAGNOSIS — R50.9 FEVER, UNSPECIFIED FEVER CAUSE: ICD-10-CM

## 2020-11-03 DIAGNOSIS — R10.9 ABDOMINAL PAIN, UNSPECIFIED ABDOMINAL LOCATION: Primary | ICD-10-CM

## 2020-11-03 DIAGNOSIS — A04.72 C. DIFFICILE COLITIS: ICD-10-CM

## 2020-11-03 DIAGNOSIS — M25.551 RIGHT HIP PAIN: ICD-10-CM

## 2020-11-03 DIAGNOSIS — R10.9 ABDOMINAL PAIN, UNSPECIFIED ABDOMINAL LOCATION: ICD-10-CM

## 2020-11-03 LAB
APPEARANCE FLD: ABNORMAL
BODY FLD TYPE: NORMAL
COLOR FLD: ABNORMAL
CRYSTALS FLD MICRO: NORMAL
EOSINOPHIL NFR FLD MANUAL: 0 %
LYMPHOCYTES NFR FLD: 2 %
MONOCYTES NFR FLD: 0 %
NEUTROPHILS NFR FLD: 98 %
NEUTS BAND # FLD: 0 %
NUC CELL # FLD: 67 /CU MM
RBC # FLD: ABNORMAL /CU MM
SPECIMEN SOURCE FLD: ABNORMAL
TOTAL CELLS COUNTED SPEC: 50

## 2020-11-03 PROCEDURE — 87205 SMEAR GRAM STAIN: CPT

## 2020-11-03 PROCEDURE — 74011000250 HC RX REV CODE- 250

## 2020-11-03 PROCEDURE — 3331090002 HH PPS REVENUE DEBIT

## 2020-11-03 PROCEDURE — 3331090001 HH PPS REVENUE CREDIT

## 2020-11-03 PROCEDURE — 99442 PR PHYS/QHP TELEPHONE EVALUATION 11-20 MIN: CPT | Performed by: INTERNAL MEDICINE

## 2020-11-03 PROCEDURE — 89051 BODY FLUID CELL COUNT: CPT

## 2020-11-03 PROCEDURE — 77002 NEEDLE LOCALIZATION BY XRAY: CPT

## 2020-11-03 PROCEDURE — 89060 EXAM SYNOVIAL FLUID CRYSTALS: CPT

## 2020-11-03 RX ORDER — SODIUM CHLORIDE 9 MG/ML
10 INJECTION INTRAMUSCULAR; INTRAVENOUS; SUBCUTANEOUS
Status: DISPENSED | OUTPATIENT
Start: 2020-11-03 | End: 2020-11-03

## 2020-11-03 RX ORDER — LIDOCAINE HYDROCHLORIDE 10 MG/ML
INJECTION, SOLUTION EPIDURAL; INFILTRATION; INTRACAUDAL; PERINEURAL
Status: COMPLETED
Start: 2020-11-03 | End: 2020-11-03

## 2020-11-03 RX ADMIN — LIDOCAINE HYDROCHLORIDE 5 ML: 10 INJECTION, SOLUTION EPIDURAL; INFILTRATION; INTRACAUDAL; PERINEURAL at 08:47

## 2020-11-04 ENCOUNTER — HOME CARE VISIT (OUTPATIENT)
Dept: HOME HEALTH SERVICES | Facility: HOME HEALTH | Age: 70
End: 2020-11-04
Payer: MEDICARE

## 2020-11-04 ENCOUNTER — HOSPITAL ENCOUNTER (OUTPATIENT)
Dept: LAB | Age: 70
Discharge: HOME | End: 2020-11-04
Payer: MEDICARE

## 2020-11-04 ENCOUNTER — TELEPHONE (OUTPATIENT)
Dept: ORTHOPEDIC SURGERY | Age: 70
End: 2020-11-04

## 2020-11-04 DIAGNOSIS — R10.9 ABDOMINAL PAIN, UNSPECIFIED ABDOMINAL LOCATION: ICD-10-CM

## 2020-11-04 DIAGNOSIS — Z96.641 STATUS POST TOTAL REPLACEMENT OF RIGHT HIP: Primary | ICD-10-CM

## 2020-11-04 LAB
BACTERIA SPEC CULT: NORMAL
C DIFF GDH STL QL: POSITIVE
C DIFF TOX A+B STL QL IA: POSITIVE
IL6 SERPL-MCNC: 170.5 PG/ML (ref 0–13)
INTERPRETATION: ABNORMAL
SERVICE CMNT-IMP: NORMAL

## 2020-11-04 PROCEDURE — 0107U C DIFF TOX AG DETCJ IA STOOL: CPT

## 2020-11-04 PROCEDURE — 3331090001 HH PPS REVENUE CREDIT

## 2020-11-04 PROCEDURE — 3331090002 HH PPS REVENUE DEBIT

## 2020-11-04 RX ORDER — METRONIDAZOLE 500 MG/1
500 TABLET ORAL 3 TIMES DAILY
Qty: 30 TAB | Refills: 0 | Status: SHIPPED | OUTPATIENT
Start: 2020-11-04 | End: 2020-11-14

## 2020-11-04 NOTE — TELEPHONE ENCOUNTER
[id number] - [address], [address] [facility]     Patient's daughter forgot to request a refill for Percocet.   Please advise Lianne Sheets [phone]

## 2020-11-05 PROCEDURE — 3331090002 HH PPS REVENUE DEBIT

## 2020-11-05 PROCEDURE — 3331090001 HH PPS REVENUE CREDIT

## 2020-11-05 RX ORDER — OXYCODONE AND ACETAMINOPHEN 7.5; 325 MG/1; MG/1
1-2 TABLET ORAL
Qty: 42 TAB | Refills: 0 | Status: SHIPPED | OUTPATIENT
Start: 2020-11-05 | End: 2020-11-12

## 2020-11-05 NOTE — PROGRESS NOTES
Chaka Hidalgo is a 77 y.o. female, evaluated via audio-only technology on 11/3/2020 for No chief complaint on file. .    Assessment & Plan:   Diagnoses and all orders for this visit:    1. Abdominal pain, unspecified abdominal location  -     C. DIFFICILE AG & TOXIN A/B; Future  -     CT ABD PELV W WO CONT; Future    2. Fever, unspecified fever cause  -     NOVEL CORONAVIRUS (COVID-19); Future    3. C. difficile colitis  -     metroNIDAZOLE (FLAGYL) 500 mg tablet; Take 1 Tab by mouth three (3) times daily for 10 days. 12  Subjective:       Chaka Hidalgo, who was evaluated through a patient-initiated, synchronous (real-time) audio only encounter, and/or her healthcare decision maker, is aware that it is a billable service, with coverage as determined by her insurance carrier. She provided verbal consent to proceed: Yes. She has not had a related appointment within my department in the past 7 days or scheduled within the next 24 hours. Total Time: minutes: 11-20 minutes    Cory Wells MD     She successfully underwent RTHR on 10/13/20 per Dr Ericka Hanks. She was doing great on our VV 10/20/20. Starting 10/25/20, she started spiking fever to 102, nausea and retching, gassiness and bloating along with loose stools. No known exposures, cough, wheezing, dyspnea, sinus sx, uti sx. She had f/u visit at ortho and cxr and ua were neg on 10/26/20. She had ER visit 10/27/20 which showed wbc 20k, no obvious source as her wound looked good. She was empirically sent home on lexapro; she recounts that she was on abx for a few days after dc also. On 10/29/20, her wbc was 29k, esr 86, crp 34 and thye elected to aspirate the joint which was done today. Again, she reports no pain, redness, difficulty in the wound. However, she continues to have f, anorexia, loose stools.   She reminded us that she did have c diff years ago and feels this is reminiscent of that episode    Past Medical History: Diagnosis Date    Anxiety 6/15    IKE-7 was 12/21    Clostridium difficile colitis 03/2014    Dr. Mandy Hilario adenoma 10/2011    Dr. Edwige Delgado; Dr Charito Gonzalez 7/20    Cystic breast     Depression 6/15    PHQ-9 was 15/27    Diverticulitis     recurrent x3 Dr Christine Escobar liver     10/10 on US, CT 6/12; Fib-4 was 0.74 from 1/15    GI bleed 2/14    ischemic colitis on CT, seen by Dr. Richard Cheatham Hyperlipidemia     calculated 10 year risk score was 3.2% (1/15)    Hypertension     IFG (impaired fasting glucose) [id number]    Ischemic colitis ([facility] [address] 3/14    Dr Enma Blood    Labial abscess     + MRSA    Lymphoma ([facility] [address] 12/13    Dr. Radha Brice    Migraine     Nephrolithiasis     Neuropathy due to chemotherapeutic drug ([facility] [address] 9/14    Osteoarthritis     hips and knees; Dr Canales [place] dependence syndrome 2/8/2019    Zoster 8/14    left T11     Past Surgical History:   Procedure Laterality Date    COLONOSCOPY N/A 7/23/2020    Dr Edwige Delgado 2003 polyps; 2011 adenoma; Dr Xiao Caal 3/14 isch colitis; Dr Charito Gonzalez 7/23/20 mod divertics and adenoma    Windy Bleacher      Dr Jose Driver; RIGHT 10/20    HX ORTHOPAEDIC  1960s    LEFT knee surgery    HX ORTHOPAEDIC      DEXA t score 2.1 spine, -0.3 hip (4/19)    HX SOPHIA AND BSO  04/02    Dr. Justo Plunkett History     Socioeconomic History    Marital status:      Spouse name: Not on file    Number of children: 2    Years of education: Not on file    Highest education level: Not on file   Occupational History    Occupation: director [employer]   Social Needs    Financial resource strain: Not on file    Food insecurity     Worry: Not on file     Inability: Not on file   [employer] needs     Medical: Not on file     Non-medical: Not on file   Tobacco Use    Smoking status: Former Smoker     Packs/day: 0.50     Years: 15.00     Pack years: 7.50     Types: Cigarettes     Last attempt to quit: 9/21/2020     Years since quittin.1    Smokeless tobacco: Never Used    Tobacco comment: smoking cessation advised   Substance and Sexual Activity    Alcohol use: Not Currently     Alcohol/week: 4.2 standard drinks     Types: 5 Glasses of wine per week    Drug use: No    Sexual activity: Not Currently   Lifestyle    Physical activity     Days per week: Not on file     Minutes per session: Not on file    Stress: Not on file   Relationships    Social connections     Talks on phone: Not on file     Gets together: Not on file     Attends [employer] service: Not on file     Active member of club or organization: Not on file     Attends meetings of clubs or organizations: Not on file     Relationship status: Not on file    Intimate partner violence     Fear of current or ex partner: Not on file     Emotionally abused: Not on file     Physically abused: Not on file     Forced sexual activity: Not on file   Other Topics Concern    Not on file   Social History Narrative    Not on file     Current Outpatient Medications   Medication Sig    metroNIDAZOLE (FLAGYL) 500 mg tablet Take 1 Tab by mouth three (3) times daily for 10 days.  ondansetron hcl (Zofran) 4 mg tablet Take 1 Tab by mouth every eight (8) hours as needed for Nausea or Vomiting.  levoFLOXacin (Levaquin) 500 mg tablet Take 1 Tab by mouth daily.  docusate sodium (Colace) 100 mg capsule Take 1 Cap by mouth two (2) times a day for 90 days.  aspirin (ASPIRIN) 325 mg tablet Take 1 Tab by mouth two (2) times a day.  acetaminophen (TylenoL) 325 mg tablet Take 325 mg by mouth every four (4) hours as needed for Pain.  amLODIPine (NORVASC) 5 mg tablet TAKE 1 TABLET BY MOUTH ONCE DAILY    ferrous sulfate (IRON) 325 mg (65 mg iron) tablet Take 1 Tab by mouth Daily (before breakfast). Takes every other day     No current facility-administered medications for this visit.       Allergies   Allergen Reactions    Meperidine Nausea Only and Other (comments)    Augmentin [Amoxicillin-Pot Clavulanate] Diarrhea and Nausea Only    Keflex [Cephalexin] Nausea Only    Sulfa (Sulfonamide Antibiotics) Rash     Assessment and plan:  1. Fever, nausea, diarrhea, abd pain. Will sched covi test, CT and c diff test. Declined going to ER, encouraged her to keep hydrated, further recs pending results      Above conditions discussed at length and patient vocalized understanding. All questions answered to patient satisfaction      ADDENDUM  Results for orders placed or performed during the hospital encounter of 11/03/20   CULTURE, BODY FLUID W GRAM STAIN    Specimen: Synovial Fluid   Result Value Ref Range    Special Requests: NO SPECIAL REQUESTS      GRAM STAIN OCCASIONAL WBCS SEEN      GRAM STAIN NO ORGANISMS SEEN      Culture result: Culture performed on Unspun Fluid      Culture result: NO GROWTH 1 DAY     CELL COUNT AND DIFF, BODY FLUID   Result Value Ref Range    BODY FLUID TYPE JOINT, HIP     FLUID COLOR RED      FLUID APPEARANCE HAZY      FLUID RBC CT. (A) NRRE /cu mm     SPECIMEN GROSSLY BLOODY. RBC'S TOO NUMEROUS TO COUNT. FLUID NUCLEATED CELLS 67 (A) NRRE /cu mm    FLD NEUTROPHILS 98 %    FLD BANDS 0 %    FLD LYMPHS 2 %    FLD MONOCYTES 0 %    FLD EOSINS 0 %    TOTAL CELLS COUNTED 50     CRYSTALS, SYNOVIAL FLUID   Result Value Ref Range    FLUID TYPE(7) JOINT FLUID      Crystals, body fluid NO CRYSTALS SEEN       c diff test positive    Spoke with pt at 10pm 11/4/20. She is feeling ok, admits to anorexia but no f, bleeding, abd pain improved. Inc fluids, nutrition. Called in flagyl to pharmacy in [place] as staying with daughter. Told to go to ER if if she clinically deteriorates and told her what to look for.   Can hold off covid test.

## 2020-11-06 ENCOUNTER — HOME CARE VISIT (OUTPATIENT)
Dept: SCHEDULING | Facility: HOME HEALTH | Age: 70
End: 2020-11-06
Payer: MEDICARE

## 2020-11-06 VITALS
TEMPERATURE: 98.2 F | HEART RATE: 85 BPM | SYSTOLIC BLOOD PRESSURE: 115 MMHG | OXYGEN SATURATION: 98 % | DIASTOLIC BLOOD PRESSURE: 60 MMHG

## 2020-11-06 DIAGNOSIS — Z96.641 STATUS POST TOTAL REPLACEMENT OF RIGHT HIP: ICD-10-CM

## 2020-11-06 DIAGNOSIS — M25.551 RIGHT HIP PAIN: ICD-10-CM

## 2020-11-06 PROCEDURE — 3331090002 HH PPS REVENUE DEBIT

## 2020-11-06 PROCEDURE — G0151 HHCP-SERV OF PT,EA 15 MIN: HCPCS

## 2020-11-06 PROCEDURE — 3331090001 HH PPS REVENUE CREDIT

## 2020-11-07 PROCEDURE — 3331090002 HH PPS REVENUE DEBIT

## 2020-11-07 PROCEDURE — 3331090001 HH PPS REVENUE CREDIT

## 2020-11-09 ENCOUNTER — TELEPHONE (OUTPATIENT)
Dept: INTERNAL MEDICINE CLINIC | Age: 70
End: 2020-11-09

## 2020-11-09 NOTE — TELEPHONE ENCOUNTER
Pt aware of message below and verbalized understanding. Patient was provided the number to central scheduling she will call and get the CT scheduled. No further questions or concerns from pt at this time.

## 2020-11-09 NOTE — TELEPHONE ENCOUNTER
Pt had a vv on 11/03- she was prescribed metroNIDAZOLE  Pt said fever is gone, she now has an appetite but still is having lower right abdominal pain,  Do you want her to sched an appt

## 2020-11-11 ENCOUNTER — HOSPITAL ENCOUNTER (OUTPATIENT)
Dept: CT IMAGING | Age: 70
Discharge: HOME | End: 2020-11-11
Attending: INTERNAL MEDICINE
Payer: MEDICARE

## 2020-11-11 ENCOUNTER — HOSPITAL ENCOUNTER (OUTPATIENT)
Dept: LAB | Age: 70
Discharge: HOME | End: 2020-11-11
Payer: MEDICARE

## 2020-11-11 ENCOUNTER — PATIENT OUTREACH (OUTPATIENT)
Dept: CASE MANAGEMENT | Age: 70
End: 2020-11-11

## 2020-11-11 DIAGNOSIS — R10.9 ABDOMINAL PAIN, UNSPECIFIED ABDOMINAL LOCATION: ICD-10-CM

## 2020-11-11 DIAGNOSIS — Z96.641 STATUS POST TOTAL REPLACEMENT OF RIGHT HIP: Primary | ICD-10-CM

## 2020-11-11 DIAGNOSIS — Z96.641 STATUS POST TOTAL REPLACEMENT OF RIGHT HIP: ICD-10-CM

## 2020-11-11 PROCEDURE — 74177 CT ABD & PELVIS W/CONTRAST: CPT

## 2020-11-11 PROCEDURE — 85025 COMPLETE CBC W/AUTO DIFF WBC: CPT

## 2020-11-11 PROCEDURE — 36415 COLL VENOUS BLD VENIPUNCTURE: CPT

## 2020-11-11 PROCEDURE — 74011000636 HC RX REV CODE- 636: Performed by: INTERNAL MEDICINE

## 2020-11-11 RX ADMIN — IOHEXOL 50 ML: 240 INJECTION, SOLUTION INTRATHECAL; INTRAVASCULAR; INTRAVENOUS; ORAL at 11:58

## 2020-11-11 RX ADMIN — IOPAMIDOL 94 ML: 612 INJECTION, SOLUTION INTRAVENOUS at 11:58

## 2020-11-11 NOTE — PROGRESS NOTES
Complex Case Management      Date/Time:  2020 10:46 AM    Method of communication with patient:phone    [facility] ([facility]) contacted the patient by telephone to perform Ambulatory Care Coordination. Verified name and  (PHI) with patient as identifiers. Provided introduction to self, and explanation of the Ambulatory Care Manager's role. Reviewed most recent clinic visit w/ patient who verbalized understanding. Patient given an opportunity to ask questions. Top Challenges reviewed with the patient   1. Spoke with patient's daughter, Parrish Cunningham. Stated patient was doing well. Currently at lab having blood work drawn  2. To have CT of abdomen and pelvis  3. Continues on Levoquin, c/o GI issues. Taking Zofran to help  4. Will continue to follow patient per [facility] protocal     The patient agrees to contact the PCP office or the [facility] for questions related to their healthcare. Provided contact information for future reference. Disease Specific:   N/A    Home Health Active: No    DME Active: Yes    Barriers to care? depression, lack of knowledge about disease, medication management    Advance Care Planning:   Does patient have an Advance Directive:  reviewed and current     Medication(s):   Medication reconciliation was performed with patient, who verbalizes understanding of administration of home medications. There were no barriers to obtaining medications identified at this time. Referral to Pharm D needed: no     Current Outpatient Medications   Medication Sig    oxyCODONE-acetaminophen (Percocet) 7.5-325 mg per tablet Take 1-2 Tabs by mouth every eight (8) hours as needed for Pain for up to 7 days. Max Daily Amount: 6 Tabs.  metroNIDAZOLE (FLAGYL) 500 mg tablet Take 1 Tab by mouth three (3) times daily for 10 days.  ondansetron hcl (Zofran) 4 mg tablet Take 1 Tab by mouth every eight (8) hours as needed for Nausea or Vomiting.     levoFLOXacin (Levaquin) 500 mg tablet Take 1 Tab by mouth daily.  docusate sodium (Colace) 100 mg capsule Take 1 Cap by mouth two (2) times a day for 90 days.  aspirin (ASPIRIN) 325 mg tablet Take 1 Tab by mouth two (2) times a day.  acetaminophen (TylenoL) 325 mg tablet Take 325 mg by mouth every four (4) hours as needed for Pain.  amLODIPine (NORVASC) 5 mg tablet TAKE 1 TABLET BY MOUTH ONCE DAILY    ferrous sulfate (IRON) 325 mg (65 mg iron) tablet Take 1 Tab by mouth Daily (before breakfast). Takes every other day     No current facility-administered medications for this visit.       Facility-Administered Medications Ordered in Other Visits   Medication Dose Route Frequency    iopamidoL (ISOVUE 300) 61 % contrast injection 100 mL  100 mL IntraVENous RAD ONCE    iohexoL (OMNIPAQUE) 240 mg iodine/mL solution 50 mL  50 mL Oral RAD ONCE       [facility] follow up appointment(s):   Future Appointments   Date Time Provider Emma Barrera   11/11/2020 11:45 AM [address] [place] [address] CT RM 2 [facility] [address] [place] [address]   4/7/2021  8:30 AM Yessi Mak MD [id number] [facility] AMB   5/5/2021  8:05 AM [facility] NURSE VISIT [facility] BS AMB   5/12/2021  8:20 AM Mary Nolen MD [facility] AMB            Goals Addressed                 This Visit's Progress     Attend follow up appointments on schedule   On track     Prepare patients and caregivers for end of life decisions (ie. need for hospice, pain management, symptom relief, advance directives etc.)   On track     Takes all medications as ordered   On track

## 2020-11-12 ENCOUNTER — TELEPHONE (OUTPATIENT)
Dept: INTERNAL MEDICINE CLINIC | Age: 70
End: 2020-11-12

## 2020-11-12 DIAGNOSIS — Z96.641 STATUS POST TOTAL REPLACEMENT OF RIGHT HIP: Primary | ICD-10-CM

## 2020-11-12 LAB
BASOPHILS # BLD: 0 K/UL (ref 0–0.1)
BASOPHILS NFR BLD: 0 % (ref 0–2)
DIFFERENTIAL METHOD BLD: ABNORMAL
EOSINOPHIL # BLD: 0.2 K/UL (ref 0–0.4)
EOSINOPHIL NFR BLD: 1 % (ref 0–5)
ERYTHROCYTE [DISTWIDTH] IN BLOOD BY AUTOMATED COUNT: 15.1 % (ref 11.6–14.5)
HCT VFR BLD AUTO: 29.1 % (ref 35–45)
HGB BLD-MCNC: 9.4 G/DL (ref 12–16)
LYMPHOCYTES # BLD: 1.7 K/UL (ref 0.9–3.6)
LYMPHOCYTES NFR BLD: 10 % (ref 21–52)
MCH RBC QN AUTO: 29.9 PG (ref 24–34)
MCHC RBC AUTO-ENTMCNC: 32.3 G/DL (ref 31–37)
MCV RBC AUTO: 92.7 FL (ref 74–97)
MONOCYTES # BLD: 0.7 K/UL (ref 0.05–1.2)
MONOCYTES NFR BLD: 4 % (ref 3–10)
NEUTS SEG # BLD: 14.3 K/UL (ref 1.8–8)
NEUTS SEG NFR BLD: 85 % (ref 40–73)
PLATELET # BLD AUTO: 600 K/UL (ref 135–420)
PLATELET COMMENTS,PCOM: ABNORMAL
PMV BLD AUTO: 8.7 FL (ref 9.2–11.8)
RBC # BLD AUTO: 3.14 M/UL (ref 4.2–5.3)
RBC MORPH BLD: ABNORMAL
WBC # BLD AUTO: 16.9 K/UL (ref 4.6–13.2)

## 2020-11-12 NOTE — TELEPHONE ENCOUNTER
Pt aware of message below and verbalized understanding. She does not wish to see GI at this time. No further questions or concerns from pt at this time.

## 2020-11-12 NOTE — TELEPHONE ENCOUNTER
pls call    IMPRESSION:     1. Prominent mural thickening throughout the cecum and ascending colon, and more  moderate circumferential mural thickening throughout the transverse colon with  adjacent mesenteric inflammatory stranding. Findings are suggestive of an acute  infectious or inflammatory colitis. Extent of mural thickening raises concern  for a C. difficile pseudomembranous colitis. No evidence of ischemic colitis. No  bowel obstruction. Long segment involvement makes an underlying neoplastic  process unlikely but not completely excluded; recommend further visualization  with colonoscopy when clinically feasible. 2. Nonobstructive right renal calculi. Simple appearing renal cysts. 3. Moderate to large hiatal hernia.       CT c/w c diff  On correct abx  If she wishes, we can send to GI for opinion

## 2020-11-16 ENCOUNTER — TELEPHONE (OUTPATIENT)
Dept: INTERNAL MEDICINE CLINIC | Age: 70
End: 2020-11-16

## 2020-11-16 NOTE — TELEPHONE ENCOUNTER
Please return call triage/appt? Pt stated feet and ankle swelling that started Friday 11/13/2020. Said not severe but very noticeable.

## 2020-11-17 LAB
BACTERIA SPEC CULT: NORMAL
BACTERIA SPEC CULT: NORMAL
GRAM STN SPEC: NORMAL
GRAM STN SPEC: NORMAL
SERVICE CMNT-IMP: NORMAL

## 2020-11-18 ENCOUNTER — HOSPITAL ENCOUNTER (OUTPATIENT)
Dept: LAB | Age: 70
Discharge: HOME | End: 2020-11-18
Payer: MEDICARE

## 2020-11-18 ENCOUNTER — OFFICE VISIT (OUTPATIENT)
Dept: INTERNAL MEDICINE CLINIC | Age: 70
End: 2020-11-18
Payer: MEDICARE

## 2020-11-18 VITALS
DIASTOLIC BLOOD PRESSURE: 64 MMHG | HEIGHT: 58 IN | TEMPERATURE: 97 F | BODY MASS INDEX: 23.09 KG/M2 | HEART RATE: 71 BPM | RESPIRATION RATE: 16 BRPM | WEIGHT: 110 LBS | OXYGEN SATURATION: 100 % | SYSTOLIC BLOOD PRESSURE: 137 MMHG

## 2020-11-18 DIAGNOSIS — R60.9 SWELLING: Primary | ICD-10-CM

## 2020-11-18 DIAGNOSIS — I10 PRIMARY HYPERTENSION: ICD-10-CM

## 2020-11-18 DIAGNOSIS — R60.9 SWELLING: ICD-10-CM

## 2020-11-18 DIAGNOSIS — A04.72 C. DIFFICILE COLITIS: ICD-10-CM

## 2020-11-18 LAB
ALBUMIN SERPL-MCNC: 3.2 G/DL (ref 3.4–5)
ALBUMIN/GLOB SERPL: 0.8 {RATIO} (ref 0.8–1.7)
ALP SERPL-CCNC: 74 U/L (ref 45–117)
ALT SERPL-CCNC: 14 U/L (ref 13–56)
ANION GAP SERPL CALC-SCNC: 4 MMOL/L (ref 3–18)
AST SERPL-CCNC: 8 U/L (ref 10–38)
BILIRUB SERPL-MCNC: 0.2 MG/DL (ref 0.2–1)
BILIRUB UR QL STRIP: NEGATIVE
BUN SERPL-MCNC: 10 MG/DL (ref 7–18)
BUN/CREAT SERPL: 16 (ref 12–20)
CALCIUM SERPL-MCNC: 9.3 MG/DL (ref 8.5–10.1)
CHLORIDE SERPL-SCNC: 109 MMOL/L (ref 100–111)
CO2 SERPL-SCNC: 28 MMOL/L (ref 21–32)
CREAT SERPL-MCNC: 0.62 MG/DL (ref 0.6–1.3)
GLOBULIN SER CALC-MCNC: 3.8 G/DL (ref 2–4)
GLUCOSE SERPL-MCNC: 93 MG/DL (ref 74–99)
GLUCOSE UR-MCNC: NEGATIVE MG/DL
KETONES P FAST UR STRIP-MCNC: NEGATIVE MG/DL
PH UR STRIP: 5.5 [PH] (ref 4.6–8)
POTASSIUM SERPL-SCNC: 4.3 MMOL/L (ref 3.5–5.5)
PROT SERPL-MCNC: 7 G/DL (ref 6.4–8.2)
PROT UR QL STRIP: NEGATIVE
SODIUM SERPL-SCNC: 141 MMOL/L (ref 136–145)
SP GR UR STRIP: 1.02 (ref 1–1.03)
UA UROBILINOGEN AMB POC: NORMAL (ref 0.2–1)
URINALYSIS CLARITY POC: CLEAR
URINALYSIS COLOR POC: YELLOW
URINE BLOOD POC: NEGATIVE
URINE LEUKOCYTES POC: NEGATIVE
URINE NITRITES POC: NEGATIVE

## 2020-11-18 PROCEDURE — 3017F COLORECTAL CA SCREEN DOC REV: CPT | Performed by: INTERNAL MEDICINE

## 2020-11-18 PROCEDURE — 81001 URINALYSIS AUTO W/SCOPE: CPT | Performed by: INTERNAL MEDICINE

## 2020-11-18 PROCEDURE — 80053 COMPREHEN METABOLIC PANEL: CPT

## 2020-11-18 PROCEDURE — G8432 DEP SCR NOT DOC, RNG: HCPCS | Performed by: INTERNAL MEDICINE

## 2020-11-18 PROCEDURE — G8754 DIAS BP LESS 90: HCPCS | Performed by: INTERNAL MEDICINE

## 2020-11-18 PROCEDURE — G0463 HOSPITAL OUTPT CLINIC VISIT: HCPCS | Performed by: INTERNAL MEDICINE

## 2020-11-18 PROCEDURE — G8427 DOCREV CUR MEDS BY ELIG CLIN: HCPCS | Performed by: INTERNAL MEDICINE

## 2020-11-18 PROCEDURE — G8420 CALC BMI NORM PARAMETERS: HCPCS | Performed by: INTERNAL MEDICINE

## 2020-11-18 PROCEDURE — G9899 SCRN MAM PERF RSLTS DOC: HCPCS | Performed by: INTERNAL MEDICINE

## 2020-11-18 PROCEDURE — 36415 COLL VENOUS BLD VENIPUNCTURE: CPT

## 2020-11-18 PROCEDURE — 99214 OFFICE O/P EST MOD 30 MIN: CPT | Performed by: INTERNAL MEDICINE

## 2020-11-18 PROCEDURE — 1090F PRES/ABSN URINE INCON ASSESS: CPT | Performed by: INTERNAL MEDICINE

## 2020-11-18 PROCEDURE — 1101F PT FALLS ASSESS-DOCD LE1/YR: CPT | Performed by: INTERNAL MEDICINE

## 2020-11-18 PROCEDURE — G8752 SYS BP LESS 140: HCPCS | Performed by: INTERNAL MEDICINE

## 2020-11-18 PROCEDURE — G8536 NO DOC ELDER MAL SCRN: HCPCS | Performed by: INTERNAL MEDICINE

## 2020-11-18 PROCEDURE — G8399 PT W/DXA RESULTS DOCUMENT: HCPCS | Performed by: INTERNAL MEDICINE

## 2020-11-18 RX ORDER — TRIAMTERENE AND HYDROCHLOROTHIAZIDE 37.5; 25 MG/1; MG/1
1 CAPSULE ORAL DAILY
Qty: 15 CAP | Refills: 0 | Status: SHIPPED | OUTPATIENT
Start: 2020-11-18 | End: 2021-04-06

## 2020-11-18 NOTE — PROGRESS NOTES
Rio Bal presents today for   Chief Complaint   Patient presents with    Swelling     Bilateral lower leg swelling x 6 days. Coordination of Care:  1. Have you been to the ER, urgent care clinic since your last visit? Hospitalized since your last visit? no    2. Have you seen or consulted any other health care providers outside of the [address] [place] [address] since your last visit? Include any pap smears or colon screening.  no

## 2020-11-18 NOTE — PROGRESS NOTES
72 y.o. WHITE OR  female who presents for evaluation. She is here concerned about swelling in her feet. Briefly, she underwent RIGHT THR in October complicated by c diff colitis which she is just getting over. For about 2-3 weeks, she had poor po intake due to n/v/d/anorexia. Her bp was low so she held the norvasc. As she has recovered, she restarted the norvasc over a week or so ago. About 5-6 days now, she's been having edema in the feet mostly as the day progresses. No pnd, orthopnea, cp, sob, pleurisy, cough. Denied any asymmetrical/unilat swelling, leg pain. warmth/redness.   She has been ambulatory on a [facility]    Past Medical History:   Diagnosis Date    Anxiety 6/15    IKE-7 was 12/21    Clostridium difficile colitis     Dr. Cowan Session 3/14; postop 10/20    Colon adenoma 10/2011    Dr. Sofya Loaiza; Dr Silviano Villa 7/20    Cystic breast     Depression 6/15    PHQ-9 was 15/27    Diverticulitis     recurrent x3 Dr Tamara Bradford liver     10/10 on US, CT 6/12; Fib-4 was 0.74 from 1/15    GI bleed 2/14    ischemic colitis on CT, seen by Dr. Taylor Jones Hyperlipidemia     calculated 10 year risk score was 3.2% (1/15)    Hypertension     IFG (impaired fasting glucose) [id number]    Ischemic colitis ([facility] [address] 3/14    Dr Camryn Benjamin    Labial abscess     mrsa    Lymphoma ([facility] [address] 12/13    Dr. Jhonatan Townsend    Migraine     Nephrolithiasis     Neuropathy due to chemotherapeutic drug ([facility] [address] 9/14    Osteoarthritis     hips and knees; Dr Rose Hdez dependence syndrome 2/8/2019    Zoster 8/14    left T11     Past Surgical History:   Procedure Laterality Date    COLONOSCOPY N/A 7/23/2020    Dr Sofya Loaiza 2003 polyps; 2011 adenoma; Dr Mu Anand 3/14 isch colitis; Dr Silviano Villa 7/23/20 mod divertics and adenoma    HX APPENDECTOMY  1968    HX HIP REPLACEMENT Right 10/2020    Dr Godfrey Leach knee surgery    HX ORTHOPAEDIC      DEXA t score 2.1 spine, -0.3 hip (4/19)    HX SOPHIA AND BSO  04/02 Dr. Darlyn Durand History     Socioeconomic History    Marital status:      Spouse name: Not on file    Number of children: 2    Years of education: Not on file    Highest education level: Not on file   Occupational History    Occupation: director [employer]   Social Needs    Financial resource strain: Not on file    Food insecurity     Worry: Not on file     Inability: Not on file   [employer] needs     Medical: Not on file     Non-medical: Not on file   Tobacco Use    Smoking status: Former Smoker     Packs/day: 0.50     Years: 15.00     Pack years: 7.50     Types: Cigarettes     Last attempt to quit: 2020     Years since quittin.1    Smokeless tobacco: Never Used    Tobacco comment: smoking cessation advised   Substance and Sexual Activity    Alcohol use: Not Currently     Alcohol/week: 4.2 standard drinks     Types: 5 Glasses of wine per week    Drug use: No    Sexual activity: Not Currently   Lifestyle    Physical activity     Days per week: Not on file     Minutes per session: Not on file    Stress: Not on file   Relationships    Social connections     Talks on phone: Not on file     Gets together: Not on file     Attends Taoist service: Not on file     Active member of club or organization: Not on file     Attends meetings of clubs or organizations: Not on file     Relationship status: Not on file    Intimate partner violence     Fear of current or ex partner: Not on file     Emotionally abused: Not on file     Physically abused: Not on file     Forced sexual activity: Not on file   Other Topics Concern    Not on file   Social History Narrative    Not on file     Current Outpatient Medications   Medication Sig    aspirin (ASPIRIN) 325 mg tablet Take 1 Tab by mouth two (2) times a day.  acetaminophen (TylenoL) 325 mg tablet Take 325 mg by mouth every four (4) hours as needed for Pain.     amLODIPine (NORVASC) 5 mg tablet TAKE 1 TABLET BY MOUTH ONCE DAILY    ferrous sulfate (IRON) 325 mg (65 mg iron) tablet Take 1 Tab by mouth Daily (before breakfast). Takes every other day    ondansetron hcl (Zofran) 4 mg tablet Take 1 Tab by mouth every eight (8) hours as needed for Nausea or Vomiting.  docusate sodium (Colace) 100 mg capsule Take 1 Cap by mouth two (2) times a day for 90 days. No current facility-administered medications for this visit. Allergies   Allergen Reactions    Meperidine Nausea Only and Other (comments)    Augmentin [Amoxicillin-Pot Clavulanate] Diarrhea and Nausea Only    Keflex [Cephalexin] Nausea Only    Sulfa (Sulfonamide Antibiotics) Rash     REVIEW OF SYSTEMS:   Ophtho  no vision change or eye pain  Oral  no mouth pain, tongue or tooth problems  Ears  no hearing loss, ear pain, fullness, no swallowing problems  Cardiac  no CP, PND, orthopnea, palpitations or syncope  Chest  no breast masses  Resp  no wheezing, chronic coughing, dyspnea  GI  no heartburn, nausea, vomiting, change in bowel habits, bleeding, hemorrhoids  Urinary  no dysuria, hematuria, flank pain, urgency, frequency    Visit Vitals  /64   Pulse 71   Temp 97 °F (36.1 °C) (Temporal)   Resp 16   Ht 4' 9.5\" (1.461 m)   Wt 110 lb (49.9 kg)   SpO2 100%   BMI 23.39 kg/m²   A&O x3  Affect is appropriate. Mood stable  No apparent distress  Anicteric, no JVD, adenopathy or thyromegaly. No carotid bruits or radiated murmur  Lungs clear to auscultation, no wheezes or rales  Heart showed regular rate and rhythm. No murmur, rubs, gallops  Abdomen soft nontender, no hepatosplenomegaly or masses. Extremities with 1+ symmetrical foot edema.   Pulses 1-2+ symmetrically    Results for orders placed or performed in visit on 11/18/20   AMB POC URINALYSIS DIP STICK AUTO W/ MICRO   Result Value Ref Range    Color (UA POC) Yellow     Clarity (UA POC) Clear     Glucose (UA POC) Negative Negative    Bilirubin (UA POC) Negative Negative    Ketones (UA POC) Negative Negative    Specific gravity (UA POC) 1.025 1.001 - 1.035    Blood (UA POC) Negative Negative    pH (UA POC) 5.5 4.6 - 8.0    Protein (UA POC) Negative Negative    Urobilinogen (UA POC) 0.2 mg/dL 0.2 - 1    Nitrites (UA POC) Negative Negative    Leukocyte esterase (UA POC) Negative Negative       Assessment and plan:  1. Recent c diff colitis. Finished the abx and everything is resolving  2. S/P Right THR. Per Dr Maximus Evans  3. Edema. Check labs but suspect hypoalbumin and recent addition of norvasc; will give a short course dyazide to pull off excess fluid. Call if no better or worsening sx  4. HTN. Short course diuretic as above then change back to amlo      Above conditions discussed at length and patient vocalized understanding.   All questions answered to patient satisfaction

## 2020-11-20 ENCOUNTER — OFFICE VISIT (OUTPATIENT)
Dept: ORTHOPEDIC SURGERY | Age: 70
End: 2020-11-20
Payer: MEDICARE

## 2020-11-20 ENCOUNTER — TELEPHONE (OUTPATIENT)
Dept: INTERNAL MEDICINE CLINIC | Age: 70
End: 2020-11-20

## 2020-11-20 VITALS
HEIGHT: 58 IN | DIASTOLIC BLOOD PRESSURE: 69 MMHG | OXYGEN SATURATION: 100 % | WEIGHT: 106.4 LBS | BODY MASS INDEX: 22.33 KG/M2 | HEART RATE: 82 BPM | SYSTOLIC BLOOD PRESSURE: 126 MMHG | TEMPERATURE: 96.8 F

## 2020-11-20 DIAGNOSIS — Z96.641 STATUS POST TOTAL REPLACEMENT OF RIGHT HIP: Primary | ICD-10-CM

## 2020-11-20 DIAGNOSIS — M25.551 RIGHT HIP PAIN: ICD-10-CM

## 2020-11-20 PROCEDURE — 99024 POSTOP FOLLOW-UP VISIT: CPT | Performed by: PHYSICIAN ASSISTANT

## 2020-11-20 NOTE — PROGRESS NOTES
[address] [place] [address]  [phone]           Patient: Keyana Hernandez                MRN: [id number]       SSN: [id number]  YOB: 1950        AGE: 72 y.o. SEX: female  Body mass index is 22.63 kg/m². PCP: Shantel Garay MD  11/20/20      This office note has been dictated. REVIEW OF SYSTEMS:  Constitutional: Negative for fever, chills, weight loss and malaise/fatigue. HENT: Negative. Eyes: Negative. Respiratory: Negative. Cardiovascular: Negative. Gastrointestinal: No bowel incontinence or constipation. Genitourinary: No bladder incontinence or saddle anesthesia. Skin: Negative. Neurological: Negative. Endo/Heme/Allergies: Negative. Psychiatric/Behavioral: Negative. Musculoskeletal: As per HPI above. Past Medical History:   Diagnosis Date    Anxiety 6/15    IKE-7 was 12/21    Clostridium difficile colitis     Dr. Lorena Saini 3/14; postop 10/20    Colon adenoma 10/2011    Dr. Bessie Carlos; Dr Kaitlin Herron 7/20    Cystic breast     Depression 6/15    PHQ-9 was 15/27    Diverticulitis     recurrent x3 Dr Ramin Gonzales liver     10/10 on US, CT 6/12; Fib-4 was 0.74 from 1/15    GI bleed 2/14    ischemic colitis on CT, seen by Dr. Willian Damon Hyperlipidemia     calculated 10 year risk score was 3.2% (1/15)    Hypertension     IFG (impaired fasting glucose) [id number]    Ischemic colitis ([facility] [address] 3/14    Dr Lorena Saini    Labial abscess     mrsa    Lymphoma ([facility] [address] 12/13    Dr. Dilip Keita    Migraine     Nephrolithiasis     Neuropathy due to chemotherapeutic drug ([facility] [address] 9/14    Osteoarthritis     hips and knees; Dr Debbie Douglas Tobacco dependence syndrome 2/8/2019    Zoster 8/14    left T11         Current Outpatient Medications:     triamterene-hydroCHLOROthiazide (DYAZIDE) 37.5-25 mg per capsule, Take 1 Cap by mouth daily. , Disp: 15 Cap, Rfl: 0    acetaminophen (TylenoL) 325 mg tablet, Take 325 mg by mouth every four (4) hours as needed for Pain., Disp: , Rfl:     amLODIPine (NORVASC) 5 mg tablet, TAKE 1 TABLET BY MOUTH ONCE DAILY, Disp: 90 Tab, Rfl: 3    ferrous sulfate (IRON) 325 mg (65 mg iron) tablet, Take 1 Tab by mouth Daily (before breakfast). Takes every other day, Disp: , Rfl:     ondansetron hcl (Zofran) 4 mg tablet, Take 1 Tab by mouth every eight (8) hours as needed for Nausea or Vomiting., Disp: 30 Tab, Rfl: 1    docusate sodium (Colace) 100 mg capsule, Take 1 Cap by mouth two (2) times a day for 90 days. 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[employer]   Social Needs    Financial resource strain: Not on file    Food insecurity     Worry: Not on file     Inability: Not on file   [employer] needs     Medical: Not on file     Non-medical: Not on file   Tobacco Use    Smoking status: Former Smoker     Packs/day: 0.50     Years: 15.00     Pack years: 7.50     Types: Cigarettes     Last attempt to quit: 2020     Years since quittin.1    Smokeless tobacco: Never Used    Tobacco comment: smoking cessation advised   Substance and Sexual Activity    Alcohol use: Not Currently     Alcohol/week: 4.2 standard drinks     Types: 5 Glasses of wine per week    Drug use: No    Sexual activity: Not Currently   Lifestyle    Physical activity     Days per week: Not on file     Minutes per session: Not on file    Stress: Not on file   Relationships    Social connections     Talks on phone: Not on file     Gets together: Not on file     Attends Evangelical service: Not on file     Active member of club or organization: Not on file     Attends meetings of clubs or organizations: Not on file     Relationship status: Not on file    Intimate partner violence     Fear of current or ex partner: Not on file     Emotionally abused: Not on file     Physically abused: Not on file     Forced sexual activity: Not on file   Other Topics Concern    Not on file   Social History Narrative    Not on file       Past Surgical History:   Procedure Laterality Date    COLONOSCOPY N/A 7/23/2020    Dr Matti Manriquez 2003 polyps; 2011 adenoma; Dr Bree Israel 3/14 isch colitis; Dr Tay Graham 7/23/20 mod divertics and adenoma    HX APPENDECTOMY  1968    HX HIP REPLACEMENT Right 10/2020    Dr Marlen Aquino knee surgery    HX ORTHOPAEDIC      DEXA t score 2.1 spine, -0.3 hip (4/19)    HX SOPHIA AND BSO  04/02    Dr. Chuy Garrett             Patient seen evaluate today for right hip placement. The patient postoperatively had fevers work-up for infection of her hip which was fortunately negative. She was found to have C. difficile eventually. She has been treated with Flagyl. She states the hip is feeling good. Still has a little stomach issues. Patient denies recent fevers, chills, chest pain, SOB, or injuries. No recent systemic changes noted. A 12-point review of systems is performed today. Pertinent positives are noted. All other systems reviewed and otherwise are negative. Physical exam: General: Alert and oriented x3, nad.  well-developed, well nourished. normal affect, AF. NC/AT, EOMI, neck supple, trachea midline, no JVD present. Breathing is non-labored. Examination of the right hip reveals skin intact. The surgical wound healed nicely. There is no erythema, ecchymosis, warmth.   There are no signs of infection or separation. There is no pain to rotation hip. Neck straight leg raise. Negative calf tenderness. Negative Homans. No signs of DVT present. The right lower extremity slightly longer sitting in the chair. Review of labs: Recent CBC, WBC16.9  Hip aspiration: 67 WBCs with 98% neutrophils no growth    Assessment: Status post right total hip replacement    Plan: She will continue with her care from her PCP regarding her C. difficile. Given the still slightly elevated WBC we have referred her to hematology for further evaluation. We will get her set up with outpatient physical therapy and plan on seeing her back in the office in 6 weeks time for reevaluation.             JR Marty CISNEROS, PA-C, ATC

## 2020-11-20 NOTE — TELEPHONE ENCOUNTER
Not sure what to do at this point outside of sending to GI for opinion  Appendix out and mele  pls schedule for monday if possible - she last saw Dr Mariano Daugherty to ER if worse; may need labs and rescan
Patient aware of message by nurse. Called [facility], appt scheduled for Monday at 8 am. Patient aware to go to ER is worse.
Pt c/o lower abdomen and right sided pain, sore and tender. Started last evening. She did complete antibiotic on Saturday for C Diff. She has no diarrhea and is passing gas she said.   Please advise her at [phone]
23-Dec-2018 21:46

## 2020-11-22 ENCOUNTER — TELEPHONE (OUTPATIENT)
Dept: INTERNAL MEDICINE CLINIC | Age: 70
End: 2020-11-22

## 2020-11-23 NOTE — TELEPHONE ENCOUNTER
Patient reached and given results, patient states that she was admitted to hospital on Saturday for fever and chills. She canceled her gastro appt today. She will call back to follow up.

## 2020-11-23 NOTE — TELEPHONE ENCOUNTER
pls call      Results for orders placed or performed during the hospital encounter of 56/27/61   METABOLIC PANEL, COMPREHENSIVE   Result Value Ref Range    Sodium 141 136 - 145 mmol/L    Potassium 4.3 3.5 - 5.5 mmol/L    Chloride 109 100 - 111 mmol/L    CO2 28 21 - 32 mmol/L    Anion gap 4 3.0 - 18 mmol/L    Glucose 93 74 - 99 mg/dL    BUN 10 7.0 - 18 MG/DL    Creatinine 0.62 0.6 - 1.3 MG/DL    BUN/Creatinine ratio 16 12 - 20      GFR est AA >60 >60 ml/min/1.73m2    GFR est non-AA >60 >60 ml/min/1.73m2    Calcium 9.3 8.5 - 10.1 MG/DL    Bilirubin, total 0.2 0.2 - 1.0 MG/DL    ALT (SGPT) 14 13 - 56 U/L    AST (SGOT) 8 (L) 10 - 38 U/L    Alk.  phosphatase 74 45 - 117 U/L    Protein, total 7.0 6.4 - 8.2 g/dL    Albumin 3.2 (L) 3.4 - 5.0 g/dL    Globulin 3.8 2.0 - 4.0 g/dL    A-G Ratio 0.8 0.8 - 1.7       UA neg    Labs all normal

## 2020-12-02 ENCOUNTER — HOSPITAL ENCOUNTER (OUTPATIENT)
Dept: PHYSICAL THERAPY | Age: 70
End: 2020-12-02
Payer: MEDICARE

## 2020-12-04 ENCOUNTER — VIRTUAL VISIT (OUTPATIENT)
Dept: INTERNAL MEDICINE CLINIC | Age: 70
End: 2020-12-04
Payer: MEDICARE

## 2020-12-04 DIAGNOSIS — I10 PRIMARY HYPERTENSION: ICD-10-CM

## 2020-12-04 DIAGNOSIS — A04.72 C. DIFFICILE COLITIS: Primary | ICD-10-CM

## 2020-12-04 PROBLEM — D72.9 NEUTROPHILIA: Status: RESOLVED | Noted: 2020-10-01 | Resolved: 2020-12-04

## 2020-12-04 PROCEDURE — 99443 PR PHYS/QHP TELEPHONE EVALUATION 21-30 MIN: CPT | Performed by: INTERNAL MEDICINE

## 2020-12-04 NOTE — PROGRESS NOTES
Cipriano Flores is a 72 y.o. female, evaluated via audio-only technology on 12/4/2020 for Transitions Of Care and Diarrhea  . Assessment & Plan:   Diagnoses and all orders for this visit:    1. C. difficile colitis    2. Primary hypertension        12  Subjective:     No flowsheet data found. Matias Marie, who was evaluated through a patient-initiated, synchronous (real-time) audio only encounter, and/or her healthcare decision maker, is aware that it is a billable service, with coverage as determined by her insurance carrier. She provided verbal consent to proceed: Yes. She has not had a related appointment within my department in the past 7 days or scheduled within the next 24 hours. Total Time: minutes: 21-30 minutes    Damir Masters MD     Briefly, she had right THR in October and course was complicated by c diff colitis after she got clinda postop. We treated her in November with flagyl and she did recover. We actually saw her 11/18/20 for edema which we treated with low dose diuretic and she was having no gi complaints. Unfortunately, she started having inc abd pain along with fevers which persisted so she presented to [facility] and was admitted 11/21-27/20. Wbc 21k, lactate 0.7, covid neg, urine cx neg, shiga/campylo neg, c diff positive. CT showed colitis in prox right colon and was started on vanco by ID. Seen by gen surg and they initially considered temporary ileostomy placement but she got better clinically so they held off.   They tried to send her home on dificid but not covered so she went home on po vanco.  She is clinically much better, eating well, gaining weight, loose stools resolved    Past Medical History:   Diagnosis Date    Anxiety 6/15    IKE-7 was 12/21    Clostridium difficile colitis     Dr. Loco Colon 3/14; postop 10/20    Colon adenoma 10/2011    Dr. Fletcher Santiago; Dr Naty Leung 7/20    Cystic breast     Depression 6/15    PHQ-9 was 15/27    Diverticulitis recurrent x3 Dr Manju Bledsoe liver     10/10 on US, CT ; Fib-4 was 0.74 from 1/15    GI bleed     ischemic colitis on CT, seen by Dr. Deepika Gil Hyperlipidemia     calculated 10 year risk score was 3.2% (1/15)    Hypertension     IFG (impaired fasting glucose) [id number]    Ischemic colitis ([facility] [address] 3/14    Dr Ursula Munoz    Labial abscess     mrsa    Lymphoma ([facility] [address]     Dr. Erin Newell    Migraine     Nephrolithiasis     Neuropathy due to chemotherapeutic drug ([facility] [address]     Osteoarthritis     hips and knees; Dr Ivonne Tirado Tobacco dependence syndrome 2019    Zoster     left T11     Past Surgical History:   Procedure Laterality Date    COLONOSCOPY N/A 2020    Dr Atiya Esposito 2003 polyps;  adenoma; Dr Denise Reeves 3/14 isch colitis; Dr Ashley Steele 20 mod divertics and adenoma    HX APPENDECTOMY  1968    HX HIP REPLACEMENT Right 10/2020    Dr Enrique Bob    LEFT knee surgery    HX ORTHOPAEDIC      DEXA t score 2.1 spine, -0.3 hip ()    HX SOPHIA AND BSO      Dr. 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[employer]   Social Needs    Financial resource strain: Not on file    Food insecurity     Worry: Not on file     Inability: Not on file   [employer] needs     Medical: Not on file     Non-medical: Not on file   Tobacco Use    Smoking status: Former Smoker     Packs/day: 0.50     Years: 15.00     Pack years: 7.50     Types: Cigarettes     Last attempt to quit: 2020     Years since quittin.2    Smokeless tobacco: Never Used    Tobacco comment: smoking cessation advised   Substance and Sexual Activity    Alcohol use: Not Currently     Alcohol/week: 4.2 standard drinks     Types: 5 Glasses of wine per week    Drug use:  No  Sexual activity: Not Currently   Lifestyle    Physical activity     Days per week: Not on file     Minutes per session: Not on file    Stress: Not on file   Relationships    Social connections     Talks on phone: Not on file     Gets together: Not on file     Attends Gnosticist service: Not on file     Active member of club or organization: Not on file     Attends meetings of clubs or organizations: Not on file     Relationship status: Not on file    Intimate partner violence     Fear of current or ex partner: Not on file     Emotionally abused: Not on file     Physically abused: Not on file     Forced sexual activity: Not on file   Other Topics Concern    Not on file   Social History Narrative    Not on file     Current Outpatient Medications   Medication Sig    triamterene-hydroCHLOROthiazide (DYAZIDE) 37.5-25 mg per capsule Take 1 Cap by mouth daily.  ondansetron hcl (Zofran) 4 mg tablet Take 1 Tab by mouth every eight (8) hours as needed for Nausea or Vomiting.  docusate sodium (Colace) 100 mg capsule Take 1 Cap by mouth two (2) times a day for 90 days.  aspirin (ASPIRIN) 325 mg tablet Take 1 Tab by mouth two (2) times a day.  acetaminophen (TylenoL) 325 mg tablet Take 325 mg by mouth every four (4) hours as needed for Pain.  amLODIPine (NORVASC) 5 mg tablet TAKE 1 TABLET BY MOUTH ONCE DAILY    ferrous sulfate (IRON) 325 mg (65 mg iron) tablet Take 1 Tab by mouth Daily (before breakfast). Takes every other day     No current facility-administered medications for this visit.       Allergies   Allergen Reactions    Meperidine Nausea Only and Other (comments)    Augmentin [Amoxicillin-Pot Clavulanate] Diarrhea and Nausea Only    Keflex [Cephalexin] Nausea Only    Sulfa (Sulfonamide Antibiotics) Rash     Patient Active Problem List   Diagnosis Code    Hyperlipidemia E78.5    Mantle cell lymphoma of lymph nodes of multiple sites ([facility] [address] C83.18    Neuropathy due to chemotherapeutic drug ([facility] [address] G62.0, T45.1X5A    Arthritis, degenerative M19.90    IFG (impaired fasting glucose) R73.01    Tobacco dependence syndrome F17.200    Colon adenoma D12.6    Diverticulosis K57.90    Primary hypertension I10    Iron deficiency anemia D50.9    Hip arthritis M16.10     Assessment and plan:  1. Recurrent c diff. Long discussion about c diff in general including fecal transplant for recurring cases. She is finishing the po vanco 14d. Was supposed to see Dr John Land group on 11/24/20 so asked her to call them back and be rescheduled  2. Continue all other meds      Above conditions discussed at length and patient vocalized understanding.   All questions answered to patient satisfaction

## 2020-12-07 ENCOUNTER — HOSPITAL ENCOUNTER (OUTPATIENT)
Dept: PHYSICAL THERAPY | Age: 70
Discharge: HOME | End: 2020-12-07
Payer: MEDICARE

## 2020-12-07 PROCEDURE — 97162 PT EVAL MOD COMPLEX 30 MIN: CPT

## 2020-12-07 PROCEDURE — 97140 MANUAL THERAPY 1/> REGIONS: CPT

## 2020-12-07 PROCEDURE — 97110 THERAPEUTIC EXERCISES: CPT

## 2020-12-07 NOTE — PROGRESS NOTES
PT DAILY TREATMENT NOTE     Patient Name: Jean Marie Salas  Date:2020  : 1950  [x]  Patient  Verified  Payor: Armen Arambula / Plan: [place] & B / Product Type: Medicare /    In time:417  Out time:511  Total Treatment Time (min): 54  Visit #: 1 of     Medicare/BCBS Only   Total Timed Codes (min):  23 1:1 Treatment Time:  49       Treatment Area: Other acute postprocedural pain [G89.18]  Pain in right hip [M25.551]    SUBJECTIVE  Pain Level (0-10 scale):  2  Any medication changes, allergies to medications, adverse drug reactions, diagnosis change, or new procedure performed?: [x] No    [] Yes (see summary sheet for update)  Subjective functional status/changes:   [] No changes reported  Pt initial eval see POC for details    OBJECTIVE    Modality rationale: decrease pain to improve the patient's ability to tolerate ADLs   Min Type Additional Details    [] Estim:  []Unatt       []IFC  []Premod                        []Other:  []w/ice   []w/heat  Position:  Location:    [] Estim: []Att    []TENS instruct  []NMES                    []Other:  []w/US   []w/ice   []w/heat  Position:  Location:    []  Traction: [] Cervical       []Lumbar                       [] Prone          []Supine                       []Intermittent   []Continuous Lbs:  [] before manual  [] after manual    []  Ultrasound: []Continuous   [] Pulsed                           []1MHz   []3MHz W/cm2:  Location:    []  Iontophoresis with dexamethasone         Location: [] Take home patch   [] In clinic   5 [x]  Ice     []  heat  []  Ice massage  []  Laser   []  Anodyne Position:L sidelying with pillow between knees   Location:R hip    []  Laser with stim  []  Other:  Position:  Location:    []  Vasopneumatic Device Pressure:       [] lo [] med [] hi   Temperature: [] lo [] med [] hi   [] Skin assessment post-treatment:  []intact []redness- no adverse reaction    []redness  adverse reaction:     26 min [x]Eval []Re-Eval       15 min Therapeutic Exercise:  [] See flow sheet :   Rationale: increase ROM and increase strength to improve the patient's ability to walk safely    8 min Manual Therapy:  stm to glute med, TFL, hip flexor tendon, scar mobilization    The manual therapy interventions were performed at a separate and distinct time from the therapeutic activities interventions. Rationale: decrease pain, increase ROM and increase tissue extensibility to improve tissue excursion during functional mobility and ADLs          With   [] TE   [] TA   [] neuro   [] other: Patient Education: [x] Review HEP    [] Progressed/Changed HEP based on:   [] positioning   [] body mechanics   [] transfers   [] heat/ice application    [] other:      Other Objective/Functional Measures:    Justification for Eval Code Complexity:  Patient History : see POC   Examination see exam   Clinical Presentation: evolving  Clinical Decision Making : FOTO : 47/100    Pain Level (0-10 scale) post treatment: 2    ASSESSMENT/Changes in Function: Pt was instructed in initial HEP required demo, vc, and tc for all exercises to perform correctly. Pt was given hand out detailing exercises and instructed in modification of exercises to tolerance, and in performing exercises safely. Pt verbalized understanding. Patient will continue to benefit from skilled PT services to modify and progress therapeutic interventions, address functional mobility deficits, address ROM deficits, address strength deficits, analyze and address soft tissue restrictions, analyze and cue movement patterns, analyze and modify body mechanics/ergonomics, assess and modify postural abnormalities, address imbalance/dizziness and instruct in home and community integration to attain remaining goals.      [x]  See Plan of Care  []  See progress note/recertification  []  See Discharge Summary         Progress towards goals / Updated goals:  No progress as goals were set today    PLAN  []  Upgrade activities as tolerated     []  Continue plan of care  []  Update interventions per flow sheet       []  Discharge due to:_  []  Other:_      Hima Ordoñez 12/7/2020  2:13 PM    Future Appointments   Date Time Provider Emma Barrera   12/7/2020  4:15 PM [facility]   4/7/2021  8:30 AM Deirdre Robles MD [id number] BS AMB   5/5/2021  8:05 AM IO NURSE VISIT [facility] AMB   5/12/2021  8:20 AM Brandi Roger MD [facility] BS AMB

## 2020-12-07 NOTE — PROGRESS NOTES
[facility] PHYSICAL THERAPY AT [address] [facility] [address]  Phone: [phone] Fax: [phone] / STATEMENT OF MEDICAL NECESSITY FOR PHYSICAL THERAPY SERVICES  Patient Name: Willie Hanson : 1950   Medical   Diagnosis: Other acute postprocedural pain [G89.18]  Pain in right hip [M25.551] Treatment Diagnosis: Pain in right hip [M25.551]  Other acute postprocedural pain [G89.18]   Onset Date:  10/13/20 R DRE     Referral Source: Emily Bains MD [facility]): 2020   Prior Hospitalization: See medical history Provider #: [id number]   Prior Level of Function: Functionally I with all activities, able to walk 4 blocks with no AD, able to walk dogs, navigate stairs   Comorbidities: HTN, c-diff   Medications: Verified on Patient Summary List   The Plan of Care and following information is based on the information from the initial evaluation.   ========================================================================  Assessment / key information:  Pt is a 72y.o. year old F who presents with R hip pain s/p posterior approach DRE DOS 10/13/20. Current deficits include: dec hip ROM, dec hip strength, dec balance. Functional deficits include: dec ambulation endurance, fear of falling, dec ability to navigate stairs, dec ability to ambulate on soft surfaces . FOTO score indicates 53% functional impairment. Home exercise program initiated on initial evaluation to address these deficits. Pt would benefit from PT to address these deficits for increased functional mobility and QOL. MAGGIE: Pt reports that she had a R posterior approach hip replacement on 10/13/20 which went well, she did 2 weeks of home health therapy which went well. Shortly after she finished home health therapy she developed c-diff which resulted in two hospitalizations. The c-diff has since resolved, she is on her last day of meds today.  She is concerned that the complication with the c-diff has set her back with the progress on her hip. The hip currently is not too painful, it gets achy with inc activity. She is concerned about falling, and she feels a bit unsteady especially with incline/declines and soft surfaces. She is also having some trouble with stairs and has 4 stairs in the back of her home. She feels the hip needs more strength and better balance. She is also limited with walking and is limited to about 1 block of walking whereas prior she could walk for 4 blocks. She is also unable to walk her dogs due to limited endurance walking, dec balance and strength as the dogs tend to pull and she is worried about falling. She is aware and compliant with her hip precautions. She also reports leg length discrepancy which she is seeing a shoe lift provider for. OBJECTIVE:  PAIN:  Location- R hip  Current- 2  Best-2  Worst-9  Alleviating factors: rest, ice  Aggravating factors: inc activity. MMT:  Hip L= WNL, R= Flex 3/5( to 90 deg per precautions), ext, 3/5, abd 3/5  AROM:  HIP  - Flexion= 90 deg SLR  - Extension 10 deg    Outcome Measure FOTO=47  Mod CTSIB: 3/4, 10s EC on Foam  SLS balance: L=15s, R=1s  Gait: Pt demonstrates mild R trendelenburg, mild R lateral trunk lean, widened CAROLINE, shortened stride length on LLE.    Palpation for tenderness: Pt reports mild TTP of incisional scar, glute med, TFL, IT band, hip flexor    Special Tests:  Not indicated  Pt does demonstrate nearly 1 inch leg length discrepancy between R and LLE    ========================================================================  Eval Complexity: History: MEDIUM  Complexity : 1-2 comorbidities / personal factors will impact the outcome/ POC Exam:MEDIUM Complexity : 3 Standardized tests and measures addressing body structure, function, activity limitation and / or participation in recreation  Presentation: MEDIUM Complexity : Evolving with changing characteristics  Clinical Decision Making:MEDIUM Complexity : FOTO score of 26-74Overall Complexity:MEDIUM  Problem List: pain affecting function, decrease ROM, decrease strength, edema affecting function, impaired gait/ balance, decrease ADL/ functional abilitiies, decrease activity tolerance, decrease flexibility/ joint mobility and decrease transfer abilities   Treatment Plan may include any combination of the following: Therapeutic exercise, Therapeutic activities, Neuromuscular re-education, Physical agent/modality, Gait/balance training, Manual therapy, Patient education, Self Care training, Functional mobility training, Home safety training and Stair training  Patient / Family readiness to learn indicated by: asking questions  Persons(s) to be included in education: patient (P)  Barriers to Learning/Limitations: None  Measures taken:    Patient Goal (s): \"better range of motion and normal gait\"   Patient self reported health status: excellent  Rehabilitation Potential: good  Short Term Goals: To be accomplished in 1 weeks:  1) Goal: Patient will be independent and compliant with HEP in order to progress toward long term goals. Status at last note/certification: issued and reviewed  Long Term Goals: To be accomplished in 8-12 treatments:  1) Goal: Patient will improve FOTO assessment score to 71 pts in order to indicate improved functional abilities. Status at last note/certification: 98HYC  2) Goal: Patient will improve R hip extension to 20 deg in order to demonstrate normalized gait pattern  Status at last note/certification: 10 deg  3) Goal: Patient will report worst R pain as 2/10 or less in order to progress toward personal goals. Status at last note/certification: 7/10  4) Goal: Patient will report overall at least 75% improvement in function in order to progress toward premorbid status.   Status at last note/certification: n/a  5) Goal: Patient will report ability to walk for 4 blocks to walk her dogs  Status at last note/certification: 1 block  6) Goal: Patient will demonstrate >=5/5 strength in R hip for improved ability to walk and navigate stairs  Status at last note/certification: grossly 3/5  7) Pt will demonstrate R SLS of 15s for improved balance and dec fall risk  Status at last note/certification: 1s      Frequency / Duration:   Patient to be seen  1-2  times per week for 4-6  weeks:  Patient / Caregiver education and instruction: exercises  Therapist Signature: Jem Saavedra Date: 86/4/0932   Certification Period: 12/7/20-3/5/21 Time: 2:12 PM   ========================================================================  I certify that the above Physical Therapy Services are being furnished while the patient is under my care. I agree with the treatment plan and certify that this therapy is necessary. Physician Signature:        Date:       Time:   Please sign and return to [facility] or you may fax the signed copy to [phone]. Thank you.

## 2020-12-10 ENCOUNTER — HOSPITAL ENCOUNTER (OUTPATIENT)
Dept: PHYSICAL THERAPY | Age: 70
End: 2020-12-10
Payer: MEDICARE

## 2020-12-14 ENCOUNTER — PATIENT OUTREACH (OUTPATIENT)
Dept: CASE MANAGEMENT | Age: 70
End: 2020-12-14

## 2020-12-14 NOTE — PROGRESS NOTES
Complex Case Management      Date/Time:  2020 10:46 AM    Method of communication with patient:phone    [facility] ([facility]) contacted the patient by telephone to perform Ambulatory Care Coordination. Verified name and  (PHI) with patient as identifiers. Provided introduction to self, and explanation of the Ambulatory Care Manager's role. Reviewed most recent clinic visit w/ patient who verbalized understanding. Patient given an opportunity to ask questions. Top Challenges reviewed with the patient   1. Patient states she is doing well  2. Completing medicatoin for C-diff. 3. Review of medications done with no issues reported. 4. Will continue to follow patient per [facility] protocal     The patient agrees to contact the PCP office or the [facility] for questions related to their healthcare. Provided contact information for future reference. Disease Specific:   N/A    Home Health Active: No    DME Active: Yes    Barriers to care? depression, lack of knowledge about disease, medication management    Advance Care Planning:   Does patient have an Advance Directive:  reviewed and current     Medication(s):   Medication reconciliation was performed with patient, who verbalizes understanding of administration of home medications. There were no barriers to obtaining medications identified at this time. Referral to Pharm D needed: no     Current Outpatient Medications   Medication Sig    triamterene-hydroCHLOROthiazide (DYAZIDE) 37.5-25 mg per capsule Take 1 Cap by mouth daily.  ondansetron hcl (Zofran) 4 mg tablet Take 1 Tab by mouth every eight (8) hours as needed for Nausea or Vomiting.  docusate sodium (Colace) 100 mg capsule Take 1 Cap by mouth two (2) times a day for 90 days.  aspirin (ASPIRIN) 325 mg tablet Take 1 Tab by mouth two (2) times a day.  acetaminophen (TylenoL) 325 mg tablet Take 325 mg by mouth every four (4) hours as needed for Pain.     amLODIPine (NORVASC) 5 mg tablet TAKE 1 TABLET BY MOUTH ONCE DAILY    ferrous sulfate (IRON) 325 mg (65 mg iron) tablet Take 1 Tab by mouth Daily (before breakfast). Takes every other day     No current facility-administered medications for this visit.         [facility] follow up appointment(s):   Future Appointments   Date Time Provider Emma Barrera   12/15/2020 10:00 AM Isatu Amaya PT [facility]   12/21/2020  9:15 AM [facility]   12/28/2020 10:45 AM Mendel Wasserman [facility]   4/7/2021  8:30 AM Harmeet Walker MD [id number] BS AMB   5/5/2021  8:05 AM [facility] NURSE VISIT [facility] BS AMB   5/12/2021  8:20 AM Merlinda Radon, MD [facility] BS AMB            Goals Addressed                 This Visit's Progress     Attend follow up appointments on schedule   On track     Prepare patients and caregivers for end of life decisions (ie. need for hospice, pain management, symptom relief, advance directives etc.)   On track     Takes all medications as ordered   On track

## 2020-12-15 ENCOUNTER — HOSPITAL ENCOUNTER (OUTPATIENT)
Dept: PHYSICAL THERAPY | Age: 70
Discharge: HOME | End: 2020-12-15
Payer: MEDICARE

## 2020-12-15 PROCEDURE — 97110 THERAPEUTIC EXERCISES: CPT | Performed by: GENERAL ACUTE CARE HOSPITAL

## 2020-12-15 PROCEDURE — 97112 NEUROMUSCULAR REEDUCATION: CPT | Performed by: GENERAL ACUTE CARE HOSPITAL

## 2020-12-15 PROCEDURE — 97140 MANUAL THERAPY 1/> REGIONS: CPT | Performed by: GENERAL ACUTE CARE HOSPITAL

## 2020-12-15 NOTE — PROGRESS NOTES
PT DAILY TREATMENT NOTE     Patient Name: Vince Harry  Date:12/15/2020  : 1950  [x]  Patient  Verified  Payor: [place] MEDICARE / Plan: [place] MEDICARE PART A & B / Product Type: Medicare /    In time:10:01  Out time:1053  Total Treatment Time (min): 52  Total Timed Codes (min): 42  1:1 Treatment Time (min): 42   Visit #: 2 of     Treatment Area: Other acute postprocedural pain [G89.18]  Pain in right hip [M25.551]    SUBJECTIVE  Pain Level (0-10 scale): 0/10  Any medication changes, allergies to medications, adverse drug reactions, diagnosis change, or new procedure performed?: [x] No    [] Yes (see summary sheet for update)  Subjective functional status/changes:   [] No changes reported  No pain today, though has been experiencing pain in anterior thigh and groin when she does to much activity. No issues with HEP.      OBJECTIVE  Modality rationale: decrease edema, decrease inflammation and decrease pain to improve the patients ability to ambulate and perform ADL's   Min Type Additional Details    [] Estim: []Att   []Unatt  []TENS instruct                 []IFC  []Premod []NMES                       []Other:  []w/US   []w/ice   []w/heat  Position:  Location:    []  Traction: [] Cervical       []Lumbar                       [] Prone          []Supine                       []Intermittent   []Continuous Lbs:  [] before manual  [] after manual    []  Ultrasound: []Continuous   [] Pulsed                           []1MHz   []3MHz Location:  W/cm2:    []  Iontophoresis with dexamethasone         Location: [] Take home patch   [] In clinic   10 [x]  Ice     []  heat  []  Ice massage Position: L sidelying with pillow between knees  Location: R hip     []  Vasopneumatic Device Pressure: [] lo [] med [] hi   Temp: [] lo [] med [] hi   [x] Skin assessment post-treatment:  [x]intact []redness- no adverse reaction       []redness  adverse reaction:       24 min Therapeutic Exercise:  [] See flow sheet : Rationale: increase ROM and increase strength to improve the patient's ability to walk safely and perform ADL's     8 min Neuromuscular Re-education:  []  See flow sheet : rockerboard, Tandem balance on FR   Rationale: increase strength, improve coordination and improve balance  to improve the patients ability to ambulate over uneven surfaces, dressing ADL's    10 min Manual Therapy:  STM to glute med, TFL, hip flexor tendon, scar mobilization    Rationale: decrease pain, increase ROM and increase tissue extensibility to improve tissue excursion during functional mobility and ADLs  [The manual therapy interventions were performed at a separate and distinct time from the therapeutic activities interventions]             min Patient Education: [x] Review HEP    [] Progressed/Changed HEP based on:   Given yellow and red theraband      Other Objective/Functional Measures:    First FU treatment - initiated exercises per FS   Pain with side step using YTB, therefore completed without    Pain Level (0-10 scale) post treatment: 0/10    ASSESSMENT/Changes in Function: Good tolerance to initial treatment session. Moderately challenged by balance interventions on dynamic surface. Noted weakness of glut med, requiring TC for correct form with clam and SL hip Abd to prevent trunk rotation compensation. Patient will continue to benefit from skilled PT services to modify and progress therapeutic interventions, address functional mobility deficits, address ROM deficits, address strength deficits, analyze and address soft tissue restrictions, analyze and cue movement patterns, analyze and modify body mechanics/ergonomics, assess and modify postural abnormalities, address imbalance/dizziness and instruct in home and community integration to attain remaining goals.      [x]  See Plan of Care  []  See progress note/recertification  []  See Discharge Summary         Progress towards goals / Updated goals:  Short Term Goals: To be accomplished in 1 weeks:  1) Goal: Patient will be independent and compliant with HEP in order to progress toward long term goals. Status at last note/certification: issued and reviewed  Current: pt reports compliance (12/15/20)  Long Term Goals: To be accomplished in 8-12 treatments:  1) Goal: Patient will improve FOTO assessment score to 71 pts in order to indicate improved functional abilities. Status at last note/certification: 27ESU  2) Goal: Patient will improve R hip extension to 20 deg in order to demonstrate normalized gait pattern  Status at last note/certification: 10 deg  3) Goal: Patient will report worst R pain as 2/10 or less in order to progress toward personal goals. Status at last note/certification: 5/90  4) Goal: Patient will report overall at least 75% improvement in function in order to progress toward premorbid status.   Status at last note/certification: n/a  5) Goal: Patient will report ability to walk for 4 blocks to walk her dogs  Status at last note/certification: 1 block  6) Goal: Patient will demonstrate >=5/5 strength in R hip for improved ability to walk and navigate stairs  Status at last note/certification: grossly 3/5  7) Pt will demonstrate R SLS of 15s for improved balance and dec fall risk  Status at last note/certification: 1s       PLAN  [x]  Upgrade activities as tolerated     []  Continue plan of care  []  Update interventions per flow sheet       []  Discharge due to:_  []  Other:_      Aliyah Stephens, PT 12/15/2020  9:50 AM

## 2020-12-17 ENCOUNTER — TELEPHONE (OUTPATIENT)
Dept: INTERNAL MEDICINE CLINIC | Age: 70
End: 2020-12-17

## 2020-12-17 DIAGNOSIS — A04.72 C. DIFFICILE COLITIS: Primary | ICD-10-CM

## 2020-12-17 NOTE — TELEPHONE ENCOUNTER
Pt calling with concerns with gastro appt. Stated Dr Favian Barnes is not available until May 2020 so she scheduled with PA at his office. appt is 01/26/2021    Stated she missed her first appt with them, had to re-schedule because she was in the hospital.     Has great concern because of her gastro issues. She feels better, but since finishing the medicine for the last few days she has pressure in her lower abdomin and bowel movements are getting looser than they should be. Said no fever, no pain     She was wonder if you could possible help expedite her getting a sooner appt.  Stated she just wants to make sure thing don't get worse

## 2020-12-18 ENCOUNTER — APPOINTMENT (OUTPATIENT)
Dept: PHYSICAL THERAPY | Age: 70
End: 2020-12-18
Payer: MEDICARE

## 2020-12-18 DIAGNOSIS — A04.72 C. DIFFICILE COLITIS: ICD-10-CM

## 2020-12-18 NOTE — TELEPHONE ENCOUNTER
Collect stool for c diff again pls    If recurrent we can cover her with abx and try to get her visit expedited    With a diff group if need be    We can't control their schedule if that is the first available    Dr Gonzalo Fry and Dr Sancho Lezama are going part time apparently

## 2020-12-22 ENCOUNTER — APPOINTMENT (OUTPATIENT)
Dept: PHYSICAL THERAPY | Age: 70
End: 2020-12-22
Payer: MEDICARE

## 2020-12-28 ENCOUNTER — APPOINTMENT (OUTPATIENT)
Dept: PHYSICAL THERAPY | Age: 70
End: 2020-12-28
Payer: MEDICARE

## 2020-12-29 ENCOUNTER — PATIENT OUTREACH (OUTPATIENT)
Dept: CASE MANAGEMENT | Age: 70
End: 2020-12-29

## 2020-12-30 ENCOUNTER — APPOINTMENT (OUTPATIENT)
Dept: PHYSICAL THERAPY | Age: 70
End: 2020-12-30
Payer: MEDICARE

## 2021-01-04 ENCOUNTER — APPOINTMENT (OUTPATIENT)
Dept: PHYSICAL THERAPY | Age: 71
End: 2021-01-04

## 2021-01-14 ENCOUNTER — PATIENT OUTREACH (OUTPATIENT)
Dept: CASE MANAGEMENT | Age: 71
End: 2021-01-14

## 2021-01-14 NOTE — PROGRESS NOTES
 ACM closing episode at this time.  ACP has been reviewed and information sent to patient as needed.  Use of MyChart has been discussed with patient/family understanding its use.  Med rec has been completed and up to date at time of closing episode. Importance of taking all medications as prescribed and on time, maintaining an adequate supply of medication, and how to obtain refills.  Goals are updated and met to the best of patient's ability.  Importance of keeping all scheduled appointments and how to make those appointments discussed with patient/family understanding.  Review of symptoms and disease process discussed as need, with patient/family showing understanding.    No further ACM contacts scheduled   Patient/family has ACM contact information for any further questions, concerns or needs   Next follow up MD appointment is 4-7-21 with Dr. Norman Adler

## 2021-01-25 ENCOUNTER — PATIENT MESSAGE (OUTPATIENT)
Dept: INTERNAL MEDICINE CLINIC | Age: 71
End: 2021-01-25

## 2021-01-25 NOTE — TELEPHONE ENCOUNTER
Melodye Cooks Roisen   I am contacting you on behalf of [facility]. This is a reminder that your Mammogram is due . Please contact [facility] at [phone] or [phone]. If you need further assistance with scheduling a Mammogram appointment please contact the office at [phone]  Thank you for your attention to this matter.     KIRSTY Rosen Panel Manager

## 2021-02-11 NOTE — PROGRESS NOTES
[address] [facility] [address] [facility] AT [address]. [place], [address]  Phone: [phone] Fax [phone] SUMMARY  Patient Name: Jessie Wheeler : 1950   Treatment/Medical Diagnosis: Other acute postprocedural pain [G89.18]  Pain in right hip [M25.551]   Referral Source: Liz Cerrato MD     Date of Initial Visit: 2020 Attended Visits: 2 Missed Visits: 3     SUMMARY OF TREATMENT  Pt is a 72y.o. year old F who presents with R hip pain s/p posterior approach DRE DOS 10/13/20. Treatment Plan may include any combination of the following: Therapeutic exercise, Therapeutic activities, Neuromuscular re-education, Physical agent/modality, Gait/balance training, Manual therapy, Patient education, Self Care training, Functional mobility training, Home safety training and Stair training    CURRENT STATUS  Pt only attended 2 session of Physical therapy to address impairments s/p R DRE. Pt demonstrated lack of compliance with attendance to therapy sessions. Pt then called to inform that she has C-Diff and therefore would need to hold therapy until resolved. Pt never called to schedule additional follow up appointments, therefore is being discharged at this time. Unable to complete formalized re-assessment due to this factor. If further issues should arise secondary to R DRE, please send new referral for continuation of physical therapy if indicated. Thank you for this referral!       RECOMMENDATIONS  Discontinue therapy due to lack of attendance or compliance. If you have any questions/comments please contact us directly at [phone]. Thank you for allowing us to assist in the care of your patient.   Therapist Signature: Luis Miguel Kaplan PT Date: 2021   Reporting Period: 2020 to 12/15/2020 Time: 10:44 AM

## 2021-02-18 ENCOUNTER — TELEPHONE (OUTPATIENT)
Dept: INTERNAL MEDICINE CLINIC | Age: 71
End: 2021-02-18

## 2021-02-18 DIAGNOSIS — D12.6 COLON ADENOMA: Primary | ICD-10-CM

## 2021-02-18 DIAGNOSIS — I10 PRIMARY HYPERTENSION: ICD-10-CM

## 2021-02-18 NOTE — TELEPHONE ENCOUNTER
Patient also requesting a new Rx for Colace. Please advise and pend new Rx if appropriate. Last Visit: 12/4/20 with MD Ernesto Sanders  Next Appointment: 5/12/21 with MD Ernesto Sanders  Previous Refill Encounter(s): 1/16/20 #90 with 3 refills    Requested Prescriptions     Pending Prescriptions Disp Refills    amLODIPine (NORVASC) 5 mg tablet 90 Tab 3     Sig: Take 1 Tab by mouth daily.

## 2021-02-18 NOTE — TELEPHONE ENCOUNTER
Referred To:               Ruben, [facility] [place] [address]         Phone:  Fax:      [phone]

## 2021-02-18 NOTE — TELEPHONE ENCOUNTER
Patient stating she went to [facility] today for an appt that she has been waiting for since November. She got new insurance in January and they [facility] Wants to know if she can be referred somewhere else.

## 2021-02-19 RX ORDER — AMLODIPINE BESYLATE 5 MG/1
5 TABLET ORAL DAILY
Qty: 90 TAB | Refills: 3 | Status: SHIPPED | OUTPATIENT
Start: 2021-02-19 | End: 2022-02-08 | Stop reason: SDUPTHER

## 2021-03-24 ENCOUNTER — TELEPHONE (OUTPATIENT)
Dept: INTERNAL MEDICINE CLINIC | Age: 71
End: 2021-03-24

## 2021-03-24 NOTE — TELEPHONE ENCOUNTER
----- Message from Rayshawn Preston sent at 3/23/2021 11:06 AM EDT -----  Regarding: FW: ph call    ----- Message -----  From: Immanuel Jj  Sent: 3/23/2021  10:28 AM EDT  To: [facility] Front Office  Subject: ph call                                          Pt is requesting a ph call regarding a med she is suppose to take before her surgery in may  but wants to speak to the Dr before taking the meds [phone]

## 2021-03-24 NOTE — TELEPHONE ENCOUNTER
Pt stated she is having basal cell carcinoma removed from her nose on may 18. The dermatologist wants to give her Keflex for Prophylaxis treatment before hand. Keflex is listed as an allergy for her and she wants to know would it be okay to take since the allergy is minimal or should something else be sent in. If so what other antibiotics would you recommend  she take.

## 2021-03-24 NOTE — TELEPHONE ENCOUNTER
Keflex is ok if the 'allergy' is nausea - now if she has to take a while, then it could be a problem  Other options are doxycycline, clindamycin - dermatologist's discretion  Not sulfa as she has rash

## 2021-04-02 ENCOUNTER — TELEPHONE (OUTPATIENT)
Dept: INTERNAL MEDICINE CLINIC | Age: 71
End: 2021-04-02

## 2021-04-02 DIAGNOSIS — M54.6 ACUTE THORACIC BACK PAIN, UNSPECIFIED BACK PAIN LATERALITY: ICD-10-CM

## 2021-04-02 DIAGNOSIS — M62.838 MUSCLE SPASM: Primary | ICD-10-CM

## 2021-04-02 RX ORDER — CYCLOBENZAPRINE HCL 10 MG
10 TABLET ORAL
Qty: 30 TAB | Refills: 1 | Status: SHIPPED | OUTPATIENT
Start: 2021-04-02 | End: 2021-04-07

## 2021-04-02 RX ORDER — HYDROCODONE BITARTRATE AND ACETAMINOPHEN 5; 325 MG/1; MG/1
1 TABLET ORAL
Qty: 21 TAB | Refills: 0 | Status: SHIPPED | OUTPATIENT
Start: 2021-04-02 | End: 2021-04-09

## 2021-04-02 NOTE — TELEPHONE ENCOUNTER
Pt said she pulled muscle in her left shoulder and back - pt has been using heat and ibuprofen   tylenol she said now it has gone up towards her neck and waking her up at night . She is asking if RD can call something into her pharmacy .

## 2021-04-05 DIAGNOSIS — D50.9 IRON DEFICIENCY ANEMIA, UNSPECIFIED IRON DEFICIENCY ANEMIA TYPE: ICD-10-CM

## 2021-04-05 DIAGNOSIS — C83.10 MANTLE CELL LYMPHOMA, UNSPECIFIED BODY REGION (HCC): Primary | ICD-10-CM

## 2021-04-05 NOTE — TELEPHONE ENCOUNTER
Pt stated she picked up the Norco and Flexeril and nothing is touching her pain. Pt stated she do not know if it is muscular or something else is going on. Wanted to know if she could take 2 norco at a time and I advised patient to wait and discuss with you in visit tomorrow.

## 2021-04-06 ENCOUNTER — TELEPHONE (OUTPATIENT)
Dept: INTERNAL MEDICINE CLINIC | Age: 71
End: 2021-04-06

## 2021-04-06 ENCOUNTER — OFFICE VISIT (OUTPATIENT)
Dept: INTERNAL MEDICINE CLINIC | Age: 71
End: 2021-04-06
Payer: MEDICARE

## 2021-04-06 ENCOUNTER — HOSPITAL ENCOUNTER (OUTPATIENT)
Dept: GENERAL RADIOLOGY | Age: 71
Discharge: HOME | End: 2021-04-06
Payer: MEDICARE

## 2021-04-06 VITALS
SYSTOLIC BLOOD PRESSURE: 118 MMHG | DIASTOLIC BLOOD PRESSURE: 80 MMHG | HEIGHT: 58 IN | HEART RATE: 84 BPM | TEMPERATURE: 97.3 F | BODY MASS INDEX: 23.51 KG/M2 | OXYGEN SATURATION: 97 % | WEIGHT: 112 LBS | RESPIRATION RATE: 14 BRPM

## 2021-04-06 DIAGNOSIS — M54.2 NECK PAIN: ICD-10-CM

## 2021-04-06 DIAGNOSIS — M54.12 CERVICAL RADICULITIS: ICD-10-CM

## 2021-04-06 DIAGNOSIS — M54.12 CERVICAL RADICULITIS: Primary | ICD-10-CM

## 2021-04-06 DIAGNOSIS — M62.838 MUSCLE SPASM: Primary | ICD-10-CM

## 2021-04-06 DIAGNOSIS — M62.830 BACK SPASM: ICD-10-CM

## 2021-04-06 PROCEDURE — G9899 SCRN MAM PERF RSLTS DOC: HCPCS | Performed by: INTERNAL MEDICINE

## 2021-04-06 PROCEDURE — G8399 PT W/DXA RESULTS DOCUMENT: HCPCS | Performed by: INTERNAL MEDICINE

## 2021-04-06 PROCEDURE — G8427 DOCREV CUR MEDS BY ELIG CLIN: HCPCS | Performed by: INTERNAL MEDICINE

## 2021-04-06 PROCEDURE — 99213 OFFICE O/P EST LOW 20 MIN: CPT | Performed by: INTERNAL MEDICINE

## 2021-04-06 PROCEDURE — 1101F PT FALLS ASSESS-DOCD LE1/YR: CPT | Performed by: INTERNAL MEDICINE

## 2021-04-06 PROCEDURE — G8510 SCR DEP NEG, NO PLAN REQD: HCPCS | Performed by: INTERNAL MEDICINE

## 2021-04-06 PROCEDURE — G8754 DIAS BP LESS 90: HCPCS | Performed by: INTERNAL MEDICINE

## 2021-04-06 PROCEDURE — 3017F COLORECTAL CA SCREEN DOC REV: CPT | Performed by: INTERNAL MEDICINE

## 2021-04-06 PROCEDURE — 72040 X-RAY EXAM NECK SPINE 2-3 VW: CPT

## 2021-04-06 PROCEDURE — G8752 SYS BP LESS 140: HCPCS | Performed by: INTERNAL MEDICINE

## 2021-04-06 PROCEDURE — G8536 NO DOC ELDER MAL SCRN: HCPCS | Performed by: INTERNAL MEDICINE

## 2021-04-06 PROCEDURE — 1090F PRES/ABSN URINE INCON ASSESS: CPT | Performed by: INTERNAL MEDICINE

## 2021-04-06 PROCEDURE — G8420 CALC BMI NORM PARAMETERS: HCPCS | Performed by: INTERNAL MEDICINE

## 2021-04-06 RX ORDER — METAXALONE 800 MG/1
800 TABLET ORAL
Qty: 30 TAB | Refills: 0 | Status: SHIPPED | OUTPATIENT
Start: 2021-04-06 | End: 2021-04-07

## 2021-04-06 RX ORDER — OXYCODONE AND ACETAMINOPHEN 5; 325 MG/1; MG/1
1 TABLET ORAL
Qty: 28 TAB | Refills: 0 | Status: SHIPPED | OUTPATIENT
Start: 2021-04-06 | End: 2021-04-13

## 2021-04-06 RX ORDER — METHYLPREDNISOLONE 4 MG/1
TABLET ORAL
Qty: 1 DOSE PACK | Refills: 0 | Status: SHIPPED | OUTPATIENT
Start: 2021-04-06 | End: 2021-05-03 | Stop reason: ALTCHOICE

## 2021-04-06 NOTE — TELEPHONE ENCOUNTER
PA for metaxalone was denied because Insurance is requiring a preferred alternative. Alternatives are Tizanidine, baclofen, carisoprodol 350mg or methocarbamol.

## 2021-04-06 NOTE — PROGRESS NOTES
Nikko Justice presents today for   Chief Complaint   Patient presents with    Back Pain     back pain radiating down left shoulder x 1 week no injuries              Depression Screening:  3 most recent PHQ Screens 4/6/2021   Little interest or pleasure in doing things Not at all   Feeling down, depressed, irritable, or hopeless Not at all   Total Score PHQ 2 0       Learning Assessment:  Learning Assessment 3/3/2020   PRIMARY LEARNER Patient   HIGHEST LEVEL OF EDUCATION - PRIMARY LEARNER  [address] [place] [address] PRIMARY LEARNER NONE   CO-LEARNER CAREGIVER No   PRIMARY LANGUAGE ENGLISH   LEARNER PREFERENCE PRIMARY DEMONSTRATION     -     -     -     -   ANSWERED BY patient   RELATIONSHIP SELF       Abuse Screening:  Abuse Screening Questionnaire 4/6/2021   Do you ever feel afraid of your partner? N   Are you in a relationship with someone who physically or mentally threatens you? N   Is it safe for you to go home? Y       Fall Risk  Fall Risk Assessment, last 12 mths 4/6/2021   Able to walk? Yes   Fall in past 12 months? 0   Do you feel unsteady? 0   Are you worried about falling 0           Coordination of Care:  1. Have you been to the ER, urgent care clinic since your last visit? Hospitalized since your last visit? no    2. Have you seen or consulted any other health care providers outside of the [address] [place] [address] since your last visit? Include any pap smears or colon screening.  no

## 2021-04-06 NOTE — PROGRESS NOTES
72 y.o. WHITE female who presents for evaluation. Two weeks before Easter, she was doing a lot of work outside in [employer], gardening, bagging up debris, etc.  No injuries or trauma at that point. 2 days later, she received her 2nd Covid shot. A day after that she started having pain in her neck and left upper back predominantly. She is not known to have spine disease. Again does not recall any specific injury or trauma. She used OTC meds with minimal relief, finally called the office late last week and we gave her initially Flexeril and then Norco.  No improvement whatsoever. Through the weekend, she is now noticing pain going down into the left upper extremity in the C7 distribution. No weakness, numbness. No bruising or rash noted.   She has been using heating pads as well    Past Medical History:   Diagnosis Date    Anxiety 6/15    IKE-7 was 12/21    Clostridium difficile colitis     Dr. Radha Frances 3/14; postop 10/20    Colon adenoma 10/2011    Dr. Kaila Garner; Dr Anthony Houston 7/20    Cystic breast     Depression 6/15    PHQ-9 was 15/27    Diverticulitis     recurrent x3 Dr Matt Martinez liver     10/10 on US, CT 6/12; Fib-4 was 0.74 from 1/15    GI bleed 2/14    ischemic colitis on CT, seen by Dr. Freida Horton Hyperlipidemia     calculated 10 year risk score was 3.2% (1/15)    Hypertension     IFG (impaired fasting glucose) [id number]    Ischemic colitis ([facility] [address] 3/14    Dr Radha Frances    Labial abscess     mrsa    Lymphoma ([facility] [address] 12/13    Dr. Chayito Reardon    Migraine     Nephrolithiasis     Neuropathy due to chemotherapeutic drug ([facility] [address] 9/14    Osteoarthritis     hips and knees; Dr Linda Kim Tobacco dependence syndrome 2/8/2019    Zoster 8/14    left T11     Current Outpatient Medications   Medication Sig    methylPREDNISolone (MEDROL DOSEPACK) 4 mg tablet Per dose pack instructions    oxyCODONE-acetaminophen (PERCOCET) 5-325 mg per tablet Take 1 Tab by mouth every six (6) hours as needed for Pain for up to 7 days. Max Daily Amount: 4 Tabs.  metaxalone (SKELAXIN) 800 mg tablet Take 1 Tab by mouth three (3) times daily as needed for Muscle Spasm(s).  cyclobenzaprine (FLEXERIL) 10 mg tablet Take 1 Tab by mouth three (3) times daily as needed for Muscle Spasm(s).  amLODIPine (NORVASC) 5 mg tablet Take 1 Tab by mouth daily.  acetaminophen (TylenoL) 325 mg tablet Take 325 mg by mouth every four (4) hours as needed for Pain.  ferrous sulfate (IRON) 325 mg (65 mg iron) tablet Take 1 Tab by mouth Daily (before breakfast). Takes every other day    HYDROcodone-acetaminophen (NORCO) 5-325 mg per tablet Take 1 Tab by mouth every eight (8) hours as needed for Pain for up to 7 days. Max Daily Amount: 3 Tabs. No current facility-administered medications for this visit. Allergies   Allergen Reactions    Meperidine Nausea Only and Other (comments)    Augmentin [Amoxicillin-Pot Clavulanate] Diarrhea and Nausea Only    Keflex [Cephalexin] Nausea Only    Sulfa (Sulfonamide Antibiotics) Rash     Visit Vitals  /80   Pulse 84   Temp 97.3 °F (36.3 °C) (Temporal)   Resp 14   Ht 4' 9.5\" (1.461 m)   Wt 112 lb (50.8 kg)   SpO2 97%   BMI 23.82 kg/m²   Neck supple with full range of motion. Negative Spurling sign. Mild midline tenderness in the lower cervical and upper thoracic region. There was marked paraspinal muscle tenderness and trapezius tenderness and spasm on the left primarily. No bruising or rash. Bilateral upper extremity strength symmetrical.  Pulses 2+ DP and PT. Reflexes intact as well    Assessment and plan:  1. Cervical radiculitis. Muscle spasms. Change to Percocet for pain, Skelaxin for spasm, I gave her Medrol Dosepak and we will order C-spine films. If no improvement will send to spine, may need MRI as well with h/o prior malignancy        Above conditions discussed at length and patient vocalized understanding.   All questions answered to patient satisfaction

## 2021-04-07 RX ORDER — BACLOFEN 10 MG/1
10 TABLET ORAL
Qty: 30 TAB | Refills: 0 | Status: SHIPPED | OUTPATIENT
Start: 2021-04-07 | End: 2021-04-21 | Stop reason: SDUPTHER

## 2021-04-09 ENCOUNTER — TELEPHONE (OUTPATIENT)
Age: 71
End: 2021-04-09

## 2021-04-12 ENCOUNTER — TELEPHONE (OUTPATIENT)
Dept: INTERNAL MEDICINE CLINIC | Age: 71
End: 2021-04-12

## 2021-04-12 DIAGNOSIS — M47.22 OSTEOARTHRITIS OF SPINE WITH RADICULOPATHY, CERVICAL REGION: Primary | ICD-10-CM

## 2021-04-12 NOTE — TELEPHONE ENCOUNTER
pls call      HISTORY: Cervical radiculitis.     Straightening of the curvature with trace anterior spondylolisthesis of C3 on C4  and retrolisthesis of C5 on C6. Mild chronic loss of vertebral body height in  C5. Degenerative disease is most severe at C5-C6. Facets and spinous processes  are intact. No prevertebral soft tissue swelling. C1-C2 alignment is normal.      Is she better?   If not, suggest spine and possibly mri

## 2021-04-13 NOTE — TELEPHONE ENCOUNTER
Pt stated she is no better. The oxycodone helps some but she cannot take that at work. Still having the numbness and tingling. She is interested in spine referral and MRI.

## 2021-04-13 NOTE — TELEPHONE ENCOUNTER
sched first available dr Jenny Garduno group pls  Mri c spine ordered although may not be covered per insurance  Usually, spine can get done and we are blocked from ordering

## 2021-04-15 ENCOUNTER — PATIENT OUTREACH (OUTPATIENT)
Dept: CASE MANAGEMENT | Age: 71
End: 2021-04-15

## 2021-04-15 NOTE — PROGRESS NOTES
Attempted to contact patient for Complex Care follow up. No answer, unable to leave a message as the VMB was full. Will attempt to contact at a later time.

## 2021-04-20 ENCOUNTER — PATIENT OUTREACH (OUTPATIENT)
Dept: CASE MANAGEMENT | Age: 71
End: 2021-04-20

## 2021-04-21 ENCOUNTER — HOSPITAL ENCOUNTER (OUTPATIENT)
Dept: INFUSION THERAPY | Age: 71
Discharge: HOME | End: 2021-04-21
Payer: MEDICARE

## 2021-04-21 VITALS
SYSTOLIC BLOOD PRESSURE: 137 MMHG | TEMPERATURE: 98.3 F | OXYGEN SATURATION: 99 % | HEART RATE: 66 BPM | DIASTOLIC BLOOD PRESSURE: 72 MMHG

## 2021-04-21 DIAGNOSIS — D50.9 IRON DEFICIENCY ANEMIA, UNSPECIFIED IRON DEFICIENCY ANEMIA TYPE: ICD-10-CM

## 2021-04-21 DIAGNOSIS — C83.10 MANTLE CELL LYMPHOMA, UNSPECIFIED BODY REGION (HCC): ICD-10-CM

## 2021-04-21 DIAGNOSIS — M62.838 MUSCLE SPASM: ICD-10-CM

## 2021-04-21 LAB
ALBUMIN SERPL-MCNC: 4.3 G/DL (ref 3.4–5)
ALBUMIN/GLOB SERPL: 1.1 {RATIO} (ref 0.8–1.7)
ALP SERPL-CCNC: 63 U/L (ref 45–117)
ALT SERPL-CCNC: 16 U/L (ref 13–56)
ANION GAP SERPL CALC-SCNC: 6 MMOL/L (ref 3–18)
AST SERPL-CCNC: 11 U/L (ref 10–38)
BASO+EOS+MONOS # BLD AUTO: 0.5 K/UL (ref 0–2.3)
BASO+EOS+MONOS NFR BLD AUTO: 6 % (ref 0.1–17)
BILIRUB SERPL-MCNC: 0.3 MG/DL (ref 0.2–1)
BUN SERPL-MCNC: 13 MG/DL (ref 7–18)
BUN/CREAT SERPL: 17 (ref 12–20)
CALCIUM SERPL-MCNC: 9.4 MG/DL (ref 8.5–10.1)
CHLORIDE SERPL-SCNC: 109 MMOL/L (ref 100–111)
CO2 SERPL-SCNC: 24 MMOL/L (ref 21–32)
CREAT SERPL-MCNC: 0.78 MG/DL (ref 0.6–1.3)
DIFFERENTIAL METHOD BLD: ABNORMAL
ERYTHROCYTE [DISTWIDTH] IN BLOOD BY AUTOMATED COUNT: 14.3 % (ref 11.5–14.5)
FERRITIN SERPL-MCNC: 46 NG/ML (ref 8–388)
FOLATE SERPL-MCNC: >20 NG/ML (ref 3.1–17.5)
GLOBULIN SER CALC-MCNC: 3.9 G/DL (ref 2–4)
GLUCOSE SERPL-MCNC: 108 MG/DL (ref 74–99)
HCT VFR BLD AUTO: 37.9 % (ref 36–48)
HGB BLD-MCNC: 12.8 G/DL (ref 12–16)
IRON SATN MFR SERPL: 15 % (ref 20–50)
IRON SERPL-MCNC: 56 UG/DL (ref 50–175)
LYMPHOCYTES # BLD: 1.6 K/UL (ref 1.1–5.9)
LYMPHOCYTES NFR BLD: 20 % (ref 14–44)
MCH RBC QN AUTO: 30.3 PG (ref 25–35)
MCHC RBC AUTO-ENTMCNC: 33.8 G/DL (ref 31–37)
MCV RBC AUTO: 89.8 FL (ref 78–102)
NEUTS SEG # BLD: 5.9 K/UL (ref 1.8–9.5)
NEUTS SEG NFR BLD: 74 % (ref 40–70)
PLATELET # BLD AUTO: 285 K/UL (ref 135–420)
POTASSIUM SERPL-SCNC: 4.6 MMOL/L (ref 3.5–5.5)
PROT SERPL-MCNC: 8.2 G/DL (ref 6.4–8.2)
RBC # BLD AUTO: 4.22 M/UL (ref 4.1–5.1)
SODIUM SERPL-SCNC: 139 MMOL/L (ref 136–145)
T4 FREE SERPL-MCNC: 1 NG/DL (ref 0.7–1.5)
TIBC SERPL-MCNC: 383 UG/DL (ref 250–450)
TSH SERPL DL<=0.05 MIU/L-ACNC: 1.12 UIU/ML (ref 0.36–3.74)
VIT B12 SERPL-MCNC: 300 PG/ML (ref 211–911)
WBC # BLD AUTO: 8 K/UL (ref 4.5–13)

## 2021-04-21 PROCEDURE — 82607 VITAMIN B-12: CPT

## 2021-04-21 PROCEDURE — 88185 FLOWCYTOMETRY/TC ADD-ON: CPT

## 2021-04-21 PROCEDURE — 88184 FLOWCYTOMETRY/ TC 1 MARKER: CPT

## 2021-04-21 PROCEDURE — 83540 ASSAY OF IRON: CPT

## 2021-04-21 PROCEDURE — 85025 COMPLETE CBC W/AUTO DIFF WBC: CPT

## 2021-04-21 PROCEDURE — 82728 ASSAY OF FERRITIN: CPT

## 2021-04-21 PROCEDURE — 84439 ASSAY OF FREE THYROXINE: CPT

## 2021-04-21 PROCEDURE — 85049 AUTOMATED PLATELET COUNT: CPT

## 2021-04-21 PROCEDURE — 36415 COLL VENOUS BLD VENIPUNCTURE: CPT

## 2021-04-21 PROCEDURE — 80053 COMPREHEN METABOLIC PANEL: CPT

## 2021-04-21 NOTE — PROGRESS NOTES
[facility] Progress Note    Date: 2021    Name: Jared De Luna    MRN: [id number]         : 1950    Peripheral Lab Draw      Ms. Marie to [facility], ambulatory at 2469 accompanied by self. Pt was assessed and education was provided. Ms. Anthony Sue vitals were reviewed and patient was observed for 5 minutes prior to treatment. Visit Vitals  /72 (BP 1 Location: Left arm, BP Patient Position: Sitting)   Pulse 66   Temp 98.3 °F (36.8 °C)   SpO2 99%     Recent Results (from the past 12 hour(s))   CBC WITH 3 PART DIFF    Collection Time: 21 10:01 AM   Result Value Ref Range    WBC 8.0 4.5 - 13.0 K/uL    RBC 4.22 4.10 - 5.10 M/uL    HGB 12.8 12.0 - 16.0 g/dL    HCT 37.9 36 - 48 %    MCV 89.8 78 - 102 FL    MCH 30.3 25.0 - 35.0 PG    MCHC 33.8 31 - 37 g/dL    RDW 14.3 11.5 - 14.5 %    NEUTROPHILS 74 (H) 40 - 70 %    MIXED CELLS 6 0.1 - 17 %    LYMPHOCYTES 20 14 - 44 %    ABS. NEUTROPHILS 5.9 1.8 - 9.5 K/UL    ABS. MIXED CELLS 0.5 0.0 - 2.3 K/uL    ABS. LYMPHOCYTES 1.6 1.1 - 5.9 K/UL    DF AUTOMATED         Blood obtained peripherally from left arm x 1 attempt with butterfly needle and sent to lab for Cbc w/diff, Cmp, Iron Profile, Ferritin, Tsh, Free T4, Perpheral smear, Vitamin B12 & Folate per written orders. No bleeding or hematoma noted at site. Gauze and coban applied. Ms. Sandy Cha tolerated the phlebotomy, and had no complaints. Patient armband removed and shredded. Ms. Sandy Cha was discharged from [place] [address] in stable condition at 1001.      Buzz Marti Phlebotomist PCT  2021  11:05 AM

## 2021-04-21 NOTE — TELEPHONE ENCOUNTER
Last Visit: 4/6/21 with MD Tiffanie Thayer  Next Appointment: 5/12/21 with MD Tiffanie Thayer  Previous Refill Encounter(s): 4/7/21 #30    Requested Prescriptions     Pending Prescriptions Disp Refills    baclofen (LIORESAL) 10 mg tablet 30 Tab 0     Sig: Take 1 Tab by mouth two (2) times daily as needed for Muscle Spasm(s).

## 2021-04-22 DIAGNOSIS — D50.9 IRON DEFICIENCY ANEMIA, UNSPECIFIED IRON DEFICIENCY ANEMIA TYPE: Primary | ICD-10-CM

## 2021-04-22 RX ORDER — BACLOFEN 10 MG/1
10 TABLET ORAL
Qty: 60 TAB | Refills: 1 | Status: SHIPPED | OUTPATIENT
Start: 2021-04-22 | End: 2021-06-16 | Stop reason: SDUPTHER

## 2021-04-26 ENCOUNTER — HOSPITAL ENCOUNTER (OUTPATIENT)
Dept: MRI IMAGING | Age: 71
Discharge: HOME | End: 2021-04-26
Attending: INTERNAL MEDICINE

## 2021-04-27 DIAGNOSIS — M47.22 OSTEOARTHRITIS OF SPINE WITH RADICULOPATHY, CERVICAL REGION: ICD-10-CM

## 2021-04-28 ENCOUNTER — TELEPHONE (OUTPATIENT)
Dept: INTERNAL MEDICINE CLINIC | Age: 71
End: 2021-04-28

## 2021-04-28 NOTE — TELEPHONE ENCOUNTER
Patient stated she did labs with Dr. Fred Martin last week, and shes scheduled to go in next week for iron infusions. Patient stated she has lots of various appointments, and she wants to know if she needs to be seen by RD as scheduled or if she should reschedule after her results come in. Please advise.

## 2021-04-29 ENCOUNTER — HOSPITAL ENCOUNTER (OUTPATIENT)
Dept: MRI IMAGING | Age: 71
Discharge: HOME | End: 2021-04-29
Attending: INTERNAL MEDICINE
Payer: MEDICARE

## 2021-04-29 PROCEDURE — 72141 MRI NECK SPINE W/O DYE: CPT

## 2021-04-29 RX ORDER — ONDANSETRON 2 MG/ML
8 INJECTION INTRAMUSCULAR; INTRAVENOUS AS NEEDED
Status: CANCELLED | OUTPATIENT
Start: 2021-05-04

## 2021-04-29 RX ORDER — HEPARIN 100 UNIT/ML
300-500 SYRINGE INTRAVENOUS AS NEEDED
Status: CANCELLED
Start: 2021-05-04

## 2021-04-29 RX ORDER — DIPHENHYDRAMINE HYDROCHLORIDE 50 MG/ML
50 INJECTION, SOLUTION INTRAMUSCULAR; INTRAVENOUS AS NEEDED
Status: CANCELLED
Start: 2021-05-04

## 2021-04-29 RX ORDER — EPINEPHRINE 1 MG/ML
0.3 INJECTION, SOLUTION, CONCENTRATE INTRAVENOUS AS NEEDED
Status: CANCELLED | OUTPATIENT
Start: 2021-05-04

## 2021-04-29 RX ORDER — SODIUM CHLORIDE 0.9 % (FLUSH) 0.9 %
10 SYRINGE (ML) INJECTION AS NEEDED
Status: CANCELLED | OUTPATIENT
Start: 2021-05-04

## 2021-04-29 RX ORDER — DIPHENHYDRAMINE HYDROCHLORIDE 50 MG/ML
25 INJECTION, SOLUTION INTRAMUSCULAR; INTRAVENOUS AS NEEDED
Status: CANCELLED
Start: 2021-05-04

## 2021-04-29 RX ORDER — ACETAMINOPHEN 325 MG/1
650 TABLET ORAL AS NEEDED
Status: CANCELLED
Start: 2021-05-04

## 2021-04-29 RX ORDER — HYDROCORTISONE SODIUM SUCCINATE 100 MG/2ML
100 INJECTION, POWDER, FOR SOLUTION INTRAMUSCULAR; INTRAVENOUS AS NEEDED
Status: CANCELLED | OUTPATIENT
Start: 2021-05-04

## 2021-04-29 RX ORDER — ALBUTEROL SULFATE 0.83 MG/ML
2.5 SOLUTION RESPIRATORY (INHALATION) AS NEEDED
Status: CANCELLED
Start: 2021-05-04

## 2021-04-30 ENCOUNTER — TELEPHONE (OUTPATIENT)
Dept: INTERNAL MEDICINE CLINIC | Age: 71
End: 2021-04-30

## 2021-04-30 DIAGNOSIS — M54.12 CERVICAL RADICULITIS: Primary | ICD-10-CM

## 2021-04-30 RX ORDER — OXYCODONE AND ACETAMINOPHEN 5; 325 MG/1; MG/1
1 TABLET ORAL
Qty: 28 TAB | Refills: 0 | Status: SHIPPED | OUTPATIENT
Start: 2021-04-30 | End: 2021-05-07

## 2021-04-30 NOTE — PROGRESS NOTES
[facility]  [address] [place] [address], [facility] [address]  Phone: [phone]  Fax: [phone]    NEW PATIENT  Pt's YOB: 1950    ASSESSMENT   Diagnoses and all orders for this visit:    1. Left cervical radiculopathy  -     REFERRAL TO PHYSICAL THERAPY  -     gabapentin (Neurontin) 100 mg capsule; Take 1 in the evening for 1 week then increase to 2    2. Foraminal stenosis of cervical region  -     REFERRAL TO PHYSICAL THERAPY    3. Neuritis  -     REFERRAL TO PHYSICAL THERAPY  -     gabapentin (Neurontin) 100 mg capsule; Take 1 in the evening for 1 week then increase to 2    4. Muscle spasm  -     REFERRAL TO PHYSICAL THERAPY         IMPRESSION AND PLAN:  Brittanie Peterson is a 72 y.o. female with history of neck pain. Pt complains of pain in the neck that radiates down the left arm x 1 month ago. She also complains of numbness/tingling in the left hand. Pt has not attended physical therapy and notes minimal relief when using [employer] and capsaicin. 1) Pt was given information on cervical arthritis exercises. 2) Discussed treatment options with the patient including medication and physical therapy. 3) She was referred to cervical physical therapy with traction. 4) Pt was prescribed Neurontin 100 mg 2 caps QHS, tapering up as directed. 5) Ms. Poonam Bui has a reminder for a \"due or due soon\" health maintenance. I have asked that she contact her primary care provider, Fany Noble MD, for follow-up on this health maintenance. 6)  demonstrated consistency with prescribing. Follow-up and Dispositions    · Return in about 5 weeks (around 6/7/2021) for PT follow up, Medication follow up. HISTORY OF PRESENT ILLNESS:  Brittanie Peterson is a 72 y.o. female with history of neck pain.  Pt presents to the office today as a new patient referred by her PCP, Fany Noble MD. Pt complains of pain in the neck that radiates down the left arm x 1 month ago. She reports intermittent electrical shocking pain in the left arm when lying in bed. Pt notes that she originally thought she pulled a muscle while sleeping since her two dogs (both allie tapia) generally stay in the bed with her. She also complains of numbness/tingling in the left hand and was referred to [facility] by her PCP, Jim Rubio MD. Pt has not attended physical therapy and notes minimal relief when using [employer] and capsaicin. Pt has been taking Percocet 5-325 mg intermittently as her pain warrants. She notes that she has undergone iron transfusions since she had chemotherapy for mantle cell non-Hhodgkin's lymphoma. Pt admits to constipation when taking iron supplements. Of note, she is s/p right hip replacement. Pt at this time desires to proceed with physical therapy and medication evaluation. Of note, she works at [employer].      Pain Scale: 6/10     PCP: Jim Rubio MD    Past Medical History:   Diagnosis Date    Anxiety 6/15    IKE-7 was 12/21    Clostridium difficile colitis     Dr. Rasheeda Rajput 3/14; postop 10/20    Colon adenoma 10/2011    Dr. José Miguel Reed; Dr Ciera Moreno 7/20    Cystic breast     Degenerative arthritis of cervical spine 04/2021    on xray, worst C5-6    Depression 6/15    PHQ-9 was 15/27    Diverticulitis     recurrent x3 Dr Eliseo Webster liver     10/10 on US, CT 6/12; Fib-4 was 0.74 from 1/15    GI bleed 2/14    ischemic colitis on CT, seen by Dr. Leyda Rios Hyperlipidemia     calculated 10 year risk score was 3.2% (1/15)    Hypertension     IFG (impaired fasting glucose) [id number]    Ischemic colitis ([address] 3/14    Dr Rasheeda Rajput    Labial abscess     mrsa    Lymphoma ([address] 12/13    Dr. Jewels Evangelista    Migraine     Nephrolithiasis     Neuropathy due to chemotherapeutic drug ([facility] 9/14    Osteoarthritis     hips and knees; Dr Gardenia Du Tobacco dependence syndrome 2/8/2019    Zoster 8/14    left T11        Social History Socioeconomic History    Marital status:      Spouse name: Not on file    Number of children: 2    Years of education: Not on file    Highest education level: Not on file   Occupational History    Occupation: director [employer]   Social Needs    Financial resource strain: Not on file    Food insecurity     Worry: Not on file     Inability: Not on file   [employer] needs     Medical: Not on file     Non-medical: Not on file   Tobacco Use    Smoking status: Former Smoker     Packs/day: 0.50     Years: 15.00     Pack years: 7.50     Types: Cigarettes     Quit date: 2020     Years since quittin.6    Smokeless tobacco: Never Used    Tobacco comment: smoking cessation advised   Substance and Sexual Activity    Alcohol use: Not Currently     Alcohol/week: 4.2 standard drinks     Types: 5 Glasses of wine per week    Drug use: No    Sexual activity: Not Currently   Lifestyle    Physical activity     Days per week: Not on file     Minutes per session: Not on file    Stress: Not on file   Relationships    Social connections     Talks on phone: Not on file     Gets together: Not on file     Attends Latter day service: Not on file     Active member of club or organization: Not on file     Attends meetings of clubs or organizations: Not on file     Relationship status: Not on file    Intimate partner violence     Fear of current or ex partner: Not on file     Emotionally abused: Not on file     Physically abused: Not on file     Forced sexual activity: Not on file   Other Topics Concern    Not on file   Social History Narrative    Not on file       Current Outpatient Medications   Medication Sig Dispense Refill    ibuprofen (MOTRIN) 200 mg tablet Take  by mouth.       gabapentin (Neurontin) 100 mg capsule Take 1 in the evening for 1 week then increase to 2 60 Cap 1    oxyCODONE-acetaminophen (PERCOCET) 5-325 mg per tablet Take 1 Tab by mouth every six (6) hours as needed for Pain for up to 7 days. Max Daily Amount: 4 Tabs. 28 Tab 0    baclofen (LIORESAL) 10 mg tablet Take 1 Tab by mouth two (2) times daily as needed for Muscle Spasm(s). 60 Tab 1    amLODIPine (NORVASC) 5 mg tablet Take 1 Tab by mouth daily. 90 Tab 3    acetaminophen (TylenoL) 325 mg tablet Take 325 mg by mouth every four (4) hours as needed for Pain.  ferrous sulfate (IRON) 325 mg (65 mg iron) tablet Take 1 Tab by mouth Daily (before breakfast). Takes every other day         Allergies   Allergen Reactions    Meperidine Nausea Only and Other (comments)    Augmentin [Amoxicillin-Pot Clavulanate] Diarrhea and Nausea Only    Keflex [Cephalexin] Nausea Only    Sulfa (Sulfonamide Antibiotics) Rash       REVIEW OF SYSTEMS    Constitutional: Negative for fever, chills, or weight change. Respiratory: Negative for cough or shortness of breath. Cardiovascular: Negative for chest pain or palpitations. Gastrointestinal: Negative for acid reflux, change in bowel habits, or constipation. Genitourinary: Negative for dysuria and flank pain. Musculoskeletal: Positive for cervical pain. Skin: Negative for rash. Neurological: Negative for headaches or dizziness. Positive for numbness. Endo/Heme/Allergies: Negative for increased bruising. Psychiatric/Behavioral: Positive for difficulty with sleep. As per HPI    PHYSICAL EXAMINATION  Visit Vitals  /70   Pulse 64   Ht 4' 10\" (1.473 m)   Wt 114 lb (51.7 kg)   BMI 23.83 kg/m²       Constitutional: Awake, alert, and in no acute distress. HEENT: Normocephalic. Atraumatic. Oropharynx is moist and clear. PERRL. EOMI. Sclerae are nonicteric  Cardiovascular: Regular rate and rhythm  Lungs: Clear to auscultation bilaterally  Abdomen: Soft and nontender. Bowel sounds are present  Neurological: 1+ symmetrical DTRs in the upper extremities. 1+ symmetrical DTRs in the lower extremities. Sensation to light touch is intact. Negative Bernal's sign bilaterally.   Skin: warm, dry, and intact. Musculoskeletal: Pain with cervical forward flexion and extension. Decreased range of motion with side to side cervical flexion. Positive Bakody's sign. Tight across the upper trapezius bilaterally with triger points on the left. Pain and limited range of motion with abduction of the left shoulder. Good range of motion in the right shoulder. No tenderness to palpation in the lumbar region. No pain with extension, axial loading, or forward flexion. No pain with internal or external rotation of her hips. Negative straight leg raise bilaterally. No difficulty with the single leg stance bilaterally. Biceps  Triceps Deltoids Wrist Ext Wrist Flex Hand Intrin   Right  4/5  4/5  4/5 +4/5 +4/5 +4/5   Left +4/5 +4/5 +4/5 +4/5 +4/5 +4/5      Hip Flex  Quads Hamstrings Ankle DF EHL Ankle PF   Right +4/5 +4/5 +4/5 +4/5 +4/5 +4/5   Left +4/5 +4/5 +4/5 +4/5 +4/5 +4/5     IMAGING:    Cervical spine MRI from 4/27/2021 was personally reviewed with the patient and demonstrated:  Results from Orders Only encounter on 04/27/21   MRI CERV SPINE WO CONT    Narrative MRI CERVICAL SPINE    CPT CODE: 74727    INDICATIONS: Neck pain with left arm radiation. COMPARISON STUDIES: NONE. TECHNIQUE: Sagittal/axial T2, sagittal T1, and sagittal inversion recovery T2  weighted sequences were obtained. FINDINGS:    Straightening of the normal cervical lordosis. Vertebral column marrow signal  intensity is normal. The spinal cord, and visualized contents of the posterior  fossa are unremarkable. The spinal canal is of normal capacity. The  prevertebral/paravertebral soft tissues are unremarkable. Normal flow voids are  visible within the vascular structures of the neck. C2/C3: Normal    C3/C4: Normal    C4/C5: No significant central canal compromise. Mild left-sided foraminal  stenosis. Right exit foramen patent. C5/C6: Moderate to advanced degenerative disc disease.  Dorsally directed  disc/osteophyte complex with effacement ventral CSF. I lateral right greater  than left bony exit foraminal stenosis. C6/C7: Moderate degenerative disc disease. Diffuse also directed disc bulge,  with a left paracentral and foraminal extension. Left greater than right  moderate combined bony and soft tissue exit foraminal stenosis. C7/T1: Normal      Impression Mild to moderate mid cervical spondylosis. No significant central canal  compromise. Levels of exit foraminal stenosis as outlined above. Central canal contents, posterior fossa contents, paravertebral soft tissues  unremarkable. Written by Justyn Corbett, as dictated by Jovany Costello MD.  I, Dr. Jovany Costello confirm that all documentation is accurate.

## 2021-04-30 NOTE — TELEPHONE ENCOUNTER
Pt states she had her MRI SPINE done last night at  and sees Dr Mehrdad Jasmine on Monday and is requesting pain medication until she can see Dr Mehrdad Jasmine.     Please send to pharmacy on file

## 2021-05-03 ENCOUNTER — PATIENT OUTREACH (OUTPATIENT)
Dept: CASE MANAGEMENT | Age: 71
End: 2021-05-03

## 2021-05-03 ENCOUNTER — OFFICE VISIT (OUTPATIENT)
Dept: ORTHOPEDIC SURGERY | Age: 71
End: 2021-05-03
Payer: MEDICARE

## 2021-05-03 VITALS
WEIGHT: 114 LBS | HEART RATE: 64 BPM | DIASTOLIC BLOOD PRESSURE: 70 MMHG | BODY MASS INDEX: 23.93 KG/M2 | SYSTOLIC BLOOD PRESSURE: 132 MMHG | HEIGHT: 58 IN

## 2021-05-03 DIAGNOSIS — M79.2 NEURITIS: ICD-10-CM

## 2021-05-03 DIAGNOSIS — M54.12 LEFT CERVICAL RADICULOPATHY: Primary | ICD-10-CM

## 2021-05-03 DIAGNOSIS — M62.838 MUSCLE SPASM: ICD-10-CM

## 2021-05-03 DIAGNOSIS — M48.02 FORAMINAL STENOSIS OF CERVICAL REGION: ICD-10-CM

## 2021-05-03 PROCEDURE — 3017F COLORECTAL CA SCREEN DOC REV: CPT | Performed by: PHYSICAL MEDICINE & REHABILITATION

## 2021-05-03 PROCEDURE — 1101F PT FALLS ASSESS-DOCD LE1/YR: CPT | Performed by: PHYSICAL MEDICINE & REHABILITATION

## 2021-05-03 PROCEDURE — G8752 SYS BP LESS 140: HCPCS | Performed by: PHYSICAL MEDICINE & REHABILITATION

## 2021-05-03 PROCEDURE — G8754 DIAS BP LESS 90: HCPCS | Performed by: PHYSICAL MEDICINE & REHABILITATION

## 2021-05-03 PROCEDURE — 99203 OFFICE O/P NEW LOW 30 MIN: CPT | Performed by: PHYSICAL MEDICINE & REHABILITATION

## 2021-05-03 PROCEDURE — G8420 CALC BMI NORM PARAMETERS: HCPCS | Performed by: PHYSICAL MEDICINE & REHABILITATION

## 2021-05-03 PROCEDURE — G8432 DEP SCR NOT DOC, RNG: HCPCS | Performed by: PHYSICAL MEDICINE & REHABILITATION

## 2021-05-03 PROCEDURE — 1090F PRES/ABSN URINE INCON ASSESS: CPT | Performed by: PHYSICAL MEDICINE & REHABILITATION

## 2021-05-03 PROCEDURE — G8399 PT W/DXA RESULTS DOCUMENT: HCPCS | Performed by: PHYSICAL MEDICINE & REHABILITATION

## 2021-05-03 PROCEDURE — G8536 NO DOC ELDER MAL SCRN: HCPCS | Performed by: PHYSICAL MEDICINE & REHABILITATION

## 2021-05-03 PROCEDURE — G9899 SCRN MAM PERF RSLTS DOC: HCPCS | Performed by: PHYSICAL MEDICINE & REHABILITATION

## 2021-05-03 PROCEDURE — G8427 DOCREV CUR MEDS BY ELIG CLIN: HCPCS | Performed by: PHYSICAL MEDICINE & REHABILITATION

## 2021-05-03 RX ORDER — IBUPROFEN 200 MG
200 TABLET ORAL
COMMUNITY

## 2021-05-03 RX ORDER — GABAPENTIN 100 MG/1
CAPSULE ORAL
Qty: 60 CAP | Refills: 1 | Status: SHIPPED | OUTPATIENT
Start: 2021-05-03 | End: 2021-05-21

## 2021-05-03 RX ORDER — GABAPENTIN 300 MG/1
CAPSULE ORAL
Qty: 90 CAP | Refills: 1 | Status: CANCELLED | OUTPATIENT
Start: 2021-05-03

## 2021-05-03 NOTE — PATIENT INSTRUCTIONS
Neck Arthritis: Exercises Introduction Here are some examples of exercises for you to try. The exercises may be suggested for a condition or for rehabilitation. Start each exercise slowly. Ease off the exercises if you start to have pain. You will be told when to start these exercises and which ones will work best for you. How to do the exercises Neck stretches to the side 1. This stretch works best if you keep your shoulder down as you lean away from it. To help you remember to do this, start by relaxing your shoulders and lightly holding on to your thighs or your chair. 2. Tilt your head toward your shoulder and hold for 15 to 30 seconds. Let the weight of your head stretch your muscles. 3. Repeat 2 to 4 times toward each shoulder. Chin tuck 1. Lie on the floor with a rolled-up towel under your neck. Your head should be touching the floor. 2. Slowly bring your chin toward your chest. 
3. Hold for a count of 6, and then relax for up to 10 seconds. 4. Repeat 8 to 12 times. Active cervical rotation 1. Sit in a firm chair, or stand up straight. 2. Keeping your chin level, turn your head to the right, and hold for 15 to 30 seconds. 3. Turn your head to the left and hold for 15 to 30 seconds. 4. Repeat 2 to 4 times to each side. Shoulder blade squeeze 1. While standing, squeeze your shoulder blades together. 2. Do not raise your shoulders up as you are squeezing. 3. Hold for 6 seconds. 4. Repeat 8 to 12 times. Shoulder rolls 1. Sit comfortably with your feet shoulder-width apart. You can also do this exercise standing up. 2. Roll your shoulders up, then back, and then down in a smooth, circular motion. 3. Repeat 2 to 4 times. Follow-up care is a key part of your treatment and safety. Be sure to make and go to all appointments, and call your doctor if you are having problems. It's also a good idea to know your test results and keep a list of the medicines you take.  
Where can you learn more? Go to http://www.gray.com/ Enter Q415 in the search box to learn more about \"Neck Arthritis: Exercises. \" Current as of: November 16, 2020               Content Version: 12.8 © 9991-6987 [employer]wise[employer]. Care instructions adapted under license by [employer] (which disclaims liability or warranty for this information). If you have questions about a medical condition or this instruction, always ask your healthcare professional. [place] [address] any warranty or liability for your use of this information.

## 2021-05-03 NOTE — PROGRESS NOTES
Attempted to contact patient for Ambulatory Care Management and follow up in response to current Covid-19 pandemic. No answer, unable to leave a message as the VMB was full. Unable to contact, will close episode.

## 2021-05-03 NOTE — LETTER
5/5/2021 Patient: Chapo Marin YOB: 1950 Date of Visit: 5/3/2021 Claudia Kong MD 
[address] [place] [address] [address] [employer] Dear Claudia Kong MD, Thank you for referring Ms. Anali Yeung to [address] [place] [address] [facility] for evaluation. My notes for this consultation are attached. If you have questions, please do not hesitate to call me. I look forward to following your patient along with you. Sincerely, Marielos Sandoval MD

## 2021-05-04 ENCOUNTER — HOSPITAL ENCOUNTER (OUTPATIENT)
Dept: INFUSION THERAPY | Age: 71
Discharge: HOME | End: 2021-05-04
Payer: MEDICARE

## 2021-05-04 VITALS
SYSTOLIC BLOOD PRESSURE: 106 MMHG | DIASTOLIC BLOOD PRESSURE: 63 MMHG | HEART RATE: 61 BPM | TEMPERATURE: 97.6 F | RESPIRATION RATE: 16 BRPM | OXYGEN SATURATION: 97 %

## 2021-05-04 DIAGNOSIS — D50.9 IRON DEFICIENCY ANEMIA, UNSPECIFIED IRON DEFICIENCY ANEMIA TYPE: Primary | ICD-10-CM

## 2021-05-04 DIAGNOSIS — D50.9 IRON DEFICIENCY ANEMIA, UNSPECIFIED IRON DEFICIENCY ANEMIA TYPE: ICD-10-CM

## 2021-05-04 PROCEDURE — 74011250636 HC RX REV CODE- 250/636: Performed by: NURSE PRACTITIONER

## 2021-05-04 PROCEDURE — 96365 THER/PROPH/DIAG IV INF INIT: CPT

## 2021-05-04 RX ORDER — DIPHENHYDRAMINE HYDROCHLORIDE 50 MG/ML
25 INJECTION, SOLUTION INTRAMUSCULAR; INTRAVENOUS AS NEEDED
Status: CANCELLED
Start: 2021-05-11

## 2021-05-04 RX ORDER — EPINEPHRINE 1 MG/ML
0.3 INJECTION, SOLUTION, CONCENTRATE INTRAVENOUS AS NEEDED
Status: CANCELLED | OUTPATIENT
Start: 2021-05-11

## 2021-05-04 RX ORDER — DIPHENHYDRAMINE HYDROCHLORIDE 50 MG/ML
50 INJECTION, SOLUTION INTRAMUSCULAR; INTRAVENOUS AS NEEDED
Status: CANCELLED
Start: 2021-05-11

## 2021-05-04 RX ORDER — SODIUM CHLORIDE 0.9 % (FLUSH) 0.9 %
10 SYRINGE (ML) INJECTION AS NEEDED
Status: CANCELLED | OUTPATIENT
Start: 2021-05-11

## 2021-05-04 RX ORDER — ACETAMINOPHEN 325 MG/1
650 TABLET ORAL AS NEEDED
Status: CANCELLED
Start: 2021-05-11

## 2021-05-04 RX ORDER — HYDROCORTISONE SODIUM SUCCINATE 100 MG/2ML
100 INJECTION, POWDER, FOR SOLUTION INTRAMUSCULAR; INTRAVENOUS AS NEEDED
Status: CANCELLED | OUTPATIENT
Start: 2021-05-11

## 2021-05-04 RX ORDER — SODIUM CHLORIDE 9 MG/ML
10 INJECTION INTRAMUSCULAR; INTRAVENOUS; SUBCUTANEOUS AS NEEDED
Status: ACTIVE | OUTPATIENT
Start: 2021-05-04 | End: 2021-05-04

## 2021-05-04 RX ORDER — ONDANSETRON 2 MG/ML
8 INJECTION INTRAMUSCULAR; INTRAVENOUS AS NEEDED
Status: CANCELLED | OUTPATIENT
Start: 2021-05-11

## 2021-05-04 RX ORDER — ALBUTEROL SULFATE 0.83 MG/ML
2.5 SOLUTION RESPIRATORY (INHALATION) AS NEEDED
Status: CANCELLED
Start: 2021-05-11

## 2021-05-04 RX ORDER — SODIUM CHLORIDE 9 MG/ML
25 INJECTION, SOLUTION INTRAVENOUS CONTINUOUS
Status: DISPENSED | OUTPATIENT
Start: 2021-05-04 | End: 2021-05-04

## 2021-05-04 RX ORDER — SODIUM CHLORIDE 9 MG/ML
10 INJECTION INTRAMUSCULAR; INTRAVENOUS; SUBCUTANEOUS AS NEEDED
Status: CANCELLED | OUTPATIENT
Start: 2021-05-11

## 2021-05-04 RX ORDER — HEPARIN 100 UNIT/ML
300-500 SYRINGE INTRAVENOUS AS NEEDED
Status: CANCELLED
Start: 2021-05-11

## 2021-05-04 RX ORDER — SODIUM CHLORIDE 9 MG/ML
25 INJECTION, SOLUTION INTRAVENOUS CONTINUOUS
Status: CANCELLED | OUTPATIENT
Start: 2021-05-11

## 2021-05-04 RX ADMIN — SODIUM CHLORIDE 10 ML: 9 INJECTION INTRAMUSCULAR; INTRAVENOUS; SUBCUTANEOUS at 09:41

## 2021-05-04 RX ADMIN — FERRIC CARBOXYMALTOSE INJECTION 750 MG: 50 INJECTION, SOLUTION INTRAVENOUS at 08:37

## 2021-05-04 RX ADMIN — SODIUM CHLORIDE 25 ML/HR: 900 INJECTION, SOLUTION INTRAVENOUS at 08:37

## 2021-05-04 NOTE — PROGRESS NOTES
[facility] Progress Note    Date: May 4, 2021    Name: Scott Duverney    MRN: [id number]         : 1950    Injectafer Infusion 1 of 2. Ms. Vincent King to [place], [facility], at 0820. Pt was assessed and education was provided. Ms. Claude Aguilar vitals were reviewed and patient was observed for 5 minutes prior to treatment. Visit Vitals  /63 (BP 1 Location: Right upper arm, BP Patient Position: Sitting)   Pulse 61   Temp 97.6 °F (36.4 °C)   Resp 16   SpO2 97%   Breastfeeding No       22g PIV placed in left antecubital x2 attempts. PIV flushed easily and had brisk blood return. Injectafer 750mg/250mL NS infused at 750mL/hr over 20 minutes as ordered. Ms. Vincent King tolerated the infusion, and had no complaints. VS remained stable. Ms. Vincent King was observed for 30 minutes as ordered    PIV flushed with NS 10ml and removed. No bleeding or hematoma noted at site. Gauze and coban applied. Reviewed discharge instructions with patient, including expected side effects and signs of allergic reaction requiring medical attention (itching/hives/rashes, SOB, chest pain, lip/tongue/facial swelling). Patient given printed copy to take home. Patient verbalized understanding of discharge instructions. Patient Education printed, signed and scanned into patient chart. Patient armband removed and shredded. Ms. Vincent King was discharged from [place] [address] in stable condition at [address]  She is to return on 2021 at 0900 for Injectafer infusion 2 of 2.    Julissa Valdes RN  May 4, 2021  [address]

## 2021-05-06 ENCOUNTER — VIRTUAL VISIT (OUTPATIENT)
Age: 71
End: 2021-05-06
Payer: MEDICARE

## 2021-05-06 ENCOUNTER — TELEPHONE (OUTPATIENT)
Age: 71
End: 2021-05-06

## 2021-05-06 DIAGNOSIS — F17.200 TOBACCO DEPENDENCE SYNDROME: ICD-10-CM

## 2021-05-06 DIAGNOSIS — D50.9 IRON DEFICIENCY ANEMIA, UNSPECIFIED IRON DEFICIENCY ANEMIA TYPE: ICD-10-CM

## 2021-05-06 DIAGNOSIS — C83.18 MANTLE CELL LYMPHOMA OF LYMPH NODES OF MULTIPLE SITES (HCC): ICD-10-CM

## 2021-05-06 DIAGNOSIS — E53.8 B12 DEFICIENCY: Primary | ICD-10-CM

## 2021-05-06 DIAGNOSIS — D72.9 NEUTROPHILIA: ICD-10-CM

## 2021-05-06 DIAGNOSIS — D12.6 COLON ADENOMA: ICD-10-CM

## 2021-05-06 DIAGNOSIS — C83.18 MANTLE CELL LYMPHOMA OF LYMPH NODES OF MULTIPLE SITES (HCC): Primary | ICD-10-CM

## 2021-05-06 PROCEDURE — 99443 PR PHYS/QHP TELEPHONE EVALUATION 21-30 MIN: CPT | Performed by: INTERNAL MEDICINE

## 2021-05-06 NOTE — PROGRESS NOTES
Richard Valentin is a 72 y.o. female who was seen by synchronous (real-time) audio-technology on 2021      Hematology/Oncology  Progress Note     Name: Oumou French  Date:10/07/20  : 1950     PCP: Maciel Collado MD       Current therapy: Supportive care and active surveillance. Assessment & Plan:   Diagnoses and all orders for this visit:    1. Mantle cell lymphoma of lymph nodes of multiple sites ([address]    2. Tobacco dependence syndrome    3. Iron deficiency anemia, unspecified iron deficiency anemia type        The complexity of medical decision making for this visit is moderate       Mantle cell lymphoma, multiple sites: Stage IV MCL Diagnosed in 2014. She is s/p chemotherapy completed in 2014. Clinically there is no evidence of disease recurrence. No signs or symptoms of disease recurrence. Continue surveillance.     Iron Deficiency Anemia: Labs as above. *Give IV iron with Injectafer since even with oral iron ferritin is still going above 40 with a transferrin saturation still 15%  . *She is status post Injectafer 1/2 doses on 2021. *Had colonoscopy one year ago. Next due in 3 years. Neutrophilia: Resolved. ANC 5.9    Cigarette smoking: Quit smoking about 6 months ago    RTC 3 months  I spent at least 25 minutes on this visit with this established patient. 712  Subjective:   Ms. Alba Powell is a 81-year-old woman who has a history of mantle cell lymphoma. She was diagnosed in 2014. She has completed a full course of systemic chemotherapy. She was being followed by Dr. Verner Peck who retired. She denies night sweats, fevers or unexplained infections. She denies weight loss. She states she is doing well and denies any abdominal pain or discomfort. She had hip replacement which went well but had cdiff colitis in the interim which has resolved. She has good appetite. Feels tired easily. All other points of ROS have been reviewed and were negative. ECOG=1. Past medical history, family history, and social history: these were reviewed and remains unchanged. Prior to Admission medications    Medication Sig Start Date End Date Taking? Authorizing Provider   ibuprofen (MOTRIN) 200 mg tablet Take  by mouth. Provider, Historical   gabapentin (Neurontin) 100 mg capsule Take 1 in the evening for 1 week then increase to 2 5/3/21   Boo Lepe MD   oxyCODONE-acetaminophen (PERCOCET) 5-325 mg per tablet Take 1 Tab by mouth every six (6) hours as needed for Pain for up to 7 days. Max Daily Amount: 4 Tabs. 4/30/21 5/7/21  Gordo Napier DNP   baclofen (LIORESAL) 10 mg tablet Take 1 Tab by mouth two (2) times daily as needed for Muscle Spasm(s). 4/22/21   Karen Kang MD   amLODIPine (NORVASC) 5 mg tablet Take 1 Tab by mouth daily. 2/19/21   Karen Kang MD   acetaminophen (TylenoL) 325 mg tablet Take 325 mg by mouth every four (4) hours as needed for Pain. Provider, Historical   ferrous sulfate (IRON) 325 mg (65 mg iron) tablet Take 1 Tab by mouth Daily (before breakfast).  Takes every other day    Provider, Historical     Patient Active Problem List   Diagnosis Code    Hyperlipidemia E78.5    Mantle cell lymphoma of lymph nodes of multiple sites ([facility] C83.18    Neuropathy due to chemotherapeutic drug ([facility] G62.0, T45.1X5A    Arthritis, degenerative M19.90    IFG (impaired fasting glucose) R73.01    Tobacco dependence syndrome F17.200    Colon adenoma D12.6    Diverticulosis K57.90    Primary hypertension I10    Iron deficiency anemia D50.9    Hip arthritis M16.10     Patient Active Problem List    Diagnosis Date Noted    Neuropathy due to chemotherapeutic drug ([facility] 05/08/2015     Priority: 1 - One    Mantle cell lymphoma of lymph nodes of multiple sites ([address] 04/04/2014     Priority: 1 - One    Hip arthritis 10/13/2020    Iron deficiency anemia 10/01/2020    Primary hypertension 07/18/2019    Tobacco dependence syndrome 02/08/2019    Colon adenoma 02/08/2019    Diverticulosis 02/08/2019    IFG (impaired fasting glucose) 10/22/2015    Arthritis, degenerative 07/06/2015    Hyperlipidemia 08/26/2011     Current Outpatient Medications   Medication Sig Dispense Refill    ibuprofen (MOTRIN) 200 mg tablet Take  by mouth.  gabapentin (Neurontin) 100 mg capsule Take 1 in the evening for 1 week then increase to 2 60 Cap 1    oxyCODONE-acetaminophen (PERCOCET) 5-325 mg per tablet Take 1 Tab by mouth every six (6) hours as needed for Pain for up to 7 days. Max Daily Amount: 4 Tabs. 28 Tab 0    baclofen (LIORESAL) 10 mg tablet Take 1 Tab by mouth two (2) times daily as needed for Muscle Spasm(s). 60 Tab 1    amLODIPine (NORVASC) 5 mg tablet Take 1 Tab by mouth daily. 90 Tab 3    acetaminophen (TylenoL) 325 mg tablet Take 325 mg by mouth every four (4) hours as needed for Pain.  ferrous sulfate (IRON) 325 mg (65 mg iron) tablet Take 1 Tab by mouth Daily (before breakfast). Takes every other day       Allergies   Allergen Reactions    Meperidine Nausea Only and Other (comments)    Augmentin [Amoxicillin-Pot Clavulanate] Diarrhea and Nausea Only    Keflex [Cephalexin] Nausea Only    Sulfa (Sulfonamide Antibiotics) Rash     Past Medical History:   Diagnosis Date    Anxiety 6/15    IKE-7 was 12/21    Clostridium difficile colitis     Dr. Marvin Ellison 3/14; postop 10/20    Colon adenoma 10/2011    Dr. Benny Osgood; Dr Michelle Grissom 7/20    Cystic breast     Degenerative arthritis of cervical spine     on xray, worst C5-6 (4/21); mri showed multilevel degen changes, foraminal stenosis (4.21);  Dr Ke Rea Depression 6/15    PHQ-9 was 15/27    Diverticulitis     recurrent x3 Dr Tony Mcdonald liver     10/10 on US, CT 6/12; Fib-4 was 0.74 from 1/15    GI bleed 2/14    ischemic colitis on CT, seen by Dr. Lori Remy Hyperlipidemia     calculated 10 year risk score was 3.2% (1/15)    Hypertension     IFG (impaired fasting glucose) [id number]    Ischemic colitis ([facility] 3/14    Dr Alisson Gonzalez    Labial abscess     mrsa    Lymphoma [facility])     Dr. Jake Shine    Migraine     Nephrolithiasis     Neuropathy due to chemotherapeutic drug ([address]     Osteoarthritis     hips and knees; Dr Robert Araiza Tobacco dependence syndrome 2019    Zoster     left T11     Past Surgical History:   Procedure Laterality Date    COLONOSCOPY N/A 2020    Dr Benitez Crowell 2003 polyps;  adenoma; Dr Demetri Forrest 3/14 isch colitis; Dr Belle Espinoza 20 mod divertics and adenoma    HX APPENDECTOMY  1968    HX HIP REPLACEMENT Right 10/2020    Dr Danii Vides knee surgery    HX ORTHOPAEDIC      DEXA t score 2.1 spine, -0.3 hip ()    HX SOPHIA AND BSO      Dr. Noreen Valles       Family History   Adopted: Yes     Social History     Tobacco Use    Smoking status: Former Smoker     Packs/day: 0.50     Years: 15.00     Pack years: 7.50     Types: Cigarettes     Quit date: 2020     Years since quittin.6    Smokeless tobacco: Never Used    Tobacco comment: smoking cessation advised   Substance Use Topics    Alcohol use: Not Currently     Alcohol/week: 4.2 standard drinks     Types: 5 Glasses of wine per week           Objective:   No flowsheet data found. General: alert, cooperative, no distress     Additional exam findings: We discussed the expected course, resolution and complications of the diagnosis(es) in detail. Medication risks, benefits, costs, interactions, and alternatives were discussed as indicated. I advised her to contact the office if her condition worsens, changes or fails to improve as anticipated. She expressed understanding with the diagnosis(es) and plan.        Aquilino Marie, who was evaluated through a patient-initiated, synchronous (real-time) audio-encounter, and/or her healthcare decision maker, is aware that it is a billable service, with coverage as determined by her insurance carrier. She provided verbal consent to proceed: Yes, and patient identification was verified. It was conducted pursuant to the emergency declaration under the [address] [place] [address] and the CNZZ and SPARQ General Act. A caregiver was present when appropriate. Ability to conduct physical exam was limited. I was at home. The patient was at home.       Estephania Shah MD

## 2021-05-11 ENCOUNTER — HOSPITAL ENCOUNTER (OUTPATIENT)
Dept: INFUSION THERAPY | Age: 71
Discharge: HOME | End: 2021-05-11
Payer: MEDICARE

## 2021-05-11 VITALS
SYSTOLIC BLOOD PRESSURE: 132 MMHG | DIASTOLIC BLOOD PRESSURE: 65 MMHG | TEMPERATURE: 97.6 F | HEART RATE: 57 BPM | OXYGEN SATURATION: 96 % | RESPIRATION RATE: 18 BRPM

## 2021-05-11 DIAGNOSIS — D50.9 IRON DEFICIENCY ANEMIA, UNSPECIFIED IRON DEFICIENCY ANEMIA TYPE: ICD-10-CM

## 2021-05-11 DIAGNOSIS — D50.9 IRON DEFICIENCY ANEMIA, UNSPECIFIED IRON DEFICIENCY ANEMIA TYPE: Primary | ICD-10-CM

## 2021-05-11 PROCEDURE — 74011250636 HC RX REV CODE- 250/636: Performed by: NURSE PRACTITIONER

## 2021-05-11 PROCEDURE — 96365 THER/PROPH/DIAG IV INF INIT: CPT

## 2021-05-11 RX ORDER — DIPHENHYDRAMINE HYDROCHLORIDE 50 MG/ML
25 INJECTION, SOLUTION INTRAMUSCULAR; INTRAVENOUS AS NEEDED
Status: CANCELLED
Start: 2021-05-11

## 2021-05-11 RX ORDER — ACETAMINOPHEN 325 MG/1
650 TABLET ORAL AS NEEDED
Status: CANCELLED
Start: 2021-05-11

## 2021-05-11 RX ORDER — ALBUTEROL SULFATE 0.83 MG/ML
2.5 SOLUTION RESPIRATORY (INHALATION) AS NEEDED
Status: CANCELLED
Start: 2021-05-11

## 2021-05-11 RX ORDER — SODIUM CHLORIDE 0.9 % (FLUSH) 0.9 %
10 SYRINGE (ML) INJECTION AS NEEDED
Status: DISPENSED | OUTPATIENT
Start: 2021-05-11 | End: 2021-05-11

## 2021-05-11 RX ORDER — SODIUM CHLORIDE 9 MG/ML
25 INJECTION, SOLUTION INTRAVENOUS CONTINUOUS
Status: DISPENSED | OUTPATIENT
Start: 2021-05-11 | End: 2021-05-11

## 2021-05-11 RX ORDER — SODIUM CHLORIDE 9 MG/ML
10 INJECTION INTRAMUSCULAR; INTRAVENOUS; SUBCUTANEOUS AS NEEDED
Status: CANCELLED | OUTPATIENT
Start: 2021-05-11

## 2021-05-11 RX ORDER — HYDROCORTISONE SODIUM SUCCINATE 100 MG/2ML
100 INJECTION, POWDER, FOR SOLUTION INTRAMUSCULAR; INTRAVENOUS AS NEEDED
Status: CANCELLED | OUTPATIENT
Start: 2021-05-11

## 2021-05-11 RX ORDER — SODIUM CHLORIDE 0.9 % (FLUSH) 0.9 %
10 SYRINGE (ML) INJECTION AS NEEDED
Status: CANCELLED | OUTPATIENT
Start: 2021-05-11

## 2021-05-11 RX ORDER — ONDANSETRON 2 MG/ML
8 INJECTION INTRAMUSCULAR; INTRAVENOUS AS NEEDED
Status: CANCELLED | OUTPATIENT
Start: 2021-05-11

## 2021-05-11 RX ORDER — HEPARIN 100 UNIT/ML
300-500 SYRINGE INTRAVENOUS AS NEEDED
Status: CANCELLED
Start: 2021-05-11

## 2021-05-11 RX ORDER — EPINEPHRINE 1 MG/ML
0.3 INJECTION, SOLUTION, CONCENTRATE INTRAVENOUS AS NEEDED
Status: CANCELLED | OUTPATIENT
Start: 2021-05-11

## 2021-05-11 RX ORDER — SODIUM CHLORIDE 9 MG/ML
25 INJECTION, SOLUTION INTRAVENOUS CONTINUOUS
Status: CANCELLED | OUTPATIENT
Start: 2021-05-11

## 2021-05-11 RX ORDER — DIPHENHYDRAMINE HYDROCHLORIDE 50 MG/ML
50 INJECTION, SOLUTION INTRAMUSCULAR; INTRAVENOUS AS NEEDED
Status: CANCELLED
Start: 2021-05-11

## 2021-05-11 RX ADMIN — FERRIC CARBOXYMALTOSE INJECTION 750 MG: 50 INJECTION, SOLUTION INTRAVENOUS at 09:32

## 2021-05-11 RX ADMIN — SODIUM CHLORIDE 25 ML/HR: 0.9 INJECTION, SOLUTION INTRAVENOUS at 09:32

## 2021-05-11 RX ADMIN — Medication 10 ML: at 09:58

## 2021-05-11 NOTE — PROGRESS NOTES
[facility] OPIC Progress Note    Date: May 11, 2021    Name: Dalton Best    MRN: [id number]         : 1950    Injectafer Infusion 2 of 2. Ms. Chris Evangelista to [facility], [facility], at 0915. Pt was assessed and education was provided. Ms. Michele Can vitals were reviewed and patient was observed for 5 minutes prior to treatment. Visit Vitals  /65 (BP 1 Location: Right arm, BP Patient Position: Sitting)   Pulse (!) 57   Temp 97.6 °F (36.4 °C)   Resp 18   SpO2 96%       24g PIV placed in left antecubital x2 attempts. PIV flushed easily and had brisk blood return. Injectafer 750mg/250mL NS infused at 750mL/hr over 20 minutes as ordered. Ms. Chris Evangelista tolerated the infusion, and had no complaints. VS remained stable. PIV flushed with NS 10ml and removed. No bleeding or hematoma noted at site. Gauze and coban applied. Patient armband removed and shredded. Ms. Chris Evangelista was discharged from [place] [address] in stable condition at 1000. She is to follow up with physician for her next appointment.     Nnamdi Coats RN  May 11, 2021

## 2021-05-14 ENCOUNTER — TELEPHONE (OUTPATIENT)
Dept: ORTHOPEDIC SURGERY | Age: 71
End: 2021-05-14

## 2021-05-14 NOTE — TELEPHONE ENCOUNTER
Spoke with patient and she takes Neurontin 100mg 2 caps QHS, instructed her that she could increase it to 3 caps QHS and if she needed to she could take 1 in the daytime. She stated it tends to make her drowsy and she can't work with it. She might try it on the weekend if she doesn't get relief tonight.

## 2021-05-14 NOTE — TELEPHONE ENCOUNTER
Patient called stating she somehow aggravated the pain in her shoulder, and it is radiating from her neck down to her arm. She stated her neurontin is not helping with her pain, and she is not sleeping due to the pain. She is asking if she needs to increase the dose. Please advise patient at [phone].

## 2021-05-21 ENCOUNTER — TELEPHONE (OUTPATIENT)
Dept: ORTHOPEDIC SURGERY | Age: 71
End: 2021-05-21

## 2021-05-21 DIAGNOSIS — M54.12 LEFT CERVICAL RADICULOPATHY: Primary | ICD-10-CM

## 2021-05-21 RX ORDER — GABAPENTIN 300 MG/1
CAPSULE ORAL
Qty: 60 CAPSULE | Refills: 1 | Status: SHIPPED | OUTPATIENT
Start: 2021-05-21 | End: 2021-12-16

## 2021-05-21 NOTE — TELEPHONE ENCOUNTER
Patient called stating she has recently increased her gabapentin to 400 mg. She stated it worked for a while, however it has stopped working. She stated yesterday she took extra strength tylenol around 1:30 p.m, then gabapentin around 4 p.m., then took oxycodone she was prescribed prior to seeing provider around 9:30 last night. She stated none of those medications helped with her pain, and she could not sleep. She stated she also started having numbness and tingling in her left arm. She stated she did minimal yard work yesterday, and does not know if that aggravated her pain. She stated she does not know if she needs to increase her gabapentin to 600 mg. She also stated she would prefer to take her gabapentin after she gets home from work. Patient is requesting a call from clinical with advice for what she can do. Please advise patient at [phone].

## 2021-05-21 NOTE — TELEPHONE ENCOUNTER
Spoke with patient, instructed her that she could increase to 600mg @ bedtime. She stated she would need a refill after this weekend. Could she get 300mg caps instead of the 100mg so she would not have to take 6 cap at bedtime.

## 2021-06-07 ENCOUNTER — HOSPITAL ENCOUNTER (OUTPATIENT)
Dept: PHYSICAL THERAPY | Age: 71
Discharge: HOME | End: 2021-06-07
Attending: PHYSICAL MEDICINE & REHABILITATION
Payer: MEDICARE

## 2021-06-07 PROCEDURE — 97162 PT EVAL MOD COMPLEX 30 MIN: CPT

## 2021-06-07 NOTE — PROGRESS NOTES
[facility] PHYSICAL THERAPY AT [address]  Phone: [phone] Fax: [phone] / [address] [place] [address] PHYSICAL THERAPY SERVICES  Patient Name: Sindi Dale : 1950   Medical   Diagnosis: Neck pain [M54.2] Treatment Diagnosis: C/S radiculopathy   Onset Date: 3 months ago      Referral Source: Traci Baumgarten, MD [facility]): 2021   Prior Hospitalization: See medical history Provider #: [id number]   Prior Level of Function: WNL   Comorbidities: R THR 10/2020    Medications: Verified on Patient Summary List   The Plan of Care and following information is based on the information from the initial evaluation.   ========================================================================  Assessment / key information:  Pt is a 73 yo female presenting with new onset of L C/S pain and radicular symptoms in the (L) UE. Symptoms have been increasing over time. Presents with sxs in the L UT, paresthesias through the full hand with blanching of the middle digit on the (L) intermittently. Notes paresthesias are nearly constant in the L UE. Experiences L UE weakness also associated with pain that limits reaching, gripping. Previous rx has included the following: xrays, MRI, gabapentin (recently increased also), baclofen. Pt has also been using the shoulder abduction / elevation position to relieve symptoms. She presents with pain ranging from 7-10/10, located L UT, L C/S and L UE. Pain is made worse with sudden movements, reaching, sitting, \"absolutely nothing - can be worse with just being there\", better with tylenol, neurontin, baclofen. Sleeping position; supine with arms on front of body or shoulder abduction position     Functional limitations include: gripping, reaching. Denies HA, Dizziness, tinnitus, vision changes, syncope, red flags.       ROM % CS Comments:pain, area   Forward flexion Chin to chest    Extension 15p! C/S and L UE pain    SB right 34p! SB left  35    Rotation right 70 Mostly L UT pain    Rotation left 50p! L UE Pain        Palpation: TTP L UT. Posture FHRS, guarded movement. GHJ AROM is WNL all planes but min limited elevation with concurrent c/o L UT pain. GHJ PROM WNL and pain-free for joint testing (pain with n tension tests)  UE myotomes are all equal and intact. Possible weakness in L T1 but inconclusive at this time . Sensation is reduced to light touch in L C5. Repeated movement testing: retraction increased c/o pain and Periph of sxs; Protrusion increased C/S pain. Unable to tolerate any further testing   Joint mobility: reduced throughout C/S and painful    Special tests: C/S traction increased (L) UE sxs; Spurlings L +.     + median and radial nerve tension tests (L). (-) Ulnar Nerve tension.      Pt will benefit from PT interventions to address the aforementioned deficits and allow pt to return to PLOF.    ========================================================================  Eval Complexity: History: MEDIUM  Complexity : 1-2 comorbidities / personal factors will impact the outcome/ POC Exam:HIGH Complexity : 4+ Standardized tests and measures addressing body structure, function, activity limitation and / or participation in recreation  Presentation: MEDIUM Complexity : Evolving with changing characteristics  Clinical Decision Making:MEDIUM Complexity : FOTO score of 26-74Overall Complexity:MEDIUM  Problem List: pain affecting function, decrease ROM, decrease strength, impaired gait/ balance, decrease ADL/ functional abilitiies, decrease activity tolerance, decrease flexibility/ joint mobility and decrease transfer abilities   Treatment Plan may include any combination of the following: Therapeutic exercise, Therapeutic activities, Neuromuscular re-education, Physical agent/modality, Gait/balance training, Manual therapy, Aquatic therapy, Patient education, Self Care training, Functional mobility training, Home safety training and Stair training  Patient / Family readiness to learn indicated by: asking questions, trying to perform skills and interest  Persons(s) to be included in education: patient (P)  Barriers to Learning/Limitations: None  Measures taken:    Patient Goal (s): \"relief of pain, better movement\"   Patient self reported health status: excellent  Rehabilitation Potential: excellent   Short Term Goals: To be accomplished in  2  treatments:  1. Pt will be independent and compliant with HEP to decrease pain, increase ROM and return pt to PLOF. Status at last assessment: initiated at    Long Term Goals: To be accomplished in  8-10  treatments:  1. Pt will increase score on the FOTO to > or = 60/100 to demo an increase in functional activity tolerance. Status at last assessment: 41/100   2. Pt will have full, pain-free AROM of the cervical spine in all planes to increase safety with driving, ADLs and self-care. Status at last assessment:  3. Pt will note < or = 2/10 pain with all mobility to improve comfort with ADLs. Status at last assessment:  4. Pt will note a reduction In L UE radicular sxs by > or = 50% to improve functional activity tolerance  Status at last assessment: constant throughout the whole L UE   Frequency / Duration:   Patient to be seen  2-3  times per week for 8-10  treatments:  Patient / Caregiver education and instruction: self care, activity modification and exercises  Therapist Signature: Dharmesh Connor DPT Date: 1/4/6827   Certification Period: 6/7/21 to 9/5/21 Time: 8:10 AM   ========================================================================  I certify that the above Physical Therapy Services are being furnished while the patient is under my care. I agree with the treatment plan and certify that this therapy is necessary.   Physician Signature:        Date:       Time:   Martha Austin MD  Please sign and return to [facility] or you may fax the signed copy to [phone]. Thank you.

## 2021-06-07 NOTE — PROGRESS NOTES
PT DAILY TREATMENT NOTE     Patient Name: Melody Fowler  Date:2021  : 1950  [x]  Patient  Verified  Payor: Carolina Carrington / Plan: [address] [place] [address] HMO / Product Type: Managed Care Medicare /    In time:10:10  Out time:10:47  Total Treatment Time (min): 37  Total Timed Codes (min): 10  1:1 Treatment Time (min): 37   Visit #: 1 of 8-10    Treatment Area: Neck pain [M54.2]    SUBJECTIVE  Pain Level (0-10 scale): 7  Any medication changes, allergies to medications, adverse drug reactions, diagnosis change, or new procedure performed?: [x] No    [] Yes (see summary sheet for update)  Subjective functional status/changes:   [] No changes reported  SEE IE for Subjective Information    Pt was late for treatment and did not have paperwork finished     OBJECTIVE       x min Patient Education: [x] Review HEP    [] Progressed/Changed HEP based on:   [] positioning   [] body mechanics   [] transfers   [] heat/ice application        Other Objective/Functional Measures:       Headaches: Do you have headaches? no  Dizziness/tinnitus/vision changes: no     OBJECTIVE  Posture: poor   C-Kyphosis:  [] increased   [] decreased   C-Lordosis:   [] increased   [] decreased  T-Kyphosis:  [] increased   [] decreased  T-Lordosis:   [] increased   [] decreased     Shoulder/Scapular Screen: WNL myotomes    Active Movements: see IE :  ROM % CS Comments:pain, area   Forward flexion Chin to chest    Extension 15p! C/S and L UE pain    SB right 34p! SB left  35    Rotation right 70 Mostly L UT pain    Rotation left 50p!  L UE Pain        Special Tests:  Cervical:        Spurling's:  [] R    [] L    [] +    [] -       Distraction:  [] R    [] L    [] +    [] - increased c/o L UE sxs       Compression: [] R    [] L    [] +    [] -  Muscle Flexibility: see IE   Global Muscular Weakness:    See IE  OBJECTIVE LS  Posture:  Lateral Shift: [] R    [] L     [] +  [] -  Kyphosis: [] Increased [] Decreased   [] WNL  Lordosis:  [] Increased [] Decreased   [] WNL    Pain Level (0-10 scale) post treatment: 0    ASSESSMENT/Changes in Function: see IE    Patient will continue to benefit from skilled PT services to modify and progress therapeutic interventions, address functional mobility deficits, address ROM deficits, address strength deficits, analyze and address soft tissue restrictions, analyze and cue movement patterns, analyze and modify body mechanics/ergonomics, assess and modify postural abnormalities, address imbalance/dizziness and instruct in home and community integration to attain remaining goals.      [x]  See Plan of Care  []  See progress note/recertification  []  See Discharge Summary         Progress towards goals / Updated goals:  SEE IE FOR GOALS     PLAN  []  Upgrade activities as tolerated     []  Continue plan of care  []  Update interventions per flow sheet       []  Discharge due to:_  [x]  Other:Initiate POC as stated in the IE      Justification for Eval Code Complexity:  Patient History : arthritis, R THR, reduced activity level lately  Examination see IE  Clinical Presentation: evolving with changing neurological sxs  Clinical Decision Making : FOTO 41/100     Nikita Johnson DPT 6/7/2021  8:10 AM    Future Appointments   Date Time Provider Emma Barrera   6/7/2021 10:00 AM Amirah Mayo PT [facility]   6/15/2021 10:15 AM Jaylene Negrete MD [facility] BS AMB   8/13/2021  8:45 AM Kati Osorio MD [facility] BS AMB   8/19/2021  8:05 AM [facility] NURSE VISIT [facility] BS AMB   8/26/2021  9:20 AM Janet Cardenas MD [facility] BS AMB

## 2021-06-15 ENCOUNTER — APPOINTMENT (OUTPATIENT)
Dept: PHYSICAL THERAPY | Age: 71
End: 2021-06-15
Attending: PHYSICAL MEDICINE & REHABILITATION
Payer: MEDICARE

## 2021-06-16 DIAGNOSIS — M62.838 MUSCLE SPASM: ICD-10-CM

## 2021-06-16 NOTE — TELEPHONE ENCOUNTER
Last Visit: 4/6/21 with MD eMrry Perales  Next Appointment: 8/26/21 with MD Merry Perales  Previous Refill Encounter(s): 4/22/21 #60 with 1 refill    Requested Prescriptions     Pending Prescriptions Disp Refills    baclofen (LIORESAL) 10 mg tablet 60 Tablet 1     Sig: Take 1 Tablet by mouth two (2) times daily as needed for Muscle Spasm(s).

## 2021-06-17 ENCOUNTER — HOSPITAL ENCOUNTER (OUTPATIENT)
Dept: PHYSICAL THERAPY | Age: 71
Discharge: HOME | End: 2021-06-17
Attending: PHYSICAL MEDICINE & REHABILITATION
Payer: MEDICARE

## 2021-06-17 PROCEDURE — 97140 MANUAL THERAPY 1/> REGIONS: CPT

## 2021-06-17 PROCEDURE — 97110 THERAPEUTIC EXERCISES: CPT

## 2021-06-17 RX ORDER — BACLOFEN 10 MG/1
10 TABLET ORAL
Qty: 60 TABLET | Refills: 1 | Status: SHIPPED | OUTPATIENT
Start: 2021-06-17 | End: 2022-05-10

## 2021-06-17 NOTE — PROGRESS NOTES
PT DAILY TREATMENT NOTE     Patient Name: Len Marie  Date:2021  : 1950  [x]  Patient  Verified  Payor: Garth Harrell / Plan: [address] [place] [address] HMO / Product Type: Managed Care Medicare /    In time: 7:54 am             Out time: 8:26 am  Total Treatment Time (min): 32  Total Timed Codes (min): 32  1:1 Treatment Time (min): 32   Visit #: 2 of 8-10    Treatment Area: Neck pain [M54.2]    SUBJECTIVE  Pain Level (0-10 scale): 0; numbness and tingling primarily in the back of the (L) elbow   Any medication changes, allergies to medications, adverse drug reactions, diagnosis change, or new procedure performed?: [x] No    [] Yes (see summary sheet for update)  Subjective functional status/changes:   [] No changes reported  Patient states the numbness and tingling in her left arm resolve with her HEP, primarily bending her neck to the right for a long period of time. She reports doing yardwork after her first session due to feeling great and felt terrible after that, although was able to use her HEP for relief.     OBJECTIVE  Modality rationale: PD     Min Type Additional Details    [] Estim: []Att   []Unatt                  []IFC  []Premod                                            []NMES    []Other:  []w/ice   []w/heat  Position:  Location:    []  Traction: [] Cervical       [] Lumbar                       [] Supine        [] Prone                       []Intermittent   []Continuous Lbs:  [] before manual  [] after manual    []  Ultrasound: []Continuous   [] Pulsed                           []1MHz   []3MHz Location:  W/cm2:    []  Ice     []  heat  []  Ice massage Position:  Location:   [] Skin assessment post-treatment:  []intact    []redness- no adverse reaction                                                                                 []redness  adverse reaction:     23 min Therapeutic Exercise:  [x] See flow sheet:   Rationale: increase ROM, increase strength and improve coordination to improve the patients ability to perform ADLs, perform yardwork, improve ease with driving/conversation (ie. checking blindspots)    9 min Manual Therapy:  STM to (L) UT with patient in supine; (L) upper trapezius in stretch position   Rationale: decrease pain, increase ROM and increase tissue extensibility to improve the patients ability to perform ADLs. The manual therapy interventions were performed at a separate and distinct time from the therapeutic activities interventions. with TE min Patient Education: [x] Review HEP from IE     Other Objective/Functional Measures:   STG met.  (+) for (L) median and radial nerve tension - patient instructed in radial and median nerve flossing initially to improve comfort as glide was initially uncomfortable, then nerve glide at approx rep 5 of the flossing     Pain Level (0-10 scale) post treatment: 0    ASSESSMENT/Changes in Function:   Good tolerance to treatment today with patient req 100% verbal/tactile cueing and demo for proper form/technique with all newly introduced therex. Good response to HEP in clinic to address symptoms. Discussed proper shoulder positioning and neck positioning in relation to functional motions at home to reduce neck stiffness and recurrence of symptoms. Patient will continue to benefit from skilled PT services to modify and progress therapeutic interventions, address functional mobility deficits, address ROM deficits, address strength deficits, analyze and address soft tissue restrictions, analyze and cue movement patterns, analyze and modify body mechanics/ergonomics and assess and modify postural abnormalities to attain remaining goals. []  See Plan of Care  []  See progress note/recertification  []  See Discharge Summary         Progress towards goals / Updated goals: · Short Term Goals: To be accomplished in  2  treatments:  1.  Pt will be independent and compliant with HEP to decrease pain, increase ROM and return pt to PLOF. Status at last assessment: initiated at   Current: goal met; pt notes strict adherence, multiple times a day to address her symptoms with instant relief (6/17/21)  · Long Term Goals: To be accomplished in  8-10  treatments:  1. Pt will increase score on the FOTO to > or = 60/100 to demo an increase in functional activity tolerance. Status at last assessment: 41/100   2. Pt will have full, pain-free AROM of the cervical spine in all planes to increase safety with driving, ADLs and self-care. Status at last assessment: flexion chin to chest, extension 15 deg with pain, SB (R) 34 deg with pain (L) 35 deg, rotation (R) 70 deg, (L) 50 deg with pain   3. Pt will note < or = 2/10 pain with all mobility to improve comfort with ADLs. Status at last assessment:  Current: goal progressing; pt admits 0/10 pain upon arrival and throughout treatment (6/17/21)  4.  Pt will note a reduction In L UE radicular sxs by > or = 50% to improve functional activity tolerance  Status at last assessment: constant throughout the whole L UE   Current: goal progressing; pt reports intermittent symptoms with HEP localized the posterior elbow (olecranon) (6/17/21)    PLAN  [x]  Upgrade activities as tolerated     [x]  Continue plan of care  []  Update interventions per flow sheet       []  Discharge due to:_  [x]  Other: assess response to treatment; assess cervical AROM BEN Buckley, PTA 6/17/2021

## 2021-06-22 ENCOUNTER — HOSPITAL ENCOUNTER (OUTPATIENT)
Dept: PHYSICAL THERAPY | Age: 71
Discharge: HOME | End: 2021-06-22
Attending: PHYSICAL MEDICINE & REHABILITATION
Payer: MEDICARE

## 2021-06-22 PROCEDURE — 97110 THERAPEUTIC EXERCISES: CPT | Performed by: GENERAL ACUTE CARE HOSPITAL

## 2021-06-22 NOTE — PROGRESS NOTES
PT DAILY TREATMENT NOTE     Patient Name: Dwight Marie  Date:2021  : 1950  [x]  Patient  Verified  Payor: Ping Waggoner / Plan: [address] [place] [address] HMO / Product Type: Managed Care Medicare /    In time: 11:30         Out time: 12:00  Total Treatment Time (min): 30  Total Timed Codes (min): 30  1:1 Treatment Time (min): 30   Visit #: 3 of 8-10    Treatment Area: Neck pain [M54.2]    SUBJECTIVE  Pain Level (0-10 scale): 0; numbness and tingling in L UE  Any medication changes, allergies to medications, adverse drug reactions, diagnosis change, or new procedure performed?: [x] No    [] Yes (see summary sheet for update)  Subjective functional status/changes:   [] No changes reported  Patient reports significant relief of pain symptoms, only report NT sensation into L UE. Reports occasional pain in L elbow and wrist after gardening or house hold chores, which she report Advil relieves.    Repeated R sidebending alleviates pain and NT into L UE.     OBJECTIVE  Modality rationale: PD     Min Type Additional Details    [] Estim: []Att   []Unatt                  []IFC  []Premod                                            []NMES    []Other:  []w/ice   []w/heat  Position:  Location:    []  Traction: [] Cervical       [] Lumbar                       [] Supine        [] Prone                       []Intermittent   []Continuous Lbs:  [] before manual  [] after manual    []  Ultrasound: []Continuous   [] Pulsed                           []1MHz   []3MHz Location:  W/cm2:    []  Ice     []  heat  []  Ice massage Position:  Location:   [] Skin assessment post-treatment:  []intact    []redness- no adverse reaction                                                                                 []redness  adverse reaction:     30 min Therapeutic Exercise:  [x] See flow sheet:   Rationale: increase ROM, increase strength and improve coordination to improve the patients ability to perform ADLs, perform yardwork, improve ease with driving/conversation (ie. checking blindspots)    TC min Manual Therapy:  STM to (L) UT with patient in supine; (L) upper trapezius in stretch position   Rationale: decrease pain, increase ROM and increase tissue extensibility to improve the patients ability to perform ADLs. The manual therapy interventions were performed at a separate and distinct time from the therapeutic activities interventions. with TE min Patient Education: [x] Review HEP from IE     Other Objective/Functional Measures: Added Doorway stretch  CS AROM: flex to chin to chest, ext 30, SB ~35, Rot (R) 70, (L) 60 deg ;  all non painful     Pain Level (0-10 scale) post treatment: 0     ASSESSMENT/Changes in Function: Pt demo's non painful CS AROM in all directions and notable improved mobility into L rotation and L sidebending. Pt is making excellent progress at this time and compliant with HEP daily. Cont to respond well to right SB to abolish L UE sx. . Pt requested to end session early today as had a meeting with her daughter. Discussed reduced frequency to 1x/wk pending symptom response. Patient will continue to benefit from skilled PT services to modify and progress therapeutic interventions, address functional mobility deficits, address ROM deficits, address strength deficits, analyze and address soft tissue restrictions, analyze and cue movement patterns, analyze and modify body mechanics/ergonomics and assess and modify postural abnormalities to attain remaining goals. []  See Plan of Care  []  See progress note/recertification  []  See Discharge Summary         Progress towards goals / Updated goals: · Short Term Goals: To be accomplished in  2  treatments:  1. Pt will be independent and compliant with HEP to decrease pain, increase ROM and return pt to PLOF.     Status at last assessment: initiated at   Current: goal met; pt notes strict adherence, multiple times a day to address her symptoms with instant relief (6/17/21)  · Long Term Goals: To be accomplished in  8-10  treatments:  1. Pt will increase score on the FOTO to > or = 60/100 to demo an increase in functional activity tolerance. Status at last assessment: 41/100   2. Pt will have full, pain-free AROM of the cervical spine in all planes to increase safety with driving, ADLs and self-care. Status at last assessment: flexion chin to chest, extension 15 deg with pain, SB (R) 34 deg with pain (L) 35 deg, rotation (R) 70 deg, (L) 50 deg with pain   Current:  Progressing; CS AROM: flex to chin to chest, ext 30, SB ~35, Rot (R) 70, (L) 60 deg ;  all non painful (6/22/21)  3. Pt will note < or = 2/10 pain with all mobility to improve comfort with ADLs. Status at last assessment:  Current: goal progressing; pt admits 0/10 pain upon arrival and throughout treatment (6/17/21)  4.  Pt will note a reduction In L UE radicular sxs by > or = 50% to improve functional activity tolerance  Status at last assessment: constant throughout the whole L UE   Current: goal progressing; pt reports intermittent symptoms with HEP localized the posterior elbow (olecranon) (6/17/21)    PLAN  [x]  Upgrade activities as tolerated     [x]  Continue plan of care  []  Update interventions per flow sheet       []  Discharge due to:_  []  Other:     Kaitlyn Wills, PT 6/22/2021

## 2021-06-24 ENCOUNTER — HOSPITAL ENCOUNTER (OUTPATIENT)
Dept: PHYSICAL THERAPY | Age: 71
End: 2021-06-24
Attending: PHYSICAL MEDICINE & REHABILITATION
Payer: MEDICARE

## 2021-06-29 ENCOUNTER — HOSPITAL ENCOUNTER (OUTPATIENT)
Dept: PHYSICAL THERAPY | Age: 71
Discharge: HOME | End: 2021-06-29
Attending: PHYSICAL MEDICINE & REHABILITATION
Payer: MEDICARE

## 2021-06-29 PROCEDURE — 97140 MANUAL THERAPY 1/> REGIONS: CPT

## 2021-06-29 PROCEDURE — 97110 THERAPEUTIC EXERCISES: CPT

## 2021-06-29 NOTE — PROGRESS NOTES
PT DAILY TREATMENT NOTE     Patient Name: Micky Marie  Date:2021  : 1950  [x]  Patient  Verified  Payor: Laura Jose / Plan: [address] [place] [address] HMO / Product Type: Managed Care Medicare /    In time: 7:45 am        Out time: 8:33 am  Total Treatment Time (min): 48  Total Timed Codes (min): 48  1:1 Treatment Time (min): 43   Visit #: 4 of 8-10    Treatment Area: Neck pain [M54.2]    SUBJECTIVE  Pain Level (0-10 scale): 0  Any medication changes, allergies to medications, adverse drug reactions, diagnosis change, or new procedure performed?: [x] No    [] Yes (see summary sheet for update)  Subjective functional status/changes:   [] No changes reported  Patient states she is down to using 100mg of gabapentin a day instead of the 600mg prescribed with good relief. She continues to feel numbness and tinging down the left arm, but is able to bend her neck to the right to completely remove the symptoms.  She states she avoids     OBJECTIVE  Modality rationale: PD     Min Type Additional Details    [] Estim: []Att   []Unatt                  []IFC  []Premod                                            []NMES    []Other:  []w/ice   []w/heat  Position:  Location:    []  Traction: [] Cervical       [] Lumbar                       [] Supine        [] Prone                       []Intermittent   []Continuous Lbs:  [] before manual  [] after manual    []  Ultrasound: []Continuous   [] Pulsed                           []1MHz   []3MHz Location:  W/cm2:    []  Ice     []  heat  []  Ice massage Position:  Location:   [] Skin assessment post-treatment:  []intact    []redness- no adverse reaction                                                                                 []redness  adverse reaction:     38 min Therapeutic Exercise:  [x] See flow sheet: added wall SA slides with bedsheet   Rationale: increase ROM, increase strength and improve coordination to improve the patients ability to perform ADLs, perform yardwork, improve ease with driving/conversation (ie. checking blindspots)    10 min Manual Therapy:  STM to (L) UT with patient in supine; (L) upper trapezius in stretch position   Rationale: decrease pain, increase ROM and increase tissue extensibility to improve the patients ability to perform ADLs. The manual therapy interventions were performed at a separate and distinct time from the therapeutic activities interventions. with TE min Patient Education: [x] Review HEP from IE; encouraged performing chin tucks within comfortable AROM     Other Objective/Functional Measures:   (+) for median nerve tension, left  (+) for radial nerve tension, left    Noted SA wall slides increased (L) UE sx, which alleviated with performing the exercise in a reduced AROM and performing cervical right side-bending for 10 seconds    C/S AROM: extension 35 deg, rotation (R) 62 deg, (L) 55 deg     Retraction: increased numbness, no worse as a result  Protraction: increased pain and numbness, worse as a result, pt able to use (R) side-bending for 5 seconds to alleviate    Pain Level (0-10 scale) post treatment: 0      ASSESSMENT/Changes in Function:   Patient appears to be a quick responder with using cervical right side-bending to completely alleviate (L) UE numbness and tingling. Patient demos poor response to left UE elevation attempts, although does demo compensatory strategies to achieve higher levels of elevation. Patient will continue to benefit from skilled PT services to modify and progress therapeutic interventions, address functional mobility deficits, address ROM deficits, address strength deficits, analyze and address soft tissue restrictions, analyze and cue movement patterns, analyze and modify body mechanics/ergonomics and assess and modify postural abnormalities to attain remaining goals.      []  See Plan of Care  []  See progress note/recertification  []  See Discharge Summary Progress towards goals / Updated goals: · Short Term Goals: To be accomplished in  2  treatments:  1. Pt will be independent and compliant with HEP to decrease pain, increase ROM and return pt to PLOF. Status at last assessment: initiated at IE  Current: goal met; pt notes strict adherence, multiple times a day to address her symptoms with instant relief (6/17/21)  · Long Term Goals: To be accomplished in  8-10  treatments:  1. Pt will increase score on the FOTO to > or = 60/100 to demo an increase in functional activity tolerance. Status at last assessment: 41/100   2. Pt will have full, pain-free AROM of the cervical spine in all planes to increase safety with driving, ADLs and self-care. Status at last assessment: flexion chin to chest, extension 15 deg with pain, SB (R) 34 deg with pain (L) 35 deg, rotation (R) 70 deg, (L) 50 deg with pain   Current:  Progressing; CS AROM: flex to chin to chest, ext 35, Rot (R) 62, (L) 55 deg (6/29/21)  3. Pt will note < or = 2/10 pain with all mobility to improve comfort with ADLs. Status at last assessment:  Current: goal progressing; pt admits 0/10 pain upon arrival and throughout treatment (6/17/21)  4.  Pt will note a reduction In L UE radicular sxs by > or = 50% to improve functional activity tolerance  Status at last assessment: constant throughout the whole L UE   Current: goal progressing; pt reports intermittent symptoms in the left UE exacerbated by heavier activity (6/29/21)    PLAN  [x]  Upgrade activities as tolerated     [x]  Continue plan of care  []  Update interventions per flow sheet       []  Discharge due to:_  []  Other:     Yari Gil, PTA 6/29/2021

## 2021-07-09 ENCOUNTER — HOSPITAL ENCOUNTER (OUTPATIENT)
Dept: PHYSICAL THERAPY | Age: 71
Discharge: HOME | End: 2021-07-09
Attending: PHYSICAL MEDICINE & REHABILITATION
Payer: MEDICARE

## 2021-07-09 PROCEDURE — 97110 THERAPEUTIC EXERCISES: CPT

## 2021-07-09 NOTE — PROGRESS NOTES
[address] [facility] [address]  Phone: [phone] Fax: [phone]  DISCHARGE NOTE   Patient Name: Jamie Espinosa : 1950   Treatment/Medical Diagnosis: Neck pain [M54.2]   Referral Source: David Mccullough MD     Date of Initial Visit: 2021 Attended Visits: 5 Missed Visits: 1     SUMMARY OF TREATMENT  Pt is a 73 yo female presenting with new onset of L C/S pain and radicular symptoms in the (L) UE. Symptoms have been increasing over time. Presents with sxs in the L UT, paresthesias through the full hand with blanching of the middle digit on the (L) intermittently  CURRENT STATUS  Patient made excellent progress with PT over 5 visits and feels she has all the tools and knowledge needed to continue with progress via HEP. Assessment as follows;  Pain at best 0, at worst 4/10 (with excessive yard work)  Subjective % improvement 90%  Objective:   (+) for median nerve tension, left  (+) for radial nerve tension, left  Noted SA wall slides increased (L) UE sx, which alleviated with performing the exercise in a reduced AROM and performing cervical right side-bending for 10 seconds  C/S AROM: extension 35 deg, flexion - chin to chest, rotation (R) 60 deg, (L) 70 deg   Retraction: increased numbness, no worse as a result  Protraction: increased pain and numbness, worse as a result, pt able to use (R) side-bending for 5 seconds to alleviate  Improvements: decreased pain , numbness and tingling, reaching, yard work does not wake up sec to pain    Deficits : N/T intermittent, mild fear avoidance, sleeping on L side          Progress towards goals / Updated goals: · Short Term Goals: To be accomplished in  2  treatments:  1. Pt will be independent and compliant with HEP to decrease pain, increase ROM and return pt to PLOF.     Status at last assessment: initiated at   Current: goal met; pt notes strict adherence, multiple times a day to address her symptoms with instant relief    · Long Term Goals: To be accomplished in  8-10  treatments:  1. Pt will increase score on the FOTO to > or = 60/100 to demo an increase in functional activity tolerance. Status at last assessment: 41/100   Current met - 96/100     2. Pt will have full, pain-free AROM of the cervical spine in all planes to increase safety with driving, ADLs and self-care. Status at last assessment: flexion chin to chest, extension 15 deg with pain, SB (R) 34 deg with pain (L) 35 deg, rotation (R) 70 deg, (L) 50 deg with pain   Current: goal met - patient demo normalized CS AROM with min to no pain      3. Pt will note < or = 2/10 pain with all mobility to improve comfort with ADLs. Status at last assessment:  Current: goal near met at 4/10 at worst, 0/ 10 at best     4. Pt will note a reduction In L UE radicular sxs by > or = 50% to improve functional activity tolerance  Status at last assessment: constant throughout the whole L UE   Current: goal met patient reports 90% improvement     RECOMMENDATIONS  DC to HEP se to goals met and program complete   If you have any questions/comments please contact us directly at [phone]   Thank you for allowing us to assist in the care of your patient.   Therapist Signature: Dieudonne Amaya, PT Date: 7/9/2021   Reporting Period  6/7/2021-7/9/2021 Time: 569X

## 2021-07-09 NOTE — PROGRESS NOTES
PT DAILY TREATMENT NOTE     Patient Name: Colleen Teran  Date:2021  : 1950  [x]  Patient  Verified  Payor: Rohan Mathews / Plan: [address] [place] [address] HMO / Product Type: Managed Care Medicare /    In time: 965     Out time: 549  Total Treatment Time (min): 21  Total Timed Codes (min): 15  1:1 Treatment Time (min): 15   Visit #: 5 of 8-10    Treatment Area: Neck pain [M54.2]    SUBJECTIVE  Pain Level (0-10 scale): 0  Any medication changes, allergies to medications, adverse drug reactions, diagnosis change, or new procedure performed?: [x] No    [] Yes (see summary sheet for update)  Subjective functional status/changes:   [] No changes reported  Patient reports TC sec to being called into work - has to leave almost immediately     OBJECTIVE  Modality rationale: PD     Min Type Additional Details    [] Estim: []Att   []Unatt                  []IFC  []Premod                                            []NMES    []Other:  []w/ice   []w/heat  Position:  Location:    []  Traction: [] Cervical       [] Lumbar                       [] Supine        [] Prone                       []Intermittent   []Continuous Lbs:  [] before manual  [] after manual    []  Ultrasound: []Continuous   [] Pulsed                           []1MHz   []3MHz Location:  W/cm2:    []  Ice     []  heat  []  Ice massage Position:  Location:   [x] Skin assessment post-treatment:  [x]intact    []redness- no adverse reaction                                                                                 []redness  adverse reaction:     21 min Therapeutic Exercise:  [x] See flow sheet:REASSESSMENT    Rationale: increase ROM, increase strength and improve coordination to improve the patients ability to perform ADLs, perform yardwork, improve ease with driving/conversation (ie. checking blindspots)    TC min Manual Therapy:   Rationale: decrease pain, increase ROM and increase tissue extensibility to improve the patients ability to perform ADLs. The manual therapy interventions were performed at a separate and distinct time from the therapeutic activities interventions. with TE min Patient Education: [x] Review HEP for DC - patient decline new pictures      Other Objective/Functional Measures:    Goals assessed for DC  Pain at best 0, at worst 4/10 (with excessive yard work)  Subjective % improvement 90%  Objective:   (+) for median nerve tension, left  (+) for radial nerve tension, left  Noted SA wall slides increased (L) UE sx, which alleviated with performing the exercise in a reduced AROM and performing cervical right side-bending for 10 seconds  C/S AROM: extension 35 deg, flexion - chin to chest, rotation (R) 60 deg, (L) 70 deg   Retraction: increased numbness, no worse as a result  Protraction: increased pain and numbness, worse as a result, pt able to use (R) side-bending for 5 seconds to alleviate  Improvements: decreased pain , numbness and tingling, reaching, yard work does not wake up sec to pain    Deficits : N/T intermittent, mild fear avoidance, sleeping on L side       Pain Level (0-10 scale) post treatment: 0      ASSESSMENT/Changes in Function:   [x]  See Discharge Summary         Progress towards goals / Updated goals: · Short Term Goals: To be accomplished in  2  treatments:  1. Pt will be independent and compliant with HEP to decrease pain, increase ROM and return pt to PLOF. Status at last assessment: initiated at IE  Current: goal met; pt notes strict adherence, multiple times a day to address her symptoms with instant relief    · Long Term Goals: To be accomplished in  8-10  treatments:  1. Pt will increase score on the FOTO to > or = 60/100 to demo an increase in functional activity tolerance. Status at last assessment: 41/100   Current met - 96/100     2. Pt will have full, pain-free AROM of the cervical spine in all planes to increase safety with driving, ADLs and self-care.     Status at last assessment: flexion chin to chest, extension 15 deg with pain, SB (R) 34 deg with pain (L) 35 deg, rotation (R) 70 deg, (L) 50 deg with pain   Current: goal met - patient demo normalized CS AROM with min to no pain      3. Pt will note < or = 2/10 pain with all mobility to improve comfort with ADLs. Status at last assessment:  Current: goal near met at 4/10 at worst, 0/ 10 at best     4. Pt will note a reduction In L UE radicular sxs by > or = 50% to improve functional activity tolerance  Status at last assessment: constant throughout the whole L UE   Current: goal met patient reports 90% improvement     PLAN  [x]  Upgrade activities as tolerated     [x]  Continue plan of care  []  Update interventions per flow sheet       []  Discharge due to:_  [x]  Other DC sec to program complete and goals met. / progressing :     Jose Delgado, PT 7/9/2021   Future Appointments   Date Time Provider Emma Barrera   7/9/2021  8:00 AM Guera Pedro, PT [facility]   7/13/2021  8:30 AM Kevin Mazariegos, PTA [facility]   8/2/2021  2:45 PM Lito Awan MD [facility] BS AMB   8/6/2021  8:30 AM [facility]LEBOTOMIST [facility] [address] [place] [address]   8/13/2021  8:45 AM Nazario Leslie MD [facility] BS AMB   8/19/2021  8:05 AM IO NURSE VISIT [facility] BS AMB   8/26/2021  9:20 AM Brady Roger MD [facility] BS AMB

## 2021-07-13 ENCOUNTER — HOSPITAL ENCOUNTER (OUTPATIENT)
Dept: PHYSICAL THERAPY | Age: 71
End: 2021-07-13
Attending: PHYSICAL MEDICINE & REHABILITATION
Payer: MEDICARE

## 2021-08-02 ENCOUNTER — OFFICE VISIT (OUTPATIENT)
Dept: ORTHOPEDIC SURGERY | Age: 71
End: 2021-08-02
Payer: MEDICARE

## 2021-08-02 VITALS
HEART RATE: 53 BPM | BODY MASS INDEX: 23.22 KG/M2 | DIASTOLIC BLOOD PRESSURE: 62 MMHG | OXYGEN SATURATION: 97 % | TEMPERATURE: 97.4 F | SYSTOLIC BLOOD PRESSURE: 116 MMHG | RESPIRATION RATE: 14 BRPM | HEIGHT: 58 IN | WEIGHT: 110.6 LBS

## 2021-08-02 DIAGNOSIS — M79.2 NEURITIS: ICD-10-CM

## 2021-08-02 DIAGNOSIS — M48.02 FORAMINAL STENOSIS OF CERVICAL REGION: Primary | ICD-10-CM

## 2021-08-02 DIAGNOSIS — M54.12 LEFT CERVICAL RADICULOPATHY: ICD-10-CM

## 2021-08-02 PROCEDURE — 3017F COLORECTAL CA SCREEN DOC REV: CPT | Performed by: PHYSICAL MEDICINE & REHABILITATION

## 2021-08-02 PROCEDURE — G8399 PT W/DXA RESULTS DOCUMENT: HCPCS | Performed by: PHYSICAL MEDICINE & REHABILITATION

## 2021-08-02 PROCEDURE — G8536 NO DOC ELDER MAL SCRN: HCPCS | Performed by: PHYSICAL MEDICINE & REHABILITATION

## 2021-08-02 PROCEDURE — 1101F PT FALLS ASSESS-DOCD LE1/YR: CPT | Performed by: PHYSICAL MEDICINE & REHABILITATION

## 2021-08-02 PROCEDURE — G9899 SCRN MAM PERF RSLTS DOC: HCPCS | Performed by: PHYSICAL MEDICINE & REHABILITATION

## 2021-08-02 PROCEDURE — 1090F PRES/ABSN URINE INCON ASSESS: CPT | Performed by: PHYSICAL MEDICINE & REHABILITATION

## 2021-08-02 PROCEDURE — G8432 DEP SCR NOT DOC, RNG: HCPCS | Performed by: PHYSICAL MEDICINE & REHABILITATION

## 2021-08-02 PROCEDURE — G8754 DIAS BP LESS 90: HCPCS | Performed by: PHYSICAL MEDICINE & REHABILITATION

## 2021-08-02 PROCEDURE — G8427 DOCREV CUR MEDS BY ELIG CLIN: HCPCS | Performed by: PHYSICAL MEDICINE & REHABILITATION

## 2021-08-02 PROCEDURE — 99213 OFFICE O/P EST LOW 20 MIN: CPT | Performed by: PHYSICAL MEDICINE & REHABILITATION

## 2021-08-02 PROCEDURE — G8420 CALC BMI NORM PARAMETERS: HCPCS | Performed by: PHYSICAL MEDICINE & REHABILITATION

## 2021-08-02 PROCEDURE — G8752 SYS BP LESS 140: HCPCS | Performed by: PHYSICAL MEDICINE & REHABILITATION

## 2021-08-02 RX ORDER — GABAPENTIN 100 MG/1
CAPSULE ORAL
Qty: 120 CAPSULE | Refills: 4 | Status: SHIPPED | OUTPATIENT
Start: 2021-08-02 | End: 2022-06-08

## 2021-08-02 NOTE — PROGRESS NOTES
Nancy Trinidad presents today for   Chief Complaint   Patient presents with    Follow-up       Is someone accompanying this pt? no    Is the patient using any DME equipment during OV? no    Coordination of Care:  1. Have you been to the ER, urgent care clinic since your last visit? Hospitalized since your last visit? no    2. Have you seen or consulted any other health care providers outside of the [address] [place] [address] since your last visit? Include any pap smears or colon screening.  no

## 2021-08-02 NOTE — PROGRESS NOTES
[facility]  [address], [facility] [address]  Phone: [phone]  Fax: [phone]    Pt's YOB: 1950    ASSESSMENT   Diagnoses and all orders for this visit:    1. Foraminal stenosis of cervical region  -     gabapentin (Neurontin) 100 mg capsule; Take 1 cap by mouth in the morning and 3 caps at night as directed. 2. Left cervical radiculopathy  -     gabapentin (Neurontin) 100 mg capsule; Take 1 cap by mouth in the morning and 3 caps at night as directed. 3. Neuritis  -     gabapentin (Neurontin) 100 mg capsule; Take 1 cap by mouth in the morning and 3 caps at night as directed. IMPRESSION AND PLAN:  Flaquito Rees is a 72 y.o. female with history of cervical pain. She reports improvement in her neck pain radiating down the left arm since her last office visit. Pt notes numbness/tingling radiating down the left arm and is taking Neurontin 300 mg 1 cap QHS with benefit. 1) Pt was given information on cervical arthritis exercises. 2) She will increase her Neurontin 100 mg 1 cap QAM and 3 caps QHS. 3) Pt will continue with physical therapy exercises. 4) Ms. Ange Monge has a reminder for a \"due or due soon\" health maintenance. I have asked that she contact her primary care provider, Aurea Watson MD, for follow-up on this health maintenance. 5)  demonstrated consistency with prescribing. Follow-up and Dispositions    · Return in about 4 months (around 12/2/2021) for Medication follow up, PT follow up. HISTORY OF PRESENT ILLNESS:  Flaquito Rees is a 72 y.o. female with history of cervical pain and presents to the office today for follow up. She reports improvement in her neck pain radiating down the left arm since her last office visit. Pt notes numbness/tingling radiating down the left arm but notes this generally improves within a few seconds with right cervical flexion.  Pt reports significant improvement with physical therapy. She started Neurontin 300 mg and takes 1 cap QHS with benefit. Pt admits to excessive sedation when she tried increasing the Neurontin 300 mg to 2 caps QHS. She takes baclofen 10 mg very intermittently as needed. pt notes minimal improvement when previously taking prednisone. Pt at this time desires to proceed with medication evaluation. Of note, she works at [employer]. Pain Scale: 0 - No pain/10    PCP: Sydnie Zeng MD     Past Medical History:   Diagnosis Date    Anxiety 6/15    IKE-7 was 12/21    Clostridium difficile colitis     Dr. Ellis Bowen 3/14; postop 10/20    Colon adenoma 10/2011    Dr. Rachel Rust; Dr Fabiana Portillo 7/20    Cystic breast     Degenerative arthritis of cervical spine     on xray, worst C5-6 (4/21); mri showed multilevel degen changes, foraminal stenosis (4.21);  Dr Mirella Fernandez Depression 6/15    PHQ-9 was 15/27    Diverticulitis     recurrent x3 Dr Duval Her liver     10/10 on US, CT 6/12; Fib-4 was 0.74 from 1/15    GI bleed 2/14    ischemic colitis on CT, seen by Dr. Izabel Villarreal Hyperlipidemia     calculated 10 year risk score was 3.2% (1/15)    Hypertension     IFG (impaired fasting glucose) [id number]    Ischemic colitis ([facility] 3/14    Dr Ellis Bowen    Labial abscess     mrsa    Lymphoma ([facility] 12/13    Dr. Angela Santoro    Migraine     Nephrolithiasis     Neuropathy due to chemotherapeutic drug ([facility] 9/14    Osteoarthritis     hips and knees; Dr Dennis Vasquez Tobacco dependence syndrome 2/8/2019    Zoster 8/14    left T11        Social History     Socioeconomic History    Marital status:      Spouse name: Not on file    Number of children: 2    Years of education: Not on file    Highest education level: Not on file   Occupational History    Occupation: director [employer]   Tobacco Use    Smoking status: Former Smoker     Packs/day: 0.50     Years: 15.00     Pack years: 7.50     Types: Cigarettes     Quit date: 2020     Years since quittin.8    Smokeless tobacco: Never Used    Tobacco comment: smoking cessation advised   Substance and Sexual Activity    Alcohol use: Not Currently     Alcohol/week: 4.2 standard drinks     Types: 5 Glasses of wine per week    Drug use: No    Sexual activity: Not Currently   Other Topics Concern    Not on file   Social History Narrative    Not on file     Social Determinants of Health     Financial Resource Strain:     Difficulty of Paying Living Expenses:    Food Insecurity:     Worried About Running Out of Food in the Last Year:     Ran Out of Food in the Last Year:    Transportation Needs:     Lack of Transportation (Medical):  Lack of Transportation (Non-Medical):    Physical Activity:     Days of Exercise per Week:     Minutes of Exercise per Session:    Stress:     Feeling of Stress :    Social Connections:     Frequency of Communication with Friends and Family:     Frequency of Social Gatherings with Friends and Family:     Attends [employer] Services:     Active Member of Clubs or Organizations:     Attends Club or Organization Meetings:     Marital Status:    Intimate Partner Violence:     Fear of Current or Ex-Partner:     Emotionally Abused:     Physically Abused:     Sexually Abused:        Current Outpatient Medications   Medication Sig Dispense Refill    gabapentin (Neurontin) 100 mg capsule Take 1 cap by mouth in the morning and 3 caps at night as directed. 120 Capsule 4    baclofen (LIORESAL) 10 mg tablet Take 1 Tablet by mouth two (2) times daily as needed for Muscle Spasm(s). 60 Tablet 1    gabapentin (NEURONTIN) 300 mg capsule 1-2 caps QHS 60 Capsule 1    ibuprofen (MOTRIN) 200 mg tablet Take 200 mg by mouth every eight (8) hours as needed.  amLODIPine (NORVASC) 5 mg tablet Take 1 Tab by mouth daily. 90 Tab 3    ferrous sulfate (IRON) 325 mg (65 mg iron) tablet Take 1 Tab by mouth Daily (before breakfast).  Takes every other day  azithromycin (ZITHROMAX) 250 mg tablet Take 2 tablets today, then take 1 tablet daily 6 Tablet 0    acetaminophen (TylenoL) 325 mg tablet Take 325 mg by mouth every four (4) hours as needed for Pain. (Patient not taking: Reported on 8/2/2021)         Allergies   Allergen Reactions    Meperidine Nausea Only and Other (comments)    Augmentin [Amoxicillin-Pot Clavulanate] Diarrhea and Nausea Only    Keflex [Cephalexin] Nausea Only    Sulfa (Sulfonamide Antibiotics) Rash         REVIEW OF SYSTEMS    Constitutional: Negative for fever, chills, or weight change. Respiratory: Negative for cough or shortness of breath. Cardiovascular: Negative for chest pain or palpitations. Gastrointestinal: Negative for acid reflux, change in bowel habits, or constipation. Genitourinary: Negative for dysuria and flank pain. Musculoskeletal: Positive for cervical pain. Neurological: Negative for headaches or dizziness. Positive for numbness. Endo/Heme/Allergies: Negative for increased bruising. Psychiatric/Behavioral: Negative for difficulty with sleep. As per HPI    PHYSICAL EXAMINATION  Visit Vitals  /62 (BP 1 Location: Right upper arm, BP Patient Position: Sitting)   Pulse (!) 53   Temp 97.4 °F (36.3 °C) (Temporal)   Resp 14   Ht 4' 10\" (1.473 m)   Wt 110 lb 9.6 oz (50.2 kg)   SpO2 97%   BMI 23.12 kg/m²       Constitutional: Awake, alert, and in no acute distress. Neurological: Sensation to light touch is intact. Negative Bernal's sign bilaterally. Skin: warm, dry, and intact. Musculoskeletal: Moderately tight across the upper trapezius but this has improved since her last office visit. Good range of motion in the cervical spine on all planes. Good range of motion in both shoulders.       Biceps  Triceps Deltoids Wrist Ext Wrist Flex Hand Intrin   Right +4/5 +4/5 +4/5 +4/5 +4/5 +4/5   Left +4/5 +4/5 +4/5 +4/5 +4/5 +4/5     IMAGING:    Cervical spine MRI from 4/27/2021 was personally reviewed with the patient and demonstrated:       Results from Orders Only encounter on 04/27/21   MRI CERV SPINE WO CONT     Narrative MRI CERVICAL SPINE     CPT CODE: 49623     INDICATIONS: Neck pain with left arm radiation.     COMPARISON STUDIES: NONE.     TECHNIQUE: Sagittal/axial T2, sagittal T1, and sagittal inversion recovery T2  weighted sequences were obtained.     FINDINGS:     Straightening of the normal cervical lordosis. Vertebral column marrow signal  intensity is normal. The spinal cord, and visualized contents of the posterior  fossa are unremarkable. The spinal canal is of normal capacity. The  prevertebral/paravertebral soft tissues are unremarkable. Normal flow voids are  visible within the vascular structures of the neck.     C2/C3: Normal     C3/C4: Normal     C4/C5: No significant central canal compromise. Mild left-sided foraminal  stenosis. Right exit foramen patent.     C5/C6: Moderate to advanced degenerative disc disease. Dorsally directed  disc/osteophyte complex with effacement ventral CSF. I lateral right greater  than left bony exit foraminal stenosis.     C6/C7: Moderate degenerative disc disease. Diffuse also directed disc bulge,  with a left paracentral and foraminal extension. Left greater than right  moderate combined bony and soft tissue exit foraminal stenosis.     C7/T1: Normal        Impression Mild to moderate mid cervical spondylosis. No significant central canal  compromise. Levels of exit foraminal stenosis as outlined above.     Central canal contents, posterior fossa contents, paravertebral soft tissues  unremarkable.        Written by José Tena, as dictated by Tee Rubio MD.  I, Dr. Tee Rubio confirm that all documentation is accurate.

## 2021-08-02 NOTE — PATIENT INSTRUCTIONS
Neck Arthritis: Exercises  Introduction  Here are some examples of exercises for you to try. The exercises may be suggested for a condition or for rehabilitation. Start each exercise slowly. Ease off the exercises if you start to have pain. You will be told when to start these exercises and which ones will work best for you. How to do the exercises  Neck stretches to the side   1. This stretch works best if you keep your shoulder down as you lean away from it. To help you remember to do this, start by relaxing your shoulders and lightly holding on to your thighs or your chair. 2. Tilt your head toward your shoulder and hold for 15 to 30 seconds. Let the weight of your head stretch your muscles. 3. Repeat 2 to 4 times toward each shoulder. Chin tuck   1. Lie on the floor with a rolled-up towel under your neck. Your head should be touching the floor. 2. Slowly bring your chin toward your chest.  3. Hold for a count of 6, and then relax for up to 10 seconds. 4. Repeat 8 to 12 times. Active cervical rotation   1. Sit in a firm chair, or stand up straight. 2. Keeping your chin level, turn your head to the right, and hold for 15 to 30 seconds. 3. Turn your head to the left and hold for 15 to 30 seconds. 4. Repeat 2 to 4 times to each side. Shoulder blade squeeze   1. While standing, squeeze your shoulder blades together. 2. Do not raise your shoulders up as you are squeezing. 3. Hold for 6 seconds. 4. Repeat 8 to 12 times. Shoulder rolls   1. Sit comfortably with your feet shoulder-width apart. You can also do this exercise standing up. 2. Roll your shoulders up, then back, and then down in a smooth, circular motion. 3. Repeat 2 to 4 times. Follow-up care is a key part of your treatment and safety. Be sure to make and go to all appointments, and call your doctor if you are having problems. It's also a good idea to know your test results and keep a list of the medicines you take.   Where can you learn more? Go to http://www.gray.com/  Enter P952 in the search box to learn more about \"Neck Arthritis: Exercises. \"  Current as of: November 16, 2020               Content Version: 12.8  © 7928-8323 [employer]wise, Incorporated. Care instructions adapted under license by [employer] (which disclaims liability or warranty for this information). If you have questions about a medical condition or this instruction, always ask your healthcare professional. [place] [address] any warranty or liability for your use of this information.

## 2021-08-02 NOTE — LETTER
8/5/2021    Patient: Dex Marie   YOB: 1950   Date of Visit: 8/2/2021     Maura Gu MD  [address] [facility] [address]  [address]  [facility]    Dear Maura Gu MD,      Thank you for referring Ms. Freddy Arambula to [facility] for evaluation. My notes for this consultation are attached. If you have questions, please do not hesitate to call me. I look forward to following your patient along with you.       Sincerely,    Kennth Bosworth, MD

## 2021-08-03 ENCOUNTER — VIRTUAL VISIT (OUTPATIENT)
Dept: INTERNAL MEDICINE CLINIC | Age: 71
End: 2021-08-03
Payer: MEDICARE

## 2021-08-03 DIAGNOSIS — J01.10 ACUTE NON-RECURRENT FRONTAL SINUSITIS: Primary | ICD-10-CM

## 2021-08-03 PROCEDURE — 3017F COLORECTAL CA SCREEN DOC REV: CPT | Performed by: INTERNAL MEDICINE

## 2021-08-03 PROCEDURE — 1090F PRES/ABSN URINE INCON ASSESS: CPT | Performed by: INTERNAL MEDICINE

## 2021-08-03 PROCEDURE — G8432 DEP SCR NOT DOC, RNG: HCPCS | Performed by: INTERNAL MEDICINE

## 2021-08-03 PROCEDURE — G8756 NO BP MEASURE DOC: HCPCS | Performed by: INTERNAL MEDICINE

## 2021-08-03 PROCEDURE — 1101F PT FALLS ASSESS-DOCD LE1/YR: CPT | Performed by: INTERNAL MEDICINE

## 2021-08-03 PROCEDURE — G8427 DOCREV CUR MEDS BY ELIG CLIN: HCPCS | Performed by: INTERNAL MEDICINE

## 2021-08-03 PROCEDURE — G9899 SCRN MAM PERF RSLTS DOC: HCPCS | Performed by: INTERNAL MEDICINE

## 2021-08-03 PROCEDURE — 99213 OFFICE O/P EST LOW 20 MIN: CPT | Performed by: INTERNAL MEDICINE

## 2021-08-03 PROCEDURE — G8399 PT W/DXA RESULTS DOCUMENT: HCPCS | Performed by: INTERNAL MEDICINE

## 2021-08-03 RX ORDER — AZITHROMYCIN 250 MG/1
TABLET, FILM COATED ORAL
Qty: 6 TABLET | Refills: 0 | Status: SHIPPED | OUTPATIENT
Start: 2021-08-03 | End: 2021-08-26

## 2021-08-03 NOTE — PROGRESS NOTES
Mony Stafford is a 72 y.o. female who was seen by synchronous (real-time) audio-video technology on 8/3/2021 for No chief complaint on file. Assessment & Plan:   Diagnoses and all orders for this visit:    1. Acute non-recurrent frontal sinusitis  -     azithromycin (ZITHROMAX) 250 mg tablet; Take 2 tablets today, then take 1 tablet daily        I spent at least 12 minutes on this visit with this established patient. [id number]  Subjective:       Objective:   No flowsheet data found. General: alert, cooperative, no distress   Mental  status: normal mood, behavior, speech, dress, motor activity, and thought processes, able to follow commands   HENT: NCAT   Neck: no visualized mass   Resp: no respiratory distress   Neuro: no gross deficits   Skin: no discoloration or lesions of concern on visible areas   Psychiatric: normal affect, consistent with stated mood, no evidence of hallucinations     Additional exam findings: We discussed the expected course, resolution and complications of the diagnosis(es) in detail. Medication risks, benefits, costs, interactions, and alternatives were discussed as indicated. I advised her to contact the office if her condition worsens, changes or fails to improve as anticipated. She expressed understanding with the diagnosis(es) and plan. Gordo Ann Marie, was evaluated through a synchronous (real-time) audio-video encounter. The patient (or guardian if applicable) is aware that this is a billable service. Verbal consent to proceed has been obtained within the past 12 months. The visit was conducted pursuant to the emergency declaration under the [facility], [address] [place] [address] authority and the [employer] General Act. Patient identification was verified, and a caregiver was present when appropriate.  The patient was located in a state where the provider was credentialed to provide care.    Roger Dumas MD    She went down to the [place] on July 21 and got exposed to her grandson who had cold symptoms. Within a few days she noted onset of symptoms. She was self treating with OTC meds with mild relief but really has not cleared. Describes transient fever at the beginning but that resolved over a week ago. She has sinus congestion and bringing out greenish material.  She had chest congestion but that is improved markedly. No wheezing or dyspnea, no known other exposures. No GI complaints    Past Medical History:   Diagnosis Date    Anxiety 6/15    IKE-7 was 12/21    Clostridium difficile colitis     Dr. Vikash Sidhu 3/14; postop 10/20    Colon adenoma 10/2011    Dr. Paulo Verduzco; Dr Barnes Bounds 7/20    Cystic breast     Degenerative arthritis of cervical spine     on xray, worst C5-6 (4/21); mri showed multilevel degen changes, foraminal stenosis (4.21); Dr Adine Leyden Depression 6/15    PHQ-9 was 15/27    Diverticulitis     recurrent x3 Dr Nancy Anderson liver     10/10 on US, CT 6/12; Fib-4 was 0.74 from 1/15    GI bleed 2/14    ischemic colitis on CT, seen by Dr. Tanya Leroy Hyperlipidemia     calculated 10 year risk score was 3.2% (1/15)    Hypertension     IFG (impaired fasting glucose) [id number]    Ischemic colitis ([facility] 3/14    Dr Vikash Sidhu    Labial abscess     mrsa    Lymphoma ([facility] 12/13    Dr. Wili Morse    Migraine     Nephrolithiasis     Neuropathy due to chemotherapeutic drug ([facility] 9/14    Osteoarthritis     hips and knees; Dr Keily Newman Tobacco dependence syndrome 2/8/2019    Zoster 8/14    left T11     Current Outpatient Medications   Medication Sig    azithromycin (ZITHROMAX) 250 mg tablet Take 2 tablets today, then take 1 tablet daily    gabapentin (Neurontin) 100 mg capsule Take 1 cap by mouth in the morning and 3 caps at night as directed.  baclofen (LIORESAL) 10 mg tablet Take 1 Tablet by mouth two (2) times daily as needed for Muscle Spasm(s).     gabapentin (NEURONTIN) 300 mg capsule 1-2 caps QHS    ibuprofen (MOTRIN) 200 mg tablet Take 200 mg by mouth every eight (8) hours as needed.  amLODIPine (NORVASC) 5 mg tablet Take 1 Tab by mouth daily.  acetaminophen (TylenoL) 325 mg tablet Take 325 mg by mouth every four (4) hours as needed for Pain. (Patient not taking: Reported on 8/2/2021)    ferrous sulfate (IRON) 325 mg (65 mg iron) tablet Take 1 Tab by mouth Daily (before breakfast). Takes every other day     No current facility-administered medications for this visit. Allergies   Allergen Reactions    Meperidine Nausea Only and Other (comments)    Augmentin [Amoxicillin-Pot Clavulanate] Diarrhea and Nausea Only    Keflex [Cephalexin] Nausea Only    Sulfa (Sulfonamide Antibiotics) Rash     Assessment and plan:  1. Sinusitis/possible bronchitis. I gave her Zithromax, continue OTC meds for symptom relief, call if no improvement or worsening complaints        Above conditions discussed at length and patient vocalized understanding.   All questions answered to patient satisfaction

## 2021-08-06 ENCOUNTER — HOSPITAL ENCOUNTER (OUTPATIENT)
Dept: INFUSION THERAPY | Age: 71
Discharge: HOME | End: 2021-08-06
Payer: MEDICARE

## 2021-08-06 DIAGNOSIS — D50.9 IRON DEFICIENCY ANEMIA, UNSPECIFIED IRON DEFICIENCY ANEMIA TYPE: ICD-10-CM

## 2021-08-06 DIAGNOSIS — C83.18 MANTLE CELL LYMPHOMA OF LYMPH NODES OF MULTIPLE SITES (HCC): ICD-10-CM

## 2021-08-06 DIAGNOSIS — E53.8 B12 DEFICIENCY: ICD-10-CM

## 2021-08-06 LAB
ALBUMIN SERPL-MCNC: 3.8 G/DL (ref 3.4–5)
ALBUMIN/GLOB SERPL: 0.9 {RATIO} (ref 0.8–1.7)
ALP SERPL-CCNC: 53 U/L (ref 45–117)
ALT SERPL-CCNC: 17 U/L (ref 13–56)
ANION GAP SERPL CALC-SCNC: 8 MMOL/L (ref 3–18)
AST SERPL-CCNC: 10 U/L (ref 10–38)
BILIRUB SERPL-MCNC: 0.5 MG/DL (ref 0.2–1)
BUN SERPL-MCNC: 16 MG/DL (ref 7–18)
BUN/CREAT SERPL: 21 (ref 12–20)
CALCIUM SERPL-MCNC: 8.7 MG/DL (ref 8.5–10.1)
CHLORIDE SERPL-SCNC: 110 MMOL/L (ref 100–111)
CO2 SERPL-SCNC: 23 MMOL/L (ref 21–32)
CREAT SERPL-MCNC: 0.77 MG/DL (ref 0.6–1.3)
FERRITIN SERPL-MCNC: 928 NG/ML (ref 8–388)
FOLATE SERPL-MCNC: 17.4 NG/ML (ref 3.1–17.5)
GLOBULIN SER CALC-MCNC: 4.1 G/DL (ref 2–4)
GLUCOSE SERPL-MCNC: 102 MG/DL (ref 74–99)
IRON SATN MFR SERPL: 40 % (ref 20–50)
IRON SERPL-MCNC: 125 UG/DL (ref 50–175)
PLATELET # BLD AUTO: 282 K/UL (ref 135–420)
POTASSIUM SERPL-SCNC: 4 MMOL/L (ref 3.5–5.5)
PROT SERPL-MCNC: 7.9 G/DL (ref 6.4–8.2)
SODIUM SERPL-SCNC: 141 MMOL/L (ref 136–145)
T4 FREE SERPL-MCNC: 1.1 NG/DL (ref 0.7–1.5)
TIBC SERPL-MCNC: 314 UG/DL (ref 250–450)
TSH SERPL DL<=0.05 MIU/L-ACNC: 1.16 UIU/ML (ref 0.36–3.74)
VIT B12 SERPL-MCNC: 336 PG/ML (ref 211–911)

## 2021-08-06 PROCEDURE — 80053 COMPREHEN METABOLIC PANEL: CPT

## 2021-08-06 PROCEDURE — 83516 IMMUNOASSAY NONANTIBODY: CPT

## 2021-08-06 PROCEDURE — 83540 ASSAY OF IRON: CPT

## 2021-08-06 PROCEDURE — 82728 ASSAY OF FERRITIN: CPT

## 2021-08-06 PROCEDURE — 84439 ASSAY OF FREE THYROXINE: CPT

## 2021-08-06 PROCEDURE — 82607 VITAMIN B-12: CPT

## 2021-08-06 PROCEDURE — 36415 COLL VENOUS BLD VENIPUNCTURE: CPT

## 2021-08-06 PROCEDURE — 85049 AUTOMATED PLATELET COUNT: CPT

## 2021-08-06 NOTE — PROGRESS NOTES
[address] [facility] Progress Note    Date: 2021    Name: Ezio Kim    MRN: [id number]         : 1950    Peripheral Lab Draw      Ms. Marie to [facility], ambulatory at 1564 accompanied by self. Pt was assessed and education was provided. Blood obtained peripherally from left arm x 1 attempt with butterfly needle and sent to lab for Cbc w/diff, Cmp, Iron Profile, Ferritin, Tsh, Vitamin B12 & Folate per written orders. No bleeding or hematoma noted at site. Gauze and coban applied. Ms. Jose Shankar tolerated the phlebotomy, and had no complaints. Patient armband removed and shredded. Ms. Jose Shankar was discharged from [place] [address] in stable condition at [address] [place] [address] Phlebotomist PCT  2021  11:31 AM

## 2021-08-10 LAB
ENDOMYSIUM IGA SER QL: NEGATIVE
GLIADIN PEPTIDE IGA SER-ACNC: 1 UNITS (ref 0–19)
GLIADIN PEPTIDE IGG SER-ACNC: 2 UNITS (ref 0–19)
IGA SERPL-MCNC: 8 MG/DL (ref 87–352)
TTG IGA SER-ACNC: <2 U/ML (ref 0–3)
TTG IGG SER-ACNC: 6 U/ML (ref 0–5)

## 2021-08-12 NOTE — TELEPHONE ENCOUNTER
PT called to be seen today for a f/u from [facility]- she was seen for itching and rash- I spoke with the nurses and they read the ER notes and said it was OK to scheduled for Thursday the 1st available with RD
N/A

## 2021-08-13 ENCOUNTER — VIRTUAL VISIT (OUTPATIENT)
Age: 71
End: 2021-08-13
Payer: MEDICARE

## 2021-08-13 DIAGNOSIS — F17.200 TOBACCO DEPENDENCE SYNDROME: ICD-10-CM

## 2021-08-13 DIAGNOSIS — G62.0 NEUROPATHY DUE TO CHEMOTHERAPEUTIC DRUG (HCC): ICD-10-CM

## 2021-08-13 DIAGNOSIS — C83.18 MANTLE CELL LYMPHOMA OF LYMPH NODES OF MULTIPLE SITES (HCC): Primary | ICD-10-CM

## 2021-08-13 DIAGNOSIS — T45.1X5A NEUROPATHY DUE TO CHEMOTHERAPEUTIC DRUG (HCC): ICD-10-CM

## 2021-08-13 DIAGNOSIS — D50.9 IRON DEFICIENCY ANEMIA, UNSPECIFIED IRON DEFICIENCY ANEMIA TYPE: ICD-10-CM

## 2021-08-13 PROCEDURE — 99442 PR PHYS/QHP TELEPHONE EVALUATION 11-20 MIN: CPT | Performed by: INTERNAL MEDICINE

## 2021-08-13 NOTE — PROGRESS NOTES
Nancy Trinidad is a 72 y.o. female who was seen by synchronous (real-time) audio-technology on 2021      Hematology/Oncology  Progress Note     Name: Oumou Dominguez  Date:10/07/20  : 1950     PCP: Nico Vidales MD       Current therapy: Supportive care and active surveillance. Assessment & Plan:   Diagnoses and all orders for this visit:    1. Mantle cell lymphoma of lymph nodes of multiple sites ([facility] [address]    2. Iron deficiency anemia, unspecified iron deficiency anemia type    3. Tobacco dependence syndrome        The complexity of medical decision making for this visit is moderate   Follow-up and Dispositions    · Return in about 3 months (around 2021). Mantle cell lymphoma, multiple sites: Stage IV MCL Diagnosed in 2014. She is s/p chemotherapy completed in 2014. Clinically there is no evidence of disease recurrence. No signs or symptoms of disease recurrence. Continue surveillance.     Iron Deficiency Anemia:   *Patient with iron deficiency anemia status post IV iron infusion with Injectafer completed on 2021, here for follow-up  Labs point 2021 showed vitamin B12 300, folate greater than 20, normal TSH and T4, ferritin 928, transferrin saturation 40%, BUN 16, creatinine 0.77, celiac panel weak positive (anti-tTG with elevated IgA). Remaining CBC pending. Advise to use gluten free products  *Had colonoscopy one year ago. Next due in 3 years. Neutrophilia: Resolved. Cigarette smoking: smokes every now and then    RTC 3 months  I spent at least 15 minutes on this visit with this established patient. 712  Subjective:   Ms. Riaz Rubio is a 50-year-old woman who has a history of mantle cell lymphoma. She was diagnosed in 2014. She has completed a full course of systemic chemotherapy. She was being followed by Dr. Sonya Mccracken who retired. She denies night sweats, fevers or unexplained infections. She denies weight loss.  She states she is doing well and denies any abdominal pain or discomfort. She had hip replacement which went well but had cdiff colitis in the interim which has resolved. She has good appetite. Feels tired easily. All other points of ROS have been reviewed and were negative. ECOG=1. Past medical history, family history, and social history: these were reviewed and remains unchanged. Prior to Admission medications    Medication Sig Start Date End Date Taking? Authorizing Provider   azithromycin (ZITHROMAX) 250 mg tablet Take 2 tablets today, then take 1 tablet daily 8/3/21   Sheridan Iglesias MD   gabapentin (Neurontin) 100 mg capsule Take 1 cap by mouth in the morning and 3 caps at night as directed. 8/2/21   Twila Laurent MD   baclofen (LIORESAL) 10 mg tablet Take 1 Tablet by mouth two (2) times daily as needed for Muscle Spasm(s). 6/17/21   Sheridan Iglesias MD   gabapentin (NEURONTIN) 300 mg capsule 1-2 caps QHS 5/21/21   Buttery, Viki Aase E, NP   ibuprofen (MOTRIN) 200 mg tablet Take 200 mg by mouth every eight (8) hours as needed. Provider, Historical   amLODIPine (NORVASC) 5 mg tablet Take 1 Tab by mouth daily. 2/19/21   Sheridan Iglesias MD   acetaminophen (TylenoL) 325 mg tablet Take 325 mg by mouth every four (4) hours as needed for Pain. Patient not taking: Reported on 8/2/2021    Provider, Historical   ferrous sulfate (IRON) 325 mg (65 mg iron) tablet Take 1 Tab by mouth Daily (before breakfast).  Takes every other day    Provider, Historical     Patient Active Problem List   Diagnosis Code    Hyperlipidemia E78.5    Mantle cell lymphoma of lymph nodes of multiple sites ([facility] [address] C83.18    Neuropathy due to chemotherapeutic drug ([facility] [address] G62.0, T45.1X5A    Arthritis, degenerative M19.90    IFG (impaired fasting glucose) R73.01    Tobacco dependence syndrome F17.200    Colon adenoma D12.6    Diverticulosis K57.90    Primary hypertension I10    Iron deficiency anemia D50.9    Hip arthritis M16.10 Patient Active Problem List    Diagnosis Date Noted    Neuropathy due to chemotherapeutic drug ([facility] [address] 05/08/2015    Mantle cell lymphoma of lymph nodes of multiple sites ([facility] [address] 04/04/2014    Hip arthritis 10/13/2020    Iron deficiency anemia 10/01/2020    Primary hypertension 07/18/2019    Tobacco dependence syndrome 02/08/2019    Colon adenoma 02/08/2019    Diverticulosis 02/08/2019    IFG (impaired fasting glucose) 10/22/2015    Arthritis, degenerative 07/06/2015    Hyperlipidemia 08/26/2011     Current Outpatient Medications   Medication Sig Dispense Refill    azithromycin (ZITHROMAX) 250 mg tablet Take 2 tablets today, then take 1 tablet daily 6 Tablet 0    gabapentin (Neurontin) 100 mg capsule Take 1 cap by mouth in the morning and 3 caps at night as directed. 120 Capsule 4    baclofen (LIORESAL) 10 mg tablet Take 1 Tablet by mouth two (2) times daily as needed for Muscle Spasm(s). 60 Tablet 1    gabapentin (NEURONTIN) 300 mg capsule 1-2 caps QHS 60 Capsule 1    ibuprofen (MOTRIN) 200 mg tablet Take 200 mg by mouth every eight (8) hours as needed.  amLODIPine (NORVASC) 5 mg tablet Take 1 Tab by mouth daily. 90 Tab 3    acetaminophen (TylenoL) 325 mg tablet Take 325 mg by mouth every four (4) hours as needed for Pain. (Patient not taking: Reported on 8/2/2021)      ferrous sulfate (IRON) 325 mg (65 mg iron) tablet Take 1 Tab by mouth Daily (before breakfast).  Takes every other day       Allergies   Allergen Reactions    Meperidine Nausea Only and Other (comments)    Augmentin [Amoxicillin-Pot Clavulanate] Diarrhea and Nausea Only    Keflex [Cephalexin] Nausea Only    Sulfa (Sulfonamide Antibiotics) Rash     Past Medical History:   Diagnosis Date    Anxiety 6/15    IKE-7 was 12/21    Clostridium difficile colitis     Dr. Luis Ny 3/14; postop 10/20    Colon adenoma 10/2011    Dr. Jax Doshi; Dr Sergio Crane 7/20    Cystic breast     Degenerative arthritis of cervical spine     on xray, worst C5-6 (); mri showed multilevel degen changes, foraminal stenosis (4.21); Dr Gerry Kothari Depression 6/15    PHQ-9 was 15/27    Diverticulitis     recurrent x3 Dr Dumont Safe liver     10/10 on US, CT ; Fib-4 was 0.74 from 1/15    GI bleed     ischemic colitis on CT, seen by Dr. Maria M Miguel Hyperlipidemia     calculated 10 year risk score was 3.2% (1/15)    Hypertension     IFG (impaired fasting glucose) [id number]    Ischemic colitis ([facility] [address] 3/14    Dr Walt Pollard    Labial abscess     mrsa    Lymphoma ([facility] [address]     Dr. Robbie Hernandez    Migraine     Nephrolithiasis     Neuropathy due to chemotherapeutic drug ([facility] [address]     Osteoarthritis     hips and knees; Dr Shiloh Silver dependence syndrome 2019    Zoster     left T11     Past Surgical History:   Procedure Laterality Date    COLONOSCOPY N/A 2020    Dr Leanne Carpenter 2003 polyps;  adenoma; Dr Noreen Jennings 3/14 isch colitis; Dr Venessa Arce 20 mod divertics and adenoma    HX APPENDECTOMY  1968    HX HIP REPLACEMENT Right 10/2020    Dr Gunnar Garner knee surgery    HX ORTHOPAEDIC      DEXA t score 2.1 spine, -0.3 hip ()    HX SOPHIA AND BSO      Dr. Scooter Renteria       Family History   Adopted: Yes     Social History     Tobacco Use    Smoking status: Former Smoker     Packs/day: 0.50     Years: 15.00     Pack years: 7.50     Types: Cigarettes     Quit date: 2020     Years since quittin.8    Smokeless tobacco: Never Used    Tobacco comment: smoking cessation advised   Substance Use Topics    Alcohol use: Not Currently     Alcohol/week: 4.2 standard drinks     Types: 5 Glasses of wine per week           Objective:   No flowsheet data found. General: alert, cooperative, no distress     Additional exam findings: We discussed the expected course, resolution and complications of the diagnosis(es) in detail.   Medication risks, benefits, costs, interactions, and alternatives were discussed as indicated. I advised her to contact the office if her condition worsens, changes or fails to improve as anticipated. She expressed understanding with the diagnosis(es) and plan. Laisha Marie, who was evaluated through a patient-initiated, synchronous (real-time) audio-encounter, and/or her healthcare decision maker, is aware that it is a billable service, with coverage as determined by her insurance carrier. She provided verbal consent to proceed: Yes, and patient identification was verified. It was conducted pursuant to the emergency declaration under the [facility], [address] [place] [address] authority and the [employer] Act. A caregiver was present when appropriate. Ability to conduct physical exam was limited. I was at home. The patient was at home.       Rhea Casas MD

## 2021-08-17 ENCOUNTER — VIRTUAL VISIT (OUTPATIENT)
Dept: INTERNAL MEDICINE CLINIC | Age: 71
End: 2021-08-17
Payer: MEDICARE

## 2021-08-17 DIAGNOSIS — K90.41 GLUTEN ENTEROPATHY: Primary | ICD-10-CM

## 2021-08-17 DIAGNOSIS — D50.9 IRON DEFICIENCY ANEMIA, UNSPECIFIED IRON DEFICIENCY ANEMIA TYPE: ICD-10-CM

## 2021-08-17 PROCEDURE — G8432 DEP SCR NOT DOC, RNG: HCPCS | Performed by: INTERNAL MEDICINE

## 2021-08-17 PROCEDURE — 1101F PT FALLS ASSESS-DOCD LE1/YR: CPT | Performed by: INTERNAL MEDICINE

## 2021-08-17 PROCEDURE — G8427 DOCREV CUR MEDS BY ELIG CLIN: HCPCS | Performed by: INTERNAL MEDICINE

## 2021-08-17 PROCEDURE — 99214 OFFICE O/P EST MOD 30 MIN: CPT | Performed by: INTERNAL MEDICINE

## 2021-08-17 PROCEDURE — G8756 NO BP MEASURE DOC: HCPCS | Performed by: INTERNAL MEDICINE

## 2021-08-17 PROCEDURE — G8399 PT W/DXA RESULTS DOCUMENT: HCPCS | Performed by: INTERNAL MEDICINE

## 2021-08-17 PROCEDURE — 1090F PRES/ABSN URINE INCON ASSESS: CPT | Performed by: INTERNAL MEDICINE

## 2021-08-17 PROCEDURE — G9899 SCRN MAM PERF RSLTS DOC: HCPCS | Performed by: INTERNAL MEDICINE

## 2021-08-17 PROCEDURE — 3017F COLORECTAL CA SCREEN DOC REV: CPT | Performed by: INTERNAL MEDICINE

## 2021-08-17 NOTE — PROGRESS NOTES
Jayleen Connor is a 72 y.o. female who was seen by synchronous (real-time) audio-video technology on 8/17/2021 for Anemia        Assessment & Plan:   Diagnoses and all orders for this visit:    1. Gluten enteropathy    2. Iron deficiency anemia, unspecified iron deficiency anemia type        I spent at least 15 minutes on this visit with this established patient. 712  Subjective:       Objective:   No flowsheet data found. General: alert, cooperative, no distress   Mental  status: normal mood, behavior, speech, dress, motor activity, and thought processes, able to follow commands   HENT: NCAT   Neck: no visualized mass   Resp: no respiratory distress   Neuro: no gross deficits   Skin: no discoloration or lesions of concern on visible areas   Psychiatric: normal affect, consistent with stated mood, no evidence of hallucinations     Additional exam findings: We discussed the expected course, resolution and complications of the diagnosis(es) in detail. Medication risks, benefits, costs, interactions, and alternatives were discussed as indicated. I advised her to contact the office if her condition worsens, changes or fails to improve as anticipated. She expressed understanding with the diagnosis(es) and plan. Kati Marie, was evaluated through a synchronous (real-time) audio-video encounter. The patient (or guardian if applicable) is aware that this is a billable service. Verbal consent to proceed has been obtained within the past 12 months. The visit was conducted pursuant to the emergency declaration under the [facility], [address] [place] [address] authority and the [employer] Act. Patient identification was verified, and a caregiver was present when appropriate. The patient was located in a state where the provider was credentialed to provide care.     Roger Dumas MD    She just wanted to talk over some findings on lab testing. After Dr. Ruben Rucker retired, Dr. Mac Pisano took over her care. She was diagnosed with iron deficiency anemia and started on oral supplementation last year. Earlier in spring, they changed her over to IV Venofer. Subsequent work-up showed possible presence of celiac disease with labs as below. She had not been having any GI symptoms whatsoever. Specifically no bleeding, diarrhea, abdominal pain, gassiness, etc.  She does admit to eating a lot of gluten and wheat products. Dr. Mac Pisano told her to stop eating wheat so she just wants some clarification. Past Medical History:   Diagnosis Date    Anxiety 6/15    IKE-7 was 12/21    Clostridium difficile colitis     Dr. Penelope Steele 3/14; postop 10/20    Colon adenoma 10/2011    Dr. Tomás Ponce; Dr Halley Fisher 7/20    Cystic breast     Degenerative arthritis of cervical spine     on xray, worst C5-6 (4/21); mri showed multilevel degen changes, foraminal stenosis (4.21); Dr Reena Kurtz Depression 6/15    PHQ-9 was 15/27    Diverticulitis     recurrent x3 Dr Delio Armas liver     10/10 on US, CT 6/12; Fib-4 was 0.74 from 1/15    GI bleed 2/14    ischemic colitis on CT, seen by Dr. Shea Galvez Hyperlipidemia     calculated 10 year risk score was 3.2% (1/15)    Hypertension     IFG (impaired fasting glucose) [id number]    Ischemic colitis ([address] 3/14    Dr Penelope Steele    Labial abscess     mrsa    Lymphoma, mantle cell ([address] 12/2013    Dr. Ruben Rucker; stage s/p chemo; now Dr Mac Pisano    Migraine     Nephrolithiasis     Neuropathy due to chemotherapeutic drug ([address] 9/14    Osteoarthritis     hips and knees; Dr Nahid Acosta Tobacco dependence syndrome 2/8/2019    Zoster 8/14    left T11     Current Outpatient Medications   Medication Sig    azithromycin (ZITHROMAX) 250 mg tablet Take 2 tablets today, then take 1 tablet daily    gabapentin (Neurontin) 100 mg capsule Take 1 cap by mouth in the morning and 3 caps at night as directed.     baclofen (LIORESAL) 10 mg tablet Take 1 Tablet by mouth two (2) times daily as needed for Muscle Spasm(s).  gabapentin (NEURONTIN) 300 mg capsule 1-2 caps QHS    ibuprofen (MOTRIN) 200 mg tablet Take 200 mg by mouth every eight (8) hours as needed.  amLODIPine (NORVASC) 5 mg tablet Take 1 Tab by mouth daily.  acetaminophen (TylenoL) 325 mg tablet Take 325 mg by mouth every four (4) hours as needed for Pain. (Patient not taking: Reported on 8/2/2021)    ferrous sulfate (IRON) 325 mg (65 mg iron) tablet Take 1 Tab by mouth Daily (before breakfast). Takes every other day     No current facility-administered medications for this visit. Allergies   Allergen Reactions    Meperidine Nausea Only and Other (comments)    Augmentin [Amoxicillin-Pot Clavulanate] Diarrhea and Nausea Only    Keflex [Cephalexin] Nausea Only    Sulfa (Sulfonamide Antibiotics) Rash     Results for orders placed or performed during the hospital encounter of 08/06/21   VITAMIN B12 & FOLATE   Result Value Ref Range    Vitamin B12 336 211 - 911 pg/mL    Folate 17.4 3.10 - 17.50 ng/mL   TSH AND FREE T4   Result Value Ref Range    TSH 1.16 0.36 - 3.74 uIU/mL    T4, Free 1.1 0.7 - 1.5 NG/DL   IRON PROFILE   Result Value Ref Range    Iron 125 50 - 175 ug/dL    TIBC 314 250 - 450 ug/dL    Iron % saturation 40 20 - 50 %   FERRITIN   Result Value Ref Range    Ferritin 928 (H) 8 - 187 NG/ML   METABOLIC PANEL, COMPREHENSIVE   Result Value Ref Range    Sodium 141 136 - 145 mmol/L    Potassium 4.0 3.5 - 5.5 mmol/L    Chloride 110 100 - 111 mmol/L    CO2 23 21 - 32 mmol/L    Anion gap 8 3.0 - 18 mmol/L    Glucose 102 (H) 74 - 99 mg/dL    BUN 16 7.0 - 18 MG/DL    Creatinine 0.77 0.6 - 1.3 MG/DL    BUN/Creatinine ratio 21 (H) 12 - 20      GFR est AA >60 >60 ml/min/1.73m2    GFR est non-AA >60 >60 ml/min/1.73m2    Calcium 8.7 8.5 - 10.1 MG/DL    Bilirubin, total 0.5 0.2 - 1.0 MG/DL    ALT (SGPT) 17 13 - 56 U/L    AST (SGOT) 10 10 - 38 U/L    Alk.  phosphatase 53 45 - 117 U/L    Protein, total 7.9 6.4 - 8.2 g/dL    Albumin 3.8 3.4 - 5.0 g/dL    Globulin 4.1 (H) 2.0 - 4.0 g/dL    A-G Ratio 0.9 0.8 - 1.7     PLATELET COUNT   Result Value Ref Range    PLATELET 741 989 - 748 K/uL   CELIAC PANEL   Result Value Ref Range    Deamidated Gliadin Ab, IgA 1 0 - 19 units    Deamidated Gliadin Ab, IgG 2 0 - 19 units    t-Transglutaminase, IgA <2 0 - 3 U/mL    t-Transglutaminase, IgG 6 (H) 0 - 5 U/mL    Endomysial Ab, IgA Negative Negative      Immunoglobulin A, Qt. 8 (L) 87 - 352 mg/dL     Assessment and plan:  1. Labs suggestive of gluten enteropathy. However, she is completely asymptomatic outside of mild iron deficiency possibly from malabsorption. Long discussion with the patient. For now, she is declining GI consult for possible EGD and definitive diagnosis. She wants to cut down her wheat intake and see if that will improve her iron absorption in the future. Follow-up as previously scheduled  2.  Reassured her that elevated ferritin levels are not unexpected after IV Venofer        Above conditions discussed at length and patient vocalized understanding.   All questions answered to patient satisfaction

## 2021-08-19 ENCOUNTER — LAB ONLY (OUTPATIENT)
Dept: INTERNAL MEDICINE CLINIC | Age: 71
End: 2021-08-19

## 2021-08-19 DIAGNOSIS — R73.01 IFG (IMPAIRED FASTING GLUCOSE): ICD-10-CM

## 2021-08-19 DIAGNOSIS — E78.5 HYPERLIPIDEMIA, UNSPECIFIED HYPERLIPIDEMIA TYPE: ICD-10-CM

## 2021-08-19 DIAGNOSIS — I10 PRIMARY HYPERTENSION: Primary | ICD-10-CM

## 2021-08-20 LAB
ALBUMIN SERPL-MCNC: 4.4 G/DL (ref 3.8–4.8)
ALBUMIN/GLOB SERPL: 1.4 {RATIO} (ref 1.2–2.2)
ALP SERPL-CCNC: 54 IU/L (ref 48–121)
ALT SERPL-CCNC: 15 IU/L (ref 0–32)
AST SERPL-CCNC: 18 IU/L (ref 0–40)
BILIRUB SERPL-MCNC: 0.2 MG/DL (ref 0–1.2)
BUN SERPL-MCNC: 13 MG/DL (ref 8–27)
BUN/CREAT SERPL: 17 (ref 12–28)
CALCIUM SERPL-MCNC: 9.5 MG/DL (ref 8.7–10.3)
CHLORIDE SERPL-SCNC: 104 MMOL/L (ref 96–106)
CHOLEST SERPL-MCNC: 211 MG/DL (ref 100–199)
CO2 SERPL-SCNC: 20 MMOL/L (ref 20–29)
CREAT SERPL-MCNC: 0.75 MG/DL (ref 0.57–1)
ERYTHROCYTE [DISTWIDTH] IN BLOOD BY AUTOMATED COUNT: 13.1 % (ref 11.7–15.4)
EST. AVERAGE GLUCOSE BLD GHB EST-MCNC: 108 MG/DL
GLOBULIN SER CALC-MCNC: 3.2 G/DL (ref 1.5–4.5)
GLUCOSE SERPL-MCNC: 110 MG/DL (ref 65–99)
HBA1C MFR BLD: 5.4 % (ref 4.8–5.6)
HCT VFR BLD AUTO: 36.2 % (ref 34–46.6)
HDLC SERPL-MCNC: 96 MG/DL
HGB BLD-MCNC: 12.5 G/DL (ref 11.1–15.9)
IMP & REVIEW OF LAB RESULTS: NORMAL
LDLC SERPL CALC-MCNC: 106 MG/DL (ref 0–99)
MCH RBC QN AUTO: 32.6 PG (ref 26.6–33)
MCHC RBC AUTO-ENTMCNC: 34.5 G/DL (ref 31.5–35.7)
MCV RBC AUTO: 95 FL (ref 79–97)
PLATELET # BLD AUTO: 237 X10E3/UL (ref 150–450)
POTASSIUM SERPL-SCNC: 4.6 MMOL/L (ref 3.5–5.2)
PROT SERPL-MCNC: 7.6 G/DL (ref 6–8.5)
RBC # BLD AUTO: 3.83 X10E6/UL (ref 3.77–5.28)
SODIUM SERPL-SCNC: 139 MMOL/L (ref 134–144)
TRIGL SERPL-MCNC: 52 MG/DL (ref 0–149)
VLDLC SERPL CALC-MCNC: 9 MG/DL (ref 5–40)
WBC # BLD AUTO: 7.4 X10E3/UL (ref 3.4–10.8)

## 2021-08-22 NOTE — PROGRESS NOTES
This is a subsequent Medicare Annual Wellness Exam    I have reviewed the patient's medical history in detail and updated the computerized patient record. Assessment/Plan   Education and counseling provided:  Are appropriate based on today's review and evaluation  End-of-Life planning (with patient's consent)  Influenza Vaccine  Screening Mammography  Colorectal cancer screening tests  Cardiovascular screening blood test  Diabetes screening test    1. Medicare annual wellness visit, subsequent  2. Primary hypertension  3. Colon adenoma  4. IFG (impaired fasting glucose)  -     METABOLIC PANEL, COMPREHENSIVE; Future  -     HEMOGLOBIN A1C WITH EAG; Future  5. Mantle cell lymphoma of lymph nodes of multiple sites ([facility] [address]  6. Neuropathy due to chemotherapeutic drug ([facility] [address]  7. Hip arthritis  8. Hyperlipidemia, unspecified hyperlipidemia type  -     CBC W/O DIFF; Future  -     LIPID PANEL; Future  9. Advanced directives, counseling/discussion  -     ADVANCE CARE PLANNING FIRST 27 MINS  10. Encounter for screening mammogram for malignant neoplasm of breast  -     JORGE MAMMO BI SCREENING INCL CAD; Future       Depression Risk Factor Screening     3 most recent PHQ Screens 8/26/2021   Little interest or pleasure in doing things Not at all   Feeling down, depressed, irritable, or hopeless Not at all   Total Score PHQ 2 0       Alcohol Risk Screen    Do you average more than 1 drink per night or more than 7 drinks a week:  No    On any one occasion in the past three months have you have had more than 3 drinks containing alcohol:  No        Functional Ability and Level of Safety    Hearing: Hearing is good. Activities of Daily Living: The home contains: no safety equipment. Patient does total self care      Ambulation: with no difficulty     Fall Risk:  Fall Risk Assessment, last 12 mths 8/2/2021   Able to walk?  Yes   Fall in past 12 months? 0   Do you feel unsteady? -   Are you worried about falling -      Abuse Screen:  Patient is not abused       Cognitive Screening    Has your family/caregiver stated any concerns about your memory: no     Cognitive Screening: Normal - Mini Cog Test    Health Maintenance Due     Health Maintenance Due   Topic Date Due    Shingrix Vaccine Age 49> (1 of 2) Never done    Breast Cancer Screen Mammogram  08/20/2020       Patient Care Team   Patient Care Team:  Lyndia Dandy, MD as PCP - General (Internal Medicine)  Lyndia Dandy, MD [facility] PCP [facility]    History     Patient Active Problem List   Diagnosis Code    Hyperlipidemia E78.5    Mantle cell lymphoma of lymph nodes of multiple sites [facility]) C83.18    Neuropathy due to chemotherapeutic drug ([facility] [address] G62.0, T45.1X5A    Arthritis, degenerative M19.90    IFG (impaired fasting glucose) R73.01    Tobacco dependence syndrome F17.200    Colon adenoma D12.6    Diverticulosis K57.90    Primary hypertension I10    Iron deficiency anemia D50.9    Hip arthritis M16.10     Past Medical History:   Diagnosis Date    Anxiety 6/15    IKE-7 was 12/21    Clostridium difficile colitis     Dr. Gerri Reynolds 3/14; postop 10/20    Colon adenoma 10/2011    Dr. Radha Torres; Dr Brandon Lepe 7/20    Cystic breast     Degenerative arthritis of cervical spine     on xray, worst C5-6 (4/21); mri showed multilevel degen changes, foraminal stenosis (4.21);  Dr Svitlana Carlton Depression 6/15    PHQ-9 was 15/27    Diverticulitis     recurrent x3 Dr Olga Lino liver     10/10 on US, CT 6/12; Fib-4 was 0.74 from 1/15    GI bleed 2/14    ischemic colitis on CT, seen by Dr. Essie Yates Hyperlipidemia     calculated 10 year risk score was 3.2% (1/15)    Hypertension     IFG (impaired fasting glucose) [id number]    Ischemic colitis ([address] 3/14    Dr Gerri Reynolds    Labial abscess     mrsa    Lymphoma, mantle cell ([facility] [address] 12/2013    Dr. Luisa Rasmussen; stage s/p chemo; now Dr Christin Leblanc    Migraine     Nephrolithiasis     Neuropathy due to chemotherapeutic drug ([address]     Osteoarthritis     hips and knees; Dr Oconnor Blamer dependence syndrome 2019    Zoster     left T11      Past Surgical History:   Procedure Laterality Date    COLONOSCOPY N/A 2020    Dr Nicky Milian 2003 polyps;  adenoma; Dr Sheyla Chase 3/14 isch colitis; Dr John Connelly 20 mod divertics and adenoma    HX APPENDECTOMY  1968    HX HIP REPLACEMENT Right 10/2020    Dr Shar Jones    LEFT knee surgery    HX ORTHOPAEDIC      DEXA t score 2.1 spine, -0.3 hip ()    HX SOPHIA AND BSO      Dr. Rebecca Garrison HX TONSILLECTOMY       Current Outpatient Medications   Medication Sig Dispense Refill    gabapentin (Neurontin) 100 mg capsule Take 1 cap by mouth in the morning and 3 caps at night as directed. 120 Capsule 4    baclofen (LIORESAL) 10 mg tablet Take 1 Tablet by mouth two (2) times daily as needed for Muscle Spasm(s). 60 Tablet 1    gabapentin (NEURONTIN) 300 mg capsule 1-2 caps QHS 60 Capsule 1    ibuprofen (MOTRIN) 200 mg tablet Take 200 mg by mouth every eight (8) hours as needed.  amLODIPine (NORVASC) 5 mg tablet Take 1 Tab by mouth daily.  90 Tab 3     Allergies   Allergen Reactions    Meperidine Nausea Only and Other (comments)    Augmentin [Amoxicillin-Pot Clavulanate] Diarrhea and Nausea Only    Keflex [Cephalexin] Nausea Only    Sulfa (Sulfonamide Antibiotics) Rash       Family History   Adopted: Yes     Social History     Tobacco Use    Smoking status: Former Smoker     Packs/day: 0.50     Years: 15.00     Pack years: 7.50     Types: Cigarettes     Quit date: 2020     Years since quittin.9    Smokeless tobacco: Never Used    Tobacco comment: smoking cessation advised   Substance Use Topics    Alcohol use: Not Currently     Alcohol/week: 4.2 standard drinks     Types: 5 Glasses of wine per week         Deon Patterson MD

## 2021-08-22 NOTE — PROGRESS NOTES
72 y.o. WHITE/NON- female who presents for evaluation. She is happy with the right hip replacement and walking the dogs daily. No cardiovascular complaints. Pressures running in the 110s over 60s when she checks. We recently did a VV for sinusitis which has resolved    Denies polyuria, polydipsia, nocturia, vision change. Not checking sugars at this time. Keeping the weight controlled    Denied any gi or gu issues. The depression and anxiety are quiescent off meds. She is now f/u w Dr Chanel Murrieta with Dr Blanca Hogue' snf. No complaints to report    LAST MEDICARE WELLNESS EXAM: 2/8/19, 5/4/20, 8/26/21    Past Medical History:   Diagnosis Date    Anxiety 6/15    IKE-7 was 12/21    Clostridium difficile colitis     Dr. Suzette Dee 3/14; postop 10/20    Colon adenoma 10/2011    Dr. Nicky Milian; Dr John Connelly 7/20    Cystic breast     Degenerative arthritis of cervical spine     on xray, worst C5-6 (4/21); mri showed multilevel degen changes, foraminal stenosis (4.21);  Dr Erasmo Drake Depression 6/15    PHQ-9 was 15/27    Diverticulitis     recurrent x3 Dr Alexia Verduzco liver     10/10 on US, CT 6/12; Fib-4 was 0.74 from 1/15    GI bleed 2/14    ischemic colitis on CT, seen by Dr. Kandace Ng Hyperlipidemia     calculated 10 year risk score was 3.2% (1/15)    Hypertension     IFG (impaired fasting glucose) [id number]    Ischemic colitis ([address] 3/14    Dr Suzette Dee    Labial abscess     mrsa    Lymphoma, mantle cell ([address] 12/2013    Dr. Blanca Hogue; stage s/p chemo; now Dr Chanel Murrieta    Migraine     Nephrolithiasis     Neuropathy due to chemotherapeutic drug ([address] 9/14    Osteoarthritis     hips and knees; Dr Taylor Mckeon Tobacco dependence syndrome 2/8/2019    Zoster 8/14    left T11     Past Surgical History:   Procedure Laterality Date    COLONOSCOPY N/A 7/23/2020    Dr Nicky Milian 2003 polyps; 2011 adenoma; Dr Sheyla Chase 3/14 isch colitis; Dr John Connelly 7/23/20 mod divertics and adenoma    HX APPENDECTOMY 1968    HX HIP REPLACEMENT Right 10/2020    Dr Ardella Lundborg knee surgery    HX ORTHOPAEDIC      DEXA t score 2.1 spine, -0.3 hip ()    HX SOPHIA AND BSO      Dr. Saji Dejesus History     Socioeconomic History    Marital status:      Spouse name: Not on file    Number of children: 2    Years of education: Not on file    Highest education level: Not on file   Occupational History    Occupation: director [employer]   Tobacco Use    Smoking status: Former Smoker     Packs/day: 0.50     Years: 15.00     Pack years: 7.50     Types: Cigarettes     Quit date: 2020     Years since quittin.9    Smokeless tobacco: Never Used    Tobacco comment: smoking cessation advised   Substance and Sexual Activity    Alcohol use: Not Currently     Alcohol/week: 4.2 standard drinks     Types: 5 Glasses of wine per week    Drug use: No    Sexual activity: Not Currently   Other Topics Concern    Not on file   Social History Narrative    Not on file     Social Determinants of Health     Financial Resource Strain:     Difficulty of Paying Living Expenses:    Food Insecurity:     Worried About Running Out of Food in the Last Year:     Tiarra of Food in the Last Year:    Transportation Needs:     Lack of Transportation (Medical):      Lack of Transportation (Non-Medical):    Physical Activity:     Days of Exercise per Week:     Minutes of Exercise per Session:    Stress:     Feeling of Stress :    Social Connections:     Frequency of Communication with Friends and Family:     Frequency of Social Gatherings with Friends and Family:     Attends Buddhist Services:     Active Member of Clubs or Organizations:     Attends Club or Organization Meetings:     Marital Status:    Intimate Partner Violence:     Fear of Current or Ex-Partner:     Emotionally Abused:     Physically Abused:     Sexually Abused:      Current Outpatient Medications Medication Sig    atorvastatin (LIPITOR) 10 mg tablet Take 1 Tablet by mouth daily.  gabapentin (Neurontin) 100 mg capsule Take 1 cap by mouth in the morning and 3 caps at night as directed.  baclofen (LIORESAL) 10 mg tablet Take 1 Tablet by mouth two (2) times daily as needed for Muscle Spasm(s).  gabapentin (NEURONTIN) 300 mg capsule 1-2 caps QHS    ibuprofen (MOTRIN) 200 mg tablet Take 200 mg by mouth every eight (8) hours as needed.  amLODIPine (NORVASC) 5 mg tablet Take 1 Tab by mouth daily. No current facility-administered medications for this visit. Allergies   Allergen Reactions    Meperidine Nausea Only and Other (comments)    Augmentin [Amoxicillin-Pot Clavulanate] Diarrhea and Nausea Only    Keflex [Cephalexin] Nausea Only    Sulfa (Sulfonamide Antibiotics) Rash     Visit Vitals  BP (!) 106/56 (BP 1 Location: Right arm, BP Patient Position: Sitting, BP Cuff Size: Adult)   Pulse (!) 54   Temp 97 °F (36.1 °C) (Temporal)   Resp 14   Wt 106 lb 3.2 oz (48.2 kg)   SpO2 98%   BMI 22.20 kg/m²   A&O x3  Affect is appropriate. Mood stable  No apparent distress  Anicteric, no JVD, adenopathy or thyromegaly. No carotid bruits or radiated murmur  Lungs clear to auscultation, no wheezes or rales  Heart showed regular rate and rhythm. No murmur, rubs, gallops  Abdomen soft nontender, no hepatosplenomegaly or masses. Extremities without edema.   Pulses 1-2+ symmetrically    REVIEW OF SYSTEMS: mammo 4/19, DEXA 4/19, colo 7/20 Dr Musa Danielson  Ophtho - no vision change or eye pain  Oral - no mouth pain, tongue or tooth problems  Ears - no hearing loss, ear pain, fullness, no swallowing problems  Cardiac - no CP, PND, orthopnea,  palpitations or syncope  Chest - no breast masses  Resp - no wheezing, chronic coughing, dyspnea  GI - no heartburn, nausea, vomiting, change in bowel habits, bleeding, hemorrhoids  Urinary - no dysuria, hematuria, flank pain, urgency, frequency    LABS  From 9/13 showed gluc 107, cr 0.65, gfr 96,  alt 69, hba1c 5.6, chol 251, tg 54, hdl 113, ldl-c 127, wbc 6.5, hb 13.9, plt 225  From 11/13 showed gluc 103, cr 0.72, gfr 90,  alt 24,          wbc 7.6, hb 14.4 ,plt 224,     tsh 0.99  From 1/14 showed   gluc 119, cr 0.65, gfr>60,           wbc 8.2, hb 13.4, plt 208  From 2/14 showed   gluc 118, cr 0.72, gfr>60, alt 25,                   fe 84, %sat 29,                    b12 1289, fol 14.8, lip 376  From 3/14 showed   gluc 107, cr 0.52, gfr>60, alt 13,          wbc 4.8, hb 10.8, plt 299, lip 130, c diff+  From 8/14 showed                        fe 57, %sat 16, ferritin 153  Form 1/15 showed                                   b12 457,   fol>20,  vit d 23,  From 1/15 showed        hba1c 4.9, chol 206, tg 50, hdl 116, ldl-c 80  From 9/15 showed   gluc 105, cr 0.69, gfr>60, alt 27,         wbc 9.3, hb 15.4, plt 248  From 2/16 showed   gluc 123, cr 0.62, gfr 96,  alt 22  From 2/17 showed   gluc 89,   cr 0.72, gfr>60, alt 47,          wbc 9.8, hb 14.8, plt 328  From 2/18 showed   gluc 109, cr 0.65, gfr>60, alt 22,          wbc 9.3, hb 13.1, plt 253  From 2/19 showed   gluc 106, cr 0.61, gfr>60, alt 37, hba1c 5.2, chol 219, tg 27, hdl 161, ldl-c 53,   wbc 6.6, hb 12.7, plt 213  From 7/19 showed   gluc 159, cr 0.84, gfr>60, alt 46,          wbc 7.7, hb 12.6, plt 265  From 5/20 showed   gluc 103, cr 0.79, gfr>60, alt 15,          wbc 7.5, hb 11.9, plt 301, fe 75, %sat 20, ferritin 75  From 10/20 showed gluc 102, cr 0.90, gfr>60,    hba1c 5.2,         wbc 7.5, hb 12.3, plt 297  From 4/21 showed   gluc 108, cr 0.78, gfr>60, alt 16,                  fe 56, %sat 15, ferritin 46,   b12 300,   fol>20, tsh 1.12, ft4 1.00    Results for orders placed or performed in visit on 53/19/25   METABOLIC PANEL, COMPREHENSIVE   Result Value Ref Range    Glucose 110 (H) 65 - 99 mg/dL    BUN 13 8 - 27 mg/dL    Creatinine 0.75 0.57 - 1.00 mg/dL    GFR est non-AA 82 >59 mL/min/1.73    GFR est AA 94 >59 mL/min/1.73 BUN/Creatinine ratio 17 12 - 28    Sodium 139 134 - 144 mmol/L    Potassium 4.6 3.5 - 5.2 mmol/L    Chloride 104 96 - 106 mmol/L    CO2 20 20 - 29 mmol/L    Calcium 9.5 8.7 - 10.3 mg/dL    Protein, total 7.6 6.0 - 8.5 g/dL    Albumin 4.4 3.8 - 4.8 g/dL    GLOBULIN, TOTAL 3.2 1.5 - 4.5 g/dL    A-G Ratio 1.4 1.2 - 2.2    Bilirubin, total 0.2 0.0 - 1.2 mg/dL    Alk. phosphatase 54 48 - 121 IU/L    AST (SGOT) 18 0 - 40 IU/L    ALT (SGPT) 15 0 - 32 IU/L   CBC W/O DIFF   Result Value Ref Range    WBC 7.4 3.4 - 10.8 x10E3/uL    RBC 3.83 3.77 - 5.28 x10E6/uL    HGB 12.5 11.1 - 15.9 g/dL    HCT 36.2 34.0 - 46.6 %    MCV 95 79 - 97 fL    MCH 32.6 26.6 - 33.0 pg    MCHC 34.5 31.5 - 35.7 g/dL    RDW 13.1 11.7 - 15.4 %    PLATELET 238 760 - 333 x10E3/uL   LIPID PANEL   Result Value Ref Range    Cholesterol, total 211 (H) 100 - 199 mg/dL    Triglyceride 52 0 - 149 mg/dL    HDL Cholesterol 96 >39 mg/dL    VLDL, calculated 9 5 - 40 mg/dL    LDL, calculated 106 (H) 0 - 99 mg/dL   HEMOGLOBIN A1C WITH EAG   Result Value Ref Range    Hemoglobin A1c 5.4 4.8 - 5.6 %    Estimated average glucose 108 mg/dL   CVD REPORT   Result Value Ref Range    INTERPRETATION Note      We reviewed the patient's labs from the last several visits to point out trends in the numbers        Patient Active Problem List   Diagnosis Code    Hyperlipidemia E78.5    Mantle cell lymphoma of lymph nodes of multiple sites ([facility] C83.18    Neuropathy due to chemotherapeutic drug ([facility] G62.0, T45.1X5A    Arthritis, degenerative M19.90    IFG (impaired fasting glucose) R73.01    Tobacco dependence syndrome F17.200    Colon adenoma D12.6    Diverticulosis K57.90    Primary hypertension I10    Iron deficiency anemia D50.9    Hip arthritis M16.10     Assessment and plan:  1. Colon adenoma, divertics. Fiber, colo 2025  2. HTN. Controlled on amlo  3. HLP.   Long discussion about risks and benefits of statins; also discussed further risk stratification with ca score.  She is wanting to do both. Follow up 4 mos  4. IFG. Lifestyle and dietary measures, maintain weight  5. Nephrolithiasis. Quiescent, hydration  6. Lymphoma. F/U Dr Chanel Murrieta as scheduled  7. Smoking cessation reiterated        RTC 1/22      Above conditions discussed at length and patient vocalized understanding. All questions answered to patient satisfaction        ICD-10-CM ICD-9-CM    1. Medicare annual wellness visit, subsequent  Z00.00 V70.0    2. Primary hypertension  I10 401.9    3. Colon adenoma  D12.6 211.3    4. IFG (impaired fasting glucose)  F43.66 289.04 METABOLIC PANEL, COMPREHENSIVE      CANCELED: HEMOGLOBIN A1C WITH EAG   5. Mantle cell lymphoma of lymph nodes of multiple sites ([facility]  C83.18 200.48    6. Neuropathy due to chemotherapeutic drug (HCC)  G62.0 357.6     T45.1X5A E933.1    7. Hip arthritis  M16.10 716.95    8. Hyperlipidemia, unspecified hyperlipidemia type  E78.5 272.4 LIPID PANEL      CT HEART W/O CONT WITH CALCIUM      atorvastatin (LIPITOR) 10 mg tablet      CANCELED: CBC W/O DIFF   9.  Advanced directives, counseling/discussion  Z71.89 V65.49 ADVANCE CARE PLANNING FIRST 27 MINS   10. Encounter for screening mammogram for malignant neoplasm of breast  Z12.31 V76.12 JORGE MAMMO BI SCREENING INCL CAD

## 2021-08-26 ENCOUNTER — OFFICE VISIT (OUTPATIENT)
Dept: INTERNAL MEDICINE CLINIC | Age: 71
End: 2021-08-26
Payer: MEDICARE

## 2021-08-26 ENCOUNTER — TELEPHONE (OUTPATIENT)
Dept: INTERNAL MEDICINE CLINIC | Age: 71
End: 2021-08-26

## 2021-08-26 VITALS
DIASTOLIC BLOOD PRESSURE: 56 MMHG | SYSTOLIC BLOOD PRESSURE: 106 MMHG | RESPIRATION RATE: 14 BRPM | HEART RATE: 54 BPM | OXYGEN SATURATION: 98 % | TEMPERATURE: 97 F | WEIGHT: 106.2 LBS | BODY MASS INDEX: 22.2 KG/M2

## 2021-08-26 DIAGNOSIS — T45.1X5A NEUROPATHY DUE TO CHEMOTHERAPEUTIC DRUG (HCC): ICD-10-CM

## 2021-08-26 DIAGNOSIS — D12.6 COLON ADENOMA: ICD-10-CM

## 2021-08-26 DIAGNOSIS — E78.5 HYPERLIPIDEMIA, UNSPECIFIED HYPERLIPIDEMIA TYPE: ICD-10-CM

## 2021-08-26 DIAGNOSIS — Z12.31 ENCOUNTER FOR SCREENING MAMMOGRAM FOR MALIGNANT NEOPLASM OF BREAST: ICD-10-CM

## 2021-08-26 DIAGNOSIS — C83.18 MANTLE CELL LYMPHOMA OF LYMPH NODES OF MULTIPLE SITES (HCC): ICD-10-CM

## 2021-08-26 DIAGNOSIS — R73.01 IFG (IMPAIRED FASTING GLUCOSE): ICD-10-CM

## 2021-08-26 DIAGNOSIS — M16.10 HIP ARTHRITIS: ICD-10-CM

## 2021-08-26 DIAGNOSIS — Z00.00 MEDICARE ANNUAL WELLNESS VISIT, SUBSEQUENT: Primary | ICD-10-CM

## 2021-08-26 DIAGNOSIS — I10 PRIMARY HYPERTENSION: ICD-10-CM

## 2021-08-26 DIAGNOSIS — Z71.89 ADVANCED DIRECTIVES, COUNSELING/DISCUSSION: ICD-10-CM

## 2021-08-26 DIAGNOSIS — G62.0 NEUROPATHY DUE TO CHEMOTHERAPEUTIC DRUG (HCC): ICD-10-CM

## 2021-08-26 PROCEDURE — G8754 DIAS BP LESS 90: HCPCS | Performed by: INTERNAL MEDICINE

## 2021-08-26 PROCEDURE — G0439 PPPS, SUBSEQ VISIT: HCPCS | Performed by: INTERNAL MEDICINE

## 2021-08-26 PROCEDURE — G8536 NO DOC ELDER MAL SCRN: HCPCS | Performed by: INTERNAL MEDICINE

## 2021-08-26 PROCEDURE — G8752 SYS BP LESS 140: HCPCS | Performed by: INTERNAL MEDICINE

## 2021-08-26 PROCEDURE — G8420 CALC BMI NORM PARAMETERS: HCPCS | Performed by: INTERNAL MEDICINE

## 2021-08-26 PROCEDURE — G9899 SCRN MAM PERF RSLTS DOC: HCPCS | Performed by: INTERNAL MEDICINE

## 2021-08-26 PROCEDURE — 99214 OFFICE O/P EST MOD 30 MIN: CPT | Performed by: INTERNAL MEDICINE

## 2021-08-26 PROCEDURE — 1090F PRES/ABSN URINE INCON ASSESS: CPT | Performed by: INTERNAL MEDICINE

## 2021-08-26 PROCEDURE — 99497 ADVNCD CARE PLAN 30 MIN: CPT | Performed by: INTERNAL MEDICINE

## 2021-08-26 PROCEDURE — G8510 SCR DEP NEG, NO PLAN REQD: HCPCS | Performed by: INTERNAL MEDICINE

## 2021-08-26 PROCEDURE — G8427 DOCREV CUR MEDS BY ELIG CLIN: HCPCS | Performed by: INTERNAL MEDICINE

## 2021-08-26 PROCEDURE — G8399 PT W/DXA RESULTS DOCUMENT: HCPCS | Performed by: INTERNAL MEDICINE

## 2021-08-26 PROCEDURE — 3017F COLORECTAL CA SCREEN DOC REV: CPT | Performed by: INTERNAL MEDICINE

## 2021-08-26 RX ORDER — ATORVASTATIN CALCIUM 10 MG/1
10 TABLET, FILM COATED ORAL DAILY
Qty: 30 TABLET | Refills: 5 | Status: SHIPPED | OUTPATIENT
Start: 2021-08-26 | End: 2022-07-19 | Stop reason: SDUPTHER

## 2021-08-26 NOTE — PROGRESS NOTES
Pt is here for   Chief Complaint   Patient presents with   Ace Silver Annual Wellness Visit     1. Have you been to the ER, urgent care clinic or hospitalized since your last visit? NO.     2. Have you seen or consulted any other health care providers outside of the [address] [place] [address] since your last visit (Include any pap smears or colon screening)? NO      Do you have an Advanced Directive? NO    Would you like information on Advanced Directives?  NO

## 2021-08-26 NOTE — PATIENT INSTRUCTIONS

## 2021-08-26 NOTE — TELEPHONE ENCOUNTER
Instructions       Return in about 4 months (around 12/26/2021) with labs, or if symptoms worsen or fail to improve.

## 2021-08-26 NOTE — ACP (ADVANCE CARE PLANNING)
Advance Care Planning     Advance Care Planning (ACP) Physician/NP/PA Conversation      Date of Conversation: 8/26/2021  Conducted with: Patient with Decision Making Capacity    Healthcare Decision Maker:   No healthcare decision makers have been documented. Click here to complete [address] including selection of the Healthcare Decision Maker Relationship (ie \"Primary\")      Today we documented Decision Maker(s) consistent with Legal Next of Kin hierarchy. Care Preferences:    Hospitalization: \"If your health worsens and it becomes clear that your chance of recovery is unlikely, what would be your preference regarding hospitalization? \"  The patient would prefer hospitalization. Ventilation: \"If you were unable to breathe on your own and your chance of recovery was unlikely, what would be your preference about the use of a ventilator (breathing machine) if it was available to you? \"   The patient would desire the use of a ventilator. Resuscitation: \"In the event your heart stopped as a result of an underlying serious health condition, would you want attempts to be made to restart your heart, or would you prefer a natural death? \"   Yes, attempt to resuscitate.     Additional topics discussed: ventilation preferences, hospitalization preferences and resuscitation preferences    Conversation Outcomes / Follow-Up Plan:   ACP in process - information provided, considering goals and options  Reviewed DNR/DNI and patient elects Full Code (Attempt Resuscitation)     Length of Voluntary ACP Conversation in minutes:  16 minutes    Lani Jimenez MD

## 2021-08-29 DIAGNOSIS — Z12.31 ENCOUNTER FOR SCREENING MAMMOGRAM FOR MALIGNANT NEOPLASM OF BREAST: ICD-10-CM

## 2021-09-03 ENCOUNTER — HOSPITAL ENCOUNTER (OUTPATIENT)
Dept: CT IMAGING | Age: 71
Discharge: HOME | End: 2021-09-03
Attending: INTERNAL MEDICINE

## 2021-09-03 PROCEDURE — 75571 CT HRT W/O DYE W/CA TEST: CPT

## 2021-09-09 DIAGNOSIS — E78.5 HYPERLIPIDEMIA, UNSPECIFIED HYPERLIPIDEMIA TYPE: ICD-10-CM

## 2021-09-14 ENCOUNTER — TELEPHONE (OUTPATIENT)
Dept: OTHER | Age: 71
End: 2021-09-14

## 2021-09-14 ENCOUNTER — TELEPHONE (OUTPATIENT)
Dept: INTERNAL MEDICINE CLINIC | Age: 71
End: 2021-09-14

## 2021-09-14 NOTE — TELEPHONE ENCOUNTER
pls call    CARDIOLOGY REPORT:       The patient underwent a limited noncontrast CT scan of her chest with cardiac  gating to evaluate for coronary arterial calcification. Using the Agatston  method the coronary calcium score was calculated. There is a moderate amount of  coronary calcifications present in all 3 major coronary vessels. Coronary  calcium score calculated be 138.     IMPRESSION. Coronary calcium score 138. STAY on statin and follow up as directed    FINDINGS:  Well-circumscribed right middle lobe 9 x 10 mm pulmonary nodule is stable since  2015. No additional follow-up is required per current Fleischner guidelines. No pleural or pericardial effusion. Moderate-large hiatal hernia redemonstrated.       Stable RML nodule since 2015, no follow up needed  Hiatal hernia

## 2021-09-14 NOTE — TELEPHONE ENCOUNTER
Pt called because she received her results from the cardio calcium test. She wants to know does she need to see Dr. Arleen Mayes for a follow up or should she just keep taking the lipitor. Please advise.  Thank you!!!

## 2021-09-14 NOTE — TELEPHONE ENCOUNTER
Patient identity verified and matched to demographic data. Patient notified of Coronary Calcium Score of 138         Follow up recommended with Dr Rebecca Tomas, Primary Care MD     Patient verbalized understanding of results, patient education, and follow up care.

## 2021-10-22 ENCOUNTER — TELEPHONE (OUTPATIENT)
Age: 71
End: 2021-10-22

## 2021-10-22 DIAGNOSIS — D50.9 IRON DEFICIENCY ANEMIA, UNSPECIFIED IRON DEFICIENCY ANEMIA TYPE: Primary | ICD-10-CM

## 2021-10-22 NOTE — TELEPHONE ENCOUNTER
Patient called in requesting a refill of the medication gabapentin (Neurontin) 100 mg capsule called into the pharmacy on file.     Patient's contact is [phone]

## 2021-10-22 NOTE — TELEPHONE ENCOUNTER
Called patient to fix appointment time. Patient would like to know if labs are needed before appointment.  Patient is scheduled for a 3 month follow up on 12/2

## 2021-10-22 NOTE — TELEPHONE ENCOUNTER
A chart review was done. The last issued prescription for neurontin was done on 08/02/21 and had 4 refills on it. I contacted the pt. The pt was identified using 2 pt identifiers. She was made aware of this. She states that her prescription bottle says 0 refills. Pt was advised to contact the pharmacy and have them pull the original prescription for the remaining refills. She verbalized understanding and has no questions or concerns. She will contact the office if she has any issues.

## 2021-11-02 ENCOUNTER — TELEPHONE (OUTPATIENT)
Age: 71
End: 2021-11-02

## 2021-12-02 ENCOUNTER — VIRTUAL VISIT (OUTPATIENT)
Dept: INTERNAL MEDICINE CLINIC | Age: 71
End: 2021-12-02
Payer: MEDICARE

## 2021-12-02 DIAGNOSIS — J01.10 ACUTE NON-RECURRENT FRONTAL SINUSITIS: Primary | ICD-10-CM

## 2021-12-02 PROCEDURE — 99442 PR PHYS/QHP TELEPHONE EVALUATION 11-20 MIN: CPT | Performed by: INTERNAL MEDICINE

## 2021-12-02 RX ORDER — AZITHROMYCIN 250 MG/1
TABLET, FILM COATED ORAL
Qty: 6 TABLET | Refills: 0 | Status: SHIPPED | OUTPATIENT
Start: 2021-12-02 | End: 2021-12-16

## 2021-12-02 NOTE — PROGRESS NOTES
Jesus De Jesus is a 80 y.o. female, evaluated via audio-only technology on 12/2/2021 for Sinus Infection      Assessment & Plan:   Diagnoses and all orders for this visit:    1. Acute non-recurrent frontal sinusitis  -     azithromycin (ZITHROMAX) 250 mg tablet; Take 2 tablets today, then take 1 tablet daily        12  Subjective:         Jesus De Jesus, who was evaluated through a patient-initiated, synchronous (real-time) audio only encounter, and/or her healthcare decision maker, is aware that it is a billable service, with coverage as determined by her insurance carrier. She provided verbal consent to proceed: Yes. She has not had a related appointment within my department in the past 7 days or scheduled within the next 24 hours. On this date 12/02/2021 I have spent 12 minutes reviewing previous notes, test results and face to face (virtual) with the patient discussing the diagnosis and importance of compliance with the treatment plan as well as documenting on the day of the visit. Vicky Bee MD     She think she has sinus infection. For the last week, she is been having a lot of sinus fullness, periorbital  Pain, headache, the last 2 days she has noted some yellowish discharge. Also reports mild pressure on the right ear without associated hearing loss, tinnitus, dizziness. No known exposures, she is vaccinated. Denied any GI symptoms or myalgias. Been using OTC meds including Flonase without significant improvement    Past Medical History:   Diagnosis Date    Agatston CAC score 100-199 09/2021    ca score 138, calcification all 3 vessels    Anxiety 6/15    IKE-7 was 12/21    Clostridium difficile colitis     Dr. Osmani Mcgowan 3/14; postop 10/20    Colon adenoma 10/2011    Dr. Marlene Montero; Dr Saniya Levy 7/20    Cystic breast     Degenerative arthritis of cervical spine     on xray, worst C5-6 (4/21); mri showed multilevel degen changes, foraminal stenosis (4.21);  Dr Krishna Arora 6/15    PHQ-9 was 15/27    Diverticulitis     recurrent x3 Dr Sherri Francisco liver     10/10 on US, CT 6/12; Fib-4 was 0.74 from 1/15    GI bleed 2/14    ischemic colitis on CT, seen by Dr. Rody Schwartz Hyperlipidemia     calculated 10 year risk score was 3.2% (1/15)    Hypertension     IFG (impaired fasting glucose) [id number]    Ischemic colitis ([facility] 3/14    Dr Radha Santillan    Labial abscess     mrsa    Lung nodule 2015    9x10mm RML, no change 9/21    Lymphoma, mantle cell ([facility] 12/2013    Dr. Kellen Adame; stage s/p chemo; now Dr Azeb Perea    Migraine     Nephrolithiasis     Neuropathy due to chemotherapeutic drug ([facility] 9/14    Osteoarthritis     hips and knees; Dr Nicol Schneider Tobacco dependence syndrome 2/8/2019    Zoster 8/14    left T11     Current Outpatient Medications   Medication Sig    azithromycin (ZITHROMAX) 250 mg tablet Take 2 tablets today, then take 1 tablet daily    atorvastatin (LIPITOR) 10 mg tablet Take 1 Tablet by mouth daily.  gabapentin (Neurontin) 100 mg capsule Take 1 cap by mouth in the morning and 3 caps at night as directed.  baclofen (LIORESAL) 10 mg tablet Take 1 Tablet by mouth two (2) times daily as needed for Muscle Spasm(s).  gabapentin (NEURONTIN) 300 mg capsule 1-2 caps QHS    ibuprofen (MOTRIN) 200 mg tablet Take 200 mg by mouth every eight (8) hours as needed.  amLODIPine (NORVASC) 5 mg tablet Take 1 Tab by mouth daily. No current facility-administered medications for this visit. Allergies   Allergen Reactions    Meperidine Nausea Only and Other (comments)    Augmentin [Amoxicillin-Pot Clavulanate] Diarrhea and Nausea Only    Keflex [Cephalexin] Nausea Only    Sulfa (Sulfonamide Antibiotics) Rash     Assessment and plan:  1. Sinusitis.   I gave her Z-Dudley, continue OTC meds for symptom relief, call if no improvement

## 2021-12-15 ENCOUNTER — NURSE TRIAGE (OUTPATIENT)
Dept: OTHER | Facility: CLINIC | Age: 71
End: 2021-12-15

## 2021-12-15 NOTE — TELEPHONE ENCOUNTER
Received call from Artie at [facility] with Red Flag Complaint. Brief description of triage: Fatigue since Monday    Triage indicates for patient to see in office today    Care advice provided, patient verbalizes understanding; denies any other questions or concerns; instructed to call back for any new or worsening symptoms. Message left in [facility] ([facility]) chat for scheduling. Attention Provider: Thank you for allowing me to participate in the care of your patient. The patient was connected to triage in response to information provided to the [facility]. Please do not respond through this encounter as the response is not directed to a shared pool. Reason for Disposition   Taking a medicine that could cause weakness (e.g., blood pressure medications, diuretics)    Answer Assessment - Initial Assessment Questions  1. DESCRIPTION: \"Describe how you are feeling. \"      Extreme tired    2. SEVERITY: \"How bad is it? \"  \"Can you stand and walk? \"    - MILD - Feels weak or tired, but does not interfere with work, school or normal activities    - [facility] to stand and walk; weakness interferes with work, school, or normal activities    - SEVERE - Unable to stand or walk      Mild    3. ONSET:  \"When did the weakness begin? \"      Tired for several days    4. CAUSE: \"What do you think is causing the weakness? \"      \"Iron may be low, will feel weak when iron is low. Last iron infusion earlier this year\"    5. MEDICINES: Rhetta Borne you recently started a new medicine or had a change in the amount of a medicine? \"      No    6. OTHER SYMPTOMS: \"Do you have any other symptoms? \" (e.g., chest pain, fever, cough, SOB, vomiting, diarrhea, bleeding, other areas of pain)      No    7. PREGNANCY: \"Is there any chance you are pregnant? \" \"When was your last menstrual period? \"      N/a    Protocols used: WEAKNESS (GENERALIZED) AND FATIGUE-ADULT-[place]

## 2021-12-16 ENCOUNTER — OFFICE VISIT (OUTPATIENT)
Dept: INTERNAL MEDICINE CLINIC | Age: 71
End: 2021-12-16
Payer: MEDICARE

## 2021-12-16 VITALS
SYSTOLIC BLOOD PRESSURE: 127 MMHG | OXYGEN SATURATION: 100 % | HEART RATE: 67 BPM | BODY MASS INDEX: 22.33 KG/M2 | DIASTOLIC BLOOD PRESSURE: 65 MMHG | HEIGHT: 58 IN | TEMPERATURE: 97.3 F | RESPIRATION RATE: 16 BRPM | WEIGHT: 106.4 LBS

## 2021-12-16 DIAGNOSIS — R53.83 FATIGUE, UNSPECIFIED TYPE: ICD-10-CM

## 2021-12-16 DIAGNOSIS — R93.1 AGATSTON CAC SCORE 100-199: ICD-10-CM

## 2021-12-16 DIAGNOSIS — D50.9 IRON DEFICIENCY ANEMIA, UNSPECIFIED IRON DEFICIENCY ANEMIA TYPE: Primary | ICD-10-CM

## 2021-12-16 DIAGNOSIS — E78.5 HYPERLIPIDEMIA, UNSPECIFIED HYPERLIPIDEMIA TYPE: ICD-10-CM

## 2021-12-16 PROCEDURE — G9899 SCRN MAM PERF RSLTS DOC: HCPCS | Performed by: INTERNAL MEDICINE

## 2021-12-16 PROCEDURE — G8420 CALC BMI NORM PARAMETERS: HCPCS | Performed by: INTERNAL MEDICINE

## 2021-12-16 PROCEDURE — G8399 PT W/DXA RESULTS DOCUMENT: HCPCS | Performed by: INTERNAL MEDICINE

## 2021-12-16 PROCEDURE — G8754 DIAS BP LESS 90: HCPCS | Performed by: INTERNAL MEDICINE

## 2021-12-16 PROCEDURE — G9717 DOC PT DX DEP/BP F/U NT REQ: HCPCS | Performed by: INTERNAL MEDICINE

## 2021-12-16 PROCEDURE — 99214 OFFICE O/P EST MOD 30 MIN: CPT | Performed by: INTERNAL MEDICINE

## 2021-12-16 PROCEDURE — G8752 SYS BP LESS 140: HCPCS | Performed by: INTERNAL MEDICINE

## 2021-12-16 PROCEDURE — 3017F COLORECTAL CA SCREEN DOC REV: CPT | Performed by: INTERNAL MEDICINE

## 2021-12-16 PROCEDURE — G8536 NO DOC ELDER MAL SCRN: HCPCS | Performed by: INTERNAL MEDICINE

## 2021-12-16 PROCEDURE — 1090F PRES/ABSN URINE INCON ASSESS: CPT | Performed by: INTERNAL MEDICINE

## 2021-12-16 PROCEDURE — G8427 DOCREV CUR MEDS BY ELIG CLIN: HCPCS | Performed by: INTERNAL MEDICINE

## 2021-12-16 PROCEDURE — 1101F PT FALLS ASSESS-DOCD LE1/YR: CPT | Performed by: INTERNAL MEDICINE

## 2021-12-16 NOTE — PROGRESS NOTES
Chief Complaint   Patient presents with    Fatigue     pt c/o fatigue and lightheadedness x 1 week (exam rm 7)         1. \"Have you been to the ER, urgent care clinic since your last visit? Hospitalized since your last visit? \" No    2. \"Have you seen or consulted any other health care providers outside of the [address] [place] [address] since your last visit? \" No     3. For patients aged 39-70: Has the patient had a colonoscopy / FIT/ Cologuard? Yes, HM satisfied with blue hyperlink     If the patient is female:    4. For patients aged 41-77: Has the patient had a mammogram within the past 2 years? Yes, HM satisfied with blue hyperlink    5. For patients aged 21-65: Has the patient had a pap smear?  NA based on age or sex

## 2021-12-16 NOTE — PROGRESS NOTES
80 y.o. WHITE/NON- female who presents for evaluation. She is wanted to be checked out for fatigue. We had seen her through a virtual visit earlier this month at which time we gave her Zithromax for sinusitis. That seems to have cleared up already. 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 She is working overtime, sometimes gets home in the afternoon at 4:00, takes a nap all the way to 1:00 which then screws up her diurnal cycle as she cannot fall asleep again. On a separate note, her last blood counts were okay in August but she was supposed to see Dr. Natasha Ryan who has since left. Not able to see the new hematologist until February so she wants to have her labs checked. She is under a lot of stress, denies any violent ideation or hallucinations. She is currently off psychotropics at this point. We did discuss usage of gabapentin as well, she is now taking the daytime dose sometimes takes the nighttime dose which makes her sleepy    Past Medical History:   Diagnosis Date    Agatston CAC score 100-199 09/2021    ca score 138, calcification all 3 vessels    Anxiety 6/15    IKE-7 was 12/21    Clostridium difficile colitis     Dr. Sarahi Real 3/14; postop 10/20    Colon adenoma 10/2011    Dr. Carlee Julian; Dr Lexi Thomas 7/20    Cystic breast     Degenerative arthritis of cervical spine     on xray, worst C5-6 (4/21); mri showed multilevel degen changes, foraminal stenosis (4.21);  Dr Soraida Rahman Depression 6/15    PHQ-9 was 15/27    Diverticulitis     recurrent x3 Dr Milton Mcnally liver     10/10 on US, CT 6/12; Fib-4 was 0.74 from 1/15    GI bleed 2/14    ischemic colitis on CT, seen by Dr. Kate Mathias Hyperlipidemia     calculated 10 year risk score was 3.2% (1/15)    Hypertension     IFG (impaired fasting glucose) [id number]    Ischemic colitis ([facility] 3/14    Dr Sarahi Real    Labial abscess     mrsa    Lung nodule 2015    9x10mm RML, no change 9/21    Lymphoma, mantle cell ([facility] 12/2013    Dr. Lucia Cee; stage s/p chemo; now Dr Monie Mike    Migraine     Nephrolithiasis     Neuropathy due to chemotherapeutic drug ([facility] 9/14    Osteoarthritis     hips and knees; Dr Jeff Bennett Tobacco dependence syndrome 2/8/2019    Zoster 8/14    left T11     Current Outpatient Medications   Medication Sig    gabapentin (Neurontin) 100 mg capsule Take 1 cap by mouth in the morning and 3 caps at night as directed.  baclofen (LIORESAL) 10 mg tablet Take 1 Tablet by mouth two (2) times daily as needed for Muscle Spasm(s).  ibuprofen (MOTRIN) 200 mg tablet Take 200 mg by mouth every eight (8) hours as needed.  amLODIPine (NORVASC) 5 mg tablet Take 1 Tab by mouth daily.  atorvastatin (LIPITOR) 10 mg tablet Take 1 Tablet by mouth daily. (Patient not taking: Reported on 12/16/2021)     No current facility-administered medications for this visit. Allergies   Allergen Reactions    Meperidine Nausea Only and Other (comments)    Augmentin [Amoxicillin-Pot Clavulanate] Diarrhea and Nausea Only    Keflex [Cephalexin] Nausea Only    Sulfa (Sulfonamide Antibiotics) Rash     Visit Vitals  /65   Pulse 67   Temp 97.3 °F (36.3 °C) (Temporal)   Resp 16   Ht 4' 10\" (1.473 m)   Wt 106 lb 6.4 oz (48.3 kg)   SpO2 100%   BMI 22.24 kg/m²   A&O x3  Affect is appropriate. Mood stable  No apparent distress  Anicteric, no JVD, adenopathy or thyromegaly. No carotid bruits or radiated murmur  Lungs clear to auscultation, no wheezes or rales  Heart showed regular rate and rhythm. No murmur, rubs, gallops  Abdomen soft nontender, no hepatosplenomegaly or masses. Extremities without edema. Pulses 1-2+ symmetrically    Assessment and plan:  1. Tiredness and fatigue. Check routine chemistries; CBC, iron levels, thyroid as well.   B12 has been checked twice in the last year both normal.  I suspect this has more to do with her workload and disruption in her sleep cycle.  She is reconsidering her job at this point. We suggested that she take gabapentin at night to help her fall back asleep and to not do 7-hour naps as she has been doing when she gets home. We decided to hold off on psychotropics for anxiety/depression at this time. Call with an update  2. Hyperlipidemia. She stopped the Lipitor for reasons unclear, will restart with her Agatston score as above        Above conditions discussed at length and patient vocalized understanding.   All questions answered to patient satisfaction

## 2021-12-17 LAB
ALBUMIN SERPL-MCNC: 4.7 G/DL (ref 3.8–4.8)
ALBUMIN/GLOB SERPL: 1.6 {RATIO} (ref 1.2–2.2)
ALP SERPL-CCNC: 63 IU/L (ref 44–121)
ALT SERPL-CCNC: 10 IU/L (ref 0–32)
AST SERPL-CCNC: 16 IU/L (ref 0–40)
BASOPHILS # BLD AUTO: 0.1 X10E3/UL (ref 0–0.2)
BASOPHILS NFR BLD AUTO: 1 %
BILIRUB SERPL-MCNC: 0.2 MG/DL (ref 0–1.2)
BUN SERPL-MCNC: 22 MG/DL (ref 8–27)
BUN/CREAT SERPL: 25 (ref 12–28)
CALCIUM SERPL-MCNC: 9.7 MG/DL (ref 8.7–10.3)
CHLORIDE SERPL-SCNC: 103 MMOL/L (ref 96–106)
CO2 SERPL-SCNC: 16 MMOL/L (ref 20–29)
CREAT SERPL-MCNC: 0.87 MG/DL (ref 0.57–1)
EOSINOPHIL # BLD AUTO: 0.2 X10E3/UL (ref 0–0.4)
EOSINOPHIL NFR BLD AUTO: 2 %
ERYTHROCYTE [DISTWIDTH] IN BLOOD BY AUTOMATED COUNT: 12.7 % (ref 11.7–15.4)
GLOBULIN SER CALC-MCNC: 3 G/DL (ref 1.5–4.5)
GLUCOSE SERPL-MCNC: 103 MG/DL (ref 65–99)
HCT VFR BLD AUTO: 35.6 % (ref 34–46.6)
HGB BLD-MCNC: 12.6 G/DL (ref 11.1–15.9)
IMM GRANULOCYTES # BLD AUTO: 0 X10E3/UL (ref 0–0.1)
IMM GRANULOCYTES NFR BLD AUTO: 0 %
IRON SATN MFR SERPL: 26 % (ref 15–55)
IRON SERPL-MCNC: 88 UG/DL (ref 27–139)
LYMPHOCYTES # BLD AUTO: 1.6 X10E3/UL (ref 0.7–3.1)
LYMPHOCYTES NFR BLD AUTO: 16 %
MCH RBC QN AUTO: 33 PG (ref 26.6–33)
MCHC RBC AUTO-ENTMCNC: 35.4 G/DL (ref 31.5–35.7)
MCV RBC AUTO: 93 FL (ref 79–97)
MONOCYTES # BLD AUTO: 0.6 X10E3/UL (ref 0.1–0.9)
MONOCYTES NFR BLD AUTO: 6 %
NEUTROPHILS # BLD AUTO: 7.5 X10E3/UL (ref 1.4–7)
NEUTROPHILS NFR BLD AUTO: 75 %
PLATELET # BLD AUTO: 299 X10E3/UL (ref 150–450)
POTASSIUM SERPL-SCNC: 4.6 MMOL/L (ref 3.5–5.2)
PROT SERPL-MCNC: 7.7 G/DL (ref 6–8.5)
RBC # BLD AUTO: 3.82 X10E6/UL (ref 3.77–5.28)
SODIUM SERPL-SCNC: 139 MMOL/L (ref 134–144)
T4 FREE SERPL-MCNC: 1.28 NG/DL (ref 0.82–1.77)
TIBC SERPL-MCNC: 345 UG/DL (ref 250–450)
TSH SERPL DL<=0.005 MIU/L-ACNC: 1.65 UIU/ML (ref 0.45–4.5)
UIBC SERPL-MCNC: 257 UG/DL (ref 118–369)
WBC # BLD AUTO: 10 X10E3/UL (ref 3.4–10.8)

## 2021-12-20 ENCOUNTER — TELEPHONE (OUTPATIENT)
Dept: INTERNAL MEDICINE CLINIC | Age: 71
End: 2021-12-20

## 2021-12-20 NOTE — TELEPHONE ENCOUNTER
pls call      All labs negative      Results for orders placed or performed in visit on 12/16/21   TSH AND FREE T4   Result Value Ref Range    TSH 1.650 0.450 - 4.500 uIU/mL    T4, Free 1.28 0.82 - 1.77 ng/dL   CBC WITH AUTOMATED DIFF   Result Value Ref Range    WBC 10.0 3.4 - 10.8 x10E3/uL    RBC 3.82 3.77 - 5.28 x10E6/uL    HGB 12.6 11.1 - 15.9 g/dL    HCT 35.6 34.0 - 46.6 %    MCV 93 79 - 97 fL    MCH 33.0 26.6 - 33.0 pg    MCHC 35.4 31.5 - 35.7 g/dL    RDW 12.7 11.7 - 15.4 %    PLATELET 493 277 - 448 x10E3/uL    NEUTROPHILS 75 Not Estab. %    Lymphocytes 16 Not Estab. %    MONOCYTES 6 Not Estab. %    EOSINOPHILS 2 Not Estab. %    BASOPHILS 1 Not Estab. %    ABS. NEUTROPHILS 7.5 (H) 1.4 - 7.0 x10E3/uL    Abs Lymphocytes 1.6 0.7 - 3.1 x10E3/uL    ABS. MONOCYTES 0.6 0.1 - 0.9 x10E3/uL    ABS. EOSINOPHILS 0.2 0.0 - 0.4 x10E3/uL    ABS. BASOPHILS 0.1 0.0 - 0.2 x10E3/uL    IMMATURE GRANULOCYTES 0 Not Estab. %    ABS. IMM. GRANS. 0.0 0.0 - 0.1 [id number]   METABOLIC PANEL, COMPREHENSIVE   Result Value Ref Range    Glucose 103 (H) 65 - 99 mg/dL    BUN 22 8 - 27 mg/dL    Creatinine 0.87 0.57 - 1.00 mg/dL    GFR est non-AA 68 >59 mL/min/1.73    GFR est AA 78 >59 mL/min/1.73    BUN/Creatinine ratio 25 12 - 28    Sodium 139 134 - 144 mmol/L    Potassium 4.6 3.5 - 5.2 mmol/L    Chloride 103 96 - 106 mmol/L    CO2 16 (L) 20 - 29 mmol/L    Calcium 9.7 8.7 - 10.3 mg/dL    Protein, total 7.7 6.0 - 8.5 g/dL    Albumin 4.7 3.8 - 4.8 g/dL    GLOBULIN, TOTAL 3.0 1.5 - 4.5 g/dL    A-G Ratio 1.6 1.2 - 2.2    Bilirubin, total 0.2 0.0 - 1.2 mg/dL    Alk.  phosphatase 63 44 - 121 IU/L    AST (SGOT) 16 0 - 40 IU/L    ALT (SGPT) 10 0 - 32 IU/L   IRON PROFILE   Result Value Ref Range    TIBC 345 250 - 450 ug/dL    UIBC 257 118 - 369 ug/dL    Iron 88 27 - 139 ug/dL    Iron % saturation 26 15 - 55 %

## 2021-12-29 ENCOUNTER — HOSPITAL ENCOUNTER (OUTPATIENT)
Dept: INFUSION THERAPY | Age: 71
Discharge: HOME | End: 2021-12-29
Payer: MEDICARE

## 2021-12-29 DIAGNOSIS — D50.9 IRON DEFICIENCY ANEMIA, UNSPECIFIED IRON DEFICIENCY ANEMIA TYPE: ICD-10-CM

## 2021-12-29 LAB
ALBUMIN SERPL-MCNC: 3.9 G/DL (ref 3.4–5)
ALBUMIN/GLOB SERPL: 1 {RATIO} (ref 0.8–1.7)
ALP SERPL-CCNC: 59 U/L (ref 45–117)
ALT SERPL-CCNC: 20 U/L (ref 13–56)
ANION GAP SERPL CALC-SCNC: 5 MMOL/L (ref 3–18)
AST SERPL-CCNC: 15 U/L (ref 10–38)
BASO+EOS+MONOS # BLD AUTO: 0.5 K/UL (ref 0–2.3)
BASO+EOS+MONOS NFR BLD AUTO: 6 % (ref 0.1–17)
BILIRUB SERPL-MCNC: 0.3 MG/DL (ref 0.2–1)
BUN SERPL-MCNC: 16 MG/DL (ref 7–18)
BUN/CREAT SERPL: 20 (ref 12–20)
CALCIUM SERPL-MCNC: 9.3 MG/DL (ref 8.5–10.1)
CHLORIDE SERPL-SCNC: 112 MMOL/L (ref 100–111)
CO2 SERPL-SCNC: 23 MMOL/L (ref 21–32)
CREAT SERPL-MCNC: 0.8 MG/DL (ref 0.6–1.3)
DIFFERENTIAL METHOD BLD: ABNORMAL
ERYTHROCYTE [DISTWIDTH] IN BLOOD BY AUTOMATED COUNT: 13.5 % (ref 11.5–14.5)
FERRITIN SERPL-MCNC: 595 NG/ML (ref 8–388)
GLOBULIN SER CALC-MCNC: 3.9 G/DL (ref 2–4)
GLUCOSE SERPL-MCNC: 119 MG/DL (ref 74–99)
HCT VFR BLD AUTO: 36.4 % (ref 36–48)
HGB BLD-MCNC: 12.4 G/DL (ref 12–16)
IRON SATN MFR SERPL: 20 % (ref 20–50)
IRON SERPL-MCNC: 65 UG/DL (ref 50–175)
LYMPHOCYTES # BLD: 1.7 K/UL (ref 1.1–5.9)
LYMPHOCYTES NFR BLD: 20 % (ref 14–44)
MCH RBC QN AUTO: 32.9 PG (ref 25–35)
MCHC RBC AUTO-ENTMCNC: 34.1 G/DL (ref 31–37)
MCV RBC AUTO: 96.6 FL (ref 78–102)
NEUTS SEG # BLD: 6.4 K/UL (ref 1.8–9.5)
NEUTS SEG NFR BLD: 74 % (ref 40–70)
PLATELET # BLD AUTO: 304 K/UL (ref 140–440)
POTASSIUM SERPL-SCNC: 4.1 MMOL/L (ref 3.5–5.5)
PROT SERPL-MCNC: 7.8 G/DL (ref 6.4–8.2)
RBC # BLD AUTO: 3.77 M/UL (ref 4.1–5.1)
SODIUM SERPL-SCNC: 140 MMOL/L (ref 136–145)
TIBC SERPL-MCNC: 333 UG/DL (ref 250–450)
WBC # BLD AUTO: 8.6 K/UL (ref 4.5–13)

## 2021-12-29 PROCEDURE — 83540 ASSAY OF IRON: CPT

## 2021-12-29 PROCEDURE — 80053 COMPREHEN METABOLIC PANEL: CPT

## 2021-12-29 PROCEDURE — 85025 COMPLETE CBC W/AUTO DIFF WBC: CPT

## 2021-12-29 PROCEDURE — 82728 ASSAY OF FERRITIN: CPT

## 2021-12-29 PROCEDURE — 36415 COLL VENOUS BLD VENIPUNCTURE: CPT

## 2022-01-06 ENCOUNTER — OFFICE VISIT (OUTPATIENT)
Age: 72
End: 2022-01-06
Payer: MEDICARE

## 2022-01-06 VITALS
TEMPERATURE: 97.2 F | WEIGHT: 107 LBS | SYSTOLIC BLOOD PRESSURE: 119 MMHG | DIASTOLIC BLOOD PRESSURE: 70 MMHG | HEART RATE: 62 BPM | OXYGEN SATURATION: 98 % | RESPIRATION RATE: 14 BRPM | HEIGHT: 58 IN | BODY MASS INDEX: 22.46 KG/M2

## 2022-01-06 DIAGNOSIS — T45.1X5A NEUROPATHY DUE TO CHEMOTHERAPEUTIC DRUG (HCC): ICD-10-CM

## 2022-01-06 DIAGNOSIS — C83.18 MANTLE CELL LYMPHOMA OF LYMPH NODES OF MULTIPLE SITES (HCC): Primary | ICD-10-CM

## 2022-01-06 DIAGNOSIS — D50.9 IRON DEFICIENCY ANEMIA, UNSPECIFIED IRON DEFICIENCY ANEMIA TYPE: ICD-10-CM

## 2022-01-06 DIAGNOSIS — G62.0 NEUROPATHY DUE TO CHEMOTHERAPEUTIC DRUG (HCC): ICD-10-CM

## 2022-01-06 PROCEDURE — 99214 OFFICE O/P EST MOD 30 MIN: CPT | Performed by: INTERNAL MEDICINE

## 2022-01-06 NOTE — PROGRESS NOTES
Hematology/Oncology  Progress Note    Name: Joe Murray  Date: 2022  : 1950  Primary Care Provider: Rosemary Trejo MD    Ms. Rachell Akers is a 80y.o. year old female with iron deficiency anemia with periodic intravenous iron infusions and distant history of mantle cell lymphoma stage IV diagnosed in 2014 chemotherapy completed 2014. Patient  may have low-grade celiac disease. Current Therapy: Periodic intravenous iron infusion    Subjective:        Mantle cell lymphoma, multiple sites: Stage IV MCL Diagnosed in 2014.  She is s/p chemotherapy completed in 2014. Clinically there is no evidence of disease recurrence.     No signs or symptoms of disease recurrence. Continue surveillance.     Iron Deficiency Anemia:   *Patient with iron deficiency anemia status post IV iron infusion with Injectafer completed on 2021, here for follow-up  Labs point 2021 showed vitamin B12 300, folate greater than 20, normal TSH and T4, ferritin 928, transferrin saturation 40%, BUN 16, creatinine 0.77, celiac panel weak positive (anti-tTG with elevated IgA). Remaining CBC pending. Advise to use gluten free products  *Had colonoscopy one year ago. Next due in 3 years.     Neutrophilia: Resolved.       Cigarette smoking: smokes every now and then    The past medical, surgical and social history has been reviewed and remains unchanged. Past Medical History:   Diagnosis Date    Agatston CAC score 100-199 2021    ca score 138, calcification all 3 vessels    Anxiety 6/15    IKE-7 was     Clostridium difficile colitis     Dr. Sebas Christianson 3/14; postop 10/20    Colon adenoma 10/2011    Dr. Mine Vaca; Dr Veronica Dias     Cystic breast     Degenerative arthritis of cervical spine     on xray, worst C5-6 (); mri showed multilevel degen changes, foraminal stenosis (.);  Dr Ezekiel Sidhu Depression 6/15    PHQ-9 was 15/27    Diverticulitis     recurrent x3 Dr Alfreda Weber  Fatty liver     10/10 on US, CT ; Fib-4 was 0.74 from 1/15    GI bleed     ischemic colitis on CT, seen by Dr. Oumou Johnson Hyperlipidemia     calculated 10 year risk score was 3.2% (1/15)    Hypertension     IFG (impaired fasting glucose) [id number]    Ischemic colitis ([facility] 3/14    Dr Guadalupe Betancourt    Labial abscess     mrsa    Lung nodule     9x10mm RML, no change     Lymphoma, mantle cell ([facility] 2013    Dr. Janie Mercedes; stage s/p chemo; now Dr Albaro Dunn    Migraine     Nephrolithiasis     Neuropathy due to chemotherapeutic drug ([facility]     Osteoarthritis     hips and knees; Dr Marielos Rome Tobacco dependence syndrome 2019    Zoster     left T11     Past Surgical History:   Procedure Laterality Date    COLONOSCOPY N/A 2020    Dr Felicia Huang 2003 polyps;  adenoma; Dr Tr Kothari 3/14 isch colitis; Dr Herminia Huerta 20 mod divertics and adenoma    HX APPENDECTOMY  1968    HX HIP REPLACEMENT Right 10/2020    Dr Arleth Novak ARTHROSCOPY Left     HX ORTHOPAEDIC      DEXA t score 2.1 spine, -0.3 hip ()    HX SOPHIA AND BSO      Dr. Bob Garcia HX TONSILLECTOMY       Social History     Socioeconomic History    Marital status:      Spouse name: Not on file    Number of children: 2    Years of education: Not on file    Highest education level: Not on file   Occupational History    Occupation: director [employer]   Tobacco Use    Smoking status: Former Smoker     Packs/day: 0.50     Years: 15.00     Pack years: 7.50     Types: Cigarettes     Quit date: 2020     Years since quittin.2    Smokeless tobacco: Never Used    Tobacco comment: smoking cessation advised   Substance and Sexual Activity    Alcohol use: Not Currently     Alcohol/week: 4.2 standard drinks     Types: 5 Glasses of wine per week    Drug use: No    Sexual activity: Not Currently   Other Topics Concern    Not on file   Social History Narrative    Not on file     Social Determinants of Health Financial Resource Strain:     Difficulty of Paying Living Expenses: Not on file   Food Insecurity:     Worried About Running Out of Food in the Last Year: Not on file    Tiarra of Food in the Last Year: Not on file   Transportation Needs:     Lack of Transportation (Medical): Not on file    Lack of Transportation (Non-Medical): Not on file   Physical Activity:     Days of Exercise per Week: Not on file    Minutes of Exercise per Session: Not on file   Stress:     Feeling of Stress : Not on file   Social Connections:     Frequency of Communication with Friends and Family: Not on file    Frequency of Social Gatherings with Friends and Family: Not on file    Attends [employer] Services: Not on file    Active Member of [address] [place] [address] or Organizations: Not on file    Attends Club or Organization Meetings: Not on file    Marital Status: Not on file   Intimate Partner Violence:     Fear of Current or Ex-Partner: Not on file    Emotionally Abused: Not on file    Physically Abused: Not on file    Sexually Abused: Not on file   Housing Stability:     Unable to Pay for Housing in the Last Year: Not on file    Number of [place] in the Last Year: Not on file    Unstable Housing in the Last Year: Not on file     Family History   Adopted: Yes     Current Outpatient Medications   Medication Sig Dispense Refill    atorvastatin (LIPITOR) 10 mg tablet Take 1 Tablet by mouth daily. (Patient not taking: Reported on 12/16/2021) 30 Tablet 5    gabapentin (Neurontin) 100 mg capsule Take 1 cap by mouth in the morning and 3 caps at night as directed. 120 Capsule 4    baclofen (LIORESAL) 10 mg tablet Take 1 Tablet by mouth two (2) times daily as needed for Muscle Spasm(s). 60 Tablet 1    ibuprofen (MOTRIN) 200 mg tablet Take 200 mg by mouth every eight (8) hours as needed.  amLODIPine (NORVASC) 5 mg tablet Take 1 Tab by mouth daily.  90 Tab 3       Review of Systems:    General :The patient has  complaints of fatigue and there is no physical distress evident. Psychological : patient denies having any psychological symptoms such as hallucinations depression or anxiety. Ophthalmic:the patient denies having any visual impairment or eye discomfort. ENT: there are no abnormalities reported. Allergy and Immunology:the patient denies having any seasonal allergies or allergies to medications other than those already outlined above. Hematological and Lymphatic: the patient denies having any bruising, bleeding or lymphadenopathy. Endocrine: the patient denies having any heat or cold intolerance. There is no history of diabetes or thyroid disorders. Respiratory:the patient denies having any cough, shortness of breath, or dyspnea on exertion. Cardiovascular: there are no complaints of chest pain, palpitations, chest pounding, or dyspnea on exertion. Gastrointestinal: the patient denies having nausea, emesis, diarrhea, constipation, or blood in the stool. Genito-Urinary: the patient denies having urinary urgency, frequency, or dysuria. Musculoskeletal: with the exception of mild arthralgias the patient has no other musculoskeletal complaints. Neurological:  denies having any numbness, tingling, or neurologic deficits. Dermatological: patient denies having any unexplained rash, skin ulcerations, or hives. Objective: There were no vitals taken for this visit. Pain Score: 0    Physical Exam:     ECO    General: Well appearing, in NAD    Psychologic: mood and affect are appropriate, no anxiety or depression noted    Skin: examination of the skin reveals no bruising, rash or petechiae    HEENT: Normocephalic, atraumatic. Conjunctiva and sclera are clear. Pupils are equal, round and reactive to light. EOMs are intact.      ENT without oral mucosal lesions, stomatitis or thrush    Neck: supple without lymphadenopathy, JVD or thyromegaly    Lymphatics: no palpable cervical, supraclavicular, infraclavicular, axillary or inguinal lymphadenopathy    Lungs: clear breath sounds bilaterally, no rhonchi or wheezes noted    Heart: Regular rate and rhythm, no murmurs, rubs or gallops, S1-S2 noted. Positive peripheral pulses bilaterally upper and lower extremities    Abdomen: soft, non-tender, non-distended, no HSM    Extremities: without clubbing, cyanosis or edema    Neurologic: no focal deficits, steady gait, Alert and oriented x 3. Laboratory Data:     Results for orders placed or performed during the hospital encounter of 12/29/21   CBC WITH 3 PART DIFF     Status: Abnormal   Result Value Ref Range Status    WBC 8.6 4.5 - 13.0 K/uL Final    RBC 3.77 (L) 4.10 - 5.10 M/uL Final    HGB 12.4 12.0 - 16.0 g/dL Final    HCT 36.4 36 - 48 % Final    MCV 96.6 78 - 102 FL Final    MCH 32.9 25.0 - 35.0 PG Final    MCHC 34.1 31 - 37 g/dL Final    RDW 13.5 11.5 - 14.5 % Final    PLATELET 459 757 - 688 K/uL Final    NEUTROPHILS 74 (H) 40 - 70 % Final    MIXED CELLS 6 0.1 - 17 % Final    LYMPHOCYTES 20 14 - 44 % Final    ABS. NEUTROPHILS 6.4 1.8 - 9.5 K/UL Final    ABS. MIXED CELLS 0.5 0.0 - 2.3 K/uL Final    ABS. LYMPHOCYTES 1.7 1.1 - 5.9 K/UL Final     Comment: Test performed at [address] [place] [address] or Outpatient Infusion Center Location. Reviewed by Medical Director. DF AUTOMATED   Final       Patient Active Problem List   Diagnosis Code    Hyperlipidemia E78.5    Mantle cell lymphoma of lymph nodes of multiple sites ([facility] [address] [address] C83.18    Neuropathy due to chemotherapeutic drug ([facility] G62.0, T45.1X5A    Arthritis, degenerative M19.90    IFG (impaired fasting glucose) R73.01    Tobacco dependence syndrome F17.200    Colon adenoma D12.6    Diverticulosis K57.90    Primary hypertension I10    Iron deficiency anemia D50.9    Hip arthritis M16.10    Depression F32. A    Agatston CAC score 100-199 R93.1         Assessment:     1.  Mantle cell lymphoma of lymph nodes of multiple sites ([facility]    2. Iron deficiency anemia, unspecified iron deficiency anemia type    3. 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 Patient iron stores are dropping still within somewhat reasonable range and there would be low within 2 months or so. However the patient is having progressive fatigue. 2. Repeat blood test in 6 weeks and have a telephone visit at that point. That she may need iron infusion earlier than expected. 3. Patient had opportunity to ask questions which were answered. 4. Patient agrees with this plan       No orders of the defined types were placed in this encounter.       Rosendo Chandler MD  1/6/2022

## 2022-02-08 DIAGNOSIS — I10 PRIMARY HYPERTENSION: ICD-10-CM

## 2022-02-08 NOTE — TELEPHONE ENCOUNTER
Last Visit: 12/16/21 with MD Reyna Shabazz  Next Appointment: none  Previous Refill Encounter(s): 2/19/21 #90 with 3 refills    Requested Prescriptions     Pending Prescriptions Disp Refills    amLODIPine (NORVASC) 5 mg tablet 90 Tablet 3     Sig: Take 1 Tablet by mouth daily.

## 2022-02-10 RX ORDER — AMLODIPINE BESYLATE 5 MG/1
5 TABLET ORAL DAILY
Qty: 90 TABLET | Refills: 3 | Status: SHIPPED | OUTPATIENT
Start: 2022-02-10

## 2022-02-16 ENCOUNTER — HOSPITAL ENCOUNTER (OUTPATIENT)
Dept: INFUSION THERAPY | Age: 72
Discharge: HOME | End: 2022-02-16
Payer: MEDICARE

## 2022-02-16 DIAGNOSIS — C83.18 MANTLE CELL LYMPHOMA OF LYMPH NODES OF MULTIPLE SITES (HCC): ICD-10-CM

## 2022-02-16 DIAGNOSIS — G62.0 NEUROPATHY DUE TO CHEMOTHERAPEUTIC DRUG (HCC): ICD-10-CM

## 2022-02-16 DIAGNOSIS — D50.9 IRON DEFICIENCY ANEMIA, UNSPECIFIED IRON DEFICIENCY ANEMIA TYPE: ICD-10-CM

## 2022-02-16 DIAGNOSIS — T45.1X5A NEUROPATHY DUE TO CHEMOTHERAPEUTIC DRUG (HCC): ICD-10-CM

## 2022-02-16 LAB
25(OH)D3 SERPL-MCNC: 19.2 NG/ML (ref 30–100)
ALBUMIN SERPL-MCNC: 4.1 G/DL (ref 3.4–5)
ALBUMIN/GLOB SERPL: 1.1 {RATIO} (ref 0.8–1.7)
ALP SERPL-CCNC: 76 U/L (ref 45–117)
ALT SERPL-CCNC: 20 U/L (ref 13–56)
ANION GAP SERPL CALC-SCNC: 5 MMOL/L (ref 3–18)
AST SERPL-CCNC: 11 U/L (ref 10–38)
BASOPHILS # BLD: 0.1 K/UL (ref 0–0.1)
BASOPHILS NFR BLD: 1 % (ref 0–2)
BILIRUB SERPL-MCNC: 0.3 MG/DL (ref 0.2–1)
BUN SERPL-MCNC: 18 MG/DL (ref 7–18)
BUN/CREAT SERPL: 21 (ref 12–20)
CALCIUM SERPL-MCNC: 9.5 MG/DL (ref 8.5–10.1)
CHLORIDE SERPL-SCNC: 111 MMOL/L (ref 100–111)
CO2 SERPL-SCNC: 24 MMOL/L (ref 21–32)
CREAT SERPL-MCNC: 0.85 MG/DL (ref 0.6–1.3)
DIFFERENTIAL METHOD BLD: ABNORMAL
EOSINOPHIL # BLD: 0.3 K/UL (ref 0–0.4)
EOSINOPHIL NFR BLD: 3 % (ref 0–5)
ERYTHROCYTE [DISTWIDTH] IN BLOOD BY AUTOMATED COUNT: 13.4 % (ref 11.6–14.5)
FERRITIN SERPL-MCNC: 501 NG/ML (ref 8–388)
FOLATE SERPL-MCNC: 11.3 NG/ML (ref 3.1–17.5)
GLOBULIN SER CALC-MCNC: 3.7 G/DL (ref 2–4)
GLUCOSE SERPL-MCNC: 118 MG/DL (ref 74–99)
HCT VFR BLD AUTO: 35.8 % (ref 35–45)
HGB BLD-MCNC: 12.4 G/DL (ref 12–16)
IMM GRANULOCYTES # BLD AUTO: 0 K/UL (ref 0–0.04)
IMM GRANULOCYTES NFR BLD AUTO: 0 % (ref 0–0.5)
IRON SATN MFR SERPL: 22 % (ref 20–50)
IRON SERPL-MCNC: 77 UG/DL (ref 50–175)
LYMPHOCYTES # BLD: 1.9 K/UL (ref 0.9–3.6)
LYMPHOCYTES NFR BLD: 23 % (ref 21–52)
MCH RBC QN AUTO: 33.2 PG (ref 24–34)
MCHC RBC AUTO-ENTMCNC: 34.6 G/DL (ref 31–37)
MCV RBC AUTO: 96 FL (ref 78–100)
MONOCYTES # BLD: 0.5 K/UL (ref 0.05–1.2)
MONOCYTES NFR BLD: 6 % (ref 3–10)
NEUTS SEG # BLD: 5.6 K/UL (ref 1.8–8)
NEUTS SEG NFR BLD: 67 % (ref 40–73)
NRBC # BLD: 0 K/UL (ref 0–0.01)
NRBC BLD-RTO: 0 PER 100 WBC
PLATELET # BLD AUTO: 288 K/UL (ref 135–420)
PMV BLD AUTO: 9.1 FL (ref 9.2–11.8)
POTASSIUM SERPL-SCNC: 4.1 MMOL/L (ref 3.5–5.5)
PROT SERPL-MCNC: 7.8 G/DL (ref 6.4–8.2)
RBC # BLD AUTO: 3.73 M/UL (ref 4.2–5.3)
SODIUM SERPL-SCNC: 140 MMOL/L (ref 136–145)
T4 FREE SERPL-MCNC: 1 NG/DL (ref 0.7–1.5)
TIBC SERPL-MCNC: 350 UG/DL (ref 250–450)
TSH SERPL DL<=0.05 MIU/L-ACNC: 1.1 UIU/ML (ref 0.36–3.74)
VIT B12 SERPL-MCNC: 213 PG/ML (ref 211–911)
WBC # BLD AUTO: 8.4 K/UL (ref 4.6–13.2)

## 2022-02-16 PROCEDURE — 84439 ASSAY OF FREE THYROXINE: CPT

## 2022-02-16 PROCEDURE — 36415 COLL VENOUS BLD VENIPUNCTURE: CPT

## 2022-02-16 PROCEDURE — 82728 ASSAY OF FERRITIN: CPT

## 2022-02-16 PROCEDURE — 82607 VITAMIN B-12: CPT

## 2022-02-16 PROCEDURE — 83540 ASSAY OF IRON: CPT

## 2022-02-16 PROCEDURE — 80053 COMPREHEN METABOLIC PANEL: CPT

## 2022-02-16 PROCEDURE — 82306 VITAMIN D 25 HYDROXY: CPT

## 2022-02-16 PROCEDURE — 85025 COMPLETE CBC W/AUTO DIFF WBC: CPT

## 2022-02-16 NOTE — PROGRESS NOTES
[facility] Progress Note    Date: 2022    Name: Joshua Ortiz    MRN: [id number]         : 1950    Peripheral Lab Draw      Ms. Marie to [facility] [place], ambulatory at 1308 accompanied by self. Pt was assessed and education was provided. Ms. Tirso Avila vitals were reviewed and patient was observed for 5 minutes prior to treatment. There were no vitals taken for this visit. Blood obtained peripherally from left arm x 1 attempt with butterfly needle and sent to lab  per written orders. No bleeding or hematoma noted at site. Gauze and coban applied. Ms. Nyasia Akers tolerated the phlebotomy, and had no complaints. Patient armband removed and shredded. Ms. Nyasia Akers was discharged from [place] [address] in stable condition at 1318.      Ton Bearden Phlebotomist PCT  2022  2:34 PM

## 2022-02-24 ENCOUNTER — VIRTUAL VISIT (OUTPATIENT)
Age: 72
End: 2022-02-24
Payer: MEDICARE

## 2022-02-24 DIAGNOSIS — D50.9 IRON DEFICIENCY ANEMIA, UNSPECIFIED IRON DEFICIENCY ANEMIA TYPE: ICD-10-CM

## 2022-02-24 DIAGNOSIS — F17.200 TOBACCO DEPENDENCE SYNDROME: ICD-10-CM

## 2022-02-24 DIAGNOSIS — E55.9 VITAMIN D DEFICIENCY: ICD-10-CM

## 2022-02-24 DIAGNOSIS — C83.18 MANTLE CELL LYMPHOMA OF LYMPH NODES OF MULTIPLE SITES (HCC): Primary | ICD-10-CM

## 2022-02-24 PROCEDURE — 99443 PR PHYS/QHP TELEPHONE EVALUATION 21-30 MIN: CPT | Performed by: INTERNAL MEDICINE

## 2022-02-24 RX ORDER — ACETAMINOPHEN 500 MG
2000 TABLET ORAL 2 TIMES DAILY
Qty: 180 CAPSULE | Refills: 3 | Status: SHIPPED | OUTPATIENT
Start: 2022-02-24 | End: 2022-09-19

## 2022-02-24 RX ORDER — CETIRIZINE HYDROCHLORIDE 10 MG/1
CAPSULE, LIQUID FILLED ORAL AS NEEDED
COMMUNITY

## 2022-02-24 RX ORDER — FLUTICASONE PROPIONATE 50 MCG
SPRAY, SUSPENSION (ML) NASAL
COMMUNITY
End: 2022-05-10

## 2022-02-24 RX ORDER — LANOLIN ALCOHOL/MO/W.PET/CERES
CREAM (GRAM) TOPICAL
COMMUNITY

## 2022-02-24 NOTE — PROGRESS NOTES
Meagan Lopez is a 80 y.o. female, evaluated via audio-only technology on 2/24/2022 for RENÉE now good CBC and Iron stores. Hx of Mantel cell lymphoma MARIA LUZ. Vit D level low so will Rx Vit D . Assessment & Plan:   Patient feels tired much of the time. However she sleeps only 6 hours a day because of her job and her dog and cat. Furthermore patient is still smoking. I asked the patient to try to change her sleeping habits to 8 hours a day if not already 2 months that take a nap on returning from work. Furthermore I have asked the patient to stop smoking. She has done this in the past and she is committed to stop it within the next 4 months. Patient will return in 4 months with the blood test and we shall revisit in person  12  Subjective:     Ms. Kevon Peña is a 80y.o. year old female with iron deficiency anemia with periodic intravenous iron infusions and distant history of mantle cell lymphoma stage IV diagnosed in January 2014 chemotherapy completed September 2014. Patient  may have low-grade celiac disease. Prior to Admission medications    Medication Sig Start Date End Date Taking? Authorizing Provider   amLODIPine (NORVASC) 5 mg tablet Take 1 Tablet by mouth daily. 2/10/22   Nikki Rojas MD   atorvastatin (LIPITOR) 10 mg tablet Take 1 Tablet by mouth daily. 8/26/21   Nikki Rojas MD   gabapentin (Neurontin) 100 mg capsule Take 1 cap by mouth in the morning and 3 caps at night as directed. 8/2/21   Alban Marrufo MD   baclofen (LIORESAL) 10 mg tablet Take 1 Tablet by mouth two (2) times daily as needed for Muscle Spasm(s). 6/17/21   Nikki Rojas MD   ibuprofen (MOTRIN) 200 mg tablet Take 200 mg by mouth every eight (8) hours as needed.     Provider, Historical     Patient Active Problem List   Diagnosis Code    Hyperlipidemia E78.5    Mantle cell lymphoma of lymph nodes of multiple sites ([facility] C83.18    Neuropathy due to chemotherapeutic drug ([facility] G62.0, T45.1X5A    Arthritis, degenerative M19.90    IFG (impaired fasting glucose) R73.01    Tobacco dependence syndrome F17.200    Colon adenoma D12.6    Diverticulosis K57.90    Primary hypertension I10    Iron deficiency anemia D50.9    Hip arthritis M16.10    Depression F32. A    Agatston CAC score 100-199 R93.1     Current Outpatient Medications   Medication Sig Dispense Refill    amLODIPine (NORVASC) 5 mg tablet Take 1 Tablet by mouth daily. 90 Tablet 3    atorvastatin (LIPITOR) 10 mg tablet Take 1 Tablet by mouth daily. 30 Tablet 5    gabapentin (Neurontin) 100 mg capsule Take 1 cap by mouth in the morning and 3 caps at night as directed. 120 Capsule 4    baclofen (LIORESAL) 10 mg tablet Take 1 Tablet by mouth two (2) times daily as needed for Muscle Spasm(s). 60 Tablet 1    ibuprofen (MOTRIN) 200 mg tablet Take 200 mg by mouth every eight (8) hours as needed. Allergies   Allergen Reactions    Meperidine Nausea Only and Other (comments)    Augmentin [Amoxicillin-Pot Clavulanate] Diarrhea and Nausea Only    Keflex [Cephalexin] Nausea Only    Sulfa (Sulfonamide Antibiotics) Rash     Past Medical History:   Diagnosis Date    Agatston CAC score 100-199 09/2021    ca score 138, calcification all 3 vessels    Anxiety 6/15    IKE-7 was 12/21    Clostridium difficile colitis     Dr. Celeste Rodriguez 3/14; postop 10/20    Colon adenoma 10/2011    Dr. Polly Cabrales; Dr Joann Raymundo 7/20    Cystic breast     Degenerative arthritis of cervical spine     on xray, worst C5-6 (4/21); mri showed multilevel degen changes, foraminal stenosis (4.21);  Dr Coty Blair Depression 6/15    PHQ-9 was 15/27    Diverticulitis     recurrent x3 Dr Jenny Gupta liver     10/10 on US, CT 6/12; Fib-4 was 0.74 from 1/15    GI bleed 2/14    ischemic colitis on CT, seen by Dr. Stevie Boone Hyperlipidemia     calculated 10 year risk score was 3.2% (1/15)    Hypertension     IFG (impaired fasting glucose) [id number]    Ischemic colitis ([facility] [address] [address] 3/14     Damle    Labial abscess     mrsa    Lung nodule 2015    9x10mm RML, no change 9/21    Lymphoma, mantle cell ([facility] 12/2013    Dr. 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 She provided verbal consent to proceed: Yes. She has not had a related appointment within my department in the past 7 days or scheduled within the next 24 hours. On this date 02/24/2022 I have spent 27 minutes reviewing previous notes, test results and face to face (virtual) with the patient discussing the diagnosis and importance of compliance with the treatment plan as well as documenting on the day of the visit.     Maldonado Kaplan MD

## 2022-03-18 PROBLEM — M16.10 HIP ARTHRITIS: Status: ACTIVE | Noted: 2020-10-13

## 2022-03-18 PROBLEM — F17.200 TOBACCO DEPENDENCE SYNDROME: Status: ACTIVE | Noted: 2019-02-08

## 2022-03-18 PROBLEM — R93.1 AGATSTON CAC SCORE 100-199: Status: ACTIVE | Noted: 2021-12-16

## 2022-03-18 PROBLEM — D50.9 IRON DEFICIENCY ANEMIA: Status: ACTIVE | Noted: 2020-10-01

## 2022-03-18 PROBLEM — K57.90 DIVERTICULOSIS: Status: ACTIVE | Noted: 2019-02-08

## 2022-03-19 PROBLEM — D12.6 COLON ADENOMA: Status: ACTIVE | Noted: 2019-02-08

## 2022-03-19 PROBLEM — I10 PRIMARY HYPERTENSION: Status: ACTIVE | Noted: 2019-07-18

## 2022-05-04 DIAGNOSIS — M54.12 LEFT CERVICAL RADICULOPATHY: ICD-10-CM

## 2022-05-04 DIAGNOSIS — M79.2 NEURITIS: ICD-10-CM

## 2022-05-04 DIAGNOSIS — M48.02 FORAMINAL STENOSIS OF CERVICAL REGION: ICD-10-CM

## 2022-05-04 RX ORDER — GABAPENTIN 100 MG/1
CAPSULE ORAL
Qty: 120 CAPSULE | Refills: 4 | OUTPATIENT
Start: 2022-05-04

## 2022-05-04 NOTE — TELEPHONE ENCOUNTER
Last Visit: 8/2/21 with MD Stone  Next Appointment: Advised to follow-up in 4 months  Previous Refill Encounter(s): 8/2/21 #120 with 4 refills    Requested Prescriptions     Pending Prescriptions Disp Refills    gabapentin (Neurontin) 100 mg capsule 120 Capsule 4     Sig: Take 1 cap by mouth in the morning and 3 caps at night as directed.

## 2022-05-10 ENCOUNTER — VIRTUAL VISIT (OUTPATIENT)
Dept: ORTHOPEDIC SURGERY | Age: 72
End: 2022-05-10
Payer: MEDICARE

## 2022-05-10 DIAGNOSIS — M48.02 FORAMINAL STENOSIS OF CERVICAL REGION: ICD-10-CM

## 2022-05-10 DIAGNOSIS — M54.12 LEFT CERVICAL RADICULOPATHY: ICD-10-CM

## 2022-05-10 DIAGNOSIS — M79.2 NEURITIS: ICD-10-CM

## 2022-05-10 PROCEDURE — 99443 PR PHYS/QHP TELEPHONE EVALUATION 21-30 MIN: CPT | Performed by: NURSE PRACTITIONER

## 2022-05-10 RX ORDER — GABAPENTIN 300 MG/1
300 CAPSULE ORAL
Qty: 30 CAPSULE | Refills: 5 | Status: SHIPPED | OUTPATIENT
Start: 2022-05-10

## 2022-05-10 NOTE — PROGRESS NOTES
History of Present Illness:    Consent: Loretta Gunderson, who was seen by telephone call and/or her healthcare decision maker, is aware that this patient-initiated, Telehealth encounter on 5/10/2022 is a billable service, with coverage as determined by her insurance carrier. She is aware that she may receive a bill and has provided verbal consent to proceed: Yes. The provider was located at [facility] office and the patient was located at their home. No one else participated in the visit with the patient. The platform used was telephone call    The patient is a 28-year-old female who has neck pain that radiates into her left upper extremity. She states she has been doing well on gabapentin 300 at night. She found that the 100 mg in the morning was making her sluggish so she stopped taking it in the morning. She states she has been doing a lot of yard work that is increased her pain. She would like to go ahead and get a refill of the gabapentin. Physical Exam: The patient is a 28-year-old female who is alert and oriented. She spoke with fluency. She did not appear to be distressed. Assessment & Plan: This is a patient who has cervical spinal stenosis that gives her cervical radiculopathy and neck pain. I have refilled her gabapentin but I changed it to 300 at night. We will see her back in 6 months sooner if needed. Diagnoses and all orders for this visit:    1. Foraminal stenosis of cervical region  -     gabapentin (NEURONTIN) 300 mg capsule; Take 1 Capsule by mouth nightly. Max Daily Amount: 300 mg. Indications: neuropathic pain    2. Left cervical radiculopathy  -     gabapentin (NEURONTIN) 300 mg capsule; Take 1 Capsule by mouth nightly. Max Daily Amount: 300 mg. Indications: neuropathic pain    3. Neuritis  -     gabapentin (NEURONTIN) 300 mg capsule; Take 1 Capsule by mouth nightly. Max Daily Amount: 300 mg.  Indications: neuropathic pain          Pain Scale: /10      We discussed the expected course, resolution and complications of the diagnosis(es) in detail. Medication risks, benefits, costs, interactions, and alternatives were discussed as indicated. I advised her to contact the office if her condition worsens, changes or fails to improve as anticipated. For emergencies or worsening neurological symptoms the patient was instructed to call 911. She expressed understanding with the diagnosis(es) and plan. Follow-up and Dispositions    · Return in about 6 months (around 11/10/2022) for with RIGOBERTO Michael. Wilfrid Southwell [facility] Colin is a 80 y.o. female being evaluated by a video visit encounter for concerns as above. A caregiver was present when appropriate. Due to this being a TeleHealth encounter (During [facility] public health emergency), evaluation of the following organ systems was limited: Vitals/Constitutional/EENT/Resp/CV/GI//MS/Neuro/Skin/Heme-Lymph-Imm. Pursuant to the emergency declaration under the [facility], [employer] [facility] [employer] authority and the [employer] Act, this Virtual  Visit was conducted, with patient's (and/or legal guardian's) consent, to reduce the patient's risk of exposure to COVID-19 and provide necessary medical care. CPT Codes 87013-96148 for Established Patients may apply to this Telehealth Visit  Time-based coding, delete if not needed: I spent at least 15 minutes with this established patient, and >50% of the time was spent counseling and/or coordinating care regarding Neck pain and left  arm. Start time: 3:48 PM and Stop time: 4:01 PM.        Due to this being a TeleHealth evaluation, many elements of the physical examination are unable to be assessed.        Pursuant to the emergency declaration under the [address] waiver authority and the [employer] Appropriations Act, this Virtual  Visit was conducted, with patient's consent, to reduce the patient's risk of exposure to COVID-19 and provide continuity of care for an established patient. Services were provided through a video synchronous discussion virtually to substitute for in-person clinic visit.     Cheri Vega NP

## 2022-06-02 NOTE — PROGRESS NOTES
80 y.o. WHITE/NON- female who presents for evaluation. She continues to walk the dogs daily. No cardiovascular complaints. Pressures controlled when she checks    Denies polyuria, polydipsia, nocturia, vision change. Not checking sugars at this time. Keeping the weight controlled    Denied any gi or gu issues. She recently retired from 21 years at [employer]. She also moved to a house on the [facility] which her daughter generously lets her use. She is unclear of her path going forward, feels mildly depressed. No VI/hallucinations. She understands that this is an adjustment period. However, she is willing to try back to Prozac which she took about 7 years ago just to see if it will help    Continues to follow-up with hematology, now 7 years out from her mantle cell lymphoma    Is taking vitamin D supplement. However, her B12 is borderline low when we checked it last.    LAST MEDICARE WELLNESS EXAM: 2/8/19, 5/4/20, 8/26/21    Past Medical History:   Diagnosis Date    Agaton CAC score 100-199 09/2021    ca score 138, calcification all 3 vessels    Anxiety 6/15    IKE-7 was 12/21    Clostridium difficile colitis     Dr. Leida Nicholas 3/14; postop 10/20    Colon adenoma 10/2011    Dr. Hannah Arteaga; Dr Segundo Rivero 7/20    Cystic breast     Degenerative arthritis of cervical spine     on xray, worst C5-6 (4/21); mri showed multilevel degen changes, foraminal stenosis (4.21);  Dr Brandan Davies Depression 6/15    PHQ-9 was 15/27    Diverticulitis     recurrent x3 Dr Lonnie Cruz liver     10/10 on US, CT 6/12; Fib-4 was 0.74 from 1/15    GI bleed 2/14    ischemic colitis on CT, seen by Dr. Ba Monge Hyperlipidemia     calculated 10 year risk score was 3.2% (1/15)    Hypertension     IFG (impaired fasting glucose) [id number]    Ischemic colitis ([address] 3/14    Dr Leida Nicholas    Labial abscess     mrsa    Lung nodule 2015    9x10mm RML, no change 9/21    Lymphoma, mantle cell ([facility] 12/2013    Dr. Zhen Obrien; stage s/p chemo; now Dr Jessenia Smith Migraine     Nephrolithiasis     Neuropathy due to chemotherapeutic drug ([address]     Osteoarthritis     hips and knees; Dr Paul Purl dependence syndrome 2019    Vitamin D deficiency 2022    Zoster     left T11     Past Surgical History:   Procedure Laterality Date    COLONOSCOPY N/A 2020    Dr Dipika Land 2003 polyps;  adenoma; Dr Leo Saba 3/14 isch colitis; Dr Larry Urbano 20 mod divertics and adenoma    HX APPENDECTOMY  1968    HX HIP REPLACEMENT Right 10/2020    Dr Patricia Maza ARTHROSCOPY Left     HX ORTHOPAEDIC      DEXA t score 2.1 spine, -0.3 hip ()    HX SOPHIA AND BSO      Dr. Flores Morgan HX TONSILLECTOMY       Social History     Socioeconomic History    Marital status:      Spouse name: Not on file    Number of children: 2    Years of education: Not on file    Highest education level: Not on file   Occupational History    Occupation: director [employer]   Tobacco Use    Smoking status: Former Smoker     Packs/day: 0.50     Years: 15.00     Pack years: 7.50     Types: Cigarettes     Quit date: 2020     Years since quittin.7    Smokeless tobacco: Never Used    Tobacco comment: smoking cessation advised   Substance and Sexual Activity    Alcohol use: Not Currently     Alcohol/week: 4.2 standard drinks     Types: 5 Glasses of wine per week    Drug use: No    Sexual activity: Not Currently   Other Topics Concern    Not on file   Social History Narrative    Not on file     Social Determinants of Health     Financial Resource Strain:     Difficulty of Paying Living Expenses: Not on file   Food Insecurity:     Worried About [address] in the Last Year: Not on file    [address] in the Last Year: Not on file   Transportation Needs:     Lack of Transportation (Medical): Not on file    Lack of Transportation (Non-Medical):  Not on file   Physical Activity:     Days of Exercise per Week: Not on file    Minutes of Exercise per Session: Not on file   Stress:     Feeling of Stress : Not on file   Social Connections:     Frequency of Communication with Friends and Family: Not on file    Frequency of Social Gatherings with Friends and Family: Not on file    Attends Anglican Services: Not on file    Active Member of [address] [place] [address] or Organizations: Not on file    Attends Club or Organization Meetings: Not on file    Marital Status: Not on file   Intimate Partner Violence:     Fear of Current or Ex-Partner: Not on file    Emotionally Abused: Not on file    Physically Abused: Not on file    Sexually Abused: Not on file   Housing Stability:     Unable to Pay for Housing in the Last Year: Not on file    Number of [place] in the Last Year: Not on file    Unstable Housing in the Last Year: Not on file     Current Outpatient Medications   Medication Sig    FLUoxetine (PROzac) 10 mg capsule Take 1 Capsule by mouth daily.  gabapentin (NEURONTIN) 300 mg capsule Take 1 Capsule by mouth nightly. Max Daily Amount: 300 mg. Indications: neuropathic pain    cholecalciferol (VITAMIN D3) (2,000 UNITS /50 MCG) cap capsule Take 1 Capsule by mouth two (2) times a day for 360 days. Indications: low vitamin D levels    ferrous sulfate 325 mg (65 mg iron) tablet 3 times a week    Cetirizine (ZyrTEC) 10 mg cap as needed.  amLODIPine (NORVASC) 5 mg tablet Take 1 Tablet by mouth daily.  atorvastatin (LIPITOR) 10 mg tablet Take 1 Tablet by mouth daily.  ibuprofen (MOTRIN) 200 mg tablet Take 200 mg by mouth every eight (8) hours as needed. No current facility-administered medications for this visit.      Allergies   Allergen Reactions    Meperidine Nausea Only and Other (comments)    Augmentin [Amoxicillin-Pot Clavulanate] Diarrhea and Nausea Only    Keflex [Cephalexin] Nausea Only    Sulfa (Sulfonamide Antibiotics) Rash     Visit Vitals  /62   Pulse 67   Temp 97.7 °F (36.5 °C) (Temporal) Resp 16   Ht 4' 10\" (1.473 m)   Wt 108 lb (49 kg)   SpO2 100%   BMI 22.57 kg/m²   A&O x3  Affect is appropriate. Mood stable  No apparent distress  Anicteric, no JVD, adenopathy or thyromegaly. No carotid bruits or radiated murmur  Lungs clear to auscultation, no wheezes or rales  Heart showed regular rate and rhythm. No murmur, rubs, gallops  Abdomen soft nontender, no hepatosplenomegaly or masses. Extremities without edema.   Pulses 1-2+ symmetrically    REVIEW OF SYSTEMS: mammo 4/19, DEXA 4/19, colo 7/20 Dr Kailey Sheets  Ophtho - no vision change or eye pain  Oral - no mouth pain, tongue or tooth problems  Ears - no hearing loss, ear pain, fullness, no swallowing problems  Cardiac - no CP, PND, orthopnea,  palpitations or syncope  Chest - no breast masses  Resp - no wheezing, chronic coughing, dyspnea  GI - no heartburn, nausea, vomiting, change in bowel habits, bleeding, hemorrhoids  Urinary - no dysuria, hematuria, flank pain, urgency, frequency    LABS  From 9/13 showed   gluc 107, cr 0.65, gfr 96,  alt 69, hba1c 5.6, chol 251, tg 54, hdl 113, ldl-c 127, wbc 6.5, hb 13.9, plt 225  From 11/13 showed gluc 103, cr 0.72, gfr 90,  alt 24,          wbc 7.6, hb 14.4 ,plt 224,     tsh 0.99  From 1/14 showed   gluc 119, cr 0.65, gfr>60,           wbc 8.2, hb 13.4, plt 208  From 2/14 showed   gluc 118, cr 0.72, gfr>60, alt 25,                   fe 84, %sat 29,                    b12 1289, fol 14.8, lip 376  From 3/14 showed   gluc 107, cr 0.52, gfr>60, alt 13,          wbc 4.8, hb 10.8, plt 299, lip 130, c diff+  From 8/14 showed                        fe 57, %sat 16, ferritin 153  Form 1/15 showed                                   b12 457,   fol>20,  vit d 23,  From 1/15 showed        hba1c 4.9, chol 206, tg 50, hdl 116, ldl-c 80  From 9/15 showed   gluc 105, cr 0.69, gfr>60, alt 27,         wbc 9.3, hb 15.4, plt 248  From 2/16 showed   gluc 123, cr 0.62, gfr 96,  alt 22  From 2/17 showed   gluc 89,   cr 0.72, gfr>60, alt 47,          wbc 9.8, hb 14.8, plt 328  From 2/18 showed   gluc 109, cr 0.65, gfr>60, alt 22,          wbc 9.3, hb 13.1, plt 253  From 2/19 showed   gluc 106, cr 0.61, gfr>60, alt 37, hba1c 5.2, chol 219, tg 27, hdl 161, ldl-c 53,   wbc 6.6, hb 12.7, plt 213  From 7/19 showed   gluc 159, cr 0.84, gfr>60, alt 46,          wbc 7.7, hb 12.6, plt 265  From 5/20 showed   gluc 103, cr 0.79, gfr>60, alt 15,          wbc 7.5, hb 11.9, plt 301, fe 75, %sat 20, ferritin 75  From 10/20 showed gluc 102, cr 0.90, gfr>60,    hba1c 5.2,         wbc 7.5, hb 12.3, plt 297  From 4/21 showed   gluc 108, cr 0.78, gfr>60, alt 16,                   fe 56, %sat 15, ferritin 46,   b12 300,   fol>20, tsh 1.12, ft4 1.00  From 8/21 showed   gluc 110, cr 0.75, cr 82,   alt 15, hba1c 5.4, chol 211, tg 52,   hdl 96, ldl-c 106, wbc 7.4, hb 12.5, plt 237   From 2/22 showed   gluc 118, cr 0.85, gfr>60,           wbc 8.4, hb 12.4, plt 288, fe 17, %sat 22, ferritin 501, b12 213, fol 11.3, tsh 1.10, ft4 1.00, vit d 19.2    Results for orders placed or performed in visit on 06/03/22   TSH AND FREE T4   Result Value Ref Range    TSH 1.800 0.450 - 4.500 uIU/mL    T4, Free 1.27 0.82 - 1.77 ng/dL   CBC WITH AUTOMATED DIFF   Result Value Ref Range    WBC 7.0 3.4 - 10.8 x10E3/uL    RBC 3.62 (L) 3.77 - 5.28 x10E6/uL    HGB 11.8 11.1 - 15.9 g/dL    HCT 35.3 34.0 - 46.6 %    MCV 98 (H) 79 - 97 fL    MCH 32.6 26.6 - 33.0 pg    MCHC 33.4 31.5 - 35.7 g/dL    RDW 13.1 11.7 - 15.4 %    PLATELET 747 230 - 712 x10E3/uL    NEUTROPHILS 69 Not Estab. %    Lymphocytes 20 Not Estab. %    MONOCYTES 7 Not Estab. %    EOSINOPHILS 3 Not Estab. %    BASOPHILS 1 Not Estab. %    ABS. NEUTROPHILS 4.8 1.4 - 7.0 x10E3/uL    Abs Lymphocytes 1.4 0.7 - 3.1 x10E3/uL    ABS. MONOCYTES 0.5 0.1 - 0.9 x10E3/uL    ABS. EOSINOPHILS 0.2 0.0 - 0.4 x10E3/uL    ABS. BASOPHILS 0.1 0.0 - 0.2 x10E3/uL    IMMATURE GRANULOCYTES 0 Not Estab. %    ABS. IMM.  GRANS. 0.0 0.0 - 0.1 I89V4/II   METABOLIC PANEL, COMPREHENSIVE   Result Value Ref Range    Glucose 99 65 - 99 mg/dL    BUN 13 8 - 27 mg/dL    Creatinine 0.83 0.57 - 1.00 mg/dL    eGFR 76 >59 mL/min/1.73    BUN/Creatinine ratio 16 12 - 28    Sodium 138 134 - 144 mmol/L    Potassium 4.5 3.5 - 5.2 mmol/L    Chloride 104 96 - 106 mmol/L    CO2 20 20 - 29 mmol/L    Calcium 9.5 8.7 - 10.3 mg/dL    Protein, total 7.2 6.0 - 8.5 g/dL    Albumin 4.4 3.8 - 4.8 g/dL    GLOBULIN, TOTAL 2.8 1.5 - 4.5 g/dL    A-G Ratio 1.6 1.2 - 2.2    Bilirubin, total 0.2 0.0 - 1.2 mg/dL    Alk. phosphatase 50 44 - 121 IU/L    AST (SGOT) 15 0 - 40 IU/L    ALT (SGPT) 13 0 - 32 IU/L   IRON PROFILE   Result Value Ref Range    TIBC 318 250 - 450 ug/dL    UIBC 200 118 - 369 ug/dL    Iron 118 27 - 139 ug/dL    Iron % saturation 37 15 - 55 %     We reviewed the patient's labs from the last several visits to point out trends in the numbers        Patient Active Problem List   Diagnosis Code    Hyperlipidemia E78.5    Mantle cell lymphoma of lymph nodes of multiple sites ([facility] C83.18    Neuropathy due to chemotherapeutic drug ([facility] G62.0, T45.1X5A    Arthritis, degenerative M19.90    IFG (impaired fasting glucose) R73.01    Tobacco dependence syndrome F17.200    Colon adenoma D12.6    Diverticulosis K57.90    Primary hypertension I10    Iron deficiency anemia D50.9    Agatston CAC score 100-199 R93.1    Vitamin D deficiency E55.9    Major depressive disorder, recurrent, mild F33.0     Assessment and plan:  1. Colon adenoma, divertics. Fiber, colo 2025  2. HTN. Controlled on amlo  3. HLP. lipitor   4. IFG. Lifestyle and dietary measures, maintain weight  5. Nephrolithiasis. Quiescent, hydration  6. Lymphoma. F/U hematology  7. Smoking cessation reiterated, she has failed chantix in past    NEW ISSUES  8. Borderline b12,  Supplement  9. Vit d def. Supplement   10. Depression.   prozac restarted, no sfx with previous tx      RTC 12/22      Above conditions discussed at length and patient vocalized understanding. All questions answered to patient satisfaction        ICD-10-CM ICD-9-CM    1. Depression, unspecified depression type  F32. A 311 FLUoxetine (PROzac) 10 mg capsule   2. Primary hypertension  I10 401.9    3. Agatston CAC score 100-199  R93.1 793.2    4. IFG (impaired fasting glucose)  R73.01 790.21    5. Mantle cell lymphoma of lymph nodes of multiple sites ([facility]  C83.18 200.48    6. Iron deficiency anemia, unspecified iron deficiency anemia type  D50.9 280.9    7. Hyperlipidemia, unspecified hyperlipidemia type  E78.5 272.4 LIPID PANEL      METABOLIC PANEL, BASIC   8. B12 deficiency  E53.8 266.2 VITAMIN B12      METHYLMALONIC ACID   9.  Vitamin D deficiency  E55.9 268.9 VITAMIN D, 25 HYDROXY

## 2022-06-03 ENCOUNTER — LAB ONLY (OUTPATIENT)
Dept: INTERNAL MEDICINE CLINIC | Age: 72
End: 2022-06-03

## 2022-06-03 DIAGNOSIS — D50.9 IRON DEFICIENCY ANEMIA, UNSPECIFIED IRON DEFICIENCY ANEMIA TYPE: ICD-10-CM

## 2022-06-03 DIAGNOSIS — Z00.00 ENCOUNTER FOR ANNUAL PHYSICAL EXAM: Primary | ICD-10-CM

## 2022-06-03 DIAGNOSIS — E78.5 HYPERLIPIDEMIA, UNSPECIFIED HYPERLIPIDEMIA TYPE: ICD-10-CM

## 2022-06-03 DIAGNOSIS — I10 PRIMARY HYPERTENSION: ICD-10-CM

## 2022-06-04 LAB
ALBUMIN SERPL-MCNC: 4.4 G/DL (ref 3.8–4.8)
ALBUMIN/GLOB SERPL: 1.6 {RATIO} (ref 1.2–2.2)
ALP SERPL-CCNC: 50 IU/L (ref 44–121)
ALT SERPL-CCNC: 13 IU/L (ref 0–32)
AST SERPL-CCNC: 15 IU/L (ref 0–40)
BASOPHILS # BLD AUTO: 0.1 X10E3/UL (ref 0–0.2)
BASOPHILS NFR BLD AUTO: 1 %
BILIRUB SERPL-MCNC: 0.2 MG/DL (ref 0–1.2)
BUN SERPL-MCNC: 13 MG/DL (ref 8–27)
BUN/CREAT SERPL: 16 (ref 12–28)
CALCIUM SERPL-MCNC: 9.5 MG/DL (ref 8.7–10.3)
CHLORIDE SERPL-SCNC: 104 MMOL/L (ref 96–106)
CO2 SERPL-SCNC: 20 MMOL/L (ref 20–29)
CREAT SERPL-MCNC: 0.83 MG/DL (ref 0.57–1)
EGFR: 76 ML/MIN/1.73
EOSINOPHIL # BLD AUTO: 0.2 X10E3/UL (ref 0–0.4)
EOSINOPHIL NFR BLD AUTO: 3 %
ERYTHROCYTE [DISTWIDTH] IN BLOOD BY AUTOMATED COUNT: 13.1 % (ref 11.7–15.4)
GLOBULIN SER CALC-MCNC: 2.8 G/DL (ref 1.5–4.5)
GLUCOSE SERPL-MCNC: 99 MG/DL (ref 65–99)
HCT VFR BLD AUTO: 35.3 % (ref 34–46.6)
HGB BLD-MCNC: 11.8 G/DL (ref 11.1–15.9)
IMM GRANULOCYTES # BLD AUTO: 0 X10E3/UL (ref 0–0.1)
IMM GRANULOCYTES NFR BLD AUTO: 0 %
IRON SATN MFR SERPL: 37 % (ref 15–55)
IRON SERPL-MCNC: 118 UG/DL (ref 27–139)
LYMPHOCYTES # BLD AUTO: 1.4 X10E3/UL (ref 0.7–3.1)
LYMPHOCYTES NFR BLD AUTO: 20 %
MCH RBC QN AUTO: 32.6 PG (ref 26.6–33)
MCHC RBC AUTO-ENTMCNC: 33.4 G/DL (ref 31.5–35.7)
MCV RBC AUTO: 98 FL (ref 79–97)
MONOCYTES # BLD AUTO: 0.5 X10E3/UL (ref 0.1–0.9)
MONOCYTES NFR BLD AUTO: 7 %
NEUTROPHILS # BLD AUTO: 4.8 X10E3/UL (ref 1.4–7)
NEUTROPHILS NFR BLD AUTO: 69 %
PLATELET # BLD AUTO: 268 X10E3/UL (ref 150–450)
POTASSIUM SERPL-SCNC: 4.5 MMOL/L (ref 3.5–5.2)
PROT SERPL-MCNC: 7.2 G/DL (ref 6–8.5)
RBC # BLD AUTO: 3.62 X10E6/UL (ref 3.77–5.28)
SODIUM SERPL-SCNC: 138 MMOL/L (ref 134–144)
T4 FREE SERPL-MCNC: 1.27 NG/DL (ref 0.82–1.77)
TIBC SERPL-MCNC: 318 UG/DL (ref 250–450)
TSH SERPL DL<=0.005 MIU/L-ACNC: 1.8 UIU/ML (ref 0.45–4.5)
UIBC SERPL-MCNC: 200 UG/DL (ref 118–369)
WBC # BLD AUTO: 7 X10E3/UL (ref 3.4–10.8)

## 2022-06-08 ENCOUNTER — OFFICE VISIT (OUTPATIENT)
Dept: INTERNAL MEDICINE CLINIC | Age: 72
End: 2022-06-08
Payer: MEDICARE

## 2022-06-08 VITALS
TEMPERATURE: 97.7 F | HEIGHT: 58 IN | RESPIRATION RATE: 16 BRPM | SYSTOLIC BLOOD PRESSURE: 133 MMHG | WEIGHT: 108 LBS | DIASTOLIC BLOOD PRESSURE: 62 MMHG | OXYGEN SATURATION: 100 % | BODY MASS INDEX: 22.67 KG/M2 | HEART RATE: 67 BPM

## 2022-06-08 DIAGNOSIS — F32.A DEPRESSION, UNSPECIFIED DEPRESSION TYPE: Primary | ICD-10-CM

## 2022-06-08 DIAGNOSIS — E78.5 HYPERLIPIDEMIA, UNSPECIFIED HYPERLIPIDEMIA TYPE: ICD-10-CM

## 2022-06-08 DIAGNOSIS — R93.1 AGATSTON CAC SCORE 100-199: ICD-10-CM

## 2022-06-08 DIAGNOSIS — I10 PRIMARY HYPERTENSION: ICD-10-CM

## 2022-06-08 DIAGNOSIS — C83.18 MANTLE CELL LYMPHOMA OF LYMPH NODES OF MULTIPLE SITES (HCC): ICD-10-CM

## 2022-06-08 DIAGNOSIS — E55.9 VITAMIN D DEFICIENCY: ICD-10-CM

## 2022-06-08 DIAGNOSIS — D50.9 IRON DEFICIENCY ANEMIA, UNSPECIFIED IRON DEFICIENCY ANEMIA TYPE: ICD-10-CM

## 2022-06-08 DIAGNOSIS — R73.01 IFG (IMPAIRED FASTING GLUCOSE): ICD-10-CM

## 2022-06-08 DIAGNOSIS — E53.8 B12 DEFICIENCY: ICD-10-CM

## 2022-06-08 PROBLEM — F33.0 MAJOR DEPRESSIVE DISORDER, RECURRENT, MILD (HCC): Status: ACTIVE | Noted: 2022-06-08

## 2022-06-08 PROBLEM — M16.10 HIP ARTHRITIS: Status: RESOLVED | Noted: 2020-10-13 | Resolved: 2022-06-08

## 2022-06-08 PROCEDURE — G8752 SYS BP LESS 140: HCPCS | Performed by: INTERNAL MEDICINE

## 2022-06-08 PROCEDURE — G8427 DOCREV CUR MEDS BY ELIG CLIN: HCPCS | Performed by: INTERNAL MEDICINE

## 2022-06-08 PROCEDURE — G9717 DOC PT DX DEP/BP F/U NT REQ: HCPCS | Performed by: INTERNAL MEDICINE

## 2022-06-08 PROCEDURE — G8754 DIAS BP LESS 90: HCPCS | Performed by: INTERNAL MEDICINE

## 2022-06-08 PROCEDURE — 1090F PRES/ABSN URINE INCON ASSESS: CPT | Performed by: INTERNAL MEDICINE

## 2022-06-08 PROCEDURE — G8536 NO DOC ELDER MAL SCRN: HCPCS | Performed by: INTERNAL MEDICINE

## 2022-06-08 PROCEDURE — 1123F ACP DISCUSS/DSCN MKR DOCD: CPT | Performed by: INTERNAL MEDICINE

## 2022-06-08 PROCEDURE — G8399 PT W/DXA RESULTS DOCUMENT: HCPCS | Performed by: INTERNAL MEDICINE

## 2022-06-08 PROCEDURE — 3017F COLORECTAL CA SCREEN DOC REV: CPT | Performed by: INTERNAL MEDICINE

## 2022-06-08 PROCEDURE — 99214 OFFICE O/P EST MOD 30 MIN: CPT | Performed by: INTERNAL MEDICINE

## 2022-06-08 PROCEDURE — 1101F PT FALLS ASSESS-DOCD LE1/YR: CPT | Performed by: INTERNAL MEDICINE

## 2022-06-08 PROCEDURE — G8420 CALC BMI NORM PARAMETERS: HCPCS | Performed by: INTERNAL MEDICINE

## 2022-06-08 PROCEDURE — G9899 SCRN MAM PERF RSLTS DOC: HCPCS | Performed by: INTERNAL MEDICINE

## 2022-06-08 RX ORDER — FLUOXETINE 10 MG/1
10 CAPSULE ORAL DAILY
Qty: 90 CAPSULE | Refills: 3 | Status: SHIPPED | OUTPATIENT
Start: 2022-06-08

## 2022-06-08 NOTE — PROGRESS NOTES
Colleen Teran presents with   Chief Complaint   Patient presents with   [facility] Annual Wellness Visit    Cholesterol Problem    Labs     6-3-22        1. \"Have you been to the ER, urgent care clinic since your last visit? Hospitalized since your last visit? \" YES, Jennifer Tol[facility] for calf pain    2. \"Have you seen or consulted any other health care providers outside of the [address] [place] [address] since your last visit? \" [facility] Nails for ED     3. For patients aged 39-70: Has the patient had a colonoscopy / FIT/ Cologuard? Yes - no Care Gap present      If the patient is female:    4. For patients aged 41-77: Has the patient had a mammogram within the past 2 years? Yes - Care Gap present. Most recent result on file      5. For patients aged 21-65: Has the patient had a pap smear?  NA - based on age or sex

## 2022-06-29 ENCOUNTER — HOSPITAL ENCOUNTER (OUTPATIENT)
Dept: INFUSION THERAPY | Age: 72
Discharge: HOME | End: 2022-06-29
Payer: MEDICARE

## 2022-06-29 VITALS — TEMPERATURE: 97 F

## 2022-06-29 DIAGNOSIS — E55.9 VITAMIN D DEFICIENCY: ICD-10-CM

## 2022-06-29 DIAGNOSIS — D50.9 IRON DEFICIENCY ANEMIA, UNSPECIFIED IRON DEFICIENCY ANEMIA TYPE: ICD-10-CM

## 2022-06-29 DIAGNOSIS — F17.200 TOBACCO DEPENDENCE SYNDROME: ICD-10-CM

## 2022-06-29 DIAGNOSIS — C83.18 MANTLE CELL LYMPHOMA OF LYMPH NODES OF MULTIPLE SITES (HCC): ICD-10-CM

## 2022-06-29 LAB
25(OH)D3 SERPL-MCNC: 32.9 NG/ML (ref 30–100)
ALBUMIN SERPL-MCNC: 4 G/DL (ref 3.4–5)
ALBUMIN/GLOB SERPL: 1.1 {RATIO} (ref 0.8–1.7)
ALP SERPL-CCNC: 51 U/L (ref 45–117)
ALT SERPL-CCNC: 16 U/L (ref 13–56)
ANION GAP SERPL CALC-SCNC: 6 MMOL/L (ref 3–18)
AST SERPL-CCNC: 14 U/L (ref 10–38)
BASO+EOS+MONOS # BLD AUTO: 0.2 K/UL (ref 0–2.3)
BASO+EOS+MONOS NFR BLD AUTO: 3 % (ref 0.1–17)
BILIRUB SERPL-MCNC: 0.5 MG/DL (ref 0.2–1)
BUN SERPL-MCNC: 18 MG/DL (ref 7–18)
BUN/CREAT SERPL: 21 (ref 12–20)
CALCIUM SERPL-MCNC: 9.5 MG/DL (ref 8.5–10.1)
CHLORIDE SERPL-SCNC: 110 MMOL/L (ref 100–111)
CO2 SERPL-SCNC: 23 MMOL/L (ref 21–32)
CREAT SERPL-MCNC: 0.86 MG/DL (ref 0.6–1.3)
DIFFERENTIAL METHOD BLD: ABNORMAL
ERYTHROCYTE [DISTWIDTH] IN BLOOD BY AUTOMATED COUNT: 13 % (ref 11.5–14.5)
FERRITIN SERPL-MCNC: 441 NG/ML (ref 8–388)
FOLATE SERPL-MCNC: 15.3 NG/ML (ref 3.1–17.5)
GLOBULIN SER CALC-MCNC: 3.6 G/DL (ref 2–4)
GLUCOSE SERPL-MCNC: 96 MG/DL (ref 74–99)
HCT VFR BLD AUTO: 33.9 % (ref 36–48)
HGB BLD-MCNC: 11.3 G/DL (ref 12–16)
IRON SATN MFR SERPL: 24 % (ref 20–50)
IRON SERPL-MCNC: 77 UG/DL (ref 50–175)
LYMPHOCYTES # BLD: 2.3 K/UL (ref 1.1–5.9)
LYMPHOCYTES NFR BLD: 27 % (ref 14–44)
MCH RBC QN AUTO: 32.1 PG (ref 25–35)
MCHC RBC AUTO-ENTMCNC: 33.3 G/DL (ref 31–37)
MCV RBC AUTO: 96.3 FL (ref 78–102)
NEUTS SEG # BLD: 5.9 K/UL (ref 1.8–9.5)
NEUTS SEG NFR BLD: 70 % (ref 40–70)
PLATELET # BLD AUTO: 288 K/UL (ref 140–440)
POTASSIUM SERPL-SCNC: 4.6 MMOL/L (ref 3.5–5.5)
PROT SERPL-MCNC: 7.6 G/DL (ref 6.4–8.2)
RBC # BLD AUTO: 3.52 M/UL (ref 4.1–5.1)
SODIUM SERPL-SCNC: 139 MMOL/L (ref 136–145)
TIBC SERPL-MCNC: 327 UG/DL (ref 250–450)
VIT B12 SERPL-MCNC: 331 PG/ML (ref 211–911)
WBC # BLD AUTO: 8.4 K/UL (ref 4.5–13)

## 2022-06-29 PROCEDURE — 82728 ASSAY OF FERRITIN: CPT

## 2022-06-29 PROCEDURE — 36415 COLL VENOUS BLD VENIPUNCTURE: CPT

## 2022-06-29 PROCEDURE — 83540 ASSAY OF IRON: CPT

## 2022-06-29 PROCEDURE — 82306 VITAMIN D 25 HYDROXY: CPT

## 2022-06-29 PROCEDURE — 80053 COMPREHEN METABOLIC PANEL: CPT

## 2022-06-29 PROCEDURE — 85025 COMPLETE CBC W/AUTO DIFF WBC: CPT

## 2022-06-29 PROCEDURE — 82607 VITAMIN B-12: CPT

## 2022-07-11 ENCOUNTER — OFFICE VISIT (OUTPATIENT)
Age: 72
End: 2022-07-11
Payer: MEDICARE

## 2022-07-11 VITALS
HEART RATE: 90 BPM | HEIGHT: 58 IN | TEMPERATURE: 98.1 F | OXYGEN SATURATION: 98 % | DIASTOLIC BLOOD PRESSURE: 68 MMHG | WEIGHT: 102.2 LBS | SYSTOLIC BLOOD PRESSURE: 137 MMHG | RESPIRATION RATE: 16 BRPM | BODY MASS INDEX: 21.45 KG/M2

## 2022-07-11 DIAGNOSIS — C83.18 MANTLE CELL LYMPHOMA OF LYMPH NODES OF MULTIPLE SITES (HCC): Primary | ICD-10-CM

## 2022-07-11 DIAGNOSIS — T45.1X5A NEUROPATHY DUE TO CHEMOTHERAPEUTIC DRUG (HCC): ICD-10-CM

## 2022-07-11 DIAGNOSIS — E55.9 VITAMIN D DEFICIENCY: ICD-10-CM

## 2022-07-11 DIAGNOSIS — E53.8 VITAMIN B 12 DEFICIENCY: ICD-10-CM

## 2022-07-11 DIAGNOSIS — G62.0 NEUROPATHY DUE TO CHEMOTHERAPEUTIC DRUG (HCC): ICD-10-CM

## 2022-07-11 DIAGNOSIS — D50.9 IRON DEFICIENCY ANEMIA, UNSPECIFIED IRON DEFICIENCY ANEMIA TYPE: ICD-10-CM

## 2022-07-11 DIAGNOSIS — F17.200 TOBACCO DEPENDENCE SYNDROME: ICD-10-CM

## 2022-07-11 DIAGNOSIS — E03.8 OTHER SPECIFIED HYPOTHYROIDISM: ICD-10-CM

## 2022-07-11 PROCEDURE — 1123F ACP DISCUSS/DSCN MKR DOCD: CPT | Performed by: INTERNAL MEDICINE

## 2022-07-11 PROCEDURE — 99214 OFFICE O/P EST MOD 30 MIN: CPT | Performed by: INTERNAL MEDICINE

## 2022-07-11 NOTE — PROGRESS NOTES
Hematology/Oncology  Progress Note    Name: Loly Ramírez  Date: 2022  : 1950  Primary Care Provider: Jake Davey MD    Ms. David Magaña is a 80y.o. year old female with RENÉE now good CBC and Iron stores. Hx of Mantel cell lymphoma MARIA LUZ. Vit D level low so will Rx Vit D . Iron WNL however declinning    Still smoking 10 cigs per day    Current Therapy: obsevation    Subjective:     Patient feels tired much of the time. However she sleeps only 6 hours a day because of her job and her dog and cat. Furthermore patient is still smoking. I asked the patient to try to change her sleeping habits to 8 hours a day if not already 2 months that take a nap on returning from work. Furthermore I have asked the patient to stop smoking. She has done this in the past and she is committed to stop it within the next 4 months. Patient will return in 4 months with the blood test and we shall revisit in person    The past medical, surgical and social history has been reviewed and remains unchanged. Past Medical History:   Diagnosis Date    Agatston CAC score 100-199 2021    ca score 138, calcification all 3 vessels    Anxiety 6/15    IKE-7 was     Clostridium difficile colitis     Dr. June Aguliera 3/14; postop 10/20    Colon adenoma 10/2011    Dr. Brock Calderon; Dr Vasyl Tinoco     Cystic breast     Degenerative arthritis of cervical spine     on xray, worst C5-6 (); mri showed multilevel degen changes, foraminal stenosis (4.21);  Dr Supriya Dela Cruz Depression 6/15    PHQ-9 was 15/27    Diverticulitis     recurrent x3 Dr Rufus Galarza liver     10/10 on US, CT ; Fib-4 was 0.74 from 1/15    GI bleed     ischemic colitis on CT, seen by Dr. Elinor Rogers Hyperlipidemia     calculated 10 year risk score was 3.2% (1/15)    Hypertension     IFG (impaired fasting glucose) [id number]    Ischemic colitis ([facility] 3/14    Dr June Aguilera    Labial abscess     mrsa    Lung nodule     9x10mm RML, no change     Lymphoma, mantle cell ([facility] 2013    Dr. Kailey Regalado; stage s/p chemo; now Dr Spring Staff    Migraine     Nephrolithiasis     [facility] to [facility]     Osteoarthritis     hips and knees; Dr Patel Lam dependence syndrome 2019    Vitamin D deficiency 2022    Zoster     left T11     Past Surgical History:   Procedure Laterality Date    COLONOSCOPY N/A 2020    Dr Dylon Rajan 2003 polyps;  adenoma; Dr Aurora Yu 3/14 isch colitis; Dr Vince Morocho 20 mod divertics and adenoma    HX APPENDECTOMY  1968    HX HIP REPLACEMENT Right 10/2020    Dr Kendell Suárez ARTHROSCOPY Left     HX ORTHOPAEDIC      DEXA t score 2.1 spine, -0.3 hip ()    HX SOPHIA AND BSO      Dr. Payne Risk HX TONSILLECTOMY       Social History     Socioeconomic History    Marital status:      Spouse name: Not on file    Number of children: 2    Years of education: Not on file    Highest education level: Not on file   Occupational History    Occupation: director [employer]   Tobacco Use    Smoking status: Former Smoker     Packs/day: 0.50     Years: 15.00     Pack years: 7.50     Types: Cigarettes     Quit date: 2020     Years since quittin.8    Smokeless tobacco: Never Used    Tobacco comment: smoking cessation advised   Substance and Sexual Activity    Alcohol use: Not Currently     Alcohol/week: 4.2 standard drinks     Types: 5 Glasses of wine per week    Drug use: No    Sexual activity: Not Currently   Other Topics Concern    Not on file   Social History Narrative    Not on file     Social Determinants of Health     Financial Resource Strain:     Difficulty of Paying Living Expenses: Not on file   Food Insecurity:     Worried About [address] in the Last Year: Not on file    [address] in the Last Year: Not on file   Transportation Needs:     Lack of Transportation (Medical): Not on file    Lack of Transportation (Non-Medical):  Not on file   Physical Activity:     Days of Exercise per Week: Not on file    Minutes of Exercise per Session: Not on file   Stress:     Feeling of Stress : Not on file   Social Connections:     Frequency of Communication with Friends and Family: Not on file    Frequency of Social Gatherings with Friends and Family: Not on file    Attends [employer] Services: Not on file    Active Member of [address] [place] [address] or Organizations: Not on file    Attends Club or Organization Meetings: Not on file    Marital Status: Not on file   Intimate Partner Violence:     Fear of Current or Ex-Partner: Not on file    Emotionally Abused: Not on file    Physically Abused: Not on file    Sexually Abused: Not on file   Housing Stability:     Unable to Pay for Housing in the Last Year: Not on file    Number of [place] in the Last Year: Not on file    Unstable Housing in the Last Year: Not on file     Family History   Adopted: Yes     Current Outpatient Medications   Medication Sig Dispense Refill    vit B comp/niacin/B12/choline (ALMEBEX PLUS B-12 PO) Take  by mouth.  FLUoxetine (PROzac) 10 mg capsule Take 1 Capsule by mouth daily. 90 Capsule 3    gabapentin (NEURONTIN) 300 mg capsule Take 1 Capsule by mouth nightly. Max Daily Amount: 300 mg. Indications: neuropathic pain 30 Capsule 5    cholecalciferol (VITAMIN D3) (2,000 UNITS /50 MCG) cap capsule Take 1 Capsule by mouth two (2) times a day for 360 days. Indications: low vitamin D levels 180 Capsule 3    ferrous sulfate 325 mg (65 mg iron) tablet 3 times a week      Cetirizine (ZyrTEC) 10 mg cap as needed.  amLODIPine (NORVASC) 5 mg tablet Take 1 Tablet by mouth daily. 90 Tablet 3    atorvastatin (LIPITOR) 10 mg tablet Take 1 Tablet by mouth daily. 30 Tablet 5    ibuprofen (MOTRIN) 200 mg tablet Take 200 mg by mouth every eight (8) hours as needed. Review of Systems:    General :The patient has no complaints and there is no physical distress evident. Psychological : patient denies having any psychological symptoms such as hallucinations depression or anxiety. Ophthalmic:the patient denies having any visual impairment or eye discomfort. ENT: there are no abnormalities reported. Allergy and Immunology:the patient denies having any seasonal allergies or allergies to medications other than those already outlined above. Hematological and Lymphatic: the patient denies having any bruising, bleeding or lymphadenopathy. Endocrine: the patient denies having any heat or cold intolerance. There is no history of diabetes or thyroid disorders. Breast: the patient denies having any history of breast mass or lumps. Respiratory:the patient denies having any cough, shortness of breath, or dyspnea on exertion. Cardiovascular: there are no complaints of chest pain, palpitations, chest pounding, or dyspnea on exertion. Gastrointestinal: the patient denies having nausea, emesis, diarrhea, constipation, or blood in the stool. Genito-Urinary: the patient denies having urinary urgency, frequency, or dysuria. Musculoskeletal: with the exception of mild arthralgias the patient has no other musculoskeletal complaints. Neurological:  denies having any numbness, tingling, or neurologic deficits. Dermatological: patient denies having any unexplained rash, skin ulcerations, or hives. Objective:     Visit Vitals  /68   Pulse 90   Temp 98.1 °F (36.7 °C)   Resp 16   Ht 4' 10\" (1.473 m)   Wt 46.4 kg (102 lb 3.2 oz)   SpO2 98%   BMI 21.36 kg/m²     Pain Score: 3    Physical Exam:     ECO    General: Well appearing, in NAD    Psychologic: mood and affect are appropriate, no anxiety or depression noted    Skin: examination of the skin reveals no bruising, rash or petechiae    HEENT: Normocephalic, atraumatic. Conjunctiva and sclera are clear. Pupils are equal, round and reactive to light. EOMs are intact.      Neck: supple without lymphadenopathy, JVD or thyromegaly    Lymphatics: no palpable cervical, supraclavicular, infraclavicular, axillary or inguinal lymphadenopathy    Lungs: clear breath sounds bilaterally, no rhonchi or wheezes noted    Heart: Regular rate and rhythm, no murmurs, rubs or gallops, S1-S2 noted. Abdomen: soft, non-tender, non-distended, no HSM    Extremities: without clubbing, cyanosis or edema    Neurologic: no focal deficits, steady gait, Alert and oriented x 3. Laboratory Data:     Results for orders placed or performed during the hospital encounter of 06/29/22   CBC WITH 3 PART DIFF     Status: Abnormal   Result Value Ref Range Status    WBC 8.4 4.5 - 13.0 K/uL Final    RBC 3.52 (L) 4.10 - 5.10 M/uL Final    HGB 11.3 (L) 12.0 - 16.0 g/dL Final    HCT 33.9 (L) 36 - 48 % Final    MCV 96.3 78 - 102 FL Final    MCH 32.1 25.0 - 35.0 PG Final    MCHC 33.3 31 - 37 g/dL Final    RDW 13.0 11.5 - 14.5 % Final    PLATELET 669 934 - 894 K/uL Final    NEUTROPHILS 70 40 - 70 % Final    Mixed cells 3 0.1 - 17 % Final    LYMPHOCYTES 27 14 - 44 % Final    ABS. NEUTROPHILS 5.9 1.8 - 9.5 K/UL Final    ABS. MIXED CELLS 0.2 0.0 - 2.3 K/uL Final    ABS. LYMPHOCYTES 2.3 1.1 - 5.9 K/UL Final     Comment: Test performed at [address] [place] [address] or Outpatient Infusion Center Location. Reviewed by Medical Director.     DF AUTOMATED   Final       Patient Active Problem List   Diagnosis Code    Hyperlipidemia E78.5    Mantle cell lymphoma of lymph nodes of multiple sites ([address] C83.18    Neuropathy due to chemotherapeutic drug ([facility] G62.0, T45.1X5A    Arthritis, degenerative M19.90    IFG (impaired fasting glucose) R73.01    Tobacco dependence syndrome F17.200    Colon adenoma D12.6    Diverticulosis K57.90    Primary hypertension I10    Iron deficiency anemia D50.9    Agatston CAC score 100-199 R93.1    Vitamin D deficiency E55.9    Major depressive disorder, recurrent, mild F33.0 Assessment:     1. Mantle cell lymphoma of lymph nodes of multiple sites ([facility]    2. Iron deficiency anemia, unspecified iron deficiency anemia type    3. Tobacco dependence syndrome    4. Vitamin D deficiency    5. Neuropathy [facility] to [facility]otherapeutic drug ([facility]    6. Vitamin B 12 deficiency    7. Other specified hypothyroidism        Plan:     1. Repeat labs in two months  2. Follow up in person with labs in two months    Follow-up and Dispositions  ·   Return in about 2 months (around 9/11/2022) for Follow up with labs, Follow_up In Person. Orders Placed This Encounter    CBC WITH AUTOMATED DIFF     Standing Status:   Future     Standing Expiration Date:   7/12/2023    FERRITIN     Standing Status:   Future     Standing Expiration Date:   7/12/2023    VITAMIN D, 25 HYDROXY     Standing Status:   Future     Standing Expiration Date:   7/12/2023    VITAMIN B12 & FOLATE     Standing Status:   Future     Standing Expiration Date:   7/12/2023    TSH+FREE T4     Standing Status:   Future     Standing Expiration Date:   5/62/2072    METABOLIC PANEL, COMPREHENSIVE     Standing Status:   Future     Standing Expiration Date:   7/12/2023    IRON PROFILE     Standing Status:   Future     Standing Expiration Date:   7/12/2023    vit B comp/niacin/B12/choline (ALMEBEX PLUS B-12 PO)     Sig: Take  by mouth.        Yoselin Souza MD  7/11/2022

## 2022-07-11 NOTE — LETTER
7/11/2022    Patient: Patt Marie   YOB: 1950   Date of Visit: 7/11/2022     Rigoberto Khan MD  [facility].  [address] [place] [address]  [place] [place] [address]  Via Fax: [phone]    Dear MD Onur Ramirez MD,      Thank you for referring Ms. Elmer Hollins to [facility] for evaluation. My notes for this consultation are attached. If you have questions, please do not hesitate to call me. I look forward to following your patient along with you.       Sincerely,    Connie Townsend MD

## 2022-07-19 DIAGNOSIS — E78.5 HYPERLIPIDEMIA, UNSPECIFIED HYPERLIPIDEMIA TYPE: ICD-10-CM

## 2022-07-19 NOTE — TELEPHONE ENCOUNTER
Last Visit: 6/8/22 with MD Debra Valencia  Next Appointment: Advised to follow-up in 6 months  Previous Refill Encounter(s): 8/26/21 #30 with 5 refills    Requested Prescriptions     Pending Prescriptions Disp Refills    atorvastatin (LIPITOR) 10 mg tablet 90 Tablet 3     Sig: Take 1 Tablet by mouth daily.          For [facility] in place:    Recommendation Provided To:    Intervention Detail: New Rx: 1, reason: Patient Preference   Gap Closed?:    Intervention Accepted By:   Myrna Damico Time Spent (min): 5

## 2022-07-20 RX ORDER — ATORVASTATIN CALCIUM 10 MG/1
10 TABLET, FILM COATED ORAL DAILY
Qty: 90 TABLET | Refills: 3 | Status: SHIPPED | OUTPATIENT
Start: 2022-07-20

## 2022-08-03 ENCOUNTER — PATIENT MESSAGE (OUTPATIENT)
Dept: INTERNAL MEDICINE CLINIC | Age: 72
End: 2022-08-03

## 2022-08-04 ENCOUNTER — VIRTUAL VISIT (OUTPATIENT)
Dept: INTERNAL MEDICINE CLINIC | Age: 72
End: 2022-08-04
Payer: MEDICARE

## 2022-08-04 DIAGNOSIS — J01.10 ACUTE NON-RECURRENT FRONTAL SINUSITIS: Primary | ICD-10-CM

## 2022-08-04 PROCEDURE — G9899 SCRN MAM PERF RSLTS DOC: HCPCS | Performed by: INTERNAL MEDICINE

## 2022-08-04 PROCEDURE — 3017F COLORECTAL CA SCREEN DOC REV: CPT | Performed by: INTERNAL MEDICINE

## 2022-08-04 PROCEDURE — 1123F ACP DISCUSS/DSCN MKR DOCD: CPT | Performed by: INTERNAL MEDICINE

## 2022-08-04 PROCEDURE — 1090F PRES/ABSN URINE INCON ASSESS: CPT | Performed by: INTERNAL MEDICINE

## 2022-08-04 PROCEDURE — G8399 PT W/DXA RESULTS DOCUMENT: HCPCS | Performed by: INTERNAL MEDICINE

## 2022-08-04 PROCEDURE — G8427 DOCREV CUR MEDS BY ELIG CLIN: HCPCS | Performed by: INTERNAL MEDICINE

## 2022-08-04 PROCEDURE — G8756 NO BP MEASURE DOC: HCPCS | Performed by: INTERNAL MEDICINE

## 2022-08-04 PROCEDURE — 1101F PT FALLS ASSESS-DOCD LE1/YR: CPT | Performed by: INTERNAL MEDICINE

## 2022-08-04 PROCEDURE — G9717 DOC PT DX DEP/BP F/U NT REQ: HCPCS | Performed by: INTERNAL MEDICINE

## 2022-08-04 PROCEDURE — 99213 OFFICE O/P EST LOW 20 MIN: CPT | Performed by: INTERNAL MEDICINE

## 2022-08-04 RX ORDER — AZITHROMYCIN 250 MG/1
TABLET, FILM COATED ORAL
Qty: 6 TABLET | Refills: 0 | Status: SHIPPED | OUTPATIENT
Start: 2022-08-04

## 2022-08-04 NOTE — PROGRESS NOTES
Jean-Paul Brown is a 80 y.o. female who was seen by synchronous (real-time) audio-video technology on 8/4/2022 for Sinus Infection        Assessment & Plan:   Diagnoses and all orders for this visit:    1. Acute non-recurrent frontal sinusitis  -     azithromycin (ZITHROMAX) 250 mg tablet; Take 2 tablets today, then take 1 tablet daily      I spent at least 21 minutes on this visit with this established patient. 712  Subjective:       Objective:     Patient-Reported Vitals 2/24/2022   Patient-Reported Weight 105   Patient-Reported Height 4'10\"   Patient-Reported Temperature 97.3      General: alert, cooperative, no distress   Mental  status: normal mood, behavior, speech, dress, motor activity, and thought processes, able to follow commands   HENT: NCAT   Neck: no visualized mass   Resp: no respiratory distress   Neuro: no gross deficits   Skin: no discoloration or lesions of concern on visible areas   Psychiatric: normal affect, consistent with stated mood, no evidence of hallucinations     Additional exam findings: We discussed the expected course, resolution and complications of the diagnosis(es) in detail. Medication risks, benefits, costs, interactions, and alternatives were discussed as indicated. I advised her to contact the office if her condition worsens, changes or fails to improve as anticipated. She expressed understanding with the diagnosis(es) and plan. Remi Cogan Roisen, was evaluated through a synchronous (real-time) audio-video encounter. The patient (or guardian if applicable) is aware that this is a billable service, which includes applicable co-pays. This Virtual Visit was conducted with patient's (and/or legal guardian's) consent. The visit was conducted pursuant to the emergency declaration under the [facility], [address] [place] [address] authority and the [employer] and [employer] General Act.   Patient identification was verified, and a caregiver was present when appropriate. The patient was located at: Home: [address] [place] [id number]  The provider was located at: [facility]        Donna Caro MD      She has been ENDO BX the last week or so. On 7/31/2022, she noted onset of a headache and facial pain. Did not think much of it, but it is not cleared and in fact, it is gotten worse. Now reports a lot of facial and tooth pressure, sinus congestion, purulent discharge. She spiked a fever to 101 this morning, admits to postnasal drip with resulting cough but no wheezing, chest congestion or dyspnea. No GI complaints or generalized achiness, COVID test negative yesterday and today. Past Medical History:   Diagnosis Date    Agatston CAC score 100-199 09/2021    ca score 138, calcification all 3 vessels    Anxiety 6/15    IKE-7 was 12/21    Clostridium difficile colitis     Dr. Matilda Sheets 3/14; postop 10/20    Colon adenoma 10/2011    Dr. Chantelle Yost; Dr Javier Machado 7/20    Cystic breast     Degenerative arthritis of cervical spine     on xray, worst C5-6 (4/21); mri showed multilevel degen changes, foraminal stenosis (4.21);  Dr Priscilla Monroe    Depression 6/15    PHQ-9 was 15/27    Diverticulitis     recurrent x3 Dr Morales Seek liver     10/10 on US, CT 6/12; Fib-4 was 0.74 from 1/15    GI bleed 2/14    ischemic colitis on CT, seen by Dr. Matilda Sheets    Hyperlipidemia     calculated 10 year risk score was 3.2% (1/15)    Hypertension     IFG (impaired fasting glucose) [id number]    Ischemic colitis ([address] 3/14    Dr Matilda Sheets    Labial abscess     mrsa    Lung nodule 2015    9x10mm RML, no change 9/21    Lymphoma, mantle cell ([address] 12/2013    Dr. Liyah Chow; stage s/p chemo; now Dr Alysa Velásquez    Migraine     Nephrolithiasis     Neuropathy due to chemotherapeutic drug ([address] 9/14    Osteoarthritis     hips and knees; Dr Ngoc Salcedo dependence syndrome 2/8/2019    Vitamin D deficiency 6/8/2022    Zoster 8/14    left T11     Current Outpatient Medications   Medication Sig    azithromycin (ZITHROMAX) 250 mg tablet Take 2 tablets today, then take 1 tablet daily    atorvastatin (LIPITOR) 10 mg tablet Take 1 Tablet by mouth in the morning. vit B comp/niacin/B12/choline (ALMEBEX PLUS B-12 PO) Take  by mouth. FLUoxetine (PROzac) 10 mg capsule Take 1 Capsule by mouth daily. gabapentin (NEURONTIN) 300 mg capsule Take 1 Capsule by mouth nightly. Max Daily Amount: 300 mg. Indications: neuropathic pain    cholecalciferol (VITAMIN D3) (2,000 UNITS /50 MCG) cap capsule Take 1 Capsule by mouth two (2) times a day for 360 days. Indications: low vitamin D levels    ferrous sulfate 325 mg (65 mg iron) tablet 3 times a week    Cetirizine (ZyrTEC) 10 mg cap as needed. amLODIPine (NORVASC) 5 mg tablet Take 1 Tablet by mouth daily. ibuprofen (MOTRIN) 200 mg tablet Take 200 mg by mouth every eight (8) hours as needed. No current facility-administered medications for this visit. Allergies   Allergen Reactions    Meperidine Nausea Only and Other (comments)    Augmentin [Amoxicillin-Pot Clavulanate] Diarrhea and Nausea Only    Keflex [Cephalexin] Nausea Only    Sulfa (Sulfonamide Antibiotics) Rash     Assessment and plan:  1. Sinusitis. I gave her Z-Dudley, OTC meds for symptom relief, she is considering using the Beatrice pot as well. Call if no improvement or worsening complaints        Above conditions discussed at length and patient vocalized understanding.   All questions answered to patient satisfaction

## 2022-08-24 ENCOUNTER — TELEPHONE (OUTPATIENT)
Dept: MAMMOGRAPHY | Age: 72
End: 2022-08-24

## 2022-09-14 ENCOUNTER — HOSPITAL ENCOUNTER (OUTPATIENT)
Dept: INFUSION THERAPY | Age: 72
Discharge: HOME | End: 2022-09-14
Payer: MEDICARE

## 2022-09-14 VITALS — TEMPERATURE: 97.3 F

## 2022-09-14 DIAGNOSIS — C83.18 MANTLE CELL LYMPHOMA OF LYMPH NODES OF MULTIPLE SITES (HCC): ICD-10-CM

## 2022-09-14 DIAGNOSIS — T45.1X5A NEUROPATHY DUE TO CHEMOTHERAPEUTIC DRUG (HCC): ICD-10-CM

## 2022-09-14 DIAGNOSIS — G62.0 NEUROPATHY DUE TO CHEMOTHERAPEUTIC DRUG (HCC): ICD-10-CM

## 2022-09-14 DIAGNOSIS — D50.9 IRON DEFICIENCY ANEMIA, UNSPECIFIED IRON DEFICIENCY ANEMIA TYPE: ICD-10-CM

## 2022-09-14 DIAGNOSIS — E03.8 OTHER SPECIFIED HYPOTHYROIDISM: ICD-10-CM

## 2022-09-14 DIAGNOSIS — F17.200 TOBACCO DEPENDENCE SYNDROME: ICD-10-CM

## 2022-09-14 DIAGNOSIS — E53.8 VITAMIN B 12 DEFICIENCY: ICD-10-CM

## 2022-09-14 DIAGNOSIS — E55.9 VITAMIN D DEFICIENCY: ICD-10-CM

## 2022-09-14 LAB
25(OH)D3 SERPL-MCNC: 49.8 NG/ML (ref 30–100)
ALBUMIN SERPL-MCNC: 4.1 G/DL (ref 3.4–5)
ALBUMIN/GLOB SERPL: 1 {RATIO} (ref 0.8–1.7)
ALP SERPL-CCNC: 68 U/L (ref 45–117)
ALT SERPL-CCNC: 17 U/L (ref 13–56)
ANION GAP SERPL CALC-SCNC: 5 MMOL/L (ref 3–18)
AST SERPL-CCNC: 10 U/L (ref 10–38)
BASO+EOS+MONOS # BLD AUTO: 0.6 K/UL (ref 0–2.3)
BASO+EOS+MONOS NFR BLD AUTO: 7 % (ref 0.1–17)
BILIRUB SERPL-MCNC: 0.3 MG/DL (ref 0.2–1)
BUN SERPL-MCNC: 19 MG/DL (ref 7–18)
BUN/CREAT SERPL: 23 (ref 12–20)
CALCIUM SERPL-MCNC: 9.9 MG/DL (ref 8.5–10.1)
CHLORIDE SERPL-SCNC: 109 MMOL/L (ref 100–111)
CO2 SERPL-SCNC: 25 MMOL/L (ref 21–32)
CREAT SERPL-MCNC: 0.84 MG/DL (ref 0.6–1.3)
DIFFERENTIAL METHOD BLD: ABNORMAL
ERYTHROCYTE [DISTWIDTH] IN BLOOD BY AUTOMATED COUNT: 14.4 % (ref 11.5–14.5)
FERRITIN SERPL-MCNC: 329 NG/ML (ref 8–388)
FOLATE SERPL-MCNC: 11.2 NG/ML (ref 3.1–17.5)
GLOBULIN SER CALC-MCNC: 4 G/DL (ref 2–4)
GLUCOSE SERPL-MCNC: 139 MG/DL (ref 74–99)
HCT VFR BLD AUTO: 38.1 % (ref 36–48)
HGB BLD-MCNC: 12.8 G/DL (ref 12–16)
IRON SATN MFR SERPL: 21 % (ref 20–50)
IRON SERPL-MCNC: 72 UG/DL (ref 50–175)
LYMPHOCYTES # BLD: 1.9 K/UL (ref 1.1–5.9)
LYMPHOCYTES NFR BLD: 19 % (ref 14–44)
MCH RBC QN AUTO: 31.8 PG (ref 25–35)
MCHC RBC AUTO-ENTMCNC: 33.6 G/DL (ref 31–37)
MCV RBC AUTO: 94.8 FL (ref 78–102)
NEUTS SEG # BLD: 7.3 K/UL (ref 1.8–9.5)
NEUTS SEG NFR BLD: 74 % (ref 40–70)
PLATELET # BLD AUTO: 289 K/UL (ref 135–420)
POTASSIUM SERPL-SCNC: 5.2 MMOL/L (ref 3.5–5.5)
PROT SERPL-MCNC: 8.1 G/DL (ref 6.4–8.2)
RBC # BLD AUTO: 4.02 M/UL (ref 4.1–5.1)
SODIUM SERPL-SCNC: 139 MMOL/L (ref 136–145)
T4 FREE SERPL-MCNC: 1 NG/DL (ref 0.7–1.5)
TIBC SERPL-MCNC: 339 UG/DL (ref 250–450)
TSH SERPL DL<=0.05 MIU/L-ACNC: 1.82 UIU/ML (ref 0.36–3.74)
VIT B12 SERPL-MCNC: 609 PG/ML (ref 211–911)
WBC # BLD AUTO: 9.8 K/UL (ref 4.5–13)

## 2022-09-14 PROCEDURE — 85025 COMPLETE CBC W/AUTO DIFF WBC: CPT

## 2022-09-14 PROCEDURE — 83540 ASSAY OF IRON: CPT

## 2022-09-14 PROCEDURE — 82728 ASSAY OF FERRITIN: CPT

## 2022-09-14 PROCEDURE — 82607 VITAMIN B-12: CPT

## 2022-09-14 PROCEDURE — 85049 AUTOMATED PLATELET COUNT: CPT

## 2022-09-14 PROCEDURE — 82306 VITAMIN D 25 HYDROXY: CPT

## 2022-09-14 PROCEDURE — 36415 COLL VENOUS BLD VENIPUNCTURE: CPT

## 2022-09-14 PROCEDURE — 80053 COMPREHEN METABOLIC PANEL: CPT

## 2022-09-14 PROCEDURE — 84439 ASSAY OF FREE THYROXINE: CPT

## 2022-09-19 ENCOUNTER — OFFICE VISIT (OUTPATIENT)
Age: 72
End: 2022-09-19
Payer: MEDICARE

## 2022-09-19 VITALS
WEIGHT: 101 LBS | SYSTOLIC BLOOD PRESSURE: 132 MMHG | OXYGEN SATURATION: 99 % | DIASTOLIC BLOOD PRESSURE: 72 MMHG | RESPIRATION RATE: 15 BRPM | HEIGHT: 58 IN | HEART RATE: 56 BPM | BODY MASS INDEX: 21.2 KG/M2 | TEMPERATURE: 97.7 F

## 2022-09-19 DIAGNOSIS — T45.1X5A NEUROPATHY DUE TO CHEMOTHERAPEUTIC DRUG (HCC): ICD-10-CM

## 2022-09-19 DIAGNOSIS — E53.8 VITAMIN B 12 DEFICIENCY: ICD-10-CM

## 2022-09-19 DIAGNOSIS — D50.9 IRON DEFICIENCY ANEMIA, UNSPECIFIED IRON DEFICIENCY ANEMIA TYPE: ICD-10-CM

## 2022-09-19 DIAGNOSIS — C83.18 MANTLE CELL LYMPHOMA OF LYMPH NODES OF MULTIPLE SITES (HCC): Primary | ICD-10-CM

## 2022-09-19 DIAGNOSIS — E55.9 VITAMIN D DEFICIENCY: ICD-10-CM

## 2022-09-19 DIAGNOSIS — E03.8 OTHER SPECIFIED HYPOTHYROIDISM: ICD-10-CM

## 2022-09-19 DIAGNOSIS — F17.200 TOBACCO DEPENDENCE SYNDROME: ICD-10-CM

## 2022-09-19 DIAGNOSIS — G62.0 NEUROPATHY DUE TO CHEMOTHERAPEUTIC DRUG (HCC): ICD-10-CM

## 2022-09-19 PROCEDURE — 1123F ACP DISCUSS/DSCN MKR DOCD: CPT | Performed by: INTERNAL MEDICINE

## 2022-09-19 PROCEDURE — 99214 OFFICE O/P EST MOD 30 MIN: CPT | Performed by: INTERNAL MEDICINE

## 2022-09-19 RX ORDER — ACETAMINOPHEN 500 MG
2000 TABLET ORAL 2 TIMES DAILY
Qty: 180 CAPSULE | Refills: 3 | Status: SHIPPED | OUTPATIENT
Start: 2022-09-19 | End: 2023-09-14

## 2022-09-19 NOTE — PROGRESS NOTES
Hematology/Oncology  Progress Note    Name: Stewart Zavaleta  Date: 2022  : 1950  Primary Care Provider: Nehemias Gerber MD    Ms. Jhoan Hall is a 80y.o. year old female with RENÉE now good CBC and Iron stores. Hx of Mantel cell lymphoma MARIA LUZ. Vit D level now good     Iron WNL however declining slowly     Still smoking 10 cigs per day, still trying to reduce      Current Therapy: surveillance        Subjective:     Patient feels tired much of the time. However she sleeps only 6 hours a day because of her job and her dog and cat. Furthermore patient is still smoking. I have asked the patient to stop smoking. The past medical, surgical and social history has been reviewed and remains unchanged. Past Medical History:   Diagnosis Date    Agatston CAC score 100-199 2021    ca score 138, calcification all 3 vessels    Anxiety 6/15    IKE-7 was     Clostridium difficile colitis     Dr. Rubén Avalos 3/14; postop 10/20    Colon adenoma 10/2011    Dr. Dylon Rajan; Dr Vince Morocho     Cystic breast     Degenerative arthritis of cervical spine     on xray, worst C5-6 (); mri showed multilevel degen changes, foraminal stenosis (.);  Dr Stone    Depression 6/15    PHQ-9 was 15/27    Diverticulitis     recurrent x3 Dr Anders De La Vega liver     10/10 on US, CT ; Fib-4 was 0.74 from 1/15    GI bleed     ischemic colitis on CT, seen by Dr. Rubén Avalos    Hyperlipidemia     calculated 10 year risk score was 3.2% (1/15)    Hypertension     IFG (impaired fasting glucose) [id number]    Ischemic colitis ([address] 3/14    Dr Rubén Avalos    Labial abscess     mrsa    Lung nodule     9x10mm RML, no change     Lymphoma, mantle cell ([address] 2013    Dr. Kailey Regalado; stage s/p chemo; now Dr Spring Staff    Migraine     Nephrolithiasis     Neuropathy due to chemotherapeutic drug ([address]     Osteoarthritis     hips and knees; Dr Nalini Kramer dependence syndrome 2019    Vitamin D deficiency 2022    Zoster     left T11     Past Surgical History:   Procedure Laterality Date    COLONOSCOPY N/A 2020    Dr Conner Madison 2003 polyps; 2011 adenoma; Dr Yi Juarez 3/14 isch colitis; Dr Winter Diop 20 mod divertics and adenoma    HX APPENDECTOMY  1968    HX HIP REPLACEMENT Right 10/2020    Dr Jose Maria Elkins ARTHROSCOPY Left     HX ORTHOPAEDIC      DEXA t score 2.1 spine, -0.3 hip ()    HX SOPHIA AND BSO      Dr. Carreno [place] TONSILLECTOMY       Social History     Socioeconomic History    Marital status:      Spouse name: Not on file    Number of children: 2    Years of education: Not on file    Highest education level: Not on file   Occupational History    Occupation: director [employer]   Tobacco Use    Smoking status: Every Day     Packs/day: 0.50     Years: 15.00     Pack years: 7.50     Types: Cigarettes     Last attempt to quit: 2020     Years since quittin.9    Smokeless tobacco: Never    Tobacco comments:     10 cigarettes per day   Substance and Sexual Activity    Alcohol use: Not Currently     Alcohol/week: 4.2 standard drinks     Types: 5 Glasses of wine per week    Drug use: No    Sexual activity: Not Currently   Other Topics Concern    Not on file   Social History Narrative    Not on file     Social Determinants of Health     Financial Resource Strain: Not on file   Food Insecurity: Not on file   Transportation Needs: Not on file   Physical Activity: Not on file   Stress: Not on file   Social Connections: Not on file   Intimate Partner Violence: Not on file   Housing Stability: Not on file     Family History   Adopted: Yes     Current Outpatient Medications   Medication Sig Dispense Refill    azithromycin (ZITHROMAX) 250 mg tablet Take 2 tablets today, then take 1 tablet daily 6 Tablet 0    atorvastatin (LIPITOR) 10 mg tablet Take 1 Tablet by mouth in the morning. 90 Tablet 3    vit B comp/niacin/B12/choline (ALMEBEX PLUS B-12 PO) Take  by mouth.       FLUoxetine (PROzac) 10 mg capsule Take 1 Capsule by mouth daily. 90 Capsule 3    gabapentin (NEURONTIN) 300 mg capsule Take 1 Capsule by mouth nightly. Max Daily Amount: 300 mg. Indications: neuropathic pain 30 Capsule 5    cholecalciferol (VITAMIN D3) (2,000 UNITS /50 MCG) cap capsule Take 1 Capsule by mouth two (2) times a day for 360 days. Indications: low vitamin D levels 180 Capsule 3    ferrous sulfate 325 mg (65 mg iron) tablet 3 times a week      Cetirizine (ZyrTEC) 10 mg cap as needed. amLODIPine (NORVASC) 5 mg tablet Take 1 Tablet by mouth daily. 90 Tablet 3    ibuprofen (MOTRIN) 200 mg tablet Take 200 mg by mouth every eight (8) hours as needed. Review of Systems:    General :The patient has no new complaints and there is no physical distress evident. Psychological : patient denies having any psychological symptoms such as hallucinations depression or anxiety. Ophthalmic:the patient denies having any visual impairment or eye discomfort. ENT: there are no abnormalities reported. Allergy and Immunology:the patient denies having any seasonal allergies or allergies to medications other than those already outlined above. Hematological and Lymphatic: the patient denies having any bruising, bleeding or lymphadenopathy. Endocrine: the patient denies having any heat or cold intolerance. There is no history of diabetes or thyroid disorders. Breast: the patient denies having any history of breast mass or lumps. Respiratory:the patient denies having any cough, shortness of breath, or dyspnea on exertion. Cardiovascular: there are no complaints of chest pain, palpitations, chest pounding, or dyspnea on exertion. Gastrointestinal: the patient denies having nausea, emesis, diarrhea, constipation, or blood in the stool. Genito-Urinary: the patient denies having urinary urgency, frequency, or dysuria.      Musculoskeletal: with the exception of mild arthralgias the patient has no other musculoskeletal complaints. Neurological:  denies having any numbness, tingling, or neurologic deficits. Dermatological: patient denies having any unexplained rash, skin ulcerations, or hives. Objective:     Visit Vitals  /72   Pulse (!) 56   Temp 97.7 °F (36.5 °C)   Resp 15   Ht 4' 10\" (1.473 m)   Wt 45.8 kg (101 lb)   SpO2 99%   BMI 21.11 kg/m²     Pain Score: 0    Physical Exam:     ECO    General: Well appearing, in NAD    Psychologic: mood and affect are appropriate, no anxiety or depression noted    Skin: examination of the skin reveals no bruising, rash or petechiae    HEENT: Normocephalic, atraumatic. Conjunctiva and sclera are clear. Pupils are equal, round and reactive to light. EOMs are intact. Neck: supple without lymphadenopathy, JVD or thyromegaly    Lymphatics: no palpable cervical, supraclavicular, infraclavicular, axillary or inguinal lymphadenopathy    Lungs: clear breath sounds bilaterally, no rhonchi or wheezes noted    Heart: Regular rate and rhythm, S1-S2 noted. Abdomen: soft, non-tender, non-distended, no HSM, positive bowel sounds    Extremities: without clubbing, cyanosis or edema    Neurologic: no focal deficits, steady gait, Alert and oriented x 3. Laboratory Data:     Results for orders placed or performed during the hospital encounter of 22   CBC WITH 3 PART DIFF     Status: Abnormal   Result Value Ref Range Status    WBC 9.8 4.5 - 13.0 K/uL Final    RBC 4.02 (L) 4.10 - 5.10 M/uL Final    HGB 12.8 12.0 - 16.0 g/dL Final    HCT 38.1 36 - 48 % Final    MCV 94.8 78 - 102 FL Final    MCH 31.8 25.0 - 35.0 PG Final    MCHC 33.6 31 - 37 g/dL Final    RDW 14.4 11.5 - 14.5 % Final    NEUTROPHILS 74 (H) 40 - 70 % Final    Mixed cells 7 0.1 - 17 % Final    LYMPHOCYTES 19 14 - 44 % Final    ABS. NEUTROPHILS 7.3 1.8 - 9.5 K/UL Final    ABS. MIXED CELLS 0.6 0.0 - 2.3 K/uL Final    ABS.  LYMPHOCYTES 1.9 1.1 - 5.9 K/UL Final     Comment: Test performed at [facility] [address] Oncology or Outpatient Infusion Center Location. Reviewed by Medical Director. DF AUTOMATED   Final       Patient Active Problem List   Diagnosis Code    Hyperlipidemia E78.5    Mantle cell lymphoma of lymph nodes of multiple sites ([address] C83.18    Neuropathy due to chemotherapeutic drug ([address] G62.0, T45.1X5A    Arthritis, degenerative M19.90    IFG (impaired fasting glucose) R73.01    Tobacco dependence syndrome F17.200    Colon adenoma D12.6    Diverticulosis K57.90    Primary hypertension I10    Iron deficiency anemia D50.9    Agatston CAC score 100-199 R93.1    Vitamin D deficiency E55.9    Major depressive disorder, recurrent, mild F33.0         Assessment:     1. Mantle cell lymphoma of lymph nodes of multiple sites ([address]    2. Iron deficiency anemia, unspecified iron deficiency anemia type    3. Tobacco dependence syndrome    4. Vitamin D deficiency    5. Neuropathy due to chemotherapeutic drug ([address]    6. Vitamin B 12 deficiency    7. Other specified hypothyroidism        Plan:     Repeat labs in 6 months  Follow up in person with labs in six months       No orders of the defined types were placed in this encounter.       Sarah Calderón MD  9/19/2022

## 2022-09-28 ENCOUNTER — TRANSCRIBE ORDER (OUTPATIENT)
Dept: SCHEDULING | Age: 72
End: 2022-09-28

## 2022-09-28 DIAGNOSIS — Z12.31 VISIT FOR SCREENING MAMMOGRAM: Primary | ICD-10-CM

## 2022-11-11 ENCOUNTER — TELEPHONE (OUTPATIENT)
Dept: INTERNAL MEDICINE CLINIC | Age: 72
End: 2022-11-11

## 2022-11-11 DIAGNOSIS — J40 BRONCHITIS: Primary | ICD-10-CM

## 2022-11-11 RX ORDER — ALBUTEROL SULFATE 90 UG/1
2 AEROSOL, METERED RESPIRATORY (INHALATION)
Qty: 18 G | Refills: 1 | Status: SHIPPED | OUTPATIENT
Start: 2022-11-11

## 2022-11-11 NOTE — TELEPHONE ENCOUNTER
Patient is calling stating she went away on vacation and came back sick. She has a wheezy cough in her upper chest, headache and clear drainage from her sinuses. She tested - for COVID. She wants to know what she take.

## 2022-11-11 NOTE — TELEPHONE ENCOUNTER
How many days sick? When was covid test?  Otc meds/decongestants/cough syrup  Does she have any albuterol for wheezing?   Can call that in

## 2022-11-11 NOTE — TELEPHONE ENCOUNTER
How many days sick? When was covid test?  Otc meds/decongestants/cough syrup  Does she have any albuterol for wheezing? Can call that in    Patient has had symptoms since 11-8-22. Patient denies fever. No phlegm. Tested for COVID on 11-9 and 11-10 both were negative. Patient would like an albuterol for wheezing. To [facility]. Patient is asking for Dr. Veronica Barnes opinion on which OTC decongestants to take for HTN patients.     Please advise

## 2022-11-11 NOTE — TELEPHONE ENCOUNTER
Patient contacted, patient identified with two identifiers (Name & ). Patient aware of instructions per Dr. Oc Mcfadden and verbalizes understanding.

## 2022-11-15 ENCOUNTER — VIRTUAL VISIT (OUTPATIENT)
Dept: ORTHOPEDIC SURGERY | Age: 72
End: 2022-11-15
Payer: MEDICARE

## 2022-11-15 DIAGNOSIS — M54.12 LEFT CERVICAL RADICULOPATHY: ICD-10-CM

## 2022-11-15 DIAGNOSIS — M79.2 NEURITIS: ICD-10-CM

## 2022-11-15 DIAGNOSIS — M48.02 FORAMINAL STENOSIS OF CERVICAL REGION: ICD-10-CM

## 2022-11-15 PROCEDURE — 99442 PR PHYS/QHP TELEPHONE EVALUATION 11-20 MIN: CPT | Performed by: NURSE PRACTITIONER

## 2022-11-15 RX ORDER — GABAPENTIN 300 MG/1
300 CAPSULE ORAL
Qty: 30 CAPSULE | Refills: 5 | Status: SHIPPED | OUTPATIENT
Start: 2022-11-15

## 2022-11-15 NOTE — PROGRESS NOTES
History of Present Illness:    Consent: Mamadou Pedro, who was seen by telephone call, and/or her healthcare decision maker, is aware that this patient-initiated, Telehealth encounter on 11/15/2022 is a billable service, with coverage as determined by her insurance carrier. She is aware that she may receive a bill and has provided verbal consent to proceed: Yes. The provider was located at the [facility] office and the patient was located at their home. No one else participated in the visit with the patient. The platform used was telephone call    The patient is a 71-year-old female who has chronic neck pain that radiates into her left upper extremity. She controls her pain with gabapentin 300 mg at nighttime. She does not have any side effects from it. She states it works very well for her. Currently she has a very bad cold but is starting to get a little bit better. She denies any fever bowel bladder dysfunction. No new medical issues other than her cold. Physical Exam: Patient is a 71-year-old female who is alert and oriented. She spoke with fluency. She did not appear to be in distress. Assessment & Plan: This is a patient has cervical spinal stenosis that gives her neck and arm pain. It is very well controlled with gabapentin I have refilled that for her. We will see her back in 6 months in the office sooner if needed. Diagnoses and all orders for this visit:    1. Foraminal stenosis of cervical region  -     gabapentin (NEURONTIN) 300 mg capsule; Take 1 Capsule by mouth nightly. Max Daily Amount: 300 mg. Indications: neuropathic pain    2. Left cervical radiculopathy  -     gabapentin (NEURONTIN) 300 mg capsule; Take 1 Capsule by mouth nightly. Max Daily Amount: 300 mg. Indications: neuropathic pain    3. Neuritis  -     gabapentin (NEURONTIN) 300 mg capsule; Take 1 Capsule by mouth nightly. Max Daily Amount: 300 mg.  Indications: neuropathic pain              We discussed the expected course, resolution and complications of the diagnosis(es) in detail. Medication risks, benefits, costs, interactions, and alternatives were discussed as indicated. I advised her to contact the office if her condition worsens, changes or fails to improve as anticipated. For emergencies or worsening neurological symptoms the patient was instructed to call 911. She expressed understanding with the diagnosis(es) and plan. Follow-up and Dispositions    Return in about 6 months (around 5/15/2023) for In office with RIGOBERTO Michael. Wilfrid [facility] Colin is a 71 y.o. female being evaluated by a video visit encounter for concerns as above. A caregiver was present when appropriate. Due to this being a TeleHealth encounter (During [facility] public health emergency), evaluation of the following organ systems was limited: Vitals/Constitutional/EENT/Resp/CV/GI//MS/Neuro/Skin/Heme-Lymph-Imm. Pursuant to the emergency declaration under the [facility], [facility] waiver authority and the [employer] Act, this Virtual  Visit was conducted, with patient's (and/or legal guardian's) consent, to reduce the patient's risk of exposure to COVID-19 and provide necessary medical care. CPT Codes 18851-35273 for Established Patients may apply to this Telehealth Visit  Time-based coding, delete if not needed: I spent at least 15 minutes with this established patient, and >50% of the time was spent counseling and/or coordinating care regarding    Pain start time: 3:40 PM and Stop time: 3:56 PM.        Due to this being a TeleHealth evaluation, many elements of the physical examination are unable to be assessed.        Pursuant to the emergency declaration under the [facility], [address] [place] [address] waiver authority and the [employer], this Virtual  Visit was conducted, with patient's consent, to reduce the patient's risk of exposure to COVID-19 and provide continuity of care for an established patient. Services were provided through a video synchronous discussion virtually to substitute for in-person clinic visit.     Tosha Kim NP

## 2022-11-16 ENCOUNTER — TELEPHONE (OUTPATIENT)
Dept: ORTHOPEDIC SURGERY | Age: 72
End: 2022-11-16

## 2022-11-17 RX ORDER — CODEINE PHOSPHATE AND GUAIFENESIN 10; 100 MG/5ML; MG/5ML
5 SOLUTION ORAL
Qty: 105 ML | Refills: 0 | Status: SHIPPED | OUTPATIENT
Start: 2022-11-17 | End: 2022-11-24

## 2022-11-17 RX ORDER — AZITHROMYCIN 250 MG/1
TABLET, FILM COATED ORAL
Qty: 6 TABLET | Refills: 0 | Status: SHIPPED | OUTPATIENT
Start: 2022-11-17 | End: 2022-11-22

## 2022-11-17 NOTE — TELEPHONE ENCOUNTER
Pt called states she is feeling better ,except she still has a bad cough,and says she is fatigued ,she is coughing up whitish/clear  phlegm  she is using the OTC Tussin DM  she is asking if there is something elese she could use for the cough please advise

## 2022-11-17 NOTE — TELEPHONE ENCOUNTER
Patient contacted, patient identified with two identifiers (Name & ). Patient aware of prescription per Dr. Kandace Lebron and verbalizes understanding.

## 2023-01-31 DIAGNOSIS — Z12.31 VISIT FOR SCREENING MAMMOGRAM: Primary | ICD-10-CM

## 2023-02-03 DIAGNOSIS — I10 PRIMARY HYPERTENSION: ICD-10-CM

## 2023-02-04 DIAGNOSIS — D50.9 IRON DEFICIENCY ANEMIA, UNSPECIFIED IRON DEFICIENCY ANEMIA TYPE: ICD-10-CM

## 2023-02-04 DIAGNOSIS — C83.18 MANTLE CELL LYMPHOMA OF LYMPH NODES OF MULTIPLE SITES (HCC): Primary | ICD-10-CM

## 2023-02-04 DIAGNOSIS — Z12.31 VISIT FOR SCREENING MAMMOGRAM: Primary | ICD-10-CM

## 2023-02-05 DIAGNOSIS — D50.9 IRON DEFICIENCY ANEMIA, UNSPECIFIED IRON DEFICIENCY ANEMIA TYPE: ICD-10-CM

## 2023-02-05 DIAGNOSIS — C83.18 MANTLE CELL LYMPHOMA OF LYMPH NODES OF MULTIPLE SITES (HCC): Primary | ICD-10-CM

## 2023-02-05 RX ORDER — AMLODIPINE BESYLATE 5 MG/1
5 TABLET ORAL DAILY
Qty: 90 TABLET | Refills: 3 | Status: SHIPPED | OUTPATIENT
Start: 2023-02-05

## 2023-02-06 DIAGNOSIS — C83.18 MANTLE CELL LYMPHOMA OF LYMPH NODES OF MULTIPLE SITES (HCC): Primary | ICD-10-CM

## 2023-02-06 DIAGNOSIS — D50.9 IRON DEFICIENCY ANEMIA, UNSPECIFIED IRON DEFICIENCY ANEMIA TYPE: ICD-10-CM

## 2023-02-07 DIAGNOSIS — C83.18 MANTLE CELL LYMPHOMA OF LYMPH NODES OF MULTIPLE SITES (HCC): Primary | ICD-10-CM

## 2023-02-07 DIAGNOSIS — D50.9 IRON DEFICIENCY ANEMIA, UNSPECIFIED IRON DEFICIENCY ANEMIA TYPE: ICD-10-CM

## 2023-03-20 ENCOUNTER — APPOINTMENT (OUTPATIENT)
Dept: INFUSION THERAPY | Age: 73
End: 2023-03-20

## 2023-03-22 DIAGNOSIS — E55.9 VITAMIN D DEFICIENCY, UNSPECIFIED: ICD-10-CM

## 2023-03-22 DIAGNOSIS — C83.18 MANTLE CELL LYMPHOMA, LYMPH NODES OF MULTIPLE SITES (HCC): Primary | ICD-10-CM

## 2023-03-22 DIAGNOSIS — D50.9 IRON DEFICIENCY ANEMIA, UNSPECIFIED IRON DEFICIENCY ANEMIA TYPE: ICD-10-CM

## 2023-04-04 ENCOUNTER — TELEPHONE (OUTPATIENT)
Age: 73
End: 2023-04-04

## 2023-04-04 DIAGNOSIS — E78.5 HYPERLIPIDEMIA, UNSPECIFIED HYPERLIPIDEMIA TYPE: ICD-10-CM

## 2023-04-04 DIAGNOSIS — R73.01 IFG (IMPAIRED FASTING GLUCOSE): Primary | ICD-10-CM

## 2023-04-04 NOTE — TELEPHONE ENCOUNTER
----- Message from Emily Charles sent at 4/3/2023 10:34 AM EDT -----  Subject: Referral Request    Reason for referral request? Pt would like to have blood work done before   her appt with Dr Meaghan Bernabe on 6/27/2023. Please call Pt and advise   next steps. Provider patient wants to be referred to(if known):     Provider Phone Number(if known):     Additional Information for Provider?   ---------------------------------------------------------------------------  --------------  [employer] [employer]    [phone]; OK to leave message on voicemail  ---------------------------------------------------------------------------  --------------

## 2023-04-05 NOTE — TELEPHONE ENCOUNTER
Which labs do you want for the patient to complete prior to appointment in June. Please note that she is seeing hematology/oncology, and has had iron studies done.

## 2023-05-16 ENCOUNTER — OFFICE VISIT (OUTPATIENT)
Age: 73
End: 2023-05-16
Payer: COMMERCIAL

## 2023-05-16 VITALS
HEIGHT: 58 IN | OXYGEN SATURATION: 99 % | BODY MASS INDEX: 21.87 KG/M2 | TEMPERATURE: 97.5 F | HEART RATE: 61 BPM | RESPIRATION RATE: 18 BRPM | WEIGHT: 104.2 LBS

## 2023-05-16 DIAGNOSIS — M48.02 SPINAL STENOSIS, CERVICAL REGION: Primary | ICD-10-CM

## 2023-05-16 DIAGNOSIS — M79.2 NEURALGIA AND NEURITIS, UNSPECIFIED: ICD-10-CM

## 2023-05-16 DIAGNOSIS — M54.12 RADICULOPATHY, CERVICAL REGION: ICD-10-CM

## 2023-05-16 PROCEDURE — 99213 OFFICE O/P EST LOW 20 MIN: CPT | Performed by: NURSE PRACTITIONER

## 2023-05-16 PROCEDURE — 1123F ACP DISCUSS/DSCN MKR DOCD: CPT | Performed by: NURSE PRACTITIONER

## 2023-05-16 RX ORDER — GABAPENTIN 300 MG/1
300 CAPSULE ORAL
Qty: 90 CAPSULE | Refills: 1 | Status: SHIPPED | OUTPATIENT
Start: 2023-05-16 | End: 2023-11-12

## 2023-05-16 RX ORDER — OXYCODONE HYDROCHLORIDE AND ACETAMINOPHEN 5; 325 MG/1; MG/1
1 TABLET ORAL EVERY 4 HOURS PRN
COMMUNITY
Start: 2020-11-27 | End: 2023-05-16

## 2023-05-16 NOTE — PROGRESS NOTES
Chief complaint   Chief Complaint   Patient presents with    Back Problem    Pain    Back Pain    Shoulder Pain    Neck Pain       History of Present Illness:  Belinda Bright is a  71 y.o.  female who comes in today for follow-up of her chronic neck pain. She states usually she gets pain on the left side of her neck that goes into her left upper arm. He states she did a lot of yard work recently and it started going on the right side of her neck to her right shoulder. She states she did not use on it and it did get better. She also takes gabapentin 300 mg at bedtime which helps with her neck and radicular pain. She states when she tries to go off of it because she does not want to be on medication the pain comes back after a couple days so she has to restart it. She tolerates it well. She denies fever bowel bladder dysfunction. Physical Exam: The patient is a 71-year-old female well-developed well-nourished who is alert and oriented with a normal mood and affect. She has a full weightbearing nonantalgic gait. She did not use any assist device. She has 4 out of 5 strength bilateral upper extremities. Negative [place]'s. Assessment and Plan: This is a patient who has cervical spinal stenosis. Her pain is controlled with gabapentin. I refilled that for her. We will see her back in 6 months sooner if needed. Darwin Arroyo was seen today for back problem, pain, back pain, shoulder pain and neck pain. Diagnoses and all orders for this visit:    Spinal stenosis, cervical region  -     gabapentin (NEURONTIN) 300 MG capsule; Take 1 capsule by mouth nightly for 180 days. Max Daily Amount: 300 mg    Radiculopathy, cervical region  -     gabapentin (NEURONTIN) 300 MG capsule; Take 1 capsule by mouth nightly for 180 days. Max Daily Amount: 300 mg    Neuralgia and neuritis, unspecified  -     gabapentin (NEURONTIN) 300 MG capsule; Take 1 capsule by mouth nightly for 180 days.  Max Daily Amount: 300

## 2023-05-28 DIAGNOSIS — F33.0 MAJOR DEPRESSIVE DISORDER, RECURRENT, MILD (HCC): Primary | ICD-10-CM

## 2023-05-30 RX ORDER — FLUOXETINE 10 MG/1
10 CAPSULE ORAL DAILY
Qty: 90 CAPSULE | Refills: 3 | Status: SHIPPED | OUTPATIENT
Start: 2023-05-30

## 2023-06-20 ENCOUNTER — TELEPHONE (OUTPATIENT)
Age: 73
End: 2023-06-20

## 2023-06-20 NOTE — TELEPHONE ENCOUNTER
----- Message from [facility] sent at 6/20/2023 11:04 AM EDT -----  Subject: Message to Provider    QUESTIONS  Information for Provider? Patient called in to see ask her provider if she   should have him order the additional labs she has done for Oncology even   though all her Oncologist keep resigning and she does not know when she   will be scheduled again with another Oncologist. Patient stated that she   normally has labs done for Oncology every 4 months but since her providers   keep resigning she has not had the labs done for Oncology in 6 months. Please contact patient to further assist.   ---------------------------------------------------------------------------  --------------  Mary Pelletier INFO  [phone]; OK to leave message on voicemail  ---------------------------------------------------------------------------  --------------  SCRIPT ANSWERS  Relationship to Patient?  Self

## 2023-06-20 NOTE — TELEPHONE ENCOUNTER
Contacted patient. She said she would prefer [facility] since that is where she lives and it would be closer.

## 2023-06-20 NOTE — TELEPHONE ENCOUNTER
We can do her routine labs as she's due for ov and med wellness anyway - schedule at her convenience    Would suggest she establish with [facility] for her cancer follow up as unclear when there will be a replacement - they can run whatever studies they need to run for her condition    [facility]

## 2023-06-20 NOTE — TELEPHONE ENCOUNTER
----- Message from [facility] sent at 6/20/2023 11:04 AM EDT -----  Subject: Message to Provider    QUESTIONS  Information for Provider? Patient called in to see ask her provider if she   should have him order the additional labs she has done for Oncology even   though all her Oncologist keep resigning and she does not know when she   will be scheduled again with another Oncologist. Patient stated that she   normally has labs done for Oncology every 4 months but since her providers   keep resigning she has not had the labs done for Oncology in 6 months. Please contact patient to further assist.   ---------------------------------------------------------------------------  --------------  Vicky GRAFF  [phone]; OK to leave message on voicemail  ---------------------------------------------------------------------------  --------------  SCRIPT ANSWERS  Relationship to Patient?  Self

## 2023-06-23 ENCOUNTER — NURSE ONLY (OUTPATIENT)
Age: 73
End: 2023-06-23

## 2023-06-23 DIAGNOSIS — R73.01 IFG (IMPAIRED FASTING GLUCOSE): ICD-10-CM

## 2023-06-23 DIAGNOSIS — E78.5 HYPERLIPIDEMIA, UNSPECIFIED HYPERLIPIDEMIA TYPE: ICD-10-CM

## 2023-06-24 LAB
ALBUMIN SERPL-MCNC: 4.6 G/DL (ref 3.7–4.7)
ALBUMIN/GLOB SERPL: 1.5 {RATIO} (ref 1.2–2.2)
ALP SERPL-CCNC: 47 IU/L (ref 44–121)
ALT SERPL-CCNC: 11 IU/L (ref 0–32)
AST SERPL-CCNC: 14 IU/L (ref 0–40)
BILIRUB SERPL-MCNC: 0.3 MG/DL (ref 0–1.2)
BUN SERPL-MCNC: 18 MG/DL (ref 8–27)
BUN/CREAT SERPL: 20 (ref 12–28)
CALCIUM SERPL-MCNC: 9.9 MG/DL (ref 8.7–10.3)
CHLORIDE SERPL-SCNC: 108 MMOL/L (ref 96–106)
CHOLEST SERPL-MCNC: 191 MG/DL (ref 100–199)
CO2 SERPL-SCNC: 23 MMOL/L (ref 20–29)
CREAT SERPL-MCNC: 0.92 MG/DL (ref 0.57–1)
EGFRCR SERPLBLD CKD-EPI 2021: 67 ML/MIN/1.73
GLOBULIN SER CALC-MCNC: 3 G/DL (ref 1.5–4.5)
GLUCOSE SERPL-MCNC: 115 MG/DL (ref 70–99)
HBA1C MFR BLD: 5.8 % (ref 4.8–5.6)
HDLC SERPL-MCNC: 98 MG/DL
LDLC SERPL CALC-MCNC: 84 MG/DL (ref 0–99)
POTASSIUM SERPL-SCNC: 5.5 MMOL/L (ref 3.5–5.2)
PROT SERPL-MCNC: 7.6 G/DL (ref 6–8.5)
SODIUM SERPL-SCNC: 140 MMOL/L (ref 134–144)
SPECIMEN STATUS REPORT: NORMAL
TRIGL SERPL-MCNC: 48 MG/DL (ref 0–149)
VLDLC SERPL CALC-MCNC: 9 MG/DL (ref 5–40)

## 2023-06-27 ENCOUNTER — OFFICE VISIT (OUTPATIENT)
Age: 73
End: 2023-06-27
Payer: MEDICARE

## 2023-06-27 VITALS
WEIGHT: 105 LBS | DIASTOLIC BLOOD PRESSURE: 68 MMHG | OXYGEN SATURATION: 98 % | HEART RATE: 62 BPM | SYSTOLIC BLOOD PRESSURE: 130 MMHG | RESPIRATION RATE: 19 BRPM | BODY MASS INDEX: 22.04 KG/M2 | TEMPERATURE: 97.5 F | HEIGHT: 58 IN

## 2023-06-27 DIAGNOSIS — E78.5 HYPERLIPIDEMIA, UNSPECIFIED HYPERLIPIDEMIA TYPE: ICD-10-CM

## 2023-06-27 DIAGNOSIS — D12.6 COLON ADENOMA: ICD-10-CM

## 2023-06-27 DIAGNOSIS — C83.18 MANTLE CELL LYMPHOMA OF LYMPH NODES OF MULTIPLE SITES (HCC): ICD-10-CM

## 2023-06-27 DIAGNOSIS — T45.1X5A NEUROPATHY DUE TO CHEMOTHERAPEUTIC DRUG (HCC): ICD-10-CM

## 2023-06-27 DIAGNOSIS — R73.01 IFG (IMPAIRED FASTING GLUCOSE): ICD-10-CM

## 2023-06-27 DIAGNOSIS — M62.838 NECK MUSCLE SPASM: ICD-10-CM

## 2023-06-27 DIAGNOSIS — Z71.89 ADVANCED CARE PLANNING/COUNSELING DISCUSSION: ICD-10-CM

## 2023-06-27 DIAGNOSIS — E55.9 VITAMIN D DEFICIENCY: ICD-10-CM

## 2023-06-27 DIAGNOSIS — I10 PRIMARY HYPERTENSION: ICD-10-CM

## 2023-06-27 DIAGNOSIS — R93.1 AGATSTON CAC SCORE 100-199: ICD-10-CM

## 2023-06-27 DIAGNOSIS — Z00.00 MEDICARE ANNUAL WELLNESS VISIT, SUBSEQUENT: Primary | ICD-10-CM

## 2023-06-27 DIAGNOSIS — G62.0 NEUROPATHY DUE TO CHEMOTHERAPEUTIC DRUG (HCC): ICD-10-CM

## 2023-06-27 PROCEDURE — 99211 OFF/OP EST MAY X REQ PHY/QHP: CPT | Performed by: INTERNAL MEDICINE

## 2023-06-27 PROCEDURE — 3078F DIAST BP <80 MM HG: CPT | Performed by: INTERNAL MEDICINE

## 2023-06-27 PROCEDURE — 1123F ACP DISCUSS/DSCN MKR DOCD: CPT | Performed by: INTERNAL MEDICINE

## 2023-06-27 PROCEDURE — G8420 CALC BMI NORM PARAMETERS: HCPCS | Performed by: INTERNAL MEDICINE

## 2023-06-27 PROCEDURE — G8427 DOCREV CUR MEDS BY ELIG CLIN: HCPCS | Performed by: INTERNAL MEDICINE

## 2023-06-27 PROCEDURE — G0439 PPPS, SUBSEQ VISIT: HCPCS | Performed by: INTERNAL MEDICINE

## 2023-06-27 PROCEDURE — 1090F PRES/ABSN URINE INCON ASSESS: CPT | Performed by: INTERNAL MEDICINE

## 2023-06-27 PROCEDURE — G8399 PT W/DXA RESULTS DOCUMENT: HCPCS | Performed by: INTERNAL MEDICINE

## 2023-06-27 PROCEDURE — 4004F PT TOBACCO SCREEN RCVD TLK: CPT | Performed by: INTERNAL MEDICINE

## 2023-06-27 PROCEDURE — 3075F SYST BP GE 130 - 139MM HG: CPT | Performed by: INTERNAL MEDICINE

## 2023-06-27 PROCEDURE — 3017F COLORECTAL CA SCREEN DOC REV: CPT | Performed by: INTERNAL MEDICINE

## 2023-06-27 PROCEDURE — 99214 OFFICE O/P EST MOD 30 MIN: CPT | Performed by: INTERNAL MEDICINE

## 2023-06-27 PROCEDURE — 99497 ADVNCD CARE PLAN 30 MIN: CPT | Performed by: INTERNAL MEDICINE

## 2023-06-27 RX ORDER — METHOCARBAMOL 750 MG/1
750 TABLET, FILM COATED ORAL 3 TIMES DAILY PRN
Qty: 40 TABLET | Refills: 0 | Status: SHIPPED | OUTPATIENT
Start: 2023-06-27

## 2023-06-27 SDOH — ECONOMIC STABILITY: FOOD INSECURITY: WITHIN THE PAST 12 MONTHS, YOU WORRIED THAT YOUR FOOD WOULD RUN OUT BEFORE YOU GOT MONEY TO BUY MORE.: NEVER TRUE

## 2023-06-27 SDOH — ECONOMIC STABILITY: HOUSING INSECURITY
IN THE LAST 12 MONTHS, WAS THERE A TIME WHEN YOU DID NOT HAVE A STEADY PLACE TO SLEEP OR SLEPT IN A SHELTER (INCLUDING NOW)?: NO

## 2023-06-27 SDOH — ECONOMIC STABILITY: INCOME INSECURITY: HOW HARD IS IT FOR YOU TO PAY FOR THE VERY BASICS LIKE FOOD, HOUSING, MEDICAL CARE, AND HEATING?: NOT HARD AT ALL

## 2023-06-27 SDOH — ECONOMIC STABILITY: FOOD INSECURITY: WITHIN THE PAST 12 MONTHS, THE FOOD YOU BOUGHT JUST DIDN'T LAST AND YOU DIDN'T HAVE MONEY TO GET MORE.: NEVER TRUE

## 2023-06-27 ASSESSMENT — LIFESTYLE VARIABLES
HOW MANY STANDARD DRINKS CONTAINING ALCOHOL DO YOU HAVE ON A TYPICAL DAY: 1 OR 2
HOW OFTEN DO YOU HAVE A DRINK CONTAINING ALCOHOL: MONTHLY OR LESS

## 2023-06-27 ASSESSMENT — PATIENT HEALTH QUESTIONNAIRE - PHQ9
10. IF YOU CHECKED OFF ANY PROBLEMS, HOW DIFFICULT HAVE THESE PROBLEMS MADE IT FOR YOU TO DO YOUR WORK, TAKE CARE OF THINGS AT HOME, OR GET ALONG WITH OTHER PEOPLE: 0
8. MOVING OR SPEAKING SO SLOWLY THAT OTHER PEOPLE COULD HAVE NOTICED. OR THE OPPOSITE, BEING SO FIGETY OR RESTLESS THAT YOU HAVE BEEN MOVING AROUND A LOT MORE THAN USUAL: 0
5. POOR APPETITE OR OVEREATING: 0
SUM OF ALL RESPONSES TO PHQ QUESTIONS 1-9: 0
3. TROUBLE FALLING OR STAYING ASLEEP: 0
9. THOUGHTS THAT YOU WOULD BE BETTER OFF DEAD, OR OF HURTING YOURSELF: 0
7. TROUBLE CONCENTRATING ON THINGS, SUCH AS READING THE NEWSPAPER OR WATCHING TELEVISION: 0
SUM OF ALL RESPONSES TO PHQ9 QUESTIONS 1 & 2: 0
1. LITTLE INTEREST OR PLEASURE IN DOING THINGS: 0
4. FEELING TIRED OR HAVING LITTLE ENERGY: 0
2. FEELING DOWN, DEPRESSED OR HOPELESS: 0
SUM OF ALL RESPONSES TO PHQ QUESTIONS 1-9: 0
6. FEELING BAD ABOUT YOURSELF - OR THAT YOU ARE A FAILURE OR HAVE LET YOURSELF OR YOUR FAMILY DOWN: 0
SUM OF ALL RESPONSES TO PHQ QUESTIONS 1-9: 0
SUM OF ALL RESPONSES TO PHQ QUESTIONS 1-9: 0

## 2023-07-21 ENCOUNTER — TELEPHONE (OUTPATIENT)
Age: 73
End: 2023-07-21
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
## 2023-07-21 RX ORDER — DOXYCYCLINE HYCLATE 100 MG/1
100 CAPSULE ORAL 2 TIMES DAILY
Qty: 2 CAPSULE | Refills: 0 | Status: SHIPPED | OUTPATIENT
Start: 2023-07-21 | End: 2023-07-22

## 2023-07-21 NOTE — TELEPHONE ENCOUNTER
Patient stating yesterday morning she pull a tick off her leg. Today there is a red lump. No itching or pain. Wants to know if she should be concerned.

## 2023-08-08 NOTE — PROGRESS NOTES
Kayla Lutz presents today for   Chief Complaint   Patient presents with    Back Problem    Pain    Back Pain    Shoulder Pain    Neck Pain       Is someone accompanying this pt? no    Is the patient using any DME equipment during OV? no    Depression Screening:  No flowsheet data found. Learning Assessment:  No flowsheet data found. Abuse Screening:  No flowsheet data found. Fall Risk  No flowsheet data found. OPIOID RISK TOOL  No flowsheet data found. Coordination of Care:  1. Have you been to the ER, urgent care clinic since your last visit? no  Hospitalized since your last visit? no    2. Have you seen or consulted any other health care providers outside of the [address] [place] [address] since your last visit? no Include any pap smears or colon screening.  no Finasteride Counseling:  I discussed with the patient the risks of use of finasteride including but not limited to decreased libido, decreased ejaculate volume, gynecomastia, and depression. Women should not handle medication.  All of the patient's questions and concerns were addressed. Finasteride Male Counseling: Finasteride Counseling:  I discussed with the patient the risks of use of finasteride including but not limited to decreased libido, decreased ejaculate volume, gynecomastia, and depression. Women should not handle medication.  All of the patient's questions and concerns were addressed.

## 2023-09-05 RX ORDER — ATORVASTATIN CALCIUM 10 MG/1
TABLET, FILM COATED ORAL
Qty: 90 TABLET | Refills: 3 | Status: SHIPPED | OUTPATIENT
Start: 2023-09-05

## 2023-10-20 DIAGNOSIS — M62.838 NECK MUSCLE SPASM: ICD-10-CM

## 2023-10-21 NOTE — TELEPHONE ENCOUNTER
Please refill if appropriate or refuse medication if not appropriate.     PCP: Antonino Mary MD     Last appt: 6/27/23    Last refill: 6/27/23      Future Appointments   Date Time Provider [address] [place] [address]   11/17/2023 10:20 AM Blount, Evone Simmonds, APRN - NP [facility] AMB   6/21/2024  7:45 AM [facility] LAB VISIT [facility] AM[facility]   6/28/2024  8:40 AM Sheeba Spears Aas, MD [facility] BS AMB         Requested Prescriptions     Pending Prescriptions Disp Refills    methocarbamol (ROBAXIN) 750 MG tablet [Pharmacy Med Name: METHOCARBAMOL 750MG TABLETS] 40 tablet 0     Sig: TAKE 1 TABLET BY MOUTH THREE TIMES DAILY AS NEEDED FOR SPASM

## 2023-10-24 RX ORDER — METHOCARBAMOL 750 MG/1
TABLET, FILM COATED ORAL
Qty: 40 TABLET | Refills: 0 | Status: SHIPPED | OUTPATIENT
Start: 2023-10-24

## 2023-11-27 ENCOUNTER — HOSPITAL ENCOUNTER (OUTPATIENT)
Facility: HOSPITAL | Age: 73
Discharge: HOME | End: 2023-11-30
Payer: MEDICARE

## 2023-11-27 ENCOUNTER — OFFICE VISIT (OUTPATIENT)
Age: 73
End: 2023-11-27
Payer: MEDICARE

## 2023-11-27 VITALS
RESPIRATION RATE: 18 BRPM | DIASTOLIC BLOOD PRESSURE: 54 MMHG | OXYGEN SATURATION: 100 % | HEART RATE: 54 BPM | SYSTOLIC BLOOD PRESSURE: 119 MMHG | WEIGHT: 105 LBS | HEIGHT: 58 IN | TEMPERATURE: 96 F | BODY MASS INDEX: 22.04 KG/M2

## 2023-11-27 DIAGNOSIS — R10.32 LLQ PAIN: Primary | ICD-10-CM

## 2023-11-27 DIAGNOSIS — R10.13 DYSPEPSIA: ICD-10-CM

## 2023-11-27 DIAGNOSIS — R10.32 LLQ PAIN: ICD-10-CM

## 2023-11-27 DIAGNOSIS — K57.92 DIVERTICULITIS: ICD-10-CM

## 2023-11-27 DIAGNOSIS — M54.12 CERVICAL RADICULITIS: ICD-10-CM

## 2023-11-27 DIAGNOSIS — R19.5 CHANGE IN STOOL CALIBER: ICD-10-CM

## 2023-11-27 LAB
ALBUMIN SERPL-MCNC: 4.1 G/DL (ref 3.4–5)
ALBUMIN/GLOB SERPL: 1 (ref 0.8–1.7)
ALP SERPL-CCNC: 56 U/L (ref 45–117)
ALT SERPL-CCNC: 16 U/L (ref 13–56)
ANION GAP SERPL CALC-SCNC: 3 MMOL/L (ref 3–18)
AST SERPL-CCNC: 12 U/L (ref 10–38)
BASOPHILS # BLD: 0.1 K/UL (ref 0–0.1)
BASOPHILS NFR BLD: 1 % (ref 0–2)
BILIRUB SERPL-MCNC: 0.4 MG/DL (ref 0.2–1)
BUN SERPL-MCNC: 16 MG/DL (ref 7–18)
BUN/CREAT SERPL: 20 (ref 12–20)
CALCIUM SERPL-MCNC: 9.9 MG/DL (ref 8.5–10.1)
CHLORIDE SERPL-SCNC: 110 MMOL/L (ref 100–111)
CO2 SERPL-SCNC: 26 MMOL/L (ref 21–32)
CREAT SERPL-MCNC: 0.8 MG/DL (ref 0.6–1.3)
DIFFERENTIAL METHOD BLD: ABNORMAL
EOSINOPHIL # BLD: 0.3 K/UL (ref 0–0.4)
EOSINOPHIL NFR BLD: 4 % (ref 0–5)
ERYTHROCYTE [DISTWIDTH] IN BLOOD BY AUTOMATED COUNT: 13.5 % (ref 11.6–14.5)
GLOBULIN SER CALC-MCNC: 4.3 G/DL (ref 2–4)
GLUCOSE SERPL-MCNC: 94 MG/DL (ref 74–99)
HCT VFR BLD AUTO: 36.4 % (ref 35–45)
HGB BLD-MCNC: 11.9 G/DL (ref 12–16)
IMM GRANULOCYTES # BLD AUTO: 0 K/UL (ref 0–0.04)
IMM GRANULOCYTES NFR BLD AUTO: 0 % (ref 0–0.5)
LIPASE SERPL-CCNC: 35 U/L (ref 13–75)
LYMPHOCYTES # BLD: 1.8 K/UL (ref 0.9–3.6)
LYMPHOCYTES NFR BLD: 20 % (ref 21–52)
MCH RBC QN AUTO: 31.2 PG (ref 24–34)
MCHC RBC AUTO-ENTMCNC: 32.7 G/DL (ref 31–37)
MCV RBC AUTO: 95.3 FL (ref 78–100)
MONOCYTES # BLD: 0.6 K/UL (ref 0.05–1.2)
MONOCYTES NFR BLD: 6 % (ref 3–10)
NEUTS SEG # BLD: 6.4 K/UL (ref 1.8–8)
NEUTS SEG NFR BLD: 69 % (ref 40–73)
NRBC # BLD: 0 K/UL (ref 0–0.01)
NRBC BLD-RTO: 0 PER 100 WBC
PLATELET # BLD AUTO: 314 K/UL (ref 135–420)
PMV BLD AUTO: 9.1 FL (ref 9.2–11.8)
POTASSIUM SERPL-SCNC: 4.3 MMOL/L (ref 3.5–5.5)
PROT SERPL-MCNC: 8.4 G/DL (ref 6.4–8.2)
RBC # BLD AUTO: 3.82 M/UL (ref 4.2–5.3)
SODIUM SERPL-SCNC: 139 MMOL/L (ref 136–145)
WBC # BLD AUTO: 9.3 K/UL (ref 4.6–13.2)

## 2023-11-27 PROCEDURE — 3074F SYST BP LT 130 MM HG: CPT | Performed by: INTERNAL MEDICINE

## 2023-11-27 PROCEDURE — 3017F COLORECTAL CA SCREEN DOC REV: CPT | Performed by: INTERNAL MEDICINE

## 2023-11-27 PROCEDURE — G8484 FLU IMMUNIZE NO ADMIN: HCPCS | Performed by: INTERNAL MEDICINE

## 2023-11-27 PROCEDURE — G8427 DOCREV CUR MEDS BY ELIG CLIN: HCPCS | Performed by: INTERNAL MEDICINE

## 2023-11-27 PROCEDURE — 4004F PT TOBACCO SCREEN RCVD TLK: CPT | Performed by: INTERNAL MEDICINE

## 2023-11-27 PROCEDURE — G8399 PT W/DXA RESULTS DOCUMENT: HCPCS | Performed by: INTERNAL MEDICINE

## 2023-11-27 PROCEDURE — 36415 COLL VENOUS BLD VENIPUNCTURE: CPT

## 2023-11-27 PROCEDURE — 80053 COMPREHEN METABOLIC PANEL: CPT

## 2023-11-27 PROCEDURE — 1123F ACP DISCUSS/DSCN MKR DOCD: CPT | Performed by: INTERNAL MEDICINE

## 2023-11-27 PROCEDURE — 85025 COMPLETE CBC W/AUTO DIFF WBC: CPT

## 2023-11-27 PROCEDURE — 1090F PRES/ABSN URINE INCON ASSESS: CPT | Performed by: INTERNAL MEDICINE

## 2023-11-27 PROCEDURE — 99215 OFFICE O/P EST HI 40 MIN: CPT | Performed by: INTERNAL MEDICINE

## 2023-11-27 PROCEDURE — 3078F DIAST BP <80 MM HG: CPT | Performed by: INTERNAL MEDICINE

## 2023-11-27 PROCEDURE — 83690 ASSAY OF LIPASE: CPT

## 2023-11-27 PROCEDURE — G8420 CALC BMI NORM PARAMETERS: HCPCS | Performed by: INTERNAL MEDICINE

## 2023-11-27 RX ORDER — OMEPRAZOLE 40 MG/1
40 CAPSULE, DELAYED RELEASE ORAL
Qty: 90 CAPSULE | Refills: 1 | Status: SHIPPED | OUTPATIENT
Start: 2023-11-27

## 2023-11-27 RX ORDER — CIPROFLOXACIN 500 MG/1
500 TABLET, FILM COATED ORAL 2 TIMES DAILY
Qty: 20 TABLET | Refills: 0 | Status: SHIPPED | OUTPATIENT
Start: 2023-11-27 | End: 2023-12-07

## 2023-11-27 RX ORDER — METRONIDAZOLE 500 MG/1
500 TABLET ORAL 3 TIMES DAILY
Qty: 30 TABLET | Refills: 0 | Status: SHIPPED | OUTPATIENT
Start: 2023-11-27 | End: 2023-12-07

## 2023-11-27 ASSESSMENT — PATIENT HEALTH QUESTIONNAIRE - PHQ9
SUM OF ALL RESPONSES TO PHQ QUESTIONS 1-9: 0
1. LITTLE INTEREST OR PLEASURE IN DOING THINGS: 0
2. FEELING DOWN, DEPRESSED OR HOPELESS: 0
SUM OF ALL RESPONSES TO PHQ9 QUESTIONS 1 & 2: 0
SUM OF ALL RESPONSES TO PHQ QUESTIONS 1-9: 0

## 2023-11-27 NOTE — PROGRESS NOTES
Kal Odell presents today for   Chief Complaint   Patient presents with    Bloated                 1. \"Have you been to the ER, urgent care clinic since your last visit? Hospitalized since your last visit? \" no    2. \"Have you seen or consulted any other health care providers outside of the [address] [place] [address] since your last visit? \" no     3. For patients aged 43-73: Has the patient had a colonoscopy / FIT/ Cologuard? No      If the patient is female:    4. For patients aged 43-66: Has the patient had a mammogram within the past 2 years? No      5. For patients aged 21-65: Has the patient had a pap smear?  Hysterectomy

## 2023-11-28 ENCOUNTER — TELEMEDICINE (OUTPATIENT)
Age: 73
End: 2023-11-28
Payer: MEDICARE

## 2023-11-28 DIAGNOSIS — M79.2 NEURALGIA AND NEURITIS, UNSPECIFIED: ICD-10-CM

## 2023-11-28 DIAGNOSIS — R29.898 RIGHT ARM WEAKNESS: Primary | ICD-10-CM

## 2023-11-28 DIAGNOSIS — M54.12 RADICULOPATHY, CERVICAL REGION: ICD-10-CM

## 2023-11-28 DIAGNOSIS — M48.02 SPINAL STENOSIS, CERVICAL REGION: ICD-10-CM

## 2023-11-28 PROCEDURE — 99443 PR PHYS/QHP TELEPHONE EVALUATION 21-30 MIN: CPT | Performed by: NURSE PRACTITIONER

## 2023-11-28 RX ORDER — GABAPENTIN 300 MG/1
300 CAPSULE ORAL
Qty: 90 CAPSULE | Refills: 1 | Status: SHIPPED | OUTPATIENT
Start: 2023-12-11 | End: 2024-06-08

## 2023-11-28 NOTE — PROGRESS NOTES
68 y.o. female who presents for evaluation. She is concerned that she had diverticulitis again. For about 6 weeks, she's had low energy and felt blah. Since then, she's had 3 episodes of abd pain w nausea and diarrhea and LLLQ pain,  she's had 'ribbon like stools' as well, no bleeding, wt loss. She's also been using low dose advil for some abd discomfort sand recently started having dyspepsia without melena. Secondly, she's been having c spine radicular sx and is scheduled to see NP Royal tomorrow. Taking gabapentin. Past Medical History:   Diagnosis Date    Agatston CAC score 100-199 09/2021    ca score 138, calcification all 3 vessels    Anxiety 6/15    ALEJANDRO-7 was 12/21    Clostridium difficile colitis     Dr. Leon Razo 3/14; postop 10/20    Colon adenoma 10/2011    Dr. Abena Thurman; Dr Sarah Fernandez 7/20    Cystic breast     Degenerative arthritis of cervical spine     on xray, worst C5-6 (4/21); mri showed multilevel degen changes, foraminal stenosis (4.21); Dr Preston Reyes    Depression 6/15    PHQ-9 was 15/27    Diverticulitis     recurrent x3 Dr Itz Matute liver     10/10 on US, CT 6/12; Fib-4 was 0.74 from 1/15    GI bleed 2/14    ischemic colitis on CT, seen by Dr. Leon Razo    Hyperlipidemia     calculated 10 year risk score was 3.2% (1/15)    Hypertension     IFG (impaired fasting glucose) [id number]    Ischemic colitis ([address]) 3/14    Dr Leon Razo    Labial abscess     mrsa    Lung nodule 2015    9x10mm RML, no change 9/21    Lymphoma, mantle cell ([address]) 12/2013    Dr. Lakshmi Lawler; stage s/p chemo; now Dr Robinson Gonzalez    Migraine     Nephrolithiasis     Neuropathy due to chemotherapeutic drug ([address]) 9/14    Osteoarthritis     hips and knees; Dr Martino Gins dependence syndrome 2/8/2019    Vitamin D deficiency 6/8/2022    Zoster 8/14    left T11     Past Surgical History:   Procedure Laterality Date    APPENDECTOMY  1968    COLONOSCOPY N/A 7/23/2020    Dr Abena Thurman 2003 polyps; 2011 adenoma; Dr Twylla Sandhoff 3/14 isch colitis;

## 2023-11-28 NOTE — PROGRESS NOTES
Ravindra Cosme is a 68 y.o. female evaluated via telephone on 11/28/2023 for Neck Pain  . History of Present Illness: The patient is a 67-year-old female who has known cervical stenosis. She was having predominantly left-sided neck pain that radiated to the left arm but last time we saw her she had done a lot of yard work and the pain that started into the right arm. She thought it would settle down on its own but it is progressively gotten worse to the point where she has weakness in that right arm now. She states over Thanksgiving she cannot lift any pains with that right arm and her daughter had to do a lot of the lifting for her. She states she is dropping her coffee cup 4 times over the last week with that right arm. She denies any balance dysfunction. She has neck pain that radiates into that right upper arm with numbness and tingling from the elbow down to her hand. She denies fever bowel bladder dysfunction. Previous MRI did show stenosis on the right at C4-5 but this was done 2 and half years ago. She states she has been taking Advil 800 mg at a time for the pain and that is only helped a little bit. She also takes gabapentin 300 mg at bedtime. She is unable to take higher doses due to sedation. Physical Exam: The patient is a 67-year-old female who is alert and oriented. She spoke with fluency. She did not appear to be in distress. Assessment/Plan: This is a patient who has known cervical stenosis that is now affecting her right arm and giving her weakness along with pain numbness and tingling. I will get a MRI of the cervical spine. I have refilled her gabapentin. She will follow-up with one of our physicians for MRI follow-up. Brittany Wharton was seen today for neck pain. Diagnoses and all orders for this visit:    Right arm weakness  -     MRI CERVICAL SPINE WO CONTRAST; Future    Spinal stenosis, cervical region  -     gabapentin (NEURONTIN) 300 MG capsule;  Take 1

## 2023-12-04 NOTE — TELEPHONE ENCOUNTER
Patient is requesting a refill on Tramadol. Just enough to get her through until she can get a Cortisone next Thursday.
Pt advised
sent
None

## 2023-12-08 ENCOUNTER — TELEPHONE (OUTPATIENT)
Age: 73
End: 2023-12-08

## 2023-12-08 NOTE — TELEPHONE ENCOUNTER
----- Message from Sindi Sierra MD sent at 11/27/2023 11:39 AM EST -----  Pls expedite abd/pelv CT - tomorrow or wed if possible

## 2023-12-11 ENCOUNTER — HOSPITAL ENCOUNTER (OUTPATIENT)
Facility: HOSPITAL | Age: 73
Discharge: HOME | End: 2023-12-14
Attending: INTERNAL MEDICINE
Payer: MEDICARE

## 2023-12-11 DIAGNOSIS — M79.2 NEURALGIA AND NEURITIS, UNSPECIFIED: ICD-10-CM

## 2023-12-11 DIAGNOSIS — M48.02 SPINAL STENOSIS, CERVICAL REGION: ICD-10-CM

## 2023-12-11 DIAGNOSIS — R29.898 RIGHT ARM WEAKNESS: ICD-10-CM

## 2023-12-11 DIAGNOSIS — M54.12 RADICULOPATHY, CERVICAL REGION: ICD-10-CM

## 2023-12-11 DIAGNOSIS — R10.32 LLQ PAIN: ICD-10-CM

## 2023-12-11 LAB — CREAT UR-MCNC: 0.8 MG/DL (ref 0.6–1.3)

## 2023-12-11 PROCEDURE — 72141 MRI NECK SPINE W/O DYE: CPT

## 2023-12-11 PROCEDURE — 74177 CT ABD & PELVIS W/CONTRAST: CPT

## 2023-12-11 PROCEDURE — 82565 ASSAY OF CREATININE: CPT

## 2023-12-11 PROCEDURE — 6360000004 HC RX CONTRAST MEDICATION: Performed by: INTERNAL MEDICINE

## 2023-12-11 RX ADMIN — DIATRIZOATE MEGLUMINE AND DIATRIZOATE SODIUM 30 ML: 660; 100 LIQUID ORAL; RECTAL at 07:02

## 2023-12-11 RX ADMIN — IOPAMIDOL 80 ML: 612 INJECTION, SOLUTION INTRAVENOUS at 08:33

## 2023-12-27 ENCOUNTER — TELEPHONE (OUTPATIENT)
Age: 73
End: 2023-12-27

## 2023-12-27 NOTE — TELEPHONE ENCOUNTER
----- Message from MIKEL Brandon - NP sent at 12/26/2023  2:43 PM EST -----  Move pt's MRI fu with Dr Woodall up to January.

## 2023-12-27 NOTE — TELEPHONE ENCOUNTER
Called patient [phone] and left voice mail asking patient to call our office back. I was calling to offer the patient an appointment to go over the results of her MRI. We had a cancellation for 1/2/2024 at 240 pm at our office on [facility]. I have made the appointment. If she can not keep this appointment please reschedule to a time where she can. Thank you.

## 2024-01-02 ENCOUNTER — OFFICE VISIT (OUTPATIENT)
Age: 74
End: 2024-01-02
Payer: MEDICARE

## 2024-01-02 VITALS
BODY MASS INDEX: 21.62 KG/M2 | WEIGHT: 103 LBS | OXYGEN SATURATION: 97 % | SYSTOLIC BLOOD PRESSURE: 115 MMHG | DIASTOLIC BLOOD PRESSURE: 51 MMHG | HEART RATE: 66 BPM | HEIGHT: 58 IN

## 2024-01-02 DIAGNOSIS — M62.838 MUSCLE SPASM: ICD-10-CM

## 2024-01-02 DIAGNOSIS — Z85.72 HISTORY OF LYMPHOMA: ICD-10-CM

## 2024-01-02 DIAGNOSIS — G62.9 NEUROPATHY: ICD-10-CM

## 2024-01-02 DIAGNOSIS — M47.812 CERVICAL FACET SYNDROME: ICD-10-CM

## 2024-01-02 DIAGNOSIS — M54.12 CERVICAL RADICULOPATHY: Primary | ICD-10-CM

## 2024-01-02 DIAGNOSIS — M48.02 CERVICAL SPINAL STENOSIS: ICD-10-CM

## 2024-01-02 PROCEDURE — G8484 FLU IMMUNIZE NO ADMIN: HCPCS | Performed by: PHYSICAL MEDICINE & REHABILITATION

## 2024-01-02 PROCEDURE — 3078F DIAST BP <80 MM HG: CPT | Performed by: PHYSICAL MEDICINE & REHABILITATION

## 2024-01-02 PROCEDURE — G8427 DOCREV CUR MEDS BY ELIG CLIN: HCPCS | Performed by: PHYSICAL MEDICINE & REHABILITATION

## 2024-01-02 PROCEDURE — 1090F PRES/ABSN URINE INCON ASSESS: CPT | Performed by: PHYSICAL MEDICINE & REHABILITATION

## 2024-01-02 PROCEDURE — G8399 PT W/DXA RESULTS DOCUMENT: HCPCS | Performed by: PHYSICAL MEDICINE & REHABILITATION

## 2024-01-02 PROCEDURE — 3074F SYST BP LT 130 MM HG: CPT | Performed by: PHYSICAL MEDICINE & REHABILITATION

## 2024-01-02 PROCEDURE — G8420 CALC BMI NORM PARAMETERS: HCPCS | Performed by: PHYSICAL MEDICINE & REHABILITATION

## 2024-01-02 PROCEDURE — 3017F COLORECTAL CA SCREEN DOC REV: CPT | Performed by: PHYSICAL MEDICINE & REHABILITATION

## 2024-01-02 PROCEDURE — 4004F PT TOBACCO SCREEN RCVD TLK: CPT | Performed by: PHYSICAL MEDICINE & REHABILITATION

## 2024-01-02 PROCEDURE — 1123F ACP DISCUSS/DSCN MKR DOCD: CPT | Performed by: PHYSICAL MEDICINE & REHABILITATION

## 2024-01-02 PROCEDURE — 99214 OFFICE O/P EST MOD 30 MIN: CPT | Performed by: PHYSICAL MEDICINE & REHABILITATION

## 2024-01-02 RX ORDER — METHYLPREDNISOLONE 4 MG/1
TABLET ORAL
Qty: 1 KIT | Refills: 1 | Status: SHIPPED | OUTPATIENT
Start: 2024-01-02 | End: 2024-01-08

## 2024-01-02 NOTE — PROGRESS NOTES
Disp: , Rfl:      Allergies   Allergen Reactions    Meperidine Nausea Only and Other (See Comments)    Amoxicillin-Pot Clavulanate Diarrhea and Nausea Only    Cephalexin Nausea Only    Sulfa Antibiotics Rash         REVIEW OF SYSTEMS    Constitutional: Negative for fever, chills, or weight change.   Respiratory: Negative for cough or shortness of breath.     Cardiovascular: Negative for chest pain or palpitations.  Gastrointestinal: Negative for acid reflux, change in bowel habits, or constipation.  Genitourinary: Negative for dysuria and flank pain.   Musculoskeletal: Positive for cervical pain.  Skin: Negative for rash.   Neurological: Negative for headaches, dizziness, or numbness.  Endo/Heme/Allergies: Negative for increased bruising.   Psychiatric/Behavioral: Negative for difficulty with sleep.    As per HPI    PHYSICAL EXAMINATION  BP (!) 115/51 (Site: Right Upper Arm, Position: Sitting, Cuff Size: Medium Adult)   Pulse 66   Ht 1.473 m (4' 10\")   Wt 46.7 kg (103 lb)   LMP  (LMP Unknown)   SpO2 97%   BMI 21.53 kg/m²     Constitutional: Awake, alert, and in no acute distress.  Neurological: 1+ symmetrical DTRs in the upper extremities. 1+ symmetrical DTRs in the lower extremities. Sensation to light touch is intact. Negative Toscano's sign bilaterally.  Skin: warm, dry, and intact.   Musculoskeletal: Decreased side to side cervical flexion.  Tightness across the trapezius bilaterally.   No pain with extension, axial loading, or forward flexion. No pain with internal or external rotation of her hips. Negative straight leg raise bilaterally.      Biceps  Triceps Deltoids Wrist Ext Wrist Flex Hand Intrin   Right +4/5 +4/5 +4/5 +4/5 +4/5 +4/5   Left +4/5 +4/5 +4/5 +4/5 +4/5 +4/5      Hip Flex  Quads Hamstrings Ankle DF EHL Ankle PF   Right +4/5 +4/5 +4/5 +4/5 +4/5 +4/5   Left +4/5 +4/5 +4/5 +4/5 +4/5 +4/5     IMAGING:    Cervical spine MRI from 12/11/2023 was personally reviewed with the patient and

## 2024-01-10 ENCOUNTER — HOSPITAL ENCOUNTER (OUTPATIENT)
Facility: HOSPITAL | Age: 74
Setting detail: RECURRING SERIES
Discharge: HOME | End: 2024-01-13
Payer: MEDICARE

## 2024-01-10 PROCEDURE — 97162 PT EVAL MOD COMPLEX 30 MIN: CPT

## 2024-01-10 NOTE — PROGRESS NOTES
PT DAILY TREATMENT NOTE/CERVICAL [id number]      Patient Name: Olamide Hand    Date: 1/10/2024    : 1950  Insurance: Payor: HU[employer] MEDICARE / Plan: HU[employer]NA GOLD PLUS HMO / Product Type: *No Product type* /      Patient  verified yes     Visit #   Current / Total 1 10   Time   In / Out 12:23 PM 1:13 PM   Pain   In / Out 0/10 0/10   Subjective Functional Status/Changes: \"I am here for neck pain and my right arm..\"     Treatment Area: Neck pain [M54.2]    SUBJECTIVE  Mechanism of Injury:  \"In October I was doing yard work and think I overdid it and my neck started hurting and both arms hurt but my right one is much worse. I also get numbness, tingling, and weakness. The attacks are just random I can't figure out what specifically. My biceps used to be so toned from scooping ice cream but it is now spongy. It also radiates up the  back of my head.\"   Current Functional Deficits: looking right/left, looking up, cervical side bending, reaching out/up, cooking, lifting, caring for dog (who tugs when walking)   Previous Treatment/Compliance: gabapentin, ibuprofen  PMHx/Surgical Hx: right hip THR, hx of c-diff, left knee surgery, lymphoma mantel cell   Pt Goals: \"I want to be able to be pain free or at least manageable without medication.\"   Imaging: MRI (cervical stenosis, narrowing, radiculopathy, facet syndrome, osteoarthritis, C4, C5, C6, C7)     Pain Pattern:  [x]intermittent [x]worsening   Pain Description:  [x]Sharp [x]Achy [x]Heaviness/weakness [x]Other: radiating, numbness/tingling       OBJECTIVE/EXAMINATION      Heat (UNBILLED):  location/position: supine; cervical .    Min Rationale   10 decrease pain and increase tissue extensibility to improve patient's ability to progress to PLOF and address remaining functional goals.     Skin assessment post-treatment:   Intact      25 min [x]Eval - untimed                      Therapeutic Procedures:  Tx Min  20 Billable or 1:1 Min (if diff from Tx Min) 
functional score where 100 = no disability  Overall Complexity Rating: MEDIUM  Problem List: pain affecting function, decrease ROM, decrease strength, decrease ADL/functional abilities, decrease activity tolerance, decrease flexibility/joint mobility, and decrease transfer abilities    Treatment Plan may include any combination of the followin Therapeutic Exercise, 72037 Neuromuscular Re-Education, 96632 Manual Therapy, 27759 Therapeutic Activity, 54086 Self Care/Home Management, 17337 Electrical Stim unattended, and 77939 Mechanical Traction If patient is receiving VASO: Vasopnuematic compression justification:  Per bilateral girth measures taken and listed above the edema is considered significant and having an impact on the patient's strength, balance, gait, transfers, self care, and ADL's  Patient / Family readiness to learn indicated by: asking questions, trying to perform skills, and interest  Persons(s) to be included in education: patient (P)  Barriers to Learning/Limitations: none   Measures taken if barriers to learning present: N/A   Patient Goal (s):  \"I want to be able to be pain free or at least manageable without medication. Get back to cooking.\"   Patient Self Reported Health Status: fair  Rehabilitation Potential: good    Short Term Goals: To be accomplished in  4  weeks:  1.  Pt will be independent and compliant with HEP  Status at IE: :HEP est at initial eval and will be progressed as needed.   2.  Patient will increase FOTO score to 49/100 for indications of increased functional mobility.   Status at IE:  42/100  3.  Patient will be able to manage 5 pound pan in kitchen with maneuvering it in and out of oven independently to optimize independence with cooking  Status at IE: unable, requires daughter's assistance   4.  Patient to improve cervical rotation to at least 40 degrees for ability to check blind spot while driving   Status at IE: <40 degrees and painful     Long Term Goals: To be

## 2024-01-23 ENCOUNTER — HOSPITAL ENCOUNTER (OUTPATIENT)
Facility: HOSPITAL | Age: 74
Setting detail: RECURRING SERIES
Discharge: HOME | End: 2024-01-26
Payer: MEDICARE

## 2024-01-23 ENCOUNTER — TELEPHONE (OUTPATIENT)
Age: 74
End: 2024-01-23

## 2024-01-23 PROCEDURE — 97110 THERAPEUTIC EXERCISES: CPT

## 2024-01-23 PROCEDURE — 97140 MANUAL THERAPY 1/> REGIONS: CPT

## 2024-01-23 PROCEDURE — 97112 NEUROMUSCULAR REEDUCATION: CPT

## 2024-01-23 NOTE — PROGRESS NOTES
PHYSICAL / OCCUPATIONAL THERAPY - DAILY TREATMENT NOTE    Patient Name: Olamide Hand    Date: 2024    : 1950  Insurance: Payor: [employer] MEDICARE / Plan: HU[employer]NA GOLD PLUS HMO / Product Type: *No Product type* /      Patient  verified Yes     Visit #   Current / Total 2 10   Time   In / Out 12:20 1:10 (50)   Pain   In / Out 6 0   Subjective Functional Status/Changes: \"I didn't sleep last night. It was in my bicep and my middle finger. Up until then I've been doing my exercises and felt good. What I did yesterday that was different - took down [employer] decorations, cooked dinner, peeled lots of potatoes and carrots\"  Treated with ibuprofen - no relief noted  Gabapentin - pt has it but did not take it      TREATMENT AREA =  Neck pain [M54.2]  Right shoulder pain [M25.511]    OBJECTIVE    Modalities Rationale:     decrease pain and increase tissue extensibility to improve patient's ability to progress to PLOF and address remaining functional goals.    10 min  unbill []  Ice     [x]  Heat    location/position:  Supine to the CS and the R shoulder, bicep and wrist extensor     Skin assessment post-treatment:   Intact      Therapeutic Procedures:    Tx Min  40 Billable or 1:1 Min (if diff from Tx Min)  38 Procedure, Rationale, Specifics   19 19 15815 Therapeutic Exercise (timed):  increase ROM, strength, coordination, balance, and proprioception to improve patient's ability to progress to PLOF and address remaining functional goals. (see flow sheet as applicable)     Details if applicable:    -discussion of likely causes of distal UE pain from repetitive movement s   10 8 [id number] Neuromuscular Re-Education (timed):  improve balance, coordination, kinesthetic sense, posture, core stability and proprioception to improve patient's ability to develop conscious control of individual muscles and awareness of position of extremities in order to progress to PLOF and address remaining functional goals. (see

## 2024-01-23 NOTE — TELEPHONE ENCOUNTER
[facility] [place]  Can this be sent through Psychiatric for signature?

## 2024-01-24 DIAGNOSIS — M62.838 NECK MUSCLE SPASM: ICD-10-CM

## 2024-01-24 NOTE — TELEPHONE ENCOUNTER
Refill req methocarbamol (ROBAXIN) 750 MG tablet     Pharmacy [id number] [place] [address] - P [phone] - F [phone] [[id number]]

## 2024-01-25 RX ORDER — METHOCARBAMOL 750 MG/1
TABLET, FILM COATED ORAL
Qty: 40 TABLET | Refills: 1 | Status: SHIPPED | OUTPATIENT
Start: 2024-01-25

## 2024-01-26 ENCOUNTER — APPOINTMENT (OUTPATIENT)
Facility: HOSPITAL | Age: 74
End: 2024-01-26
Payer: MEDICARE

## 2024-01-30 ENCOUNTER — HOSPITAL ENCOUNTER (OUTPATIENT)
Facility: HOSPITAL | Age: 74
Setting detail: RECURRING SERIES
Discharge: HOME | End: 2024-02-02
Payer: MEDICARE

## 2024-01-30 PROCEDURE — 97110 THERAPEUTIC EXERCISES: CPT

## 2024-01-30 PROCEDURE — 97112 NEUROMUSCULAR REEDUCATION: CPT

## 2024-01-30 PROCEDURE — 97140 MANUAL THERAPY 1/> REGIONS: CPT

## 2024-01-30 RX ORDER — AMLODIPINE BESYLATE 5 MG/1
5 TABLET ORAL DAILY
Qty: 90 TABLET | Refills: 3 | Status: SHIPPED | OUTPATIENT
Start: 2024-01-30

## 2024-01-30 NOTE — PROGRESS NOTES
PHYSICAL / OCCUPATIONAL THERAPY - DAILY TREATMENT NOTE    Patient Name: Olamide Hand    Date: 2024    : 1950  Insurance: Payor: HUMANA MEDICARE / Plan: HUMANA GOLD PLUS HMO / Product Type: *No Product type* /      Patient  verified Yes     Visit #   Current / Total 3 10   Time   In / Out 11:44 12:32   Pain   In / Out 1 0   Subjective Functional Status/Changes: \"No intense pain on the top o fthe R shoulder. Also just pain in the back of the neck from sleeping weird and I took aleve to get it better. The arm is better. Great! I no longer have to turn my whole body to see behind me in the car\"       TREATMENT AREA =  Neck pain [M54.2]  Right shoulder pain [M25.511]    OBJECTIVE    Modalities Rationale:     decrease pain and increase tissue extensibility to improve patient's ability to progress to PLOF and address remaining functional goals.    10 min  unbill []  Ice     [x]  Heat    location/position:  Supine to the CS and the R shoulder, bicep and wrist extensor     Skin assessment post-treatment:   Intact      Therapeutic Procedures:    Tx Min  39 Billable or 1:1 Min (if diff from Tx Min)  39 Procedure, Rationale, Specifics   12  38575 Therapeutic Exercise (timed):  increase ROM, strength, coordination, balance, and proprioception to improve patient's ability to progress to PLOF and address remaining functional goals. (see flow sheet as applicable)     Details if applicable:    -supine cane flexion     19  73213 Neuromuscular Re-Education (timed):  improve balance, coordination, kinesthetic sense, posture, core stability and proprioception to improve patient's ability to develop conscious control of individual muscles and awareness of position of extremities in order to progress to PLOF and address remaining functional goals. (see flow sheet as applicable)     Details if applicable:    -twist bar wringing for  strength and functional movements  -tband rows and extensions  -3 way cans for

## 2024-02-01 ENCOUNTER — HOSPITAL ENCOUNTER (OUTPATIENT)
Facility: HOSPITAL | Age: 74
Setting detail: RECURRING SERIES
Discharge: HOME | End: 2024-02-04
Payer: MEDICARE

## 2024-02-01 PROCEDURE — 97112 NEUROMUSCULAR REEDUCATION: CPT

## 2024-02-01 PROCEDURE — 97110 THERAPEUTIC EXERCISES: CPT

## 2024-02-01 NOTE — PROGRESS NOTES
patient requires skilled tactile cuing to facilitate scapular retraction with rows/extensions and standing waiters pose.   - Specific verbal cues required to facilitate upper cervical flexion with standing cervical retraction.    Patient will continue to benefit from skilled PT / OT services to modify and progress therapeutic interventions, analyze and address functional mobility deficits, analyze and address ROM deficits, analyze and address strength deficits, analyze and address soft tissue restrictions, analyze and cue for proper movement patterns, analyze and modify for postural abnormalities, analyze and address imbalance/dizziness, and instruct in home and community integration to address functional deficits and attain remaining goals.    Progress toward goals / Updated goals:  []  See Progress Note/Recertification    Short Term Goals: To be accomplished in  4  weeks:  1.  Pt will be independent and compliant with HEP  Status at IE: :HEP est at initial eval and will be progressed as needed.   Current : noted compliance; demo's good carry over in HEP (24)  2.  Patient will increase FOTO score to 49/100 for indications of increased functional mobility.   Status at IE:  42/100  Current:   3.  Patient will be able to manage 5 pound pan in kitchen with maneuvering it in and out of oven independently to optimize independence with cooking  Status at IE: unable, requires daughter's assistance  Current: addressed with 3 way cans (24)  4.  Patient to improve cervical rotation to at least 40 degrees for ability to check blind spot while driving   Status at IE: <40 degrees and painful   Current: progressing with increased L rotation AROM and PROM, limited R rotation (24)     Next PN/ RC due 24  Auth due (visit number/ date) 8 visits  3/31/24    PLAN  [x] Continue plan of care  [x]  Upgrade activities as tolerated  []  Discharge due to :  []  Other:    Bandar Hameed, PT    2024    7:02

## 2024-02-06 ENCOUNTER — HOSPITAL ENCOUNTER (OUTPATIENT)
Facility: HOSPITAL | Age: 74
Setting detail: RECURRING SERIES
Discharge: HOME | End: 2024-02-09
Payer: MEDICARE

## 2024-02-06 PROCEDURE — 97110 THERAPEUTIC EXERCISES: CPT

## 2024-02-06 PROCEDURE — 97112 NEUROMUSCULAR REEDUCATION: CPT

## 2024-02-06 NOTE — PROGRESS NOTES
Physical Therapy Discharge Instructions      [facility]   [address] [address] [phone] fax      Patient: Olamide Hand  : 1950      Continue Home Exercise Program 1 times per day for 2 weeks, then decrease to 4 times per week      Continue with    [] Ice  as needed 1 times per day     [x] Heat           Follow up with MD:     [] Upon completion of therapy     [] As needed      Recommendations:     [x]   Return to activity with home program    []   Return to activity with the following modifications:       []Post Rehab Program    [x]Return to yardwork!    []Return to/join local gym        Additional Comments: Thank you so much for letting us care for you.   Eve Iraheta, Richard Galindo, PT 2024 9:55 AM    
position 2  Left: 34 lbs   Right: 21 lbs      Strength:  GHJ strength 4/5 at appropriate ROM for testing  Elbow flexion/extension: R bicep 4+/5; all else 5/5    Palpation: NO TTP  [] Min  [] Mod  [] Severe    Location: Upper Trapezius  [] Min  [] Mod  [] Severe    Location: Levator Scapulae  [] Min  [] Mod  [] Severe    Location: Rotator Cuff  [] Min  [] Mod  [] Severe    Location: Biceps (muscle belly and proximal insertion)  [] Min  [] Mod  [] Severe    Location: Suboccipitals     Special Tests:  Cervical: Compression & Distraction negative for pain or neuro involvement  Special Tests: Shoulder:  (-) empty can, subacromial arc, Yergason's      Muscle Flexibility:               Scalenes:        [] WNL    [x] Tight    [x] R    [x] L              Upper Trap:     [] WNL    [x] Tight    [x] R    [x] L              Levator:           [] WNL    [x] Tight    [x] R    [x] L              Pect. Minor:     [] WNL    [x] Tight    [x] R    [x] L      Goals for this period:  Short Term Goals: To be accomplished in  4  weeks:  1.  Pt will be independent and compliant with HEP  Status at IE: :HEP est at initial eval and will be progressed as needed.   Current : noted compliance; final DC plan given and verbalized understanding (2/6/24)  2.  Patient will increase FOTO score to 49/100 for indications of increased functional mobility.   Status at IE:  42/100  Current: goal surpassed; 96/100 (2/6/24)  3.  Patient will be able to manage 5 pound pan in kitchen with maneuvering it in and out of oven independently to optimize independence with cooking  Status at IE: unable, requires daughter's assistance  Current: goal met; able to use pot to empty water for spaghetti (2/6/24)  4.  Patient to improve cervical rotation to at least 40 degrees for ability to check blind spot while driving   Status at IE: <40 degrees and painful   Current: goal met, R 80 deg, L 62 (with PT to stabilize TS) (2/6/24)    RECOMMENDATIONS  DC as pt has met goals, 
analyze and address soft tissue restrictions, analyze and cue for proper movement patterns, analyze and modify for postural abnormalities, analyze and address imbalance/dizziness, and instruct in home and community integration to address functional deficits and attain remaining goals.    Progress toward goals / Updated goals:  []  See Progress Note/Recertification    Short Term Goals: To be accomplished in  4  weeks:  1.  Pt will be independent and compliant with HEP  Status at IE: :HEP est at initial eval and will be progressed as needed.   Current : noted compliance; final DC plan given and verbalized understanding (24)  2.  Patient will increase FOTO score to 49/100 for indications of increased functional mobility.   Status at IE:  42/100  Current: goal surpassed; 96/100 (24)  3.  Patient will be able to manage 5 pound pan in kitchen with maneuvering it in and out of oven independently to optimize independence with cooking  Status at IE: unable, requires daughter's assistance  Current: goal met; able to use pot to empty water for spaghetti (24)  4.  Patient to improve cervical rotation to at least 40 degrees for ability to check blind spot while driving   Status at IE: <40 degrees and painful   Current: goal met, R 80 deg, L 62 (with PT to stabilize TS) (24)     Next PN/ RC due 24  Auth due (visit number/ date) 8 visits  3/31/24    PLAN  [] Continue plan of care  []  Upgrade activities as tolerated  [x]  Discharge due to : met all goals and I with HEP.   []  Other:    Eve Iraheta PT    2024    8:17 AM    Future Appointments   Date Time Provider Department Center   2024  9:40 AM Eve Iraheta PT [facility]   2024 11:00 AM Eve Iraheta PT [facility]   2024  9:00 AM Eve Iraheta PT [facility]   2/15/2024  9:40 AM Eve Iraheta PT [facility]   2024  9:20 AM Patricia Woodall MD [facility]   2024  9:00 AM Bandar Hameed PT [facility]

## 2024-02-08 ENCOUNTER — APPOINTMENT (OUTPATIENT)
Facility: HOSPITAL | Age: 74
End: 2024-02-08
Payer: MEDICARE

## 2024-02-13 ENCOUNTER — APPOINTMENT (OUTPATIENT)
Facility: HOSPITAL | Age: 74
End: 2024-02-13
Payer: MEDICARE

## 2024-02-15 ENCOUNTER — APPOINTMENT (OUTPATIENT)
Facility: HOSPITAL | Age: 74
End: 2024-02-15
Payer: MEDICARE

## 2024-02-20 ENCOUNTER — OFFICE VISIT (OUTPATIENT)
Age: 74
End: 2024-02-20
Payer: MEDICARE

## 2024-02-20 VITALS
BODY MASS INDEX: 22.5 KG/M2 | HEART RATE: 86 BPM | DIASTOLIC BLOOD PRESSURE: 64 MMHG | HEIGHT: 58 IN | RESPIRATION RATE: 18 BRPM | TEMPERATURE: 97.4 F | WEIGHT: 107.2 LBS | SYSTOLIC BLOOD PRESSURE: 129 MMHG

## 2024-02-20 DIAGNOSIS — M47.812 CERVICAL FACET SYNDROME: ICD-10-CM

## 2024-02-20 DIAGNOSIS — M79.10 TRIGGER POINT: ICD-10-CM

## 2024-02-20 DIAGNOSIS — M62.838 MUSCLE SPASM: ICD-10-CM

## 2024-02-20 DIAGNOSIS — M54.12 CERVICAL RADICULOPATHY: Primary | ICD-10-CM

## 2024-02-20 DIAGNOSIS — M48.02 CERVICAL SPINAL STENOSIS: ICD-10-CM

## 2024-02-20 PROCEDURE — 3017F COLORECTAL CA SCREEN DOC REV: CPT | Performed by: PHYSICAL MEDICINE & REHABILITATION

## 2024-02-20 PROCEDURE — G8399 PT W/DXA RESULTS DOCUMENT: HCPCS | Performed by: PHYSICAL MEDICINE & REHABILITATION

## 2024-02-20 PROCEDURE — 1123F ACP DISCUSS/DSCN MKR DOCD: CPT | Performed by: PHYSICAL MEDICINE & REHABILITATION

## 2024-02-20 PROCEDURE — 3074F SYST BP LT 130 MM HG: CPT | Performed by: PHYSICAL MEDICINE & REHABILITATION

## 2024-02-20 PROCEDURE — 99214 OFFICE O/P EST MOD 30 MIN: CPT | Performed by: PHYSICAL MEDICINE & REHABILITATION

## 2024-02-20 PROCEDURE — G8484 FLU IMMUNIZE NO ADMIN: HCPCS | Performed by: PHYSICAL MEDICINE & REHABILITATION

## 2024-02-20 PROCEDURE — 4004F PT TOBACCO SCREEN RCVD TLK: CPT | Performed by: PHYSICAL MEDICINE & REHABILITATION

## 2024-02-20 PROCEDURE — G8427 DOCREV CUR MEDS BY ELIG CLIN: HCPCS | Performed by: PHYSICAL MEDICINE & REHABILITATION

## 2024-02-20 PROCEDURE — 1090F PRES/ABSN URINE INCON ASSESS: CPT | Performed by: PHYSICAL MEDICINE & REHABILITATION

## 2024-02-20 PROCEDURE — G8420 CALC BMI NORM PARAMETERS: HCPCS | Performed by: PHYSICAL MEDICINE & REHABILITATION

## 2024-02-20 PROCEDURE — 3078F DIAST BP <80 MM HG: CPT | Performed by: PHYSICAL MEDICINE & REHABILITATION

## 2024-02-20 NOTE — PROGRESS NOTES
stenosis as outlined above.       Central canal contents, posterior fossa contents, paravertebral soft tissues   unremarkable.          Written by Sayra Min, as dictated by Patricia Woodall MD.  I, Dr. Patricia Woodall confirm that all documentation is accurate.

## 2024-02-21 ENCOUNTER — APPOINTMENT (OUTPATIENT)
Facility: HOSPITAL | Age: 74
End: 2024-02-21
Payer: MEDICARE

## 2024-02-22 ENCOUNTER — APPOINTMENT (OUTPATIENT)
Facility: HOSPITAL | Age: 74
End: 2024-02-22
Payer: MEDICARE

## 2024-05-07 NOTE — PROGRESS NOTES
Left +4/5 +4/5 +4/5 +4/5 +4/5 +4/5     IMAGING:  Cervical spine MRI from 12/11/2023 was personally reviewed with the patient and demonstrated:  MRI CERVICAL SPINE WO CONTRAST 12/11/2023     Narrative  EXAM: MRI CERVICAL SPINE WO CONTRAST     HISTORY: Spinal stenosis, cervical region; Radiculopathy, cervical region;  Neuralgia and neuritis, unspecified; Other symptoms and signs involving the  musculoskeletal system     COMPARISON: Cervical spine radiographs dated 4/6/2021     TECHNIQUE: Multisequence multiplanar imaging through the cervical spine.     FINDINGS:        Normal bone marrow signal intensity. Mild stepwise anterolisthesis from C3-C5.  Retrolisthesis C5-C6 measuring 3 mm. Advanced multilevel facet arthritis in the  cervical spine. Normal spinal cord signal intensity. The visualized paraspinal  soft tissues are unremarkable. The visualized intracranial contents are  unremarkable.        On axial imaging, findings at each level are as follows:     C2/C3: Bilateral facet arthritis. No spinal canal or foraminal narrowing.     C3/C4: Minimal anterolisthesis. Mild diffuse disc bulge. Bilateral facet  arthritis with bony hypertrophy. No spinal canal stenosis, moderate right and  mild left foraminal stenosis.     C4/C5: Mild anterolisthesis. Mild diffuse disc bulge. Bilateral facet arthritis.  No spinal canal stenosis. Moderate left and mild right foraminal stenosis.     C5/C6: Retrolisthesis. Disc osteophyte complex with bilateral uncovertebral  hypertrophy and facet arthropathy. Mild to moderate spinal canal narrowing,  severe right and moderate left foraminal stenosis.     C6/C7: Central disc extrusion on top of a diffuse disc bulge. Bilateral  vertebral hypertrophy and facet arthropathy. Moderate spinal canal narrowing,  severe left and moderate to severe right foraminal narrowing.     C7/T1: Bilateral facet arthritis, right worse than left. No spinal canal  narrowing and mild-to-moderate bilateral foraminal

## 2024-05-08 ENCOUNTER — OFFICE VISIT (OUTPATIENT)
Age: 74
End: 2024-05-08

## 2024-05-08 VITALS
HEIGHT: 58 IN | DIASTOLIC BLOOD PRESSURE: 71 MMHG | WEIGHT: 105.4 LBS | BODY MASS INDEX: 22.13 KG/M2 | RESPIRATION RATE: 18 BRPM | TEMPERATURE: 97.2 F | SYSTOLIC BLOOD PRESSURE: 135 MMHG | HEART RATE: 74 BPM

## 2024-05-08 DIAGNOSIS — M47.812 CERVICAL FACET SYNDROME: Primary | ICD-10-CM

## 2024-05-08 DIAGNOSIS — M62.838 MUSCLE SPASM: ICD-10-CM

## 2024-05-08 DIAGNOSIS — M79.10 TRIGGER POINT: ICD-10-CM

## 2024-05-08 DIAGNOSIS — M48.02 CERVICAL SPINAL STENOSIS: ICD-10-CM

## 2024-05-08 DIAGNOSIS — M54.12 CERVICAL RADICULOPATHY: ICD-10-CM

## 2024-05-08 DIAGNOSIS — M54.50 LUMBAR PAIN: ICD-10-CM

## 2024-05-08 PROBLEM — I25.10 CALCIFICATION OF CORONARY ARTERY: Status: ACTIVE | Noted: 2021-12-16

## 2024-05-08 PROBLEM — F41.8 MIXED ANXIETY AND DEPRESSIVE DISORDER: Status: ACTIVE | Noted: 2021-04-21

## 2024-05-08 PROBLEM — K59.04 CHRONIC IDIOPATHIC CONSTIPATION: Status: ACTIVE | Noted: 2021-04-21

## 2024-05-08 PROBLEM — A41.9 SEPSIS (HCC): Status: ACTIVE | Noted: 2020-11-21

## 2024-05-08 PROBLEM — R19.8 ALTERED BOWEL FUNCTION: Status: ACTIVE | Noted: 2024-02-26

## 2024-05-08 PROBLEM — F33.0 RECURRENT MAJOR DEPRESSIVE EPISODES, MILD (HCC): Status: ACTIVE | Noted: 2022-06-08

## 2024-05-08 PROBLEM — K76.0 STEATOSIS OF LIVER: Status: ACTIVE | Noted: 2021-04-21

## 2024-05-08 PROBLEM — G62.0 PERIPHERAL NEUROPATHY DUE TO AND NOT CONCURRENT WITH CHEMOTHERAPY (HCC): Status: ACTIVE | Noted: 2021-04-21

## 2024-05-08 PROBLEM — R10.84 ABDOMINAL PAIN, GENERALIZED: Status: ACTIVE | Noted: 2020-11-21

## 2024-05-08 PROBLEM — C85.80: Status: ACTIVE | Noted: 2021-04-21

## 2024-05-08 PROBLEM — K57.90 DIVERTICULAR DISEASE: Status: ACTIVE | Noted: 2019-02-08

## 2024-05-08 PROBLEM — I25.84 CALCIFICATION OF CORONARY ARTERY: Status: ACTIVE | Noted: 2021-12-16

## 2024-05-08 PROBLEM — R59.9 ADENOPATHY: Status: ACTIVE | Noted: 2024-05-08

## 2024-05-08 PROBLEM — T45.1X5S PERIPHERAL NEUROPATHY DUE TO AND NOT CONCURRENT WITH CHEMOTHERAPY (HCC): Status: ACTIVE | Noted: 2021-04-21

## 2024-05-08 PROBLEM — I10 ESSENTIAL HYPERTENSION: Status: ACTIVE | Noted: 2019-07-18

## 2024-05-08 RX ORDER — BETAMETHASONE SODIUM PHOSPHATE AND BETAMETHASONE ACETATE 3; 3 MG/ML; MG/ML
12 INJECTION, SUSPENSION INTRA-ARTICULAR; INTRALESIONAL; INTRAMUSCULAR; SOFT TISSUE ONCE
Status: COMPLETED | OUTPATIENT
Start: 2024-05-08 | End: 2024-05-08

## 2024-05-08 RX ORDER — LIDOCAINE HYDROCHLORIDE 10 MG/ML
5 INJECTION, SOLUTION INFILTRATION; PERINEURAL ONCE
Status: COMPLETED | OUTPATIENT
Start: 2024-05-08 | End: 2024-05-08

## 2024-05-08 RX ORDER — BUPIVACAINE HYDROCHLORIDE 2.5 MG/ML
30 INJECTION, SOLUTION EPIDURAL; INFILTRATION; INTRACAUDAL ONCE
Status: COMPLETED | OUTPATIENT
Start: 2024-05-08 | End: 2024-05-08

## 2024-05-08 RX ADMIN — BUPIVACAINE HYDROCHLORIDE 75 MG: 2.5 INJECTION, SOLUTION EPIDURAL; INFILTRATION; INTRACAUDAL at 16:22

## 2024-05-08 RX ADMIN — LIDOCAINE HYDROCHLORIDE 5 ML: 10 INJECTION, SOLUTION INFILTRATION; PERINEURAL at 16:23

## 2024-05-08 RX ADMIN — BETAMETHASONE SODIUM PHOSPHATE AND BETAMETHASONE ACETATE 6 MG: 3; 3 INJECTION, SUSPENSION INTRA-ARTICULAR; INTRALESIONAL; INTRAMUSCULAR; SOFT TISSUE at 16:24

## 2024-05-20 DIAGNOSIS — F33.0 MAJOR DEPRESSIVE DISORDER, RECURRENT, MILD (HCC): ICD-10-CM

## 2024-05-22 RX ORDER — FLUOXETINE 10 MG/1
10 CAPSULE ORAL DAILY
Qty: 90 CAPSULE | Refills: 3 | Status: SHIPPED | OUTPATIENT
Start: 2024-05-22

## 2024-06-14 DIAGNOSIS — R10.13 DYSPEPSIA: ICD-10-CM

## 2024-06-14 RX ORDER — OMEPRAZOLE 40 MG/1
40 CAPSULE, DELAYED RELEASE ORAL
Qty: 90 CAPSULE | Refills: 3 | Status: SHIPPED | OUTPATIENT
Start: 2024-06-14

## 2024-07-01 ENCOUNTER — OFFICE VISIT (OUTPATIENT)
Facility: CLINIC | Age: 74
End: 2024-07-01
Payer: MEDICARE

## 2024-07-01 VITALS
BODY MASS INDEX: 22.04 KG/M2 | WEIGHT: 105 LBS | TEMPERATURE: 99.3 F | HEART RATE: 59 BPM | OXYGEN SATURATION: 97 % | HEIGHT: 58 IN | DIASTOLIC BLOOD PRESSURE: 63 MMHG | SYSTOLIC BLOOD PRESSURE: 125 MMHG | RESPIRATION RATE: 16 BRPM

## 2024-07-01 DIAGNOSIS — F41.8 MIXED ANXIETY AND DEPRESSIVE DISORDER: ICD-10-CM

## 2024-07-01 DIAGNOSIS — R73.01 IFG (IMPAIRED FASTING GLUCOSE): ICD-10-CM

## 2024-07-01 DIAGNOSIS — R93.1 AGATSTON CAC SCORE 100-199: ICD-10-CM

## 2024-07-01 DIAGNOSIS — D12.6 COLON ADENOMA: ICD-10-CM

## 2024-07-01 DIAGNOSIS — I10 PRIMARY HYPERTENSION: ICD-10-CM

## 2024-07-01 DIAGNOSIS — I25.10 CALCIFICATION OF CORONARY ARTERY: ICD-10-CM

## 2024-07-01 DIAGNOSIS — K76.0 STEATOSIS OF LIVER: ICD-10-CM

## 2024-07-01 DIAGNOSIS — E78.5 HYPERLIPIDEMIA, UNSPECIFIED HYPERLIPIDEMIA TYPE: ICD-10-CM

## 2024-07-01 DIAGNOSIS — C83.18 MANTLE CELL LYMPHOMA OF LYMPH NODES OF MULTIPLE SITES (HCC): ICD-10-CM

## 2024-07-01 DIAGNOSIS — Z00.00 MEDICARE ANNUAL WELLNESS VISIT, SUBSEQUENT: Primary | ICD-10-CM

## 2024-07-01 DIAGNOSIS — I25.84 CALCIFICATION OF CORONARY ARTERY: ICD-10-CM

## 2024-07-01 DIAGNOSIS — N95.9 MENOPAUSAL AND PERIMENOPAUSAL DISORDER: ICD-10-CM

## 2024-07-01 DIAGNOSIS — Z71.89 ADVANCED CARE PLANNING/COUNSELING DISCUSSION: ICD-10-CM

## 2024-07-01 PROBLEM — R19.8 ALTERED BOWEL FUNCTION: Status: RESOLVED | Noted: 2024-02-26 | Resolved: 2024-07-01

## 2024-07-01 PROBLEM — K57.90 DIVERTICULAR DISEASE: Status: RESOLVED | Noted: 2019-02-08 | Resolved: 2024-07-01

## 2024-07-01 PROBLEM — T45.1X5S PERIPHERAL NEUROPATHY DUE TO AND NOT CONCURRENT WITH CHEMOTHERAPY (HCC): Status: RESOLVED | Noted: 2021-04-21 | Resolved: 2024-07-01

## 2024-07-01 PROBLEM — F33.0 MAJOR DEPRESSIVE DISORDER, RECURRENT, MILD (HCC): Status: RESOLVED | Noted: 2022-06-08 | Resolved: 2024-07-01

## 2024-07-01 PROBLEM — R10.84 ABDOMINAL PAIN, GENERALIZED: Status: RESOLVED | Noted: 2020-11-21 | Resolved: 2024-07-01

## 2024-07-01 PROBLEM — A41.9 SEPSIS (HCC): Status: RESOLVED | Noted: 2020-11-21 | Resolved: 2024-07-01

## 2024-07-01 PROBLEM — G62.0 PERIPHERAL NEUROPATHY DUE TO AND NOT CONCURRENT WITH CHEMOTHERAPY (HCC): Status: RESOLVED | Noted: 2021-04-21 | Resolved: 2024-07-01

## 2024-07-01 PROBLEM — F33.0 RECURRENT MAJOR DEPRESSIVE EPISODES, MILD (HCC): Status: RESOLVED | Noted: 2022-06-08 | Resolved: 2024-07-01

## 2024-07-01 PROBLEM — R59.9 ADENOPATHY: Status: RESOLVED | Noted: 2024-05-08 | Resolved: 2024-07-01

## 2024-07-01 PROBLEM — C85.80: Status: RESOLVED | Noted: 2021-04-21 | Resolved: 2024-07-01

## 2024-07-01 PROCEDURE — 99214 OFFICE O/P EST MOD 30 MIN: CPT | Performed by: INTERNAL MEDICINE

## 2024-07-01 PROCEDURE — 1123F ACP DISCUSS/DSCN MKR DOCD: CPT | Performed by: INTERNAL MEDICINE

## 2024-07-01 PROCEDURE — G8399 PT W/DXA RESULTS DOCUMENT: HCPCS | Performed by: INTERNAL MEDICINE

## 2024-07-01 PROCEDURE — 99497 ADVNCD CARE PLAN 30 MIN: CPT | Performed by: INTERNAL MEDICINE

## 2024-07-01 PROCEDURE — 3078F DIAST BP <80 MM HG: CPT | Performed by: INTERNAL MEDICINE

## 2024-07-01 PROCEDURE — 4004F PT TOBACCO SCREEN RCVD TLK: CPT | Performed by: INTERNAL MEDICINE

## 2024-07-01 PROCEDURE — 3017F COLORECTAL CA SCREEN DOC REV: CPT | Performed by: INTERNAL MEDICINE

## 2024-07-01 PROCEDURE — G8427 DOCREV CUR MEDS BY ELIG CLIN: HCPCS | Performed by: INTERNAL MEDICINE

## 2024-07-01 PROCEDURE — 3074F SYST BP LT 130 MM HG: CPT | Performed by: INTERNAL MEDICINE

## 2024-07-01 PROCEDURE — 1090F PRES/ABSN URINE INCON ASSESS: CPT | Performed by: INTERNAL MEDICINE

## 2024-07-01 PROCEDURE — G0439 PPPS, SUBSEQ VISIT: HCPCS | Performed by: INTERNAL MEDICINE

## 2024-07-01 PROCEDURE — G8420 CALC BMI NORM PARAMETERS: HCPCS | Performed by: INTERNAL MEDICINE

## 2024-07-01 ASSESSMENT — PATIENT HEALTH QUESTIONNAIRE - PHQ9
SUM OF ALL RESPONSES TO PHQ9 QUESTIONS 1 & 2: 0
4. FEELING TIRED OR HAVING LITTLE ENERGY: SEVERAL DAYS
2. FEELING DOWN, DEPRESSED OR HOPELESS: NOT AT ALL
7. TROUBLE CONCENTRATING ON THINGS, SUCH AS READING THE NEWSPAPER OR WATCHING TELEVISION: SEVERAL DAYS
SUM OF ALL RESPONSES TO PHQ QUESTIONS 1-9: 3
6. FEELING BAD ABOUT YOURSELF - OR THAT YOU ARE A FAILURE OR HAVE LET YOURSELF OR YOUR FAMILY DOWN: NOT AT ALL
5. POOR APPETITE OR OVEREATING: NOT AT ALL
9. THOUGHTS THAT YOU WOULD BE BETTER OFF DEAD, OR OF HURTING YOURSELF: NOT AT ALL
8. MOVING OR SPEAKING SO SLOWLY THAT OTHER PEOPLE COULD HAVE NOTICED. OR THE OPPOSITE, BEING SO FIGETY OR RESTLESS THAT YOU HAVE BEEN MOVING AROUND A LOT MORE THAN USUAL: NOT AT ALL
10. IF YOU CHECKED OFF ANY PROBLEMS, HOW DIFFICULT HAVE THESE PROBLEMS MADE IT FOR YOU TO DO YOUR WORK, TAKE CARE OF THINGS AT HOME, OR GET ALONG WITH OTHER PEOPLE: NOT DIFFICULT AT ALL
1. LITTLE INTEREST OR PLEASURE IN DOING THINGS: NOT AT ALL
SUM OF ALL RESPONSES TO PHQ QUESTIONS 1-9: 3
3. TROUBLE FALLING OR STAYING ASLEEP: SEVERAL DAYS

## 2024-07-01 ASSESSMENT — LIFESTYLE VARIABLES
HOW MANY STANDARD DRINKS CONTAINING ALCOHOL DO YOU HAVE ON A TYPICAL DAY: PATIENT DOES NOT DRINK
HOW OFTEN DO YOU HAVE A DRINK CONTAINING ALCOHOL: NEVER

## 2024-07-02 NOTE — PROGRESS NOTES
Olamide Hand presents today for   Chief Complaint   Patient presents with    Medicare AWV       \"Have you been to the ER, urgent care clinic since your last visit?  Hospitalized since your last visit?\"    NO    “Have you seen or consulted any other health care providers outside of [facility] since your last visit?”    NO       Have you had a mammogram?”   NO; scheduled/walk-in  this week    Date of last Mammogram: 8/20/2018           
Automatic Reconciliation, Ar   ibuprofen (ADVIL;MOTRIN) 200 MG tablet Take 1 tablet by mouth every 8 hours as needed Yes Automatic Reconciliation, Ar   Cholecalciferol 50 MCG (2000 UT) CAPS Take 2,000 Units by mouth 2 times daily  Edy Spears MD       CareTe (Including outside providers/suppliers regularly involved in providing care):   Patient Care Team:  Edy Spears MD as PCP - General  Edy Spears MD as PCP - Empaneled Provider     Reviewed and updated this visit:  Tobacco  Allergies  Meds  Problems  Med Hx  Surg Hx  Soc Hx  Fam Hx             
External Referral To Hematology Oncology      9. Hyperlipidemia, unspecified hyperlipidemia type        10. Mixed anxiety and depressive disorder        11. Menopausal and perimenopausal disorder  DEXA BONE DENSITY AXIAL SKELETON      12. Advanced care planning/counseling discussion

## 2024-07-02 NOTE — ACP (ADVANCE CARE PLANNING)
Advance Care Planning     Advance Care Planning (ACP) Physician/NP/PA Conversation    Date of Conversation: 7/1/2024  Conducted with: Patient with Decision Making Capacity    Healthcare Decision Maker:      Primary Decision Maker: Lena Fink - Child - [phone]    Primary Decision Maker: Damaris Caldwell - Child - [phone]    Click here to complete Healthcare Decision Makers including selection of the Healthcare Decision Maker Relationship (ie \"Primary\")  Today we documented Decision Maker(s) consistent with Legal Next of Kin hierarchy.    Care Preferences:    Hospitalization:  \"If your health worsens and it becomes clear that your chance of recovery is unlikely, what would be your preference regarding hospitalization?\"  The patient would prefer hospitalization.    Ventilation:  \"If you were unable to breath on your own and your chance of recovery was unlikely, what would be your preference about the use of a ventilator (breathing machine) if it was available to you?\"  The patient would desire the use of a ventilator.    Resuscitation:  \"In the event your heart stopped as a result of an underlying serious health condition, would you want attempts made to restart your heart, or would you prefer a natural death?\"  Yes, attempt to resuscitate.    ventilation preferences, hospitalization preferences, and resuscitation preferences    Conversation Outcomes / Follow-Up Plan:  ACP in process - information provided, considering goals and options  Reviewed DNR/DNI and patient elects Full Code (Attempt Resuscitation)    Length of Voluntary ACP Conversation in minutes:  16 minutes    CHAPITO STOKES MD

## 2024-07-16 ENCOUNTER — HOSPITAL ENCOUNTER (OUTPATIENT)
Facility: HOSPITAL | Age: 74
Discharge: HOME | End: 2024-07-19
Attending: INTERNAL MEDICINE
Payer: MEDICARE

## 2024-07-16 DIAGNOSIS — N95.9 MENOPAUSAL AND PERIMENOPAUSAL DISORDER: ICD-10-CM

## 2024-07-16 PROCEDURE — 77080 DXA BONE DENSITY AXIAL: CPT

## 2024-08-08 NOTE — PROGRESS NOTES
[facility]  [address] [place] [address], [address]  [place] [address]  Phone: [phone]  Fax: [phone]    Patient's YOB: 1950    ASSESSMENT   Olamide was seen today for neck pain.    Diagnoses and all orders for this visit:    Acute pain of left shoulder  -     ketorolac (TORADOL) injection 15 mg    Muscle spasm  -     metaxalone (SKELAXIN) 800 MG tablet; Take 1 tablet by mouth 3 times daily for 10 days    Neuropathy  -     gabapentin (NEURONTIN) 300 MG capsule; Take 1 capsule by mouth nightly for 180 days. Max Daily Amount: 300 mg    Spinal stenosis, cervical region  -     gabapentin (NEURONTIN) 300 MG capsule; Take 1 capsule by mouth nightly for 180 days. Max Daily Amount: 300 mg  -     ketorolac (TORADOL) injection 15 mg    Radiculopathy, cervical region  -     gabapentin (NEURONTIN) 300 MG capsule; Take 1 capsule by mouth nightly for 180 days. Max Daily Amount: 300 mg  -     ketorolac (TORADOL) injection 15 mg         IMPRESSION AND PLAN:  Olamide Hand is a 73 y.o. female with history of cervical facet syndrome, cervical spinal stenosis, cervical radiculopathy, muscle spasms, and trigger points. She is taking Neurontin 300 mg and takes 1 cap QHS for neuropathic symptoms and baclofen 10 mg very intermittently as needed.       Patient was given information on shoulder arthritis exercises.   Patient received a Toradol injection in office  Patient was referred to ortho for a shoulder evaluation   Patient received a refill of Gabapentin 300 mg for neuropathic symptoms  Patient received a prescription of Skelaxin 800 mg for muscle spasm  Ms. Hand has a reminder for a \"due or due soon\" health maintenance. I have asked that she contact her primary care provider, Edy Spears MD, for follow-up on this health maintenance.   demonstrated consistency with prescribing  Pt offered PT but she defers.    Return in about 3 months (around 11/13/2024)

## 2024-08-13 ENCOUNTER — OFFICE VISIT (OUTPATIENT)
Age: 74
End: 2024-08-13
Payer: MEDICARE

## 2024-08-13 VITALS
WEIGHT: 106.8 LBS | SYSTOLIC BLOOD PRESSURE: 133 MMHG | HEIGHT: 58 IN | DIASTOLIC BLOOD PRESSURE: 66 MMHG | OXYGEN SATURATION: 97 % | TEMPERATURE: 97.3 F | BODY MASS INDEX: 22.42 KG/M2 | HEART RATE: 68 BPM

## 2024-08-13 DIAGNOSIS — M54.12 RADICULOPATHY, CERVICAL REGION: ICD-10-CM

## 2024-08-13 DIAGNOSIS — M48.02 SPINAL STENOSIS, CERVICAL REGION: ICD-10-CM

## 2024-08-13 DIAGNOSIS — M62.838 MUSCLE SPASM: ICD-10-CM

## 2024-08-13 DIAGNOSIS — M25.512 ACUTE PAIN OF LEFT SHOULDER: Primary | ICD-10-CM

## 2024-08-13 DIAGNOSIS — G62.9 NEUROPATHY: ICD-10-CM

## 2024-08-13 PROCEDURE — 3078F DIAST BP <80 MM HG: CPT | Performed by: PHYSICAL MEDICINE & REHABILITATION

## 2024-08-13 PROCEDURE — 3075F SYST BP GE 130 - 139MM HG: CPT | Performed by: PHYSICAL MEDICINE & REHABILITATION

## 2024-08-13 PROCEDURE — 4004F PT TOBACCO SCREEN RCVD TLK: CPT | Performed by: PHYSICAL MEDICINE & REHABILITATION

## 2024-08-13 PROCEDURE — 1123F ACP DISCUSS/DSCN MKR DOCD: CPT | Performed by: PHYSICAL MEDICINE & REHABILITATION

## 2024-08-13 PROCEDURE — 96372 THER/PROPH/DIAG INJ SC/IM: CPT | Performed by: PHYSICAL MEDICINE & REHABILITATION

## 2024-08-13 PROCEDURE — G8399 PT W/DXA RESULTS DOCUMENT: HCPCS | Performed by: PHYSICAL MEDICINE & REHABILITATION

## 2024-08-13 PROCEDURE — G8427 DOCREV CUR MEDS BY ELIG CLIN: HCPCS | Performed by: PHYSICAL MEDICINE & REHABILITATION

## 2024-08-13 PROCEDURE — G8420 CALC BMI NORM PARAMETERS: HCPCS | Performed by: PHYSICAL MEDICINE & REHABILITATION

## 2024-08-13 PROCEDURE — 1090F PRES/ABSN URINE INCON ASSESS: CPT | Performed by: PHYSICAL MEDICINE & REHABILITATION

## 2024-08-13 PROCEDURE — 99214 OFFICE O/P EST MOD 30 MIN: CPT | Performed by: PHYSICAL MEDICINE & REHABILITATION

## 2024-08-13 PROCEDURE — 3017F COLORECTAL CA SCREEN DOC REV: CPT | Performed by: PHYSICAL MEDICINE & REHABILITATION

## 2024-08-13 RX ORDER — KETOROLAC TROMETHAMINE 15 MG/ML
15 INJECTION, SOLUTION INTRAMUSCULAR; INTRAVENOUS ONCE
Status: COMPLETED | OUTPATIENT
Start: 2024-08-13 | End: 2024-08-13

## 2024-08-13 RX ORDER — METAXALONE 800 MG/1
800 TABLET ORAL 3 TIMES DAILY
Qty: 30 TABLET | Refills: 0 | Status: SHIPPED | OUTPATIENT
Start: 2024-08-13 | End: 2024-08-23

## 2024-08-13 RX ORDER — GABAPENTIN 300 MG/1
300 CAPSULE ORAL
Qty: 90 CAPSULE | Refills: 1 | Status: SHIPPED | OUTPATIENT
Start: 2024-08-13 | End: 2025-02-09

## 2024-08-13 RX ADMIN — KETOROLAC TROMETHAMINE 15 MG: 15 INJECTION, SOLUTION INTRAMUSCULAR; INTRAVENOUS at 11:59

## 2024-08-13 NOTE — PROGRESS NOTES
Consent was explained to the pt and signed. No questions or concerns voiced at this time. Pt given 15mg/1ml of toradol IM in left gluteus. No sign or symptoms of infection noted at injection site. There was no bleeding, swelling or leaking noted after injection. Pt handed injection information sheet to take home.    Patient was offered a ten minute observation period, but declined.  Patient ambulated well to t  front office for check out.

## 2024-08-13 NOTE — PROGRESS NOTES
Olamide Socorroconner Hand presents today for   Chief Complaint   Patient presents with    Neck Pain     Neck pain is better since last visit on right side  left side of neck and left arm pain       Is someone accompanying this pt? no    Is the patient using any DME equipment during OV? no          Coordination of Care:  1. Have you been to the ER, urgent care clinic since your last visit? no  Hospitalized since your last visit? no    2. Have you seen or consulted any other health care providers outside of the [facility] since your last visit? no Include any pap smears or colon screening. no

## 2024-08-26 RX ORDER — ATORVASTATIN CALCIUM 10 MG/1
TABLET, FILM COATED ORAL
Qty: 90 TABLET | Refills: 3 | Status: SHIPPED | OUTPATIENT
Start: 2024-08-26

## 2024-12-05 ENCOUNTER — TELEPHONE (OUTPATIENT)
Facility: CLINIC | Age: 74
End: 2024-12-05

## 2024-12-05 NOTE — TELEPHONE ENCOUNTER
Pt having fever, chest ,sinus congestion cough  she said she did not take covid test she doesn't have one . No openings today ,she is asking for   Zpak to be sent to her pharmacy

## 2024-12-05 NOTE — TELEPHONE ENCOUNTER
Patient notified and MD's note relayed to pt. Pt stated she will go to Urgent care first thing in the morning

## 2025-02-25 DIAGNOSIS — I10 PRIMARY HYPERTENSION: Primary | ICD-10-CM

## 2025-02-25 NOTE — TELEPHONE ENCOUNTER
Refill request via phone     Medication: amLODIPine (NORVASC) 5 MG tablet   Quantity: 90  Pharmacy:   [id number] - [place], [place] - [address]      Last Fill: 1/30/2024    PCP: Edy Spears MD    LAST OFFICE VISIT: 7/1/2024       No future appointments.

## 2025-02-25 NOTE — TELEPHONE ENCOUNTER
Last Office Visit: 07/01/2024  Next Office Visit: No Appointment Scheduled  Last Refill: 01/30/2024

## 2025-02-26 RX ORDER — AMLODIPINE BESYLATE 5 MG/1
5 TABLET ORAL DAILY
Qty: 90 TABLET | Refills: 3 | Status: SHIPPED | OUTPATIENT
Start: 2025-02-26

## 2025-03-10 ENCOUNTER — TRANSCRIBE ORDERS (OUTPATIENT)
Facility: HOSPITAL | Age: 75
End: 2025-03-10

## 2025-03-10 DIAGNOSIS — Z12.31 VISIT FOR SCREENING MAMMOGRAM: Primary | ICD-10-CM

## 2025-03-26 ENCOUNTER — HOSPITAL ENCOUNTER (OUTPATIENT)
Dept: WOMENS IMAGING | Facility: HOSPITAL | Age: 75
Discharge: HOME | End: 2025-03-29
Attending: INTERNAL MEDICINE
Payer: MEDICARE

## 2025-03-26 DIAGNOSIS — Z12.31 VISIT FOR SCREENING MAMMOGRAM: ICD-10-CM

## 2025-03-26 PROCEDURE — 77063 BREAST TOMOSYNTHESIS BI: CPT

## 2025-05-20 ENCOUNTER — TELEMEDICINE (OUTPATIENT)
Facility: CLINIC | Age: 75
End: 2025-05-20
Payer: MEDICARE

## 2025-05-20 DIAGNOSIS — J01.10 ACUTE NON-RECURRENT FRONTAL SINUSITIS: Primary | ICD-10-CM

## 2025-05-20 DIAGNOSIS — J40 BRONCHITIS: ICD-10-CM

## 2025-05-20 PROCEDURE — 99213 OFFICE O/P EST LOW 20 MIN: CPT | Performed by: INTERNAL MEDICINE

## 2025-05-20 PROCEDURE — 1123F ACP DISCUSS/DSCN MKR DOCD: CPT | Performed by: INTERNAL MEDICINE

## 2025-05-20 PROCEDURE — G8399 PT W/DXA RESULTS DOCUMENT: HCPCS | Performed by: INTERNAL MEDICINE

## 2025-05-20 PROCEDURE — G8428 CUR MEDS NOT DOCUMENT: HCPCS | Performed by: INTERNAL MEDICINE

## 2025-05-20 PROCEDURE — 1090F PRES/ABSN URINE INCON ASSESS: CPT | Performed by: INTERNAL MEDICINE

## 2025-05-20 PROCEDURE — 3017F COLORECTAL CA SCREEN DOC REV: CPT | Performed by: INTERNAL MEDICINE

## 2025-05-20 RX ORDER — HYDROCODONE POLISTIREX AND CHLORPHENIRAMINE POLISTIREX 10; 8 MG/5ML; MG/5ML
5 SUSPENSION, EXTENDED RELEASE ORAL EVERY 12 HOURS PRN
Qty: 100 ML | Refills: 0 | Status: SHIPPED | OUTPATIENT
Start: 2025-05-20 | End: 2025-05-30

## 2025-05-20 RX ORDER — AZITHROMYCIN 250 MG/1
TABLET, FILM COATED ORAL
Qty: 6 TABLET | Refills: 0 | Status: SHIPPED | OUTPATIENT
Start: 2025-05-20 | End: 2025-05-30

## 2025-05-20 NOTE — PROGRESS NOTES
Olamidecitlalli Quintanilla Evelynabhijeet 74 y.o. who was seen by synchronous (real-time) audio-video technology for   Chief Complaint   Patient presents with    Sinus Problem    Cough             Assessment & Plan:      Diagnosis Orders   1. Acute non-recurrent frontal sinusitis  azithromycin (ZITHROMAX) 250 MG tablet    HYDROcodone-chlorpheniramine (TUSSIONEX) 10-8 MG/5ML SUER      2. Bronchitis  azithromycin (ZITHROMAX) 250 MG tablet    HYDROcodone-chlorpheniramine (TUSSIONEX) 10-8 MG/5ML SUER          712  Subjective:   She has been having URI symptoms since Mother's Day.  Describes mostly sinus congestion along with chest congestion.  She did spike a fever upwards of 102.7 last week but that has since resolved.  She has been using OTC meds including Mucinex as well along with cough syrup, currently describes a lot of sinus congestion and pressure, coughing up purulent chunky sputum as well.  Reports that albuterol has been helping, no actual shortness of breath, just occasional wheezing.    Objective:     Past Medical History:   Diagnosis Date    [place] CAC score 100-199 09/2021    ca score 138, calcification all 3 vessels    Anxiety 6/15    ALEJANDRO-7 was 12/21    Cholelithiasis 12/2023    on CT, renal cysts, divertics    Clostridium difficile colitis     Dr. Burgos 3/14; postop 10/20    Colon adenoma 10/2011    Dr. Carrillo; Dr Colon 7/20    Cystic breast     Degenerative arthritis of cervical spine     on xray, worst C5-6 (4/21); mri showed multilevel degen changes, foraminal stenosis (4.21); Dr Woodall    Depression 6/15    PHQ-9 was 15/27    Diverticulitis     recurrent x3 Dr Law    Fatty liver     10/10 on US, CT 6/12; Fib-4 was 0.74 from 1/15    GI bleed 2/14    ischemic colitis on CT, seen by Dr. Burgos    Hyperlipidemia     calculated 10 year risk score was 3.2% (1/15)    Hypertension     IFG (impaired fasting glucose) [id number]    Ischemic colitis 3/14    Dr Burgos    Labial abscess     mrsa    Lung nodule 2015    9x10mm RML,

## 2025-05-26 DIAGNOSIS — M48.02 SPINAL STENOSIS, CERVICAL REGION: ICD-10-CM

## 2025-05-26 DIAGNOSIS — M54.12 RADICULOPATHY, CERVICAL REGION: ICD-10-CM

## 2025-05-26 DIAGNOSIS — F33.0 MAJOR DEPRESSIVE DISORDER, RECURRENT, MILD: ICD-10-CM

## 2025-05-26 DIAGNOSIS — G62.9 NEUROPATHY: ICD-10-CM

## 2025-05-26 RX ORDER — GABAPENTIN 300 MG/1
CAPSULE ORAL
Qty: 90 CAPSULE | OUTPATIENT
Start: 2025-05-26

## 2025-05-27 ENCOUNTER — OFFICE VISIT (OUTPATIENT)
Facility: CLINIC | Age: 75
End: 2025-05-27
Payer: MEDICARE

## 2025-05-27 VITALS
BODY MASS INDEX: 22.25 KG/M2 | OXYGEN SATURATION: 97 % | DIASTOLIC BLOOD PRESSURE: 74 MMHG | HEART RATE: 52 BPM | WEIGHT: 106 LBS | TEMPERATURE: 98.2 F | HEIGHT: 58 IN | RESPIRATION RATE: 18 BRPM | SYSTOLIC BLOOD PRESSURE: 157 MMHG

## 2025-05-27 DIAGNOSIS — J01.10 ACUTE NON-RECURRENT FRONTAL SINUSITIS: Primary | ICD-10-CM

## 2025-05-27 DIAGNOSIS — R06.2 WHEEZING: ICD-10-CM

## 2025-05-27 DIAGNOSIS — R05.2 SUBACUTE COUGH: ICD-10-CM

## 2025-05-27 PROCEDURE — 1159F MED LIST DOCD IN RCRD: CPT | Performed by: INTERNAL MEDICINE

## 2025-05-27 PROCEDURE — G8427 DOCREV CUR MEDS BY ELIG CLIN: HCPCS | Performed by: INTERNAL MEDICINE

## 2025-05-27 PROCEDURE — 3078F DIAST BP <80 MM HG: CPT | Performed by: INTERNAL MEDICINE

## 2025-05-27 PROCEDURE — 3017F COLORECTAL CA SCREEN DOC REV: CPT | Performed by: INTERNAL MEDICINE

## 2025-05-27 PROCEDURE — G8399 PT W/DXA RESULTS DOCUMENT: HCPCS | Performed by: INTERNAL MEDICINE

## 2025-05-27 PROCEDURE — G8420 CALC BMI NORM PARAMETERS: HCPCS | Performed by: INTERNAL MEDICINE

## 2025-05-27 PROCEDURE — 99213 OFFICE O/P EST LOW 20 MIN: CPT | Performed by: INTERNAL MEDICINE

## 2025-05-27 PROCEDURE — 4004F PT TOBACCO SCREEN RCVD TLK: CPT | Performed by: INTERNAL MEDICINE

## 2025-05-27 PROCEDURE — 3077F SYST BP >= 140 MM HG: CPT | Performed by: INTERNAL MEDICINE

## 2025-05-27 PROCEDURE — 1090F PRES/ABSN URINE INCON ASSESS: CPT | Performed by: INTERNAL MEDICINE

## 2025-05-27 PROCEDURE — 1123F ACP DISCUSS/DSCN MKR DOCD: CPT | Performed by: INTERNAL MEDICINE

## 2025-05-27 RX ORDER — OXYMETAZOLINE HYDROCHLORIDE 0.05 G/100ML
2 SPRAY NASAL PRN
COMMUNITY

## 2025-05-27 RX ORDER — ALBUTEROL SULFATE 90 UG/1
2 INHALANT RESPIRATORY (INHALATION) 4 TIMES DAILY PRN
Qty: 18 G | Refills: 5 | Status: SHIPPED | OUTPATIENT
Start: 2025-05-27

## 2025-05-27 RX ORDER — PREDNISONE 20 MG/1
20 TABLET ORAL 2 TIMES DAILY
Qty: 10 TABLET | Refills: 0 | Status: SHIPPED | OUTPATIENT
Start: 2025-05-27 | End: 2025-06-01

## 2025-05-27 RX ORDER — CEFDINIR 300 MG/1
300 CAPSULE ORAL 2 TIMES DAILY
Qty: 20 CAPSULE | Refills: 0 | Status: SHIPPED | OUTPATIENT
Start: 2025-05-27 | End: 2025-06-06

## 2025-05-27 RX ORDER — HYDROCODONE POLISTIREX AND CHLORPHENIRAMINE POLISTIREX 10; 8 MG/5ML; MG/5ML
5 SUSPENSION, EXTENDED RELEASE ORAL EVERY 12 HOURS PRN
Qty: 100 ML | Refills: 0 | Status: SHIPPED | OUTPATIENT
Start: 2025-05-27 | End: 2025-06-06

## 2025-05-27 NOTE — PROGRESS NOTES
74 y.o. female who presents for evaluation.  Daughter was present for the encounter    We saw her via virtual visit on 5/20/2025.  At the time, she was complaining of URI symptoms since Mother's Day.  Mostly sinus and chest congestion although she did have a fever spike.  Was using Mucinex and able to cough up discolored material.  She had occasional wheezing but no actual shortness of breath, was using some leftover albuterol.  We gave her Z-Js and Tussionex and her symptoms did improve markedly but have not resolved.      Currently, still having persistent cough, a lot of sinus congestion, facial fullness, discolored material from the sinuses.  Fevers resolved.  They have noticed marked increase in the wheezing.  No actual shortness of breath, pleurisy, chest pain, unilateral leg swelling or edema    Past Medical History:   Diagnosis Date    Agatston CAC score 100-199 09/2021    ca score 138, calcification all 3 vessels    Anxiety 6/15    ALEJANDRO-7 was 12/21    Cholelithiasis 12/2023    on CT, renal cysts, divertics    Clostridium difficile colitis     Dr. Burgos 3/14; postop 10/20    Colon adenoma 10/2011    Dr. Carrillo; Dr Colon 7/20    Cystic breast     Degenerative arthritis of cervical spine     on xray, worst C5-6 (4/21); mri showed multilevel degen changes, foraminal stenosis (4.21); Dr Woodall    Depression 6/15    PHQ-9 was 15/27    Diverticulitis     recurrent x3 Dr Law    Fatty liver     10/10 on US, CT 6/12; Fib-4 was 0.74 from 1/15    GI bleed 2/14    ischemic colitis on CT, seen by Dr. Burgos    Hyperlipidemia     calculated 10 year risk score was 3.2% (1/15)    Hypertension     IFG (impaired fasting glucose) [id number]    Ischemic colitis 3/14    Dr Burgos    Labial abscess     mrsa    Lung nodule 2015    9x10mm RML, no change 9/21    Lymphoma, mantle cell (HCC) 12/2013    Dr. Davis; stage s/p chemo; now Dr Brody    Migraine     Nephrolithiasis     Neuropathy due to chemotherapeutic drug 9/14

## 2025-05-28 RX ORDER — FLUOXETINE 10 MG/1
10 CAPSULE ORAL DAILY
Qty: 90 CAPSULE | Refills: 3 | Status: SHIPPED | OUTPATIENT
Start: 2025-05-28

## 2025-07-18 ENCOUNTER — TELEPHONE (OUTPATIENT)
Facility: CLINIC | Age: 75
End: 2025-07-18

## 2025-07-18 DIAGNOSIS — D50.9 IRON DEFICIENCY ANEMIA, UNSPECIFIED IRON DEFICIENCY ANEMIA TYPE: ICD-10-CM

## 2025-07-18 DIAGNOSIS — E78.5 HYPERLIPIDEMIA, UNSPECIFIED HYPERLIPIDEMIA TYPE: ICD-10-CM

## 2025-07-18 DIAGNOSIS — R73.01 IFG (IMPAIRED FASTING GLUCOSE): Primary | ICD-10-CM

## 2025-07-23 ENCOUNTER — LAB (OUTPATIENT)
Facility: CLINIC | Age: 75
End: 2025-07-23

## 2025-07-23 DIAGNOSIS — D50.9 IRON DEFICIENCY ANEMIA, UNSPECIFIED IRON DEFICIENCY ANEMIA TYPE: ICD-10-CM

## 2025-07-23 DIAGNOSIS — R73.01 IFG (IMPAIRED FASTING GLUCOSE): ICD-10-CM

## 2025-07-23 DIAGNOSIS — E78.5 HYPERLIPIDEMIA, UNSPECIFIED HYPERLIPIDEMIA TYPE: ICD-10-CM

## 2025-07-24 LAB
ALBUMIN SERPL-MCNC: 4.4 G/DL (ref 3.8–4.8)
ALP SERPL-CCNC: 63 IU/L (ref 44–121)
ALT SERPL-CCNC: 15 IU/L (ref 0–32)
AST SERPL-CCNC: 18 IU/L (ref 0–40)
BASOPHILS # BLD AUTO: 0 X10E3/UL (ref 0–0.2)
BASOPHILS NFR BLD AUTO: 1 %
BILIRUB SERPL-MCNC: 0.2 MG/DL (ref 0–1.2)
BUN SERPL-MCNC: 16 MG/DL (ref 8–27)
BUN/CREAT SERPL: 20 (ref 12–28)
CALCIUM SERPL-MCNC: 9.7 MG/DL (ref 8.7–10.3)
CHLORIDE SERPL-SCNC: 106 MMOL/L (ref 96–106)
CHOLEST SERPL-MCNC: 192 MG/DL (ref 100–199)
CO2 SERPL-SCNC: 20 MMOL/L (ref 20–29)
CREAT SERPL-MCNC: 0.81 MG/DL (ref 0.57–1)
EGFRCR SERPLBLD CKD-EPI 2021: 77 ML/MIN/1.73
EOSINOPHIL # BLD AUTO: 0.3 X10E3/UL (ref 0–0.4)
EOSINOPHIL NFR BLD AUTO: 5 %
ERYTHROCYTE [DISTWIDTH] IN BLOOD BY AUTOMATED COUNT: 13.2 % (ref 11.7–15.4)
GLOBULIN SER CALC-MCNC: 3 G/DL (ref 1.5–4.5)
GLUCOSE SERPL-MCNC: 110 MG/DL (ref 70–99)
HBA1C MFR BLD: 5.6 % (ref 4.8–5.6)
HCT VFR BLD AUTO: 35.7 % (ref 34–46.6)
HDLC SERPL-MCNC: 109 MG/DL
HGB BLD-MCNC: 12 G/DL (ref 11.1–15.9)
IMM GRANULOCYTES # BLD AUTO: 0 X10E3/UL (ref 0–0.1)
IMM GRANULOCYTES NFR BLD AUTO: 0 %
LDLC SERPL CALC-MCNC: 74 MG/DL (ref 0–99)
LYMPHOCYTES # BLD AUTO: 1.5 X10E3/UL (ref 0.7–3.1)
LYMPHOCYTES NFR BLD AUTO: 20 %
MCH RBC QN AUTO: 32.1 PG (ref 26.6–33)
MCHC RBC AUTO-ENTMCNC: 33.6 G/DL (ref 31.5–35.7)
MCV RBC AUTO: 96 FL (ref 79–97)
MONOCYTES # BLD AUTO: 0.5 X10E3/UL (ref 0.1–0.9)
MONOCYTES NFR BLD AUTO: 7 %
NEUTROPHILS # BLD AUTO: 5.1 X10E3/UL (ref 1.4–7)
NEUTROPHILS NFR BLD AUTO: 67 %
PLATELET # BLD AUTO: 333 X10E3/UL (ref 150–450)
POTASSIUM SERPL-SCNC: 5.5 MMOL/L (ref 3.5–5.2)
PROT SERPL-MCNC: 7.4 G/DL (ref 6–8.5)
RBC # BLD AUTO: 3.74 X10E6/UL (ref 3.77–5.28)
SODIUM SERPL-SCNC: 141 MMOL/L (ref 134–144)
TRIGL SERPL-MCNC: 48 MG/DL (ref 0–149)
VLDLC SERPL CALC-MCNC: 9 MG/DL (ref 5–40)
WBC # BLD AUTO: 7.5 X10E3/UL (ref 3.4–10.8)

## 2025-07-25 ENCOUNTER — OFFICE VISIT (OUTPATIENT)
Facility: CLINIC | Age: 75
End: 2025-07-25
Payer: MEDICARE

## 2025-07-25 ENCOUNTER — TELEPHONE (OUTPATIENT)
Facility: CLINIC | Age: 75
End: 2025-07-25

## 2025-07-25 VITALS
RESPIRATION RATE: 16 BRPM | HEART RATE: 63 BPM | WEIGHT: 106 LBS | TEMPERATURE: 98.2 F | OXYGEN SATURATION: 96 % | BODY MASS INDEX: 22.25 KG/M2 | HEIGHT: 58 IN | DIASTOLIC BLOOD PRESSURE: 74 MMHG | SYSTOLIC BLOOD PRESSURE: 136 MMHG

## 2025-07-25 DIAGNOSIS — T45.1X5A NEUROPATHY DUE TO CHEMOTHERAPEUTIC DRUG: ICD-10-CM

## 2025-07-25 DIAGNOSIS — D12.6 COLON ADENOMA: ICD-10-CM

## 2025-07-25 DIAGNOSIS — R06.2 WHEEZING: ICD-10-CM

## 2025-07-25 DIAGNOSIS — Z00.00 MEDICARE ANNUAL WELLNESS VISIT, SUBSEQUENT: Primary | ICD-10-CM

## 2025-07-25 DIAGNOSIS — K76.0 METABOLIC DYSFUNCTION-ASSOCIATED STEATOTIC LIVER DISEASE (MASLD): ICD-10-CM

## 2025-07-25 DIAGNOSIS — R73.01 IFG (IMPAIRED FASTING GLUCOSE): ICD-10-CM

## 2025-07-25 DIAGNOSIS — C83.18 MANTLE CELL LYMPHOMA OF LYMPH NODES OF MULTIPLE SITES (HCC): ICD-10-CM

## 2025-07-25 DIAGNOSIS — I10 PRIMARY HYPERTENSION: ICD-10-CM

## 2025-07-25 DIAGNOSIS — R93.1 AGATSTON CAC SCORE 100-199: ICD-10-CM

## 2025-07-25 DIAGNOSIS — F17.200 TOBACCO DEPENDENCE SYNDROME: ICD-10-CM

## 2025-07-25 DIAGNOSIS — E78.5 HYPERLIPIDEMIA, UNSPECIFIED HYPERLIPIDEMIA TYPE: ICD-10-CM

## 2025-07-25 DIAGNOSIS — Z71.89 ADVANCED CARE PLANNING/COUNSELING DISCUSSION: ICD-10-CM

## 2025-07-25 DIAGNOSIS — G62.0 NEUROPATHY DUE TO CHEMOTHERAPEUTIC DRUG: ICD-10-CM

## 2025-07-25 PROCEDURE — 3075F SYST BP GE 130 - 139MM HG: CPT | Performed by: INTERNAL MEDICINE

## 2025-07-25 PROCEDURE — 1090F PRES/ABSN URINE INCON ASSESS: CPT | Performed by: INTERNAL MEDICINE

## 2025-07-25 PROCEDURE — G8399 PT W/DXA RESULTS DOCUMENT: HCPCS | Performed by: INTERNAL MEDICINE

## 2025-07-25 PROCEDURE — 3078F DIAST BP <80 MM HG: CPT | Performed by: INTERNAL MEDICINE

## 2025-07-25 PROCEDURE — G8420 CALC BMI NORM PARAMETERS: HCPCS | Performed by: INTERNAL MEDICINE

## 2025-07-25 PROCEDURE — 1159F MED LIST DOCD IN RCRD: CPT | Performed by: INTERNAL MEDICINE

## 2025-07-25 PROCEDURE — G8427 DOCREV CUR MEDS BY ELIG CLIN: HCPCS | Performed by: INTERNAL MEDICINE

## 2025-07-25 PROCEDURE — 3017F COLORECTAL CA SCREEN DOC REV: CPT | Performed by: INTERNAL MEDICINE

## 2025-07-25 PROCEDURE — G0439 PPPS, SUBSEQ VISIT: HCPCS | Performed by: INTERNAL MEDICINE

## 2025-07-25 PROCEDURE — 4004F PT TOBACCO SCREEN RCVD TLK: CPT | Performed by: INTERNAL MEDICINE

## 2025-07-25 PROCEDURE — 99214 OFFICE O/P EST MOD 30 MIN: CPT | Performed by: INTERNAL MEDICINE

## 2025-07-25 PROCEDURE — 99497 ADVNCD CARE PLAN 30 MIN: CPT | Performed by: INTERNAL MEDICINE

## 2025-07-25 PROCEDURE — 1123F ACP DISCUSS/DSCN MKR DOCD: CPT | Performed by: INTERNAL MEDICINE

## 2025-07-25 RX ORDER — BUPROPION HYDROCHLORIDE 150 MG/1
150 TABLET ORAL EVERY MORNING
Qty: 90 TABLET | Refills: 1 | Status: SHIPPED | OUTPATIENT
Start: 2025-07-25

## 2025-07-25 RX ORDER — FLUTICASONE PROPIONATE AND SALMETEROL XINAFOATE 45; 21 UG/1; UG/1
2 AEROSOL, METERED RESPIRATORY (INHALATION) 2 TIMES DAILY
Qty: 1 EACH | Refills: 5 | Status: SHIPPED | OUTPATIENT
Start: 2025-07-25

## 2025-07-25 RX ORDER — ALBUTEROL SULFATE 90 UG/1
2 INHALANT RESPIRATORY (INHALATION) 4 TIMES DAILY PRN
Qty: 18 G | Refills: 5 | Status: SHIPPED | OUTPATIENT
Start: 2025-07-25

## 2025-07-25 SDOH — ECONOMIC STABILITY: FOOD INSECURITY: WITHIN THE PAST 12 MONTHS, THE FOOD YOU BOUGHT JUST DIDN'T LAST AND YOU DIDN'T HAVE MONEY TO GET MORE.: NEVER TRUE

## 2025-07-25 SDOH — ECONOMIC STABILITY: FOOD INSECURITY: WITHIN THE PAST 12 MONTHS, YOU WORRIED THAT YOUR FOOD WOULD RUN OUT BEFORE YOU GOT MONEY TO BUY MORE.: NEVER TRUE

## 2025-07-25 ASSESSMENT — PATIENT HEALTH QUESTIONNAIRE - PHQ9
SUM OF ALL RESPONSES TO PHQ QUESTIONS 1-9: 3
6. FEELING BAD ABOUT YOURSELF - OR THAT YOU ARE A FAILURE OR HAVE LET YOURSELF OR YOUR FAMILY DOWN: NOT AT ALL
5. POOR APPETITE OR OVEREATING: NOT AT ALL
10. IF YOU CHECKED OFF ANY PROBLEMS, HOW DIFFICULT HAVE THESE PROBLEMS MADE IT FOR YOU TO DO YOUR WORK, TAKE CARE OF THINGS AT HOME, OR GET ALONG WITH OTHER PEOPLE: NOT DIFFICULT AT ALL
SUM OF ALL RESPONSES TO PHQ QUESTIONS 1-9: 3
SUM OF ALL RESPONSES TO PHQ QUESTIONS 1-9: 3
7. TROUBLE CONCENTRATING ON THINGS, SUCH AS READING THE NEWSPAPER OR WATCHING TELEVISION: NOT AT ALL
1. LITTLE INTEREST OR PLEASURE IN DOING THINGS: NOT AT ALL
8. MOVING OR SPEAKING SO SLOWLY THAT OTHER PEOPLE COULD HAVE NOTICED. OR THE OPPOSITE, BEING SO FIGETY OR RESTLESS THAT YOU HAVE BEEN MOVING AROUND A LOT MORE THAN USUAL: NOT AT ALL
4. FEELING TIRED OR HAVING LITTLE ENERGY: SEVERAL DAYS
SUM OF ALL RESPONSES TO PHQ QUESTIONS 1-9: 3
3. TROUBLE FALLING OR STAYING ASLEEP: MORE THAN HALF THE DAYS
2. FEELING DOWN, DEPRESSED OR HOPELESS: NOT AT ALL
9. THOUGHTS THAT YOU WOULD BE BETTER OFF DEAD, OR OF HURTING YOURSELF: NOT AT ALL

## 2025-07-25 ASSESSMENT — LIFESTYLE VARIABLES
HOW OFTEN DO YOU HAVE A DRINK CONTAINING ALCOHOL: 2-3 TIMES A WEEK
HOW MANY STANDARD DRINKS CONTAINING ALCOHOL DO YOU HAVE ON A TYPICAL DAY: 1 OR 2

## 2025-07-25 NOTE — PROGRESS NOTES
Medicare Annual Wellness Visit    Olamide Hand is here for Medicare AWV    Assessment & Plan   [place] CAC score 100-199  Wheezing  -     fluticasone-salmeterol (ADVAIR HFA) 45-21 MCG/ACT inhaler; Inhale 2 puffs into the lungs 2 times daily, Disp-1 each, R-5Normal  -     albuterol sulfate HFA (VENTOLIN HFA) 108 (90 Base) MCG/ACT inhaler; Inhale 2 puffs into the lungs 4 times daily as needed for Wheezing, Disp-18 g, R-5Normal  Mantle cell lymphoma of lymph nodes of multiple sites (HCC)  Primary hypertension  Metabolic dysfunction-associated steatotic liver disease (MASLD)  Colon adenoma  IFG (impaired fasting glucose)  Neuropathy due to chemotherapeutic drug  Hyperlipidemia, unspecified hyperlipidemia type  Tobacco dependence syndrome  -     buPROPion (WELLBUTRIN XL) 150 MG extended release tablet; Take 1 tablet by mouth every morning, Disp-90 tablet, R-1Normal  Advanced care planning/counseling discussion  Medicare annual wellness visit, subsequent       Return in 1 year (on 7/25/2026) for Medicare AWV.     Subjective       Patient's complete Health Risk Assessment and screening values have been reviewed and are found in Flowsheets. The following problems were reviewed today and where indicated follow up appointments were made and/or referrals ordered.    Positive Risk Factor Screenings with Interventions:                 Dentist Screen:  Have you seen the dentist within the past year?: (!) No    Intervention:  Patient declines any further evaluation or treatment     Vision Screen:  Do you have difficulty driving, watching TV, or doing any of your daily activities because of your eyesight?: No  Have you had an eye exam within the past year?: (!) No  Interventions:   Patient declines any further evaluation or treatment       Advanced Directives:  Do you have a Living Will?: (!) No    Intervention:  see ACP note        Tobacco Use:    Tobacco Use      Smoking status: Every Day        Packs/day: 0.50       
Olamide Hand presents today for   Chief Complaint   Patient presents with    Medicare AWV       \"Have you been to the ER, urgent care clinic since your last visit?  Hospitalized since your last visit?\"    NO    “Have you seen or consulted any other health care providers outside of  since your last visit?”    NO             
Helga    Metabolic dysfunction-associated steatotic liver disease (MASLD)     fatty liver on US (10/10), CT (6/12); Fib-4 0.74 (1/15), 1.02 (7/25)    Migraine     Nephrolithiasis     Neuropathy due to chemotherapeutic drug 9/14    Osteoarthritis     hips and knees; Dr Pisano    Tobacco dependence syndrome 2/8/2019    Vitamin D deficiency 6/8/2022    Zoster 8/14    left T11     Past Surgical History:   Procedure Laterality Date    APPENDECTOMY  1968    COLONOSCOPY N/A 7/23/2020    Dr Carrillo (2003) polyps; (2011) adenoma; Dr Watson (3/14) isch colitis; Dr Colon (7/23/20) mod divertics and adenoma; Dr Akbar (3/4/24) divertics, avm, 8 SSA/TAs    KNEE ARTHROSCOPY Left 1960s    ORTHOPEDIC SURGERY      DEXA t score 2.1 spine, -0.3 hip (4/19)    TERRY AND BSO (CERVIX REMOVED)  04/02    Dr. 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[employer]; retired   Tobacco Use    Smoking status: Every Day     Current packs/day: 0.50     Average packs/day: 0.5 packs/day for 21.5 years (10.8 ttl pk-yrs)     Types: Cigarettes     Start date: 9/20/2020     Passive exposure: Current    Smokeless tobacco: Never   Substance and Sexual Activity    Alcohol use: Not Currently     Alcohol/week: 4.2 standard drinks of alcohol    Drug use: No    Sexual activity: Not Currently   Other Topics Concern    Not on file   Social History Narrative    Not on file     Social Drivers of Health     Financial Resource Strain: Low Risk  (6/27/2023)    Overall Financial Resource Strain (CARDIA)     Difficulty of Paying Living Expenses: Not hard at all   Food Insecurity: No Food Insecurity (7/25/2025)    Hunger Vital Sign     Worried About Running Out of Food in the Last Year:

## 2025-07-25 NOTE — PATIENT INSTRUCTIONS
vision is often tested as part of a routine exam. You may also have this test when you apply for a job where recognizing different colors is important, such as , electronics, or the .  How are vision tests done?  Visual acuity test   You cover one eye at a time.  You read aloud from a wall chart across the room.  You read aloud from a small card that you hold in your hand.  Refraction   You look into a special device.  The device puts lenses of different strengths in front of each eye to see how strong your glasses or contact lenses need to be.  Visual field tests   Your doctor may have you look through special machines.  Or your doctor may simply have you stare straight ahead while they move a finger into and out of your field of vision.  Color vision test   You look at pieces of printed test patterns in various colors. You say what number or symbol you see.  Your doctor may have you trace the number or symbol using a pointer.  How do these tests feel?  There is very little chance of having a problem from this test. If dilating drops are used for a vision test, they may make the eyes sting and cause a medicine taste in the mouth.  Follow-up care is a key part of your treatment and safety. Be sure to make and go to all appointments, and call your doctor if you are having problems. It's also a good idea to know your test results and keep a list of the medicines you take.  Where can you learn more?  Go to https://www.happn.net/patientEd and enter G551 to learn more about \"Learning About Vision Tests.\"  Current as of: July 31, 2024  Content Version: 14.5  © 2024-2025 [employer].   Care instructions adapted under license by [employer]. If you have questions about a medical condition or this instruction, always ask your healthcare professional. [facility], [employer], disclaims any warranty or liability for your use of this information.         Advance Directives: Care

## 2025-07-25 NOTE — ACP (ADVANCE CARE PLANNING)
Advance Care Planning     Advance Care Planning (ACP) Physician/NP/PA Conversation    Date of Conversation: 7/25/2025  Conducted with: Patient with Decision Making Capacity    Healthcare Decision Maker:      Primary Decision Maker: Lena Fink - Child - [phone]    Primary Decision Maker: Damaris Caldwell - Child - [phone]    Click here to complete Healthcare Decision Makers including selection of the Healthcare Decision Maker Relationship (ie \"Primary\")  Today we documented Decision Maker(s) consistent with Legal Next of Kin hierarchy.    Care Preferences:    Hospitalization:  \"If your health worsens and it becomes clear that your chance of recovery is unlikely, what would be your preference regarding hospitalization?\"  The patient would prefer hospitalization.    Ventilation:  \"If you were unable to breath on your own and your chance of recovery was unlikely, what would be your preference about the use of a ventilator (breathing machine) if it was available to you?\"  The patient would desire the use of a ventilator.    Resuscitation:  \"In the event your heart stopped as a result of an underlying serious health condition, would you want attempts made to restart your heart, or would you prefer a natural death?\"  Yes, attempt to resuscitate.    ventilation preferences, hospitalization preferences, and resuscitation preferences    Conversation Outcomes / Follow-Up Plan:  ACP in process - information provided, considering goals and options  Reviewed DNR/DNI and patient elects Full Code (Attempt Resuscitation)    Length of Voluntary ACP Conversation in minutes:  16 minutes    CHAPITO STOKES MD

## 2025-08-27 ENCOUNTER — TELEPHONE (OUTPATIENT)
Facility: CLINIC | Age: 75
End: 2025-08-27

## 2025-08-27 DIAGNOSIS — E78.5 HYPERLIPIDEMIA, UNSPECIFIED HYPERLIPIDEMIA TYPE: Primary | ICD-10-CM

## 2025-08-27 RX ORDER — ATORVASTATIN CALCIUM 10 MG/1
10 TABLET, FILM COATED ORAL EVERY MORNING
Qty: 90 TABLET | Refills: 3 | Status: SHIPPED | OUTPATIENT
Start: 2025-08-27

## (undated) DEVICE — HANDPIECE SET WITH HIGH FLOW TIP AND SUCTION TUBE: Brand: INTERPULSE

## (undated) DEVICE — 3M™ STERI-DRAPE™ INSTRUMENT POUCH 1018: Brand: STERI-DRAPE™

## (undated) DEVICE — 3M™ STERI-DRAPE™ U-DRAPE 1015: Brand: STERI-DRAPE™

## (undated) DEVICE — TRAP SPEC COLL POLYP POLYSTYR --

## (undated) DEVICE — SUTURE VCRL SZ 0 L27IN ABSRB UD L36MM CT-1 1/2 CIR J260H

## (undated) DEVICE — FORCEPS BX L240CM JAW DIA2.8MM L CAP W/ NDL MIC MESH TOOTH

## (undated) DEVICE — SUTURE VCRL SZ 1 L27IN ABSRB VLT CTX L48MM 1 2 CIR SGL ARMED J365H

## (undated) DEVICE — 4-PORT MANIFOLD: Brand: NEPTUNE 2

## (undated) DEVICE — STRYKER PERFORMANCE SERIES SAGITTAL BLADE: Brand: STRYKER PERFORMANCE SERIES

## (undated) DEVICE — NEEDLE HYPO 18GA L1.5IN PNK S STL HUB POLYPR SHLD REG BVL

## (undated) DEVICE — SYRINGE MED 3ML NDL 22GA L1 1/2IN REG BVL SFGLDE

## (undated) DEVICE — CATH IV SAFE STR 22GX1IN BLU -- PROTECTIV PLUS

## (undated) DEVICE — SNARE POLYP M W27MMXL240CM OVL STIFF DISP CAPTIVATOR

## (undated) DEVICE — Device

## (undated) DEVICE — PACK PROCEDURE SURG TOT HIP BSHR LF

## (undated) DEVICE — STOCKING ANTIEMB KNEE MED REG --

## (undated) DEVICE — GOWN,REINF,POLY,ECL,PP SLV,3XL,XLONG: Brand: MEDLINE

## (undated) DEVICE — SOLUTION IRRIG 3000ML LAC R FLX CONT

## (undated) DEVICE — HIP PILLOW, ABDUCTION: Brand: DEROYAL

## (undated) DEVICE — NEEDLE SPNL 22GA L3.5IN BLK HUB S STL REG WALL FIT STYL W/

## (undated) DEVICE — INTENDED FOR TISSUE SEPARATION, AND OTHER PROCEDURES THAT REQUIRE A SHARP SURGICAL BLADE TO PUNCTURE OR CUT.: Brand: BARD-PARKER SAFETY BLADES SIZE 15, STERILE

## (undated) DEVICE — Z DISCONTINUED BY MEDLINE USE 2711682 TRAY SKIN PREP DRY W/ PREM GLV

## (undated) DEVICE — KIT CLN UP BON SECOURS MARYV

## (undated) DEVICE — GOWN,REINFORCED,POLY,AURORA,XXLARGE,STR: Brand: MEDLINE

## (undated) DEVICE — SKIN MARKER,REGULAR TIP WITH RULER AND LABELS: Brand: DEVON

## (undated) DEVICE — BIPOLAR SEALER 23-112-1 AQM 6.0: Brand: AQUAMANTYS ®

## (undated) DEVICE — INTENDED FOR TISSUE SEPARATION, AND OTHER PROCEDURES THAT REQUIRE A SHARP SURGICAL BLADE TO PUNCTURE OR CUT.: Brand: BARD-PARKER SAFETY BLADES SIZE 10, STERILE

## (undated) DEVICE — BLANKET WRM AD W50XL85.8IN PACU FULL BODY FORC AIR

## (undated) DEVICE — SUTURE MCRYL SZ 2-0 L36IN ABSRB UD L36MM CT-1 1/2 CIR Y945H

## (undated) DEVICE — SUTURE VCRL SZ 2 L27IN ABSRB VLT L65MM TP-1 1/2 CIR J649G

## (undated) DEVICE — Z DISCONTINUED USE 2744636  DRESSING AQUACEL 14 IN ALG W3.5XL14IN POLYUR FLM CVR W/ HYDRCOLL

## (undated) DEVICE — PREP SKN CHLRAPRP 26ML TNT -- CONVERT TO ITEM 373320

## (undated) DEVICE — NEEDLE HYPO 21GA L1.5IN INTRAMUSCULAR S STL LATCH BVL UP

## (undated) DEVICE — SPIROMETER INCENT 2500ML W ONE W VLV

## (undated) DEVICE — SYR LR LCK 1ML GRAD NSAF 30ML --

## (undated) DEVICE — BLANKET WRM W29.9XL79.1IN UP BODY FORC AIR MISTRAL-AIR

## (undated) DEVICE — X-RAY SPONGES,12 PLY: Brand: DERMACEA

## (undated) DEVICE — SOLUTION IV 1000ML 0.9% SOD CHL

## (undated) DEVICE — HOOD, PEEL-AWAY: Brand: FLYTE

## (undated) DEVICE — TAPE,CLOTH/SILK,CURAD,3"X10YD,LF,40/CS: Brand: CURAD